# Patient Record
Sex: FEMALE | Race: WHITE | NOT HISPANIC OR LATINO | Employment: OTHER | URBAN - METROPOLITAN AREA
[De-identification: names, ages, dates, MRNs, and addresses within clinical notes are randomized per-mention and may not be internally consistent; named-entity substitution may affect disease eponyms.]

---

## 2017-02-16 ENCOUNTER — HOSPITAL ENCOUNTER (OUTPATIENT)
Dept: PAIN MANAGEMENT | Facility: REHABILITATION | Age: 57
End: 2017-02-16
Attending: PHYSICAL MEDICINE & REHABILITATION
Payer: MEDICAID

## 2017-02-16 ENCOUNTER — HOSPITAL ENCOUNTER (OUTPATIENT)
Dept: RADIOLOGY | Facility: REHABILITATION | Age: 57
End: 2017-02-16
Attending: PHYSICAL MEDICINE & REHABILITATION
Payer: MEDICAID

## 2017-02-16 VITALS
OXYGEN SATURATION: 94 % | SYSTOLIC BLOOD PRESSURE: 133 MMHG | HEART RATE: 73 BPM | TEMPERATURE: 98.8 F | WEIGHT: 156.53 LBS | BODY MASS INDEX: 31.56 KG/M2 | HEIGHT: 59 IN | DIASTOLIC BLOOD PRESSURE: 79 MMHG | RESPIRATION RATE: 18 BRPM

## 2017-02-16 PROCEDURE — 700111 HCHG RX REV CODE 636 W/ 250 OVERRIDE (IP)

## 2017-02-16 PROCEDURE — 64494 INJ PARAVERT F JNT L/S 2 LEV: CPT

## 2017-02-16 PROCEDURE — 64495 INJ PARAVERT F JNT L/S 3 LEV: CPT

## 2017-02-16 PROCEDURE — 700117 HCHG RX CONTRAST REV CODE 255

## 2017-02-16 PROCEDURE — 64493 INJ PARAVERT F JNT L/S 1 LEV: CPT

## 2017-02-16 RX ORDER — ROPIVACAINE HYDROCHLORIDE 2 MG/ML
INJECTION, SOLUTION EPIDURAL; INFILTRATION; PERINEURAL
Status: COMPLETED
Start: 2017-02-16 | End: 2017-02-16

## 2017-02-16 RX ORDER — BUPIVACAINE HYDROCHLORIDE 5 MG/ML
INJECTION, SOLUTION EPIDURAL; INTRACAUDAL
Status: COMPLETED
Start: 2017-02-16 | End: 2017-02-16

## 2017-02-16 RX ADMIN — IOHEXOL 3 ML: 240 INJECTION, SOLUTION INTRATHECAL; INTRAVASCULAR; INTRAVENOUS; ORAL at 08:39

## 2017-02-16 RX ADMIN — ROPIVACAINE HYDROCHLORIDE 6 MG: 2 INJECTION, SOLUTION EPIDURAL; INFILTRATION at 08:43

## 2017-02-16 ASSESSMENT — PAIN SCALES - GENERAL
PAINLEVEL_OUTOF10: 7

## 2017-02-16 NOTE — PROGRESS NOTES
Current meds. See medication reconciliation form. Reviewed with pt. Pt had excedrin at 1700 yesterday (takes daily).Pt denies taking other blood thinners or anti-inflammatories.Dr. Pozo made aware pre-procedure. Pt has a ride post-procedure (friend, Viola is ). Printed and verbal discharge instructions given to pt who verbalized understanding.

## 2017-04-20 ENCOUNTER — HOSPITAL ENCOUNTER (OUTPATIENT)
Dept: PAIN MANAGEMENT | Facility: REHABILITATION | Age: 57
End: 2017-04-20
Attending: PHYSICAL MEDICINE & REHABILITATION
Payer: MEDICAID

## 2017-04-20 RX ORDER — BETAMETHASONE SODIUM PHOSPHATE AND BETAMETHASONE ACETATE 3; 3 MG/ML; MG/ML
INJECTION, SUSPENSION INTRA-ARTICULAR; INTRALESIONAL; INTRAMUSCULAR; SOFT TISSUE
Status: COMPLETED
Start: 2017-04-20 | End: 2017-04-20

## 2017-04-20 RX ORDER — MIDAZOLAM HYDROCHLORIDE 1 MG/ML
INJECTION INTRAMUSCULAR; INTRAVENOUS
Status: COMPLETED
Start: 2017-04-20 | End: 2017-04-20

## 2017-05-04 ENCOUNTER — HOSPITAL ENCOUNTER (OUTPATIENT)
Dept: RADIOLOGY | Facility: REHABILITATION | Age: 57
End: 2017-05-04
Attending: PHYSICAL MEDICINE & REHABILITATION
Payer: MEDICAID

## 2017-05-04 ENCOUNTER — HOSPITAL ENCOUNTER (OUTPATIENT)
Dept: PAIN MANAGEMENT | Facility: REHABILITATION | Age: 57
End: 2017-05-04
Attending: PHYSICAL MEDICINE & REHABILITATION
Payer: MEDICAID

## 2017-05-04 VITALS
WEIGHT: 165.57 LBS | OXYGEN SATURATION: 92 % | DIASTOLIC BLOOD PRESSURE: 57 MMHG | HEIGHT: 58 IN | SYSTOLIC BLOOD PRESSURE: 91 MMHG | TEMPERATURE: 97.4 F | HEART RATE: 61 BPM | BODY MASS INDEX: 34.75 KG/M2 | RESPIRATION RATE: 16 BRPM

## 2017-05-04 PROCEDURE — 700117 HCHG RX CONTRAST REV CODE 255

## 2017-05-04 PROCEDURE — 700111 HCHG RX REV CODE 636 W/ 250 OVERRIDE (IP)

## 2017-05-04 PROCEDURE — 64483 NJX AA&/STRD TFRM EPI L/S 1: CPT

## 2017-05-04 RX ORDER — MIDAZOLAM HYDROCHLORIDE 1 MG/ML
INJECTION INTRAMUSCULAR; INTRAVENOUS
Status: COMPLETED
Start: 2017-05-04 | End: 2017-05-04

## 2017-05-04 RX ORDER — BUPIVACAINE HYDROCHLORIDE 2.5 MG/ML
INJECTION, SOLUTION EPIDURAL; INFILTRATION; INTRACAUDAL
Status: COMPLETED
Start: 2017-05-04 | End: 2017-05-04

## 2017-05-04 RX ORDER — BETAMETHASONE SODIUM PHOSPHATE AND BETAMETHASONE ACETATE 3; 3 MG/ML; MG/ML
INJECTION, SUSPENSION INTRA-ARTICULAR; INTRALESIONAL; INTRAMUSCULAR; SOFT TISSUE
Status: COMPLETED
Start: 2017-05-04 | End: 2017-05-04

## 2017-05-04 RX ADMIN — MIDAZOLAM HYDROCHLORIDE 1 MG: 1 INJECTION, SOLUTION INTRAMUSCULAR; INTRAVENOUS at 10:10

## 2017-05-04 RX ADMIN — BUPIVACAINE HYDROCHLORIDE 5 ML: 2.5 INJECTION, SOLUTION EPIDURAL; INFILTRATION; INTRACAUDAL; PERINEURAL at 10:00

## 2017-05-04 RX ADMIN — FENTANYL CITRATE 50 MCG: 50 INJECTION, SOLUTION INTRAMUSCULAR; INTRAVENOUS at 10:10

## 2017-05-04 RX ADMIN — BETAMETHASONE SODIUM PHOSPHATE AND BETAMETHASONE ACETATE 6 MG: 3; 3 INJECTION, SUSPENSION INTRA-ARTICULAR; INTRALESIONAL; INTRAMUSCULAR at 10:00

## 2017-05-04 RX ADMIN — IOHEXOL 3 ML: 240 INJECTION, SOLUTION INTRATHECAL; INTRAVASCULAR; INTRAVENOUS; ORAL at 10:00

## 2017-05-04 ASSESSMENT — PAIN SCALES - GENERAL
PAINLEVEL_OUTOF10: 6
PAINLEVEL_OUTOF10: 0
PAINLEVEL_OUTOF10: 8

## 2017-05-04 NOTE — PROGRESS NOTES
Current medications reviewed with pt, see medications reconciliation form. Pt wilmer taking ASA or other blood thinners or anti-inflammatories.  Pt has a ride post-procedure(Ed to drive).  Printed and verbal discharge instructions given to pt who verbalized understanding.

## 2017-07-26 ENCOUNTER — HOSPITAL ENCOUNTER (OUTPATIENT)
Dept: PAIN MANAGEMENT | Facility: REHABILITATION | Age: 57
End: 2017-07-26
Attending: PHYSICAL MEDICINE & REHABILITATION
Payer: MEDICAID

## 2017-07-26 ENCOUNTER — HOSPITAL ENCOUNTER (OUTPATIENT)
Dept: RADIOLOGY | Facility: REHABILITATION | Age: 57
End: 2017-07-26
Attending: PHYSICAL MEDICINE & REHABILITATION
Payer: MEDICAID

## 2017-07-26 VITALS
OXYGEN SATURATION: 91 % | RESPIRATION RATE: 16 BRPM | TEMPERATURE: 97.8 F | SYSTOLIC BLOOD PRESSURE: 125 MMHG | HEART RATE: 58 BPM | HEIGHT: 58 IN | WEIGHT: 169.75 LBS | DIASTOLIC BLOOD PRESSURE: 74 MMHG | BODY MASS INDEX: 35.63 KG/M2

## 2017-07-26 PROCEDURE — 700117 HCHG RX CONTRAST REV CODE 255

## 2017-07-26 PROCEDURE — 64483 NJX AA&/STRD TFRM EPI L/S 1: CPT

## 2017-07-26 PROCEDURE — 62323 NJX INTERLAMINAR LMBR/SAC: CPT

## 2017-07-26 PROCEDURE — 700111 HCHG RX REV CODE 636 W/ 250 OVERRIDE (IP)

## 2017-07-26 RX ORDER — OMEPRAZOLE 20 MG/1
20 CAPSULE, DELAYED RELEASE ORAL DAILY
COMMUNITY
End: 2018-05-30 | Stop reason: SDUPTHER

## 2017-07-26 RX ORDER — BETAMETHASONE SODIUM PHOSPHATE AND BETAMETHASONE ACETATE 3; 3 MG/ML; MG/ML
INJECTION, SUSPENSION INTRA-ARTICULAR; INTRALESIONAL; INTRAMUSCULAR; SOFT TISSUE
Status: COMPLETED
Start: 2017-07-26 | End: 2017-07-26

## 2017-07-26 RX ORDER — BUPIVACAINE HYDROCHLORIDE 2.5 MG/ML
INJECTION, SOLUTION EPIDURAL; INFILTRATION; INTRACAUDAL
Status: COMPLETED
Start: 2017-07-26 | End: 2017-07-26

## 2017-07-26 RX ORDER — LIDOCAINE HYDROCHLORIDE 10 MG/ML
INJECTION, SOLUTION EPIDURAL; INFILTRATION; INTRACAUDAL; PERINEURAL
Status: COMPLETED
Start: 2017-07-26 | End: 2017-07-26

## 2017-07-26 RX ORDER — MIDAZOLAM HYDROCHLORIDE 1 MG/ML
INJECTION INTRAMUSCULAR; INTRAVENOUS
Status: COMPLETED
Start: 2017-07-26 | End: 2017-07-26

## 2017-07-26 RX ADMIN — BUPIVACAINE HYDROCHLORIDE 3 ML: 2.5 INJECTION, SOLUTION EPIDURAL; INFILTRATION; INTRACAUDAL; PERINEURAL at 15:30

## 2017-07-26 RX ADMIN — MIDAZOLAM HYDROCHLORIDE 1.5 MG: 1 INJECTION, SOLUTION INTRAMUSCULAR; INTRAVENOUS at 15:17

## 2017-07-26 RX ADMIN — BETAMETHASONE SODIUM PHOSPHATE AND BETAMETHASONE ACETATE 6 MG: 3; 3 INJECTION, SUSPENSION INTRA-ARTICULAR; INTRALESIONAL; INTRAMUSCULAR at 15:15

## 2017-07-26 RX ADMIN — IOHEXOL 3 ML: 240 INJECTION, SOLUTION INTRATHECAL; INTRAVASCULAR; INTRAVENOUS; ORAL at 15:15

## 2017-07-26 RX ADMIN — FENTANYL CITRATE 50 MCG: 50 INJECTION, SOLUTION INTRAMUSCULAR; INTRAVENOUS at 15:17

## 2017-07-26 ASSESSMENT — PAIN SCALES - GENERAL
PAINLEVEL_OUTOF10: 8
PAINLEVEL_OUTOF10: 7
PAINLEVEL_OUTOF10: 9

## 2017-09-07 ENCOUNTER — HOSPITAL ENCOUNTER (OUTPATIENT)
Dept: PAIN MANAGEMENT | Facility: REHABILITATION | Age: 57
End: 2017-09-07
Attending: PHYSICAL MEDICINE & REHABILITATION
Payer: MEDICAID

## 2017-09-07 ENCOUNTER — HOSPITAL ENCOUNTER (OUTPATIENT)
Dept: RADIOLOGY | Facility: REHABILITATION | Age: 57
End: 2017-09-07
Attending: PHYSICAL MEDICINE & REHABILITATION
Payer: MEDICAID

## 2017-09-07 VITALS
OXYGEN SATURATION: 96 % | HEIGHT: 58 IN | DIASTOLIC BLOOD PRESSURE: 62 MMHG | TEMPERATURE: 97.4 F | HEART RATE: 98 BPM | WEIGHT: 167.11 LBS | BODY MASS INDEX: 35.08 KG/M2 | RESPIRATION RATE: 16 BRPM | SYSTOLIC BLOOD PRESSURE: 119 MMHG

## 2017-09-07 PROCEDURE — 700101 HCHG RX REV CODE 250

## 2017-09-07 PROCEDURE — 64490 INJ PARAVERT F JNT C/T 1 LEV: CPT

## 2017-09-07 PROCEDURE — 700117 HCHG RX CONTRAST REV CODE 255

## 2017-09-07 PROCEDURE — 64491 INJ PARAVERT F JNT C/T 2 LEV: CPT

## 2017-09-07 PROCEDURE — 700111 HCHG RX REV CODE 636 W/ 250 OVERRIDE (IP)

## 2017-09-07 PROCEDURE — 64492 INJ PARAVERT F JNT C/T 3 LEV: CPT

## 2017-09-07 PROCEDURE — 99152 MOD SED SAME PHYS/QHP 5/>YRS: CPT

## 2017-09-07 RX ORDER — MIDAZOLAM HYDROCHLORIDE 1 MG/ML
INJECTION INTRAMUSCULAR; INTRAVENOUS
Status: COMPLETED
Start: 2017-09-07 | End: 2017-09-07

## 2017-09-07 RX ORDER — BUPIVACAINE HYDROCHLORIDE 5 MG/ML
INJECTION, SOLUTION EPIDURAL; INTRACAUDAL
Status: COMPLETED
Start: 2017-09-07 | End: 2017-09-07

## 2017-09-07 RX ORDER — HYDROCODONE BITARTRATE AND ACETAMINOPHEN 10; 325 MG/1; MG/1
1 TABLET ORAL EVERY 4 HOURS
COMMUNITY
End: 2020-08-20

## 2017-09-07 RX ADMIN — MIDAZOLAM HYDROCHLORIDE 1.5 MG: 1 INJECTION, SOLUTION INTRAMUSCULAR; INTRAVENOUS at 10:43

## 2017-09-07 RX ADMIN — MIDAZOLAM HYDROCHLORIDE 0.5 MG: 1 INJECTION, SOLUTION INTRAMUSCULAR; INTRAVENOUS at 10:53

## 2017-09-07 RX ADMIN — BUPIVACAINE HYDROCHLORIDE 3 ML: 5 INJECTION, SOLUTION EPIDURAL; INTRACAUDAL; PERINEURAL at 10:30

## 2017-09-07 RX ADMIN — IOHEXOL 3 ML: 240 INJECTION, SOLUTION INTRATHECAL; INTRAVASCULAR; INTRAVENOUS; ORAL at 10:30

## 2017-09-07 ASSESSMENT — PAIN SCALES - GENERAL
PAINLEVEL_OUTOF10: 7
PAINLEVEL_OUTOF10: 8
PAINLEVEL_OUTOF10: 7
PAINLEVEL_OUTOF10: 8

## 2017-09-07 NOTE — PROGRESS NOTES
Reviewed Plan of Care with patient. Confirmed that he/she has stopped all blood thinning medications for last 5 days  Confirmed that she has a . Reviewed home care instructions with patient prior to procedure. All questions and concerns addressed. Reviewed Pain diary.    Dr Pozo in to evaluate patient.
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
## 2017-09-07 NOTE — PROCEDURES
Date of Service: 9/7/2017    Physician/s: Celso Pozo MD    Pre-operative Diagnosis: Cervical spondylosis, facet arthropathy    Post-operative Diagnosis: Cervical spondylosis, facet arthropathy    Procedure: LEFT TON-C3-C4 Medial Branch Block #1    Description of procedure:    The risks, benefits, and alternatives of the procedure were reviewed and discussed with the patient.  Written informed consent was freely obtained. A pre-procedural time-out was conducted by the physician verifying patient’s identity, procedure to be performed, procedure site and side, and allergy verification. Appropriate equipment was determined to be in place for the procedure.     An IV was placed peripherally, and the patient received a low dose of IV Versed for anxiolysis. The patient's vital signs were carefully monitored before, throughout, and after the procedure.     In the fluoroscopy suite the patient was placed in a prone position, a pillow placed underneath their chest, and the head was turned to the opposite side of injection. The skin was prepped and draped in the usual sterile fashion. The fluoroscope was placed over the cervical neck at the appropriate injection angles, and the targets for injection were marked. A 25g needle was placed into each of the markings at the three levels, and approx 1cc of 1% Lidocaine was injected subcutaneously into the epidermal and dermal layers. The needle was removed. A 25g needle was then placed at the lateral aspect of the articular pillars, whereby the medial branch runs, at the C2, C3, C4 levels on the LEFT side. The needle tips were then verified by AP, contralateral oblique, and lateral views. In the AP view, less than 1 cc of contrast dye was used to highlight the medial branch while the fluoroscope was running live. Following negative aspiration, approx 0.5cc of 0.5% Marcaine was then injected at the above levels, and the needles were removed intact after restyleted. The  patient's back was covered with a 4x4 gauze, the area was cleansed with sterile normal saline, and a dressing was applied.     There were no complications noted, and patient was hemodynamically stable before and after the procedure.     Celso Pozo MD  PM&R/Pain Mgmt

## 2017-09-07 NOTE — PROGRESS NOTES
Reviewed instructions as well as pain diary. Pt states pain level improved. Emphasized need to hold off on analgesic for 8 hours.

## 2017-10-08 ENCOUNTER — APPOINTMENT (OUTPATIENT)
Dept: RADIOLOGY | Facility: MEDICAL CENTER | Age: 57
End: 2017-10-08
Attending: EMERGENCY MEDICINE
Payer: MEDICAID

## 2017-10-08 ENCOUNTER — HOSPITAL ENCOUNTER (OUTPATIENT)
Facility: MEDICAL CENTER | Age: 57
End: 2017-10-09
Attending: EMERGENCY MEDICINE | Admitting: INTERNAL MEDICINE
Payer: MEDICAID

## 2017-10-08 ENCOUNTER — APPOINTMENT (OUTPATIENT)
Dept: RADIOLOGY | Facility: MEDICAL CENTER | Age: 57
End: 2017-10-08
Payer: MEDICAID

## 2017-10-08 ENCOUNTER — RESOLUTE PROFESSIONAL BILLING HOSPITAL PROF FEE (OUTPATIENT)
Dept: HOSPITALIST | Facility: MEDICAL CENTER | Age: 57
End: 2017-10-08
Payer: MEDICAID

## 2017-10-08 DIAGNOSIS — R07.9 CHEST PAIN, UNSPECIFIED TYPE: ICD-10-CM

## 2017-10-08 DIAGNOSIS — G43.809 OTHER MIGRAINE WITHOUT STATUS MIGRAINOSUS, NOT INTRACTABLE: ICD-10-CM

## 2017-10-08 PROBLEM — G43.909 MIGRAINE HEADACHE: Status: ACTIVE | Noted: 2017-10-08

## 2017-10-08 PROBLEM — Z72.0 NICOTINE ABUSE: Status: ACTIVE | Noted: 2017-10-08

## 2017-10-08 LAB
ALBUMIN SERPL BCP-MCNC: 3.8 G/DL (ref 3.2–4.9)
ALBUMIN/GLOB SERPL: 1.2 G/DL
ALP SERPL-CCNC: 58 U/L (ref 30–99)
ALT SERPL-CCNC: 13 U/L (ref 2–50)
ANION GAP SERPL CALC-SCNC: 6 MMOL/L (ref 0–11.9)
APTT PPP: 28.5 SEC (ref 24.7–36)
AST SERPL-CCNC: 39 U/L (ref 12–45)
BASOPHILS # BLD AUTO: 0.7 % (ref 0–1.8)
BASOPHILS # BLD: 0.03 K/UL (ref 0–0.12)
BILIRUB SERPL-MCNC: 0.4 MG/DL (ref 0.1–1.5)
BNP SERPL-MCNC: 66 PG/ML (ref 0–100)
BUN SERPL-MCNC: 11 MG/DL (ref 8–22)
CALCIUM SERPL-MCNC: 9.6 MG/DL (ref 8.5–10.5)
CHLORIDE SERPL-SCNC: 104 MMOL/L (ref 96–112)
CO2 SERPL-SCNC: 27 MMOL/L (ref 20–33)
CREAT SERPL-MCNC: 0.86 MG/DL (ref 0.5–1.4)
DEPRECATED D DIMER PPP IA-ACNC: 217 NG/ML(D-DU)
EKG IMPRESSION: NORMAL
EKG IMPRESSION: NORMAL
EOSINOPHIL # BLD AUTO: 0.11 K/UL (ref 0–0.51)
EOSINOPHIL NFR BLD: 2.7 % (ref 0–6.9)
ERYTHROCYTE [DISTWIDTH] IN BLOOD BY AUTOMATED COUNT: 44 FL (ref 35.9–50)
GFR SERPL CREATININE-BSD FRML MDRD: >60 ML/MIN/1.73 M 2
GLOBULIN SER CALC-MCNC: 3.1 G/DL (ref 1.9–3.5)
GLUCOSE SERPL-MCNC: 105 MG/DL (ref 65–99)
HCT VFR BLD AUTO: 43.3 % (ref 37–47)
HGB BLD-MCNC: 14.1 G/DL (ref 12–16)
IMM GRANULOCYTES # BLD AUTO: 0.03 K/UL (ref 0–0.11)
IMM GRANULOCYTES NFR BLD AUTO: 0.7 % (ref 0–0.9)
INR PPP: 0.93 (ref 0.87–1.13)
LIPASE SERPL-CCNC: 22 U/L (ref 11–82)
LYMPHOCYTES # BLD AUTO: 1.47 K/UL (ref 1–4.8)
LYMPHOCYTES NFR BLD: 36.1 % (ref 22–41)
MCH RBC QN AUTO: 29.7 PG (ref 27–33)
MCHC RBC AUTO-ENTMCNC: 32.6 G/DL (ref 33.6–35)
MCV RBC AUTO: 91.4 FL (ref 81.4–97.8)
MONOCYTES # BLD AUTO: 0.24 K/UL (ref 0–0.85)
MONOCYTES NFR BLD AUTO: 5.9 % (ref 0–13.4)
NEUTROPHILS # BLD AUTO: 2.19 K/UL (ref 2–7.15)
NEUTROPHILS NFR BLD: 53.9 % (ref 44–72)
NRBC # BLD AUTO: 0 K/UL
NRBC BLD AUTO-RTO: 0 /100 WBC
PLATELET # BLD AUTO: 170 K/UL (ref 164–446)
PMV BLD AUTO: 10.3 FL (ref 9–12.9)
POTASSIUM SERPL-SCNC: 4.2 MMOL/L (ref 3.6–5.5)
PROT SERPL-MCNC: 6.9 G/DL (ref 6–8.2)
PROTHROMBIN TIME: 12.8 SEC (ref 12–14.6)
RBC # BLD AUTO: 4.74 M/UL (ref 4.2–5.4)
SODIUM SERPL-SCNC: 137 MMOL/L (ref 135–145)
TROPONIN I SERPL-MCNC: <0.01 NG/ML (ref 0–0.04)
TROPONIN I SERPL-MCNC: <0.01 NG/ML (ref 0–0.04)
WBC # BLD AUTO: 4.1 K/UL (ref 4.8–10.8)

## 2017-10-08 PROCEDURE — 700102 HCHG RX REV CODE 250 W/ 637 OVERRIDE(OP): Performed by: INTERNAL MEDICINE

## 2017-10-08 PROCEDURE — 93005 ELECTROCARDIOGRAM TRACING: CPT | Performed by: EMERGENCY MEDICINE

## 2017-10-08 PROCEDURE — 96374 THER/PROPH/DIAG INJ IV PUSH: CPT

## 2017-10-08 PROCEDURE — A9270 NON-COVERED ITEM OR SERVICE: HCPCS | Performed by: INTERNAL MEDICINE

## 2017-10-08 PROCEDURE — G0378 HOSPITAL OBSERVATION PER HR: HCPCS

## 2017-10-08 PROCEDURE — 99285 EMERGENCY DEPT VISIT HI MDM: CPT

## 2017-10-08 PROCEDURE — 80053 COMPREHEN METABOLIC PANEL: CPT

## 2017-10-08 PROCEDURE — 93005 ELECTROCARDIOGRAM TRACING: CPT | Performed by: INTERNAL MEDICINE

## 2017-10-08 PROCEDURE — 85610 PROTHROMBIN TIME: CPT

## 2017-10-08 PROCEDURE — 84484 ASSAY OF TROPONIN QUANT: CPT

## 2017-10-08 PROCEDURE — 83690 ASSAY OF LIPASE: CPT

## 2017-10-08 PROCEDURE — 36415 COLL VENOUS BLD VENIPUNCTURE: CPT

## 2017-10-08 PROCEDURE — 93010 ELECTROCARDIOGRAM REPORT: CPT | Performed by: INTERNAL MEDICINE

## 2017-10-08 PROCEDURE — 85730 THROMBOPLASTIN TIME PARTIAL: CPT

## 2017-10-08 PROCEDURE — 96375 TX/PRO/DX INJ NEW DRUG ADDON: CPT

## 2017-10-08 PROCEDURE — 700105 HCHG RX REV CODE 258: Performed by: INTERNAL MEDICINE

## 2017-10-08 PROCEDURE — 85025 COMPLETE CBC W/AUTO DIFF WBC: CPT

## 2017-10-08 PROCEDURE — 71010 DX-CHEST-LIMITED (1 VIEW): CPT

## 2017-10-08 PROCEDURE — 99220 PR INITIAL OBSERVATION CARE,LEVL III: CPT | Performed by: INTERNAL MEDICINE

## 2017-10-08 PROCEDURE — 700111 HCHG RX REV CODE 636 W/ 250 OVERRIDE (IP): Performed by: INTERNAL MEDICINE

## 2017-10-08 PROCEDURE — 85379 FIBRIN DEGRADATION QUANT: CPT

## 2017-10-08 PROCEDURE — 83880 ASSAY OF NATRIURETIC PEPTIDE: CPT

## 2017-10-08 PROCEDURE — 700111 HCHG RX REV CODE 636 W/ 250 OVERRIDE (IP): Performed by: EMERGENCY MEDICINE

## 2017-10-08 PROCEDURE — 93005 ELECTROCARDIOGRAM TRACING: CPT

## 2017-10-08 PROCEDURE — 96372 THER/PROPH/DIAG INJ SC/IM: CPT | Mod: XU

## 2017-10-08 RX ORDER — ONDANSETRON 2 MG/ML
4 INJECTION INTRAMUSCULAR; INTRAVENOUS ONCE
Status: COMPLETED | OUTPATIENT
Start: 2017-10-08 | End: 2017-10-08

## 2017-10-08 RX ORDER — MAGNESIUM GLUCONATE 27 MG(500)
500 TABLET ORAL DAILY
Status: SHIPPED | COMMUNITY
End: 2021-10-20

## 2017-10-08 RX ORDER — ASPIRIN 300 MG/1
300 SUPPOSITORY RECTAL DAILY
Status: DISCONTINUED | OUTPATIENT
Start: 2017-10-08 | End: 2017-10-09 | Stop reason: HOSPADM

## 2017-10-08 RX ORDER — ASPIRIN 325 MG
325 TABLET ORAL DAILY
Status: DISCONTINUED | OUTPATIENT
Start: 2017-10-08 | End: 2017-10-09 | Stop reason: HOSPADM

## 2017-10-08 RX ORDER — DULOXETIN HYDROCHLORIDE 30 MG/1
60 CAPSULE, DELAYED RELEASE ORAL DAILY
COMMUNITY
End: 2017-12-26

## 2017-10-08 RX ORDER — CYCLOBENZAPRINE HCL 10 MG
10 TABLET ORAL
Status: DISCONTINUED | OUTPATIENT
Start: 2017-10-08 | End: 2017-10-09 | Stop reason: HOSPADM

## 2017-10-08 RX ORDER — SUMATRIPTAN 50 MG/1
50 TABLET, FILM COATED ORAL
COMMUNITY
End: 2018-01-29 | Stop reason: SDUPTHER

## 2017-10-08 RX ORDER — SODIUM CHLORIDE 9 MG/ML
INJECTION, SOLUTION INTRAVENOUS CONTINUOUS
Status: DISCONTINUED | OUTPATIENT
Start: 2017-10-08 | End: 2017-10-09 | Stop reason: HOSPADM

## 2017-10-08 RX ORDER — OMEPRAZOLE 20 MG/1
20 CAPSULE, DELAYED RELEASE ORAL DAILY
Status: DISCONTINUED | OUTPATIENT
Start: 2017-10-09 | End: 2017-10-09 | Stop reason: HOSPADM

## 2017-10-08 RX ORDER — AMOXICILLIN 250 MG
2 CAPSULE ORAL 2 TIMES DAILY
Status: DISCONTINUED | OUTPATIENT
Start: 2017-10-08 | End: 2017-10-09 | Stop reason: HOSPADM

## 2017-10-08 RX ORDER — TRAZODONE HYDROCHLORIDE 100 MG/1
300 TABLET ORAL
COMMUNITY
End: 2018-08-01 | Stop reason: SDUPTHER

## 2017-10-08 RX ORDER — SUMATRIPTAN 50 MG/1
50 TABLET, FILM COATED ORAL
Status: DISCONTINUED | OUTPATIENT
Start: 2017-10-08 | End: 2017-10-09 | Stop reason: HOSPADM

## 2017-10-08 RX ORDER — PROMETHAZINE HYDROCHLORIDE 25 MG/1
12.5 TABLET ORAL
COMMUNITY
End: 2018-05-12

## 2017-10-08 RX ORDER — POLYETHYLENE GLYCOL 3350 17 G/17G
1 POWDER, FOR SOLUTION ORAL
Status: DISCONTINUED | OUTPATIENT
Start: 2017-10-08 | End: 2017-10-09 | Stop reason: HOSPADM

## 2017-10-08 RX ORDER — PROMETHAZINE HYDROCHLORIDE 25 MG/1
12.5-25 SUPPOSITORY RECTAL EVERY 4 HOURS PRN
Status: DISCONTINUED | OUTPATIENT
Start: 2017-10-08 | End: 2017-10-09 | Stop reason: HOSPADM

## 2017-10-08 RX ORDER — DIPHENHYDRAMINE HCL 25 MG
25 TABLET ORAL 3 TIMES DAILY PRN
COMMUNITY
End: 2017-12-26

## 2017-10-08 RX ORDER — HEPARIN SODIUM 5000 [USP'U]/ML
5000 INJECTION, SOLUTION INTRAVENOUS; SUBCUTANEOUS EVERY 8 HOURS
Status: DISCONTINUED | OUTPATIENT
Start: 2017-10-08 | End: 2017-10-09 | Stop reason: HOSPADM

## 2017-10-08 RX ORDER — ASPIRIN 81 MG/1
324 TABLET, CHEWABLE ORAL DAILY
Status: DISCONTINUED | OUTPATIENT
Start: 2017-10-08 | End: 2017-10-09 | Stop reason: HOSPADM

## 2017-10-08 RX ORDER — MORPHINE SULFATE 4 MG/ML
2 INJECTION, SOLUTION INTRAMUSCULAR; INTRAVENOUS EVERY 4 HOURS PRN
Status: DISCONTINUED | OUTPATIENT
Start: 2017-10-08 | End: 2017-10-09 | Stop reason: HOSPADM

## 2017-10-08 RX ORDER — ONDANSETRON 2 MG/ML
4 INJECTION INTRAMUSCULAR; INTRAVENOUS EVERY 4 HOURS PRN
Status: DISCONTINUED | OUTPATIENT
Start: 2017-10-08 | End: 2017-10-09 | Stop reason: HOSPADM

## 2017-10-08 RX ORDER — ONDANSETRON 4 MG/1
4 TABLET, ORALLY DISINTEGRATING ORAL EVERY 4 HOURS PRN
Status: DISCONTINUED | OUTPATIENT
Start: 2017-10-08 | End: 2017-10-09 | Stop reason: HOSPADM

## 2017-10-08 RX ORDER — TRAZODONE HYDROCHLORIDE 50 MG/1
200 TABLET ORAL
Status: DISCONTINUED | OUTPATIENT
Start: 2017-10-08 | End: 2017-10-09 | Stop reason: HOSPADM

## 2017-10-08 RX ORDER — PROMETHAZINE HYDROCHLORIDE 25 MG/1
12.5-25 TABLET ORAL EVERY 4 HOURS PRN
Status: DISCONTINUED | OUTPATIENT
Start: 2017-10-08 | End: 2017-10-09 | Stop reason: HOSPADM

## 2017-10-08 RX ORDER — GABAPENTIN 300 MG/1
300 CAPSULE ORAL 3 TIMES DAILY
Status: ON HOLD | COMMUNITY
End: 2018-05-17

## 2017-10-08 RX ORDER — HYDROCODONE BITARTRATE AND ACETAMINOPHEN 10; 325 MG/1; MG/1
1 TABLET ORAL EVERY 6 HOURS
Status: DISCONTINUED | OUTPATIENT
Start: 2017-10-08 | End: 2017-10-09 | Stop reason: HOSPADM

## 2017-10-08 RX ORDER — BISACODYL 10 MG
10 SUPPOSITORY, RECTAL RECTAL
Status: DISCONTINUED | OUTPATIENT
Start: 2017-10-08 | End: 2017-10-09 | Stop reason: HOSPADM

## 2017-10-08 RX ORDER — ACETAMINOPHEN 325 MG/1
650 TABLET ORAL EVERY 6 HOURS PRN
Status: DISCONTINUED | OUTPATIENT
Start: 2017-10-08 | End: 2017-10-09 | Stop reason: HOSPADM

## 2017-10-08 RX ORDER — BUSPIRONE HYDROCHLORIDE 10 MG/1
30 TABLET ORAL 2 TIMES DAILY
COMMUNITY
End: 2017-12-26

## 2017-10-08 RX ADMIN — SODIUM CHLORIDE: 9 INJECTION, SOLUTION INTRAVENOUS at 22:31

## 2017-10-08 RX ADMIN — ONDANSETRON 4 MG: 2 INJECTION INTRAMUSCULAR; INTRAVENOUS at 18:12

## 2017-10-08 RX ADMIN — SUMATRIPTAN SUCCINATE 50 MG: 50 TABLET ORAL at 21:53

## 2017-10-08 RX ADMIN — STANDARDIZED SENNA CONCENTRATE AND DOCUSATE SODIUM 2 TABLET: 8.6; 5 TABLET, FILM COATED ORAL at 21:54

## 2017-10-08 RX ADMIN — HYDROMORPHONE HYDROCHLORIDE 0.5 MG: 1 INJECTION, SOLUTION INTRAMUSCULAR; INTRAVENOUS; SUBCUTANEOUS at 18:14

## 2017-10-08 RX ADMIN — CYCLOBENZAPRINE 10 MG: 10 TABLET, FILM COATED ORAL at 21:55

## 2017-10-08 RX ADMIN — HEPARIN SODIUM 5000 UNITS: 5000 INJECTION, SOLUTION INTRAVENOUS; SUBCUTANEOUS at 21:57

## 2017-10-08 RX ADMIN — HYDROCODONE BITARTRATE AND ACETAMINOPHEN 1 TABLET: 10; 325 TABLET ORAL at 20:47

## 2017-10-08 RX ADMIN — TRAZODONE HYDROCHLORIDE 200 MG: 50 TABLET ORAL at 21:54

## 2017-10-08 RX ADMIN — ASPIRIN 324 MG: 81 TABLET, CHEWABLE ORAL at 21:53

## 2017-10-08 ASSESSMENT — ENCOUNTER SYMPTOMS
MYALGIAS: 0
FOCAL WEAKNESS: 0
SHORTNESS OF BREATH: 1
FALLS: 0
INSOMNIA: 0
NERVOUS/ANXIOUS: 0
SHORTNESS OF BREATH: 0
DEPRESSION: 0
PHOTOPHOBIA: 1
EYE PAIN: 0
DIARRHEA: 0
NAUSEA: 1
CHILLS: 0
PALPITATIONS: 0
TINGLING: 1
NECK PAIN: 0
ORTHOPNEA: 0
COUGH: 0
HEADACHES: 1
DIZZINESS: 0
ABDOMINAL PAIN: 0
STRIDOR: 0
FEVER: 0
HEARTBURN: 0
COUGH: 1
SENSORY CHANGE: 1
WEIGHT LOSS: 0
BLURRED VISION: 0
SPUTUM PRODUCTION: 0
BACK PAIN: 0
VOMITING: 0
NAUSEA: 0
EYE REDNESS: 0
SPEECH CHANGE: 0
EYE DISCHARGE: 0
NECK PAIN: 1
SEIZURES: 0

## 2017-10-08 ASSESSMENT — LIFESTYLE VARIABLES
TOTAL SCORE: 0
EVER FELT BAD OR GUILTY ABOUT YOUR DRINKING: NO
EVER HAD A DRINK FIRST THING IN THE MORNING TO STEADY YOUR NERVES TO GET RID OF A HANGOVER: NO
CONSUMPTION TOTAL: NEGATIVE
ON A TYPICAL DAY WHEN YOU DRINK ALCOHOL HOW MANY DRINKS DO YOU HAVE: 2
EVER_SMOKED: YES
HOW MANY TIMES IN THE PAST YEAR HAVE YOU HAD 5 OR MORE DRINKS IN A DAY: 0
AVERAGE NUMBER OF DAYS PER WEEK YOU HAVE A DRINK CONTAINING ALCOHOL: 1
ALCOHOL_USE: YES
HAVE PEOPLE ANNOYED YOU BY CRITICIZING YOUR DRINKING: NO
TOTAL SCORE: 0
TOTAL SCORE: 0
HAVE YOU EVER FELT YOU SHOULD CUT DOWN ON YOUR DRINKING: NO

## 2017-10-08 ASSESSMENT — PATIENT HEALTH QUESTIONNAIRE - PHQ9
2. FEELING DOWN, DEPRESSED, IRRITABLE, OR HOPELESS: NOT AT ALL
SUM OF ALL RESPONSES TO PHQ QUESTIONS 1-9: 0
1. LITTLE INTEREST OR PLEASURE IN DOING THINGS: NOT AT ALL
SUM OF ALL RESPONSES TO PHQ9 QUESTIONS 1 AND 2: 0

## 2017-10-08 ASSESSMENT — PAIN SCALES - GENERAL
PAINLEVEL_OUTOF10: 9
PAINLEVEL_OUTOF10: 10
PAINLEVEL_OUTOF10: 9
PAINLEVEL_OUTOF10: 4
PAINLEVEL_OUTOF10: 9

## 2017-10-08 NOTE — ED PROVIDER NOTES
ED Provider Note    Scribed for Grady Lucas M.D. by Rolando Basurto. 10/8/2017, 4:59 PM.    Primary care provider: [facility]  Means of arrival: Walk in  History obtained from: Patient  History limited by: None    CHIEF COMPLAINT  Chief Complaint   Patient presents with   • Chest Pain   • Migraine     HPI  Martha Mosher is a 57 y.o. female who presents to the Emergency Department complaining of left sided chest pain, onset three weeks. The patient notes that her pain radiates to her left breast and is described as a pressure. Per patient her pain is intermittent and severe. This pain reportedly lasts for hours. She reports a history of DVT when she was a child. Associated symptoms include shortness of breath and nausea. She denies any history of cardiac issues. Patient denies a history of diabetes or hypertension. Her pain has been intermittently treated with Norco that she takes for chronic pain.     She reports an associated persistent ocular and occipital migraine at this time. Her headache radiates into her upper neck Per patient her migraine is severe in nature. The headache started a couple days ago and has waxed and waned.  It crescendoed over about 2 hours.  It is fairly typical in nature, location, and she has no trauma, or focal neurologic symptoms or fevers or chills.  She has been taking Imitrex for her chronic migraines with only intermittent relief of her pain. Associated symptoms include myalgias, and cough. Patient reports chronic numbness and tingling to her legs which has been unchanged. She notes that the location of her headache is consistent with her history of migraine. The patient states the only change to her migraine is in the severity.     REVIEW OF SYSTEMS  Review of Systems   Eyes: Positive for photophobia.   Respiratory: Positive for cough and shortness of breath.    Cardiovascular: Positive for chest pain.   Gastrointestinal: Positive for nausea. Negative for vomiting.    Musculoskeletal: Positive for neck pain. Negative for falls.   Neurological: Positive for tingling (chronic and unchanged), sensory change (chronic and unchanged) and headaches. Negative for speech change and focal weakness.   All other systems reviewed and are negative.    C.      PAST MEDICAL HISTORY   has a past medical history of Allergy, unspecified not elsewhere classified; Anxiety; Arthritis; Cancer; Chronic airway obstruction, not elsewhere classified; Depression; Fibromyalgia; GERD (gastroesophageal reflux disease); Hypertension; Indigestion; Migraine; Osteoporosis, unspecified; Other emphysema; Other specified symptom associated with female genital organs; Renal disorder; Type II or unspecified type diabetes mellitus without mention of complication, not stated as uncontrolled; Ulcer; Unspecified asthma(493.90); Unspecified cataract; and Urolithiasis.    SURGICAL HISTORY   has a past surgical history that includes gastroscopy with biopsy (3/1/2009); colonoscopy with biopsy (8/3/2009); other abdominal surgery; gyn surgery; other; appendectomy; primary c section; hernia repair; and abdominal hysterectomy total.    SOCIAL HISTORY  Social History   Substance Use Topics   • Smoking status: Current Every Day Smoker     Packs/day: 0.50     Types: Cigarettes      Comment: 1ppd - quit 7-8 months ago   • Alcohol use No      History   Drug Use   • Types: Inhaled     Comment: Meth     FAMILY HISTORY  Family History   Problem Relation Age of Onset   • Lung Disease Mother    • Diabetes Mother    • Stroke Mother    • Arthritis Father    • Cancer Father    • Diabetes Father    • Hypertension Father    • Stroke Father    • Alcohol/Drug Father    • Hypertension Brother    • Stroke Brother    • Alcohol/Drug Brother    • Arthritis Maternal Aunt    • Arthritis Maternal Uncle    • Cancer Paternal Grandmother    • Cancer Paternal Grandfather      CURRENT MEDICATIONS  No current facility-administered medications on file prior to  "encounter.      Current Outpatient Prescriptions on File Prior to Encounter   Medication Sig Dispense Refill   • hydrocodone/acetaminophen (NORCO)  MG Tab Take 1-2 Tabs by mouth every 6 hours as needed.     • omeprazole (PRILOSEC) 20 MG delayed-release capsule Take 20 mg by mouth every day.     • TRAZODONE HCL PO Take  by mouth.     • Gabapentin (NEURONTIN PO) Take  by mouth.     • SUMAtriptan Succinate (IMITREX PO) Take  by mouth.     • Misc. Devices (FOLDING WALKER/ADULT) Misc 1 Device by Does not apply route 1 time daily as needed. 1 Each 0   • cyclobenzaprine (FLEXERIL) 10 MG Tab Take 10 mg by mouth 3 times a day.     • promethazine (PHENERGAN) 25 MG Tab Take 25 mg by mouth every 6 hours as needed for Nausea/Vomiting.     • asa/apap/caffeine (EXCEDRIN) 250-250-65 MG Tab Take 1 Tab by mouth every 6 hours as needed for Headache.       ALLERGIES  Allergies   Allergen Reactions   • Lyrica Unspecified     Hallucinations   • Sulfa Drugs Hives and Unspecified     Pt states that she hallucinates on this as well as getting hives       PHYSICAL EXAM  VITAL SIGNS: /80   Pulse 71   Temp 36.4 °C (97.6 °F) (Temporal)   Resp 18   Ht 1.473 m (4' 10\")   Wt 72.6 kg (160 lb)   SpO2 93%   BMI 33.44 kg/m²   Vitals reviewed.  Constitutional:Regular appears comfortable in no acute cardiopulmonary distress.  HENT: Normocephalic, Atraumatic, Bilateral external ears normal, Oropharynx moist, No oral exudates, Nose normal.   Eyes: PERRL, EOMI, Conjunctiva normal, No discharge.   Neck: Normal range of motion, No tenderness, Supple, No stridor.   Cardiovascular: Normal heart rate, Normal rhythm, No murmurs, No rubs, No gallops.   Thorax & Lungs: Normal breath sounds, No respiratory distress, No wheezing, No chest tenderness.   Abdomen: Bowel sounds normal, Soft, No tenderness  Skin: Warm, Dry, No erythema, No rash.   Back: No tenderness, No CVA tenderness.   Musculoskeletal: Good range of motion in all major joints. No " edema. No tenderness to palpation or major deformities noted. Good pulses in all extremities.   Neurologic: Alert, Normal motor function, Normal sensory function, No focal deficits noted.   Psychiatric: Affect normal    LABS  Results for orders placed or performed during the hospital encounter of 10/08/17   Troponin   Result Value Ref Range    Troponin I <0.01 0.00 - 0.04 ng/mL   Btype Natriuretic Peptide   Result Value Ref Range    B Natriuretic Peptide 66 0 - 100 pg/mL   CBC with Differential   Result Value Ref Range    WBC 4.1 (L) 4.8 - 10.8 K/uL    RBC 4.74 4.20 - 5.40 M/uL    Hemoglobin 14.1 12.0 - 16.0 g/dL    Hematocrit 43.3 37.0 - 47.0 %    MCV 91.4 81.4 - 97.8 fL    MCH 29.7 27.0 - 33.0 pg    MCHC 32.6 (L) 33.6 - 35.0 g/dL    RDW 44.0 35.9 - 50.0 fL    Platelet Count 170 164 - 446 K/uL    MPV 10.3 9.0 - 12.9 fL    Neutrophils-Polys 53.90 44.00 - 72.00 %    Lymphocytes 36.10 22.00 - 41.00 %    Monocytes 5.90 0.00 - 13.40 %    Eosinophils 2.70 0.00 - 6.90 %    Basophils 0.70 0.00 - 1.80 %    Immature Granulocytes 0.70 0.00 - 0.90 %    Nucleated RBC 0.00 /100 WBC    Neutrophils (Absolute) 2.19 2.00 - 7.15 K/uL    Lymphs (Absolute) 1.47 1.00 - 4.80 K/uL    Monos (Absolute) 0.24 0.00 - 0.85 K/uL    Eos (Absolute) 0.11 0.00 - 0.51 K/uL    Baso (Absolute) 0.03 0.00 - 0.12 K/uL    Immature Granulocytes (abs) 0.03 0.00 - 0.11 K/uL    NRBC (Absolute) 0.00 K/uL   Complete Metabolic Panel (CMP)   Result Value Ref Range    Sodium 137 135 - 145 mmol/L    Potassium 4.2 3.6 - 5.5 mmol/L    Chloride 104 96 - 112 mmol/L    Co2 27 20 - 33 mmol/L    Anion Gap 6.0 0.0 - 11.9    Glucose 105 (H) 65 - 99 mg/dL    Bun 11 8 - 22 mg/dL    Creatinine 0.86 0.50 - 1.40 mg/dL    Calcium 9.6 8.5 - 10.5 mg/dL    AST(SGOT) 39 12 - 45 U/L    ALT(SGPT) 13 2 - 50 U/L    Alkaline Phosphatase 58 30 - 99 U/L    Total Bilirubin 0.4 0.1 - 1.5 mg/dL    Albumin 3.8 3.2 - 4.9 g/dL    Total Protein 6.9 6.0 - 8.2 g/dL    Globulin 3.1 1.9 - 3.5 g/dL     A-G Ratio 1.2 g/dL   Prothrombin Time   Result Value Ref Range    PT 12.8 12.0 - 14.6 sec    INR 0.93 0.87 - 1.13   APTT   Result Value Ref Range    APTT 28.5 24.7 - 36.0 sec   Lipase   Result Value Ref Range    Lipase 22 11 - 82 U/L   ESTIMATED GFR   Result Value Ref Range    GFR If African American >60 >60 mL/min/1.73 m 2    GFR If Non African American >60 >60 mL/min/1.73 m 2   EKG (NOW)   Result Value Ref Range    Report       [facility] Emergency Dept.    Test Date:  2017-10-08  Pt Name:    EDENILSON FLETCHER             Department: ER  MRN:        [id number]                      Room:  Gender:     F                            [facility]  :        [id number]                   Requested By:ER TRIAGE PROTOCOL  Order #:    [id number]                    Reading MD:    Measurements  Intervals                                Axis  Rate:       68                           P:          66  CT:         132                          QRS:        68  QRSD:       132                          T:          52  QT:         428  QTc:        456    Interpretive Statements  SINUS RHYTHM  RIGHT BUNDLE BRANCH BLOCK  Compared to ECG 06/10/2010 12:57:14  Sinus bradycardia no longer present  Myocardial infarct finding no longer present       All labs reviewed by me.    EKG Interpretation  Interpreted by me    Rhythm:  Normal sinus rhythm   Rate: 68  Axis: normal  Ectopy: none  Conduction:Right bundle branch morphology with asymmetric anterior ST depressions.  ST Segments: no acute change  T Waves: no acute change  Q Waves: none  Clinical Impression: Normal sinus rhythm with) block and no old EKG for comparison    RADIOLOGY  DX-CHEST-LIMITED (1 VIEW)   Final Result         Bibasal atelectasis.      NM-CARDIAC STRESS TEST    (Results Pending)     The radiologist's interpretation of all radiological studies have been reviewed by me.    COURSE & MEDICAL DECISION MAKING  Pertinent Labs & Imaging studies reviewed. (See  chart for details)    4:59 PM Patient seen and examined at bedside. The patient presents with A headache and chest pain.  And the differential diagnosis includes but is not limited to her headache appears to be acute exacerbation of her chronic headache.  Chest pain is concerning for ACS, PE, dissection, and I. Ordered for chest x ray, troponin, estimated GFR, BNP, CBC, CMP, prothrombin time, APTT, lipase, and EKG to evaluate. Patient will be treated with Dilaudid and Zofran for her symptoms.      5:55 PM - I discussed the case with Dr. Alonso Dobbs. He is aware of the patient and agrees to admit the patient into his care.    The patient has a headache but this appears to be an acute exacerbation of chronic headache.  She has a normal neurologic exam.  She does not have a history that suggests a subarachnoid hemorrhage is not a thunderclap headache.  There is no fever, nuchal rigidity.  There is no trauma.  I doubt this is a hemorrhage.  I don't think she would benefit from a head CT at this time feel that her for pain control and observation.    The patient has chest pain is concerning for ACS as well as an abnormal EKG.  Her troponin is negative and she's been having intermittent crescendoing chest pain.  Certainly I don't think we have excluded this.  In the ER with a troponin or 2.  Her EKG is abnormal.  Her chest x-ray does not show any evidence of atelectasis.  There is no evidence of pneumonia, pneumothorax.  Her clinical history is not use of a dissection or PE.  Should be admitted for QT workup and treatment.    Old chart was reviewed.  She did have a stress test in 2012.  She is multiple cardiac risk factors.  Because of her stuttering and crescendoing chest pain.  She'll be admitted to the hospital for continued workup and treat him.    DISPOSITION:  Patient will be admitted to Dr. Alonso Dobbs in guarded condition.    FINAL IMPRESSION  1. Chest pain, unspecified type    2. Other migraine without  status migrainosus, not intractable          IRolando (Scribe), am scribing for, and in the presence of, Grady Lucas M.D..    Electronically signed by: Rolando Basurto (Scribe), 10/8/2017    Grady DALAL M.D. personally performed the services described in this documentation, as scribed by Rolando Basurto in my presence, and it is both accurate and complete.    The note accurately reflects work and decisions made by me.  Grady Lucas  10/8/2017  11:16 PM

## 2017-10-08 NOTE — ED NOTES
Pt to triage c/o chest pain radiating under breast for weeks. (+) Nausea. Pt c/o chronic migraine x 5 days. Pt used home meds with no relief. HX of migraines.   Pt advised to return to triage nurse for any changes or concerns.

## 2017-10-09 ENCOUNTER — APPOINTMENT (OUTPATIENT)
Dept: RADIOLOGY | Facility: MEDICAL CENTER | Age: 57
End: 2017-10-09
Attending: INTERNAL MEDICINE
Payer: MEDICAID

## 2017-10-09 VITALS
WEIGHT: 177.03 LBS | BODY MASS INDEX: 37.16 KG/M2 | OXYGEN SATURATION: 96 % | DIASTOLIC BLOOD PRESSURE: 57 MMHG | RESPIRATION RATE: 18 BRPM | TEMPERATURE: 97.5 F | SYSTOLIC BLOOD PRESSURE: 110 MMHG | HEART RATE: 63 BPM | HEIGHT: 58 IN

## 2017-10-09 PROBLEM — G43.909 MIGRAINE HEADACHE: Status: RESOLVED | Noted: 2017-10-08 | Resolved: 2017-10-09

## 2017-10-09 PROBLEM — R07.9 CHEST PAIN: Status: RESOLVED | Noted: 2017-10-08 | Resolved: 2017-10-09

## 2017-10-09 LAB
ALBUMIN SERPL BCP-MCNC: 3.4 G/DL (ref 3.2–4.9)
ALBUMIN/GLOB SERPL: 1.3 G/DL
ALP SERPL-CCNC: 50 U/L (ref 30–99)
ALT SERPL-CCNC: 11 U/L (ref 2–50)
ANION GAP SERPL CALC-SCNC: 4 MMOL/L (ref 0–11.9)
AST SERPL-CCNC: 34 U/L (ref 12–45)
BILIRUB SERPL-MCNC: 0.2 MG/DL (ref 0.1–1.5)
BUN SERPL-MCNC: 13 MG/DL (ref 8–22)
CALCIUM SERPL-MCNC: 9.1 MG/DL (ref 8.5–10.5)
CHLORIDE SERPL-SCNC: 106 MMOL/L (ref 96–112)
CO2 SERPL-SCNC: 27 MMOL/L (ref 20–33)
CREAT SERPL-MCNC: 0.84 MG/DL (ref 0.5–1.4)
EKG IMPRESSION: NORMAL
ERYTHROCYTE [DISTWIDTH] IN BLOOD BY AUTOMATED COUNT: 44.4 FL (ref 35.9–50)
GFR SERPL CREATININE-BSD FRML MDRD: >60 ML/MIN/1.73 M 2
GLOBULIN SER CALC-MCNC: 2.7 G/DL (ref 1.9–3.5)
GLUCOSE SERPL-MCNC: 120 MG/DL (ref 65–99)
HCT VFR BLD AUTO: 39.4 % (ref 37–47)
HGB BLD-MCNC: 12.6 G/DL (ref 12–16)
MCH RBC QN AUTO: 29.4 PG (ref 27–33)
MCHC RBC AUTO-ENTMCNC: 32 G/DL (ref 33.6–35)
MCV RBC AUTO: 91.8 FL (ref 81.4–97.8)
PLATELET # BLD AUTO: 157 K/UL (ref 164–446)
PMV BLD AUTO: 10 FL (ref 9–12.9)
POTASSIUM SERPL-SCNC: 4.2 MMOL/L (ref 3.6–5.5)
PROT SERPL-MCNC: 6.1 G/DL (ref 6–8.2)
RBC # BLD AUTO: 4.29 M/UL (ref 4.2–5.4)
SODIUM SERPL-SCNC: 137 MMOL/L (ref 135–145)
TROPONIN I SERPL-MCNC: <0.01 NG/ML (ref 0–0.04)
WBC # BLD AUTO: 3.3 K/UL (ref 4.8–10.8)

## 2017-10-09 PROCEDURE — 84484 ASSAY OF TROPONIN QUANT: CPT

## 2017-10-09 PROCEDURE — 90686 IIV4 VACC NO PRSV 0.5 ML IM: CPT | Performed by: INTERNAL MEDICINE

## 2017-10-09 PROCEDURE — 99217 PR OBSERVATION CARE DISCHARGE: CPT | Performed by: HOSPITALIST

## 2017-10-09 PROCEDURE — 90471 IMMUNIZATION ADMIN: CPT

## 2017-10-09 PROCEDURE — A9502 TC99M TETROFOSMIN: HCPCS

## 2017-10-09 PROCEDURE — G0378 HOSPITAL OBSERVATION PER HR: HCPCS

## 2017-10-09 PROCEDURE — 96375 TX/PRO/DX INJ NEW DRUG ADDON: CPT | Mod: XU

## 2017-10-09 PROCEDURE — 700111 HCHG RX REV CODE 636 W/ 250 OVERRIDE (IP): Performed by: INTERNAL MEDICINE

## 2017-10-09 PROCEDURE — 96374 THER/PROPH/DIAG INJ IV PUSH: CPT

## 2017-10-09 PROCEDURE — 700102 HCHG RX REV CODE 250 W/ 637 OVERRIDE(OP): Performed by: INTERNAL MEDICINE

## 2017-10-09 PROCEDURE — 85027 COMPLETE CBC AUTOMATED: CPT

## 2017-10-09 PROCEDURE — 80053 COMPREHEN METABOLIC PANEL: CPT

## 2017-10-09 PROCEDURE — A9270 NON-COVERED ITEM OR SERVICE: HCPCS | Performed by: INTERNAL MEDICINE

## 2017-10-09 PROCEDURE — 700111 HCHG RX REV CODE 636 W/ 250 OVERRIDE (IP)

## 2017-10-09 PROCEDURE — 36415 COLL VENOUS BLD VENIPUNCTURE: CPT

## 2017-10-09 PROCEDURE — 96372 THER/PROPH/DIAG INJ SC/IM: CPT | Mod: XU

## 2017-10-09 RX ORDER — REGADENOSON 0.08 MG/ML
INJECTION, SOLUTION INTRAVENOUS
Status: COMPLETED
Start: 2017-10-09 | End: 2017-10-09

## 2017-10-09 RX ADMIN — HEPARIN SODIUM 5000 UNITS: 5000 INJECTION, SOLUTION INTRAVENOUS; SUBCUTANEOUS at 13:55

## 2017-10-09 RX ADMIN — HEPARIN SODIUM 5000 UNITS: 5000 INJECTION, SOLUTION INTRAVENOUS; SUBCUTANEOUS at 06:00

## 2017-10-09 RX ADMIN — MORPHINE SULFATE 2 MG: 4 INJECTION INTRAVENOUS at 06:00

## 2017-10-09 RX ADMIN — STANDARDIZED SENNA CONCENTRATE AND DOCUSATE SODIUM 2 TABLET: 8.6; 5 TABLET, FILM COATED ORAL at 08:26

## 2017-10-09 RX ADMIN — OMEPRAZOLE 20 MG: 20 CAPSULE, DELAYED RELEASE ORAL at 08:25

## 2017-10-09 RX ADMIN — ACETAMINOPHEN, ASPIRIN AND CAFFEINE 2 TABLET: 250; 250; 65 TABLET, FILM COATED ORAL at 00:36

## 2017-10-09 RX ADMIN — ASPIRIN 324 MG: 81 TABLET, CHEWABLE ORAL at 08:25

## 2017-10-09 RX ADMIN — HYDROCODONE BITARTRATE AND ACETAMINOPHEN 1 TABLET: 10; 325 TABLET ORAL at 08:26

## 2017-10-09 RX ADMIN — REGADENOSON 0.4 MG: 0.08 INJECTION, SOLUTION INTRAVENOUS at 11:33

## 2017-10-09 RX ADMIN — HYDROCODONE BITARTRATE AND ACETAMINOPHEN 1 TABLET: 10; 325 TABLET ORAL at 03:41

## 2017-10-09 RX ADMIN — INFLUENZA A VIRUS A/MICHIGAN/45/2015 X-275 (H1N1) ANTIGEN (FORMALDEHYDE INACTIVATED), INFLUENZA A VIRUS A/HONG KONG/4801/2014 X-263B (H3N2) ANTIGEN (FORMALDEHYDE INACTIVATED), INFLUENZA B VIRUS B/PHUKET/3073/2013 ANTIGEN (FORMALDEHYDE INACTIVATED), AND INFLUENZA B VIRUS B/BRISBANE/60/2008 ANTIGEN (FORMALDEHYDE INACTIVATED) 0.5 ML: 15; 15; 15; 15 INJECTION, SUSPENSION INTRAMUSCULAR at 08:27

## 2017-10-09 RX ADMIN — ACETAMINOPHEN, ASPIRIN AND CAFFEINE 2 TABLET: 250; 250; 65 TABLET, FILM COATED ORAL at 12:52

## 2017-10-09 RX ADMIN — HYDROCODONE BITARTRATE AND ACETAMINOPHEN 1 TABLET: 10; 325 TABLET ORAL at 13:55

## 2017-10-09 ASSESSMENT — PAIN SCALES - GENERAL
PAINLEVEL_OUTOF10: 8
PAINLEVEL_OUTOF10: 6
PAINLEVEL_OUTOF10: 4
PAINLEVEL_OUTOF10: 8
PAINLEVEL_OUTOF10: 8
PAINLEVEL_OUTOF10: 4

## 2017-10-09 ASSESSMENT — LIFESTYLE VARIABLES: EVER_SMOKED: YES

## 2017-10-09 NOTE — ASSESSMENT & PLAN NOTE
Likely noncardiac chest pain  HILARIA score low  Initial troponin and EKG were negative  Continue to trend troponin and EKG  D-dimer pending  Nothing by mouth at midnight  Stress test in the morning

## 2017-10-09 NOTE — PROGRESS NOTES
Pt admitted from [facility] by francesca Ramos, with PIV on her left hand patent SL. Walked to her room with no problem. Oriented to her room, call light given. Placed on cardiac monitoring. Blood pressure 122/63, pulse 67, temperature 36.5 °C (97.7 °F), resp. rate 14, SpO2 93 % on 1L/NC. Admit profile completed. C/o pain on her head, Imitrex given. CARCAMO, denies any N/T. Needs attended. Call light within reach. Hourly rounding practiced.

## 2017-10-09 NOTE — H&P
Hospital Medicine History and Physical      Date of Service  10/8/2017    Chief Complaint  Chief Complaint   Patient presents with   • Chest Pain   • Migraine       History of Presenting Illness  Nomi is a 57 y.o. female PMH of anxiety, fibromyalgia, who presents with 3 weeks history of intermittent chest pain. She described the pain as sharp pain and occasionally pressure-like pain, 6/ 10 intensity, no radiation. Also she has been having a migraine headache for the past few days. She take Fioricet and sumatriptan without relief. So she is decided to come do you have a checkup. Currently she is chest pain-free. Denies shortness of breath or recent upper respiratory tract infection. She will be admitted to the floor for observation.    Primary Care Physician  Luisana Parada N.P.      Code Status  Full code    Review of Systems  Review of Systems   Constitutional: Negative for chills, fever and weight loss.   HENT: Negative for congestion and nosebleeds.    Eyes: Negative for blurred vision, pain, discharge and redness.   Respiratory: Negative for cough, sputum production, shortness of breath and stridor.    Cardiovascular: Positive for chest pain. Negative for palpitations and orthopnea.   Gastrointestinal: Negative for abdominal pain, diarrhea, heartburn, nausea and vomiting.   Genitourinary: Negative for dysuria, frequency and urgency.   Musculoskeletal: Negative for back pain, myalgias and neck pain.   Skin: Negative for itching and rash.   Neurological: Positive for headaches. Negative for dizziness, focal weakness and seizures.   Psychiatric/Behavioral: Negative for depression. The patient is not nervous/anxious and does not have insomnia.      Please see HPI, all other systems were reviewed and are negative (AMA/CMS criteria)     Past Medical History  Past Medical History:   Diagnosis Date   • Allergy, unspecified not elsewhere classified    • Anxiety    • Arthritis    • Cancer     cervical ca -  hysterectomy   • Chronic airway obstruction, not elsewhere classified    • Depression    • Fibromyalgia    • GERD (gastroesophageal reflux disease)    • Hypertension     takes lisinopril   • Indigestion     hx colitis   • Migraine    • Osteoporosis, unspecified    • Other emphysema    • Other specified symptom associated with female genital organs     total hyster   • Renal disorder     kidney stone   • Type II or unspecified type diabetes mellitus without mention of complication, not stated as uncontrolled    • Ulcer    • Unspecified asthma(493.90)    • Unspecified cataract    • Urolithiasis     has lithotripsy       Surgical History  Past Surgical History:   Procedure Laterality Date   • COLONOSCOPY WITH BIOPSY  8/3/2009    Performed by GRAEME LOCK JR at ENDOSCOPY [facility] ORS   • GASTROSCOPY WITH BIOPSY  3/1/2009    Performed by LUIS ARMANDO SIERRA at ENDOSCOPY [facility] ORS   • ABDOMINAL HYSTERECTOMY TOTAL     • APPENDECTOMY     • GYN SURGERY      hysterectomy   • HERNIA REPAIR     • OTHER      lithotripsy   • OTHER ABDOMINAL SURGERY      hysterectomy, hernia,    • PRIMARY C SECTION         Medications  No current facility-administered medications on file prior to encounter.      Current Outpatient Prescriptions on File Prior to Encounter   Medication Sig Dispense Refill   • hydrocodone/acetaminophen (NORCO)  MG Tab Take 1 Tab by mouth every 6 hours.     • omeprazole (PRILOSEC) 20 MG delayed-release capsule Take 20 mg by mouth every day.     • cyclobenzaprine (FLEXERIL) 10 MG Tab Take 10 mg by mouth every bedtime.     • asa/apap/caffeine (EXCEDRIN) 250-250-65 MG Tab Take 2 Tabs by mouth every 6 hours as needed for Headache.       Family History  Family History   Problem Relation Age of Onset   • Lung Disease Mother    • Diabetes Mother    • Stroke Mother    • Arthritis Father    • Cancer Father    • Diabetes Father    • Hypertension Father    • Stroke Father    • Alcohol/Drug Father    •  Hypertension Brother    • Stroke Brother    • Alcohol/Drug Brother    • Arthritis Maternal Aunt    • Arthritis Maternal Uncle    • Cancer Paternal Grandmother    • Cancer Paternal Grandfather          Social History  Social History   Substance Use Topics   • Smoking status: Current Every Day Smoker     Packs/day: 0.50     Types: Cigarettes   • Smokeless tobacco: Not on file      Comment: 1ppd - quit 7-8 months ago   • Alcohol use No       Allergies  Allergies   Allergen Reactions   • Lyrica Unspecified     Hallucinations   • Sulfa Drugs Hives and Unspecified     Pt states that she hallucinates on this as well as getting hives        Physical Exam  Laboratory   Hemodynamics  Temp (24hrs), Av.4 °C (97.6 °F), Min:36.4 °C (97.6 °F), Max:36.4 °C (97.6 °F)   Temperature: 36.4 °C (97.6 °F)  Pulse  Av.5  Min: 68  Max: 86 Heart Rate (Monitored): 67  Blood Pressure: 111/65, NIBP: (!) 99/67      Respiratory      Respiration: (!) 29, Pulse Oximetry: 92 %             Physical Exam   Constitutional: She is oriented to person, place, and time. No distress.   HENT:   Head: Normocephalic and atraumatic.   Mouth/Throat: Oropharynx is clear and moist.   Eyes: Conjunctivae and EOM are normal. Pupils are equal, round, and reactive to light.   Neck: Normal range of motion. Neck supple. No tracheal deviation present. No thyromegaly present.   Cardiovascular: Normal rate and regular rhythm.    No murmur heard.  Pulmonary/Chest: Effort normal and breath sounds normal. No respiratory distress. She has no wheezes.   Abdominal: Soft. Bowel sounds are normal. She exhibits no distension. There is no tenderness.   Musculoskeletal: She exhibits no edema or tenderness.   Neurological: She is alert and oriented to person, place, and time. No cranial nerve deficit.   Skin: Skin is warm and dry. She is not diaphoretic. No erythema.   Psychiatric: She has a normal mood and affect. Her behavior is normal. Thought content normal.       Recent  Labs      10/08/17   1541   WBC  4.1*   RBC  4.74   HEMOGLOBIN  14.1   HEMATOCRIT  43.3   MCV  91.4   MCH  29.7   MCHC  32.6*   RDW  44.0   PLATELETCT  170   MPV  10.3     Recent Labs      10/08/17   1541   SODIUM  137   POTASSIUM  4.2   CHLORIDE  104   CO2  27   GLUCOSE  105*   BUN  11   CREATININE  0.86   CALCIUM  9.6     Recent Labs      10/08/17   1541   ALTSGPT  13   ASTSGOT  39   ALKPHOSPHAT  58   TBILIRUBIN  0.4   LIPASE  22   GLUCOSE  105*     Recent Labs      10/08/17   1541   APTT  28.5   INR  0.93     Recent Labs      10/08/17   1541   BNPBTYPENAT  66         Lab Results   Component Value Date    TROPONINI <0.01 10/08/2017       Imaging  DX-CHEST-LIMITED (1 VIEW)   Final Result         Bibasal atelectasis.        EKG  per my independant read:  QTc: 456, HR: 68, Normal Sinus Rhythm, no ST/T changes     Assessment/Plan     I anticipate this patient is appropriate for observation status at this time.    Chest pain- (present on admission)   Assessment & Plan    Likely noncardiac chest pain  HILARIA score low  Initial troponin and EKG were negative  Continue to trend troponin and EKG  D-dimer pending  Nothing by mouth at midnight  Stress test in the morning        Nicotine abuse- (present on admission)   Assessment & Plan    Smoking cessation education was given        Migraine headache- (present on admission)   Assessment & Plan    Continue Fioricet, sumatriptan            Prophylaxis:  sc heparin

## 2017-10-09 NOTE — ED NOTES
Med rec updated and complete.  Allergies reviewed.  Pt denies antibiotic use in last 30 days.  Dimmed lights for pt comfort.

## 2017-10-09 NOTE — DISCHARGE INSTRUCTIONS
Discharge Instructions    Discharged to home by car with relative. Discharged via wheelchair, hospital escort: Yes.  Special equipment needed: Not Applicable    Be sure to schedule a follow-up appointment with your primary care doctor or any specialists as instructed.     Discharge Plan:   Diet Plan: Discussed  Activity Level: Discussed  Smoking Cessation Offered: Patient Refused  Confirmed Follow up Appointment: Patient to Call and Schedule Appointment  Confirmed Symptoms Management: Discussed  Medication Reconciliation Updated: Yes  Influenza Vaccine Indication: Indicated: 9 to 64 years of age  Influenza Vaccine Given - only chart on this line when given: Influenza Vaccine Given (See MAR)    I understand that a diet low in cholesterol, fat, and sodium is recommended for good health. Unless I have been given specific instructions below for another diet, I accept this instruction as my diet prescription.   Other diet: Regular    Special Instructions: None    · Is patient discharged on Warfarin / Coumadin?   No     · Is patient Post Blood Transfusion?  No    Depression / Suicide Risk    As you are discharged from this [facility] facility, it is important to learn how to keep safe from harming yourself.    Recognize the warning signs:  · Abrupt changes in personality, positive or negative- including increase in energy   · Giving away possessions  · Change in eating patterns- significant weight changes-  positive or negative  · Change in sleeping patterns- unable to sleep or sleeping all the time   · Unwillingness or inability to communicate  · Depression  · Unusual sadness, discouragement and loneliness  · Talk of wanting to die  · Neglect of personal appearance   · Rebelliousness- reckless behavior  · Withdrawal from people/activities they love  · Confusion- inability to concentrate     If you or a loved one observes any of these behaviors or has concerns about self-harm, here's what you can do:  · Talk about it-  your feelings and reasons for harming yourself  · Remove any means that you might use to hurt yourself (examples: pills, rope, extension cords, firearm)  · Get professional help from the community (Mental Health, Substance Abuse, psychological counseling)  · Do not be alone:Call your Safe Contact- someone whom you trust who will be there for you.  · Call your local CRISIS HOTLINE 338-1200 or 170-840-3498  · Call your local Children's Mobile Crisis Response Team [place] (983) 449-6169 or www.immoture.be  · Call the toll free National Suicide Prevention Hotlines   · National Suicide Prevention Lifeline 573-008-TSGW (2490)  · National Hope Line Network 800-SUICIDE (318-9059)

## 2017-10-09 NOTE — DISCHARGE SUMMARY
CHIEF COMPLAINT ON ADMISSION  Chief Complaint   Patient presents with   • Chest Pain   • Migraine       CODE STATUS  Full Code    HPI & HOSPITAL COURSE  This is a 57 y.o. female here with chest pain. On admission patient received an EKG and cardiac serial troponin's were followed which was grossly normal. In addition we ordereda nuclear stress test for further risk stratification which was grossly normal. Patient's chest discomfort has resolved. Patient denies fevers/chills, chest pain, shortness of breath or nausea/vommiting.     Of note patient says that she has chest discomfort only during sleep and feels like she is suffocating. She says she was diagnosed with sleep apnea  But lost her CPAP machine. I recommended to follow up with a pulmonologist for another study and to obtain a machine.        Therefore, she is discharged in good and stable condition with close outpatient follow-up.    SPECIFIC OUTPATIENT FOLLOW-UP  [facility]      DISCHARGE PROBLEM LIST  Active Problems:    Nicotine abuse POA: Yes  Resolved Problems:    Chest pain POA: Yes    Migraine headache POA: Yes      FOLLOW UP  No future appointments.  No follow-up provider specified.    MEDICATIONS ON DISCHARGE   Martha Mosher   Home Medication Instructions TONY:[id number]    Printed on:10/09/17 1323   Medication Information                      asa/apap/caffeine (EXCEDRIN) 250-250-65 MG Tab  Take 2 Tabs by mouth every 6 hours as needed for Headache.             busPIRone (BUSPAR) 10 MG Tab  Take 30 mg by mouth 2 times a day.             cyclobenzaprine (FLEXERIL) 10 MG Tab  Take 10 mg by mouth every bedtime.             diphenhydrAMINE (BENADRYL) 25 MG Tab  Take 25 mg by mouth 3 times a day as needed.             duloxetine (CYMBALTA) 30 MG Cap DR Particles  Take 60 mg by mouth every day.             gabapentin (NEURONTIN) 300 MG Cap  Take 300 mg by mouth 3 times a day.             hydrocodone/acetaminophen (NORCO)  MG Tab  Take 1  Tab by mouth every 6 hours.             magnesium gluconate (MAG-G) 500 MG tablet  Take 500 mg by mouth every day.             omeprazole (PRILOSEC) 20 MG delayed-release capsule  Take 20 mg by mouth every day.             promethazine (PHENERGAN) 25 MG Tab  Take 12.5 mg by mouth 1 time daily as needed for Nausea/Vomiting.             sumatriptan (IMITREX) 50 MG Tab  Take 50 mg by mouth Once PRN for Migraine.             trazodone (DESYREL) 100 MG Tab  Take 200 mg by mouth every bedtime.                 DIET  Orders Placed This Encounter   Procedures   • Protocol 1313 Diet Order: Reg, No Decaf, No Caffeine - Cardiac Stress Test Pharmacological     Standing Status:   Standing     Number of Occurrences:   1     Order Specific Question:   Diet:     Answer:   Regular [1]     Order Specific Question:   Miscellaneous modifications:     Answer:   No Decaf, No Caffeine(for test) [11]     Comments:   Protocol 1313 Patient to have no caffeine for 12 hours prior to exam (decaf, coffee, cola, tea, chocolate)       ACTIVITY  As tolerated.  Weight bearing as tolerated      CONSULTATIONS  None    PROCEDURES  None    LABORATORY  Lab Results   Component Value Date/Time    SODIUM 137 10/09/2017 12:48 AM    POTASSIUM 4.2 10/09/2017 12:48 AM    CHLORIDE 106 10/09/2017 12:48 AM    CO2 27 10/09/2017 12:48 AM    GLUCOSE 120 (H) 10/09/2017 12:48 AM    BUN 13 10/09/2017 12:48 AM    CREATININE 0.84 10/09/2017 12:48 AM    CREATININE 0.9 02/27/2009 07:40 PM        Lab Results   Component Value Date/Time    WBC 3.3 (L) 10/09/2017 12:48 AM    HEMOGLOBIN 12.6 10/09/2017 12:48 AM    HEMATOCRIT 39.4 10/09/2017 12:48 AM    PLATELETCT 157 (L) 10/09/2017 12:48 AM        Total time of the discharge process exceeds 38 minutes

## 2017-10-19 ENCOUNTER — HOSPITAL ENCOUNTER (OUTPATIENT)
Dept: RADIOLOGY | Facility: REHABILITATION | Age: 57
End: 2017-10-19
Attending: PHYSICAL MEDICINE & REHABILITATION
Payer: MEDICAID

## 2017-10-19 ENCOUNTER — HOSPITAL ENCOUNTER (OUTPATIENT)
Dept: PAIN MANAGEMENT | Facility: REHABILITATION | Age: 57
End: 2017-10-19
Attending: PHYSICAL MEDICINE & REHABILITATION
Payer: MEDICAID

## 2017-10-19 VITALS
HEIGHT: 58 IN | DIASTOLIC BLOOD PRESSURE: 77 MMHG | RESPIRATION RATE: 18 BRPM | TEMPERATURE: 98.1 F | OXYGEN SATURATION: 92 % | HEART RATE: 72 BPM | WEIGHT: 172.18 LBS | BODY MASS INDEX: 36.14 KG/M2 | SYSTOLIC BLOOD PRESSURE: 114 MMHG

## 2017-10-19 PROCEDURE — 62321 NJX INTERLAMINAR CRV/THRC: CPT

## 2017-10-19 PROCEDURE — 700111 HCHG RX REV CODE 636 W/ 250 OVERRIDE (IP)

## 2017-10-19 PROCEDURE — 700117 HCHG RX CONTRAST REV CODE 255

## 2017-10-19 RX ORDER — LIDOCAINE HYDROCHLORIDE 10 MG/ML
INJECTION, SOLUTION EPIDURAL; INFILTRATION; INTRACAUDAL; PERINEURAL
Status: COMPLETED
Start: 2017-10-19 | End: 2017-10-19

## 2017-10-19 RX ORDER — MIDAZOLAM HYDROCHLORIDE 1 MG/ML
INJECTION INTRAMUSCULAR; INTRAVENOUS
Status: DISPENSED
Start: 2017-10-19 | End: 2017-10-19

## 2017-10-19 RX ORDER — DEXAMETHASONE SODIUM PHOSPHATE 10 MG/ML
INJECTION, SOLUTION INTRAMUSCULAR; INTRAVENOUS
Status: COMPLETED
Start: 2017-10-19 | End: 2017-10-19

## 2017-10-19 RX ORDER — DEXAMETHASONE SODIUM PHOSPHATE 10 MG/ML
INJECTION, SOLUTION INTRAMUSCULAR; INTRAVENOUS
Status: DISPENSED
Start: 2017-10-19 | End: 2017-10-19

## 2017-10-19 RX ADMIN — DEXAMETHASONE SODIUM PHOSPHATE 20 MG: 10 INJECTION, SOLUTION INTRAMUSCULAR; INTRAVENOUS at 10:15

## 2017-10-19 RX ADMIN — LIDOCAINE HYDROCHLORIDE 4 ML: 10 INJECTION, SOLUTION EPIDURAL; INFILTRATION; INTRACAUDAL; PERINEURAL at 09:45

## 2017-10-19 RX ADMIN — FENTANYL CITRATE 25 MCG: 50 INJECTION, SOLUTION INTRAMUSCULAR; INTRAVENOUS at 10:09

## 2017-10-19 RX ADMIN — IOHEXOL 3 ML: 240 INJECTION, SOLUTION INTRATHECAL; INTRAVASCULAR; INTRAVENOUS; ORAL at 09:45

## 2017-10-19 ASSESSMENT — PAIN SCALES - GENERAL
PAINLEVEL_OUTOF10: 4
PAINLEVEL_OUTOF10: 8
PAINLEVEL_OUTOF10: 4

## 2017-10-19 NOTE — PROCEDURES
Date of Procedure: 10/19/17    Post-Procedural Note:  Physician/s: Celso Pozo MD    Pre-operative Diagnosis: Cervical radiculopathy    Post-operative Diagnosis: Cervical radiculopathy    Procedure: Cervical 6-7 ILEI #1     Description of Procedure:    The benefits, risks, and alternatives of the procedure were reviewed and discussed with the patient.  Written informed consent was freely obtained.  A pre-procedural time-out was conducted by the physician verifying patient’s identity, procedure to be performed, procedure site and side, and allergy verification.  Appropriate equipment was determined to be in place for the procedure.     A peripheral IV was placed, and the patient received a low dosage of Versed for anxiolysis with Fentanyl for analgesia.  The patient’s vital signs were carefully monitored before and after the procedure.    In the fluoroscopy suite the patient was placed in a prone position, a pillow placed underneath their chest. The skin was prepped and draped in the usual sterile fashion. The fluoroscope was placed over the cervical neck at the appropriate injection AP angle view, and the target for injection was marked. A 25g needle was placed into the marked site, and approx 2cc of 1% Lidocaine was injected subcutaneously into the epidermal and dermal layers. The needle was removed. A 20g Tuohy needle was then placed and advanced under fluoroscopic guidance into the C6-7 interlaminar space at both the initial position AP view and at a lateral view to ensure proper location of the needle tip at all times. A hang drop technique was used to guide the needle into the epidural space in a lateral fluoroscopic view. In the AP and lateral views, contrast dye was used to highlight the epidural space spread while the fluoroscope was running live. Following negative aspiration, approx 4cc of 1% Lidocaine preservative free with 1.5 cc of 10mg/mL Dexamethasone was then injected, and the needle was  removed intact after restyleted.    The procedure site was covered with a 4x4 gauze, the area was cleansed with sterile normal saline, and a dressing was applied.    There were no complications noted, the patient was hemodynamically stable, and tolerated the procedure well.
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
## 2017-11-09 ENCOUNTER — HOSPITAL ENCOUNTER (EMERGENCY)
Facility: MEDICAL CENTER | Age: 57
End: 2017-11-09
Attending: EMERGENCY MEDICINE
Payer: MEDICAID

## 2017-11-09 ENCOUNTER — APPOINTMENT (OUTPATIENT)
Dept: RADIOLOGY | Facility: MEDICAL CENTER | Age: 57
End: 2017-11-09
Attending: EMERGENCY MEDICINE
Payer: MEDICAID

## 2017-11-09 VITALS
SYSTOLIC BLOOD PRESSURE: 119 MMHG | BODY MASS INDEX: 35.91 KG/M2 | WEIGHT: 171.08 LBS | HEART RATE: 88 BPM | DIASTOLIC BLOOD PRESSURE: 62 MMHG | TEMPERATURE: 98.5 F | OXYGEN SATURATION: 96 % | RESPIRATION RATE: 16 BRPM | HEIGHT: 58 IN

## 2017-11-09 DIAGNOSIS — K59.09 OTHER CONSTIPATION: ICD-10-CM

## 2017-11-09 LAB — EKG IMPRESSION: NORMAL

## 2017-11-09 PROCEDURE — 99283 EMERGENCY DEPT VISIT LOW MDM: CPT

## 2017-11-09 PROCEDURE — 74022 RADEX COMPL AQT ABD SERIES: CPT

## 2017-11-09 PROCEDURE — 93005 ELECTROCARDIOGRAM TRACING: CPT | Performed by: EMERGENCY MEDICINE

## 2017-11-09 PROCEDURE — 93005 ELECTROCARDIOGRAM TRACING: CPT

## 2017-11-09 ASSESSMENT — PAIN SCALES - GENERAL: PAINLEVEL_OUTOF10: 7

## 2017-11-09 NOTE — ED NOTES
"Pt ambulates to triage  Chief Complaint   Patient presents with   • Chest Pain     x 1.5 month - seen for same 2 wks ago   • Nausea   • Shortness of Breath   • Abdominal Swelling     \"I went 14 days with out going, I think I have a blockage\"   Pt asked to wait in lobby, pt updated on triage process and pt asked to inform RN of any changes.     "

## 2017-11-09 NOTE — DISCHARGE INSTRUCTIONS
Constipation, Adult  Constipation is when a person has fewer than three bowel movements a week, has difficulty having a bowel movement, or has stools that are dry, hard, or larger than normal. As people grow older, constipation is more common. A low-fiber diet, not taking in enough fluids, and taking certain medicines may make constipation worse.   CAUSES   · Certain medicines, such as antidepressants, pain medicine, iron supplements, antacids, and water pills.    · Certain diseases, such as diabetes, irritable bowel syndrome (IBS), thyroid disease, or depression.    · Not drinking enough water.    · Not eating enough fiber-rich foods.    · Stress or travel.    · Lack of physical activity or exercise.    · Ignoring the urge to have a bowel movement.    · Using laxatives too much.    SIGNS AND SYMPTOMS   · Having fewer than three bowel movements a week.    · Straining to have a bowel movement.    · Having stools that are hard, dry, or larger than normal.    · Feeling full or bloated.    · Pain in the lower abdomen.    · Not feeling relief after having a bowel movement.    DIAGNOSIS   Your health care provider will take a medical history and perform a physical exam. Further testing may be done for severe constipation. Some tests may include:  · A barium enema X-ray to examine your rectum, colon, and, sometimes, your small intestine.    · A sigmoidoscopy to examine your lower colon.    · A colonoscopy to examine your entire colon.  TREATMENT   Treatment will depend on the severity of your constipation and what is causing it. Some dietary treatments include drinking more fluids and eating more fiber-rich foods. Lifestyle treatments may include regular exercise. If these diet and lifestyle recommendations do not help, your health care provider may recommend taking over-the-counter laxative medicines to help you have bowel movements. Prescription medicines may be prescribed if over-the-counter medicines do not work.    HOME CARE INSTRUCTIONS   · Eat foods that have a lot of fiber, such as fruits, vegetables, whole grains, and beans.  · Limit foods high in fat and processed sugars, such as french fries, hamburgers, cookies, candies, and soda.    · A fiber supplement may be added to your diet if you cannot get enough fiber from foods.    · Drink enough fluids to keep your urine clear or pale yellow.    · Exercise regularly or as directed by your health care provider.    · Go to the restroom when you have the urge to go. Do not hold it.    · Only take over-the-counter or prescription medicines as directed by your health care provider. Do not take other medicines for constipation without talking to your health care provider first.    SEEK IMMEDIATE MEDICAL CARE IF:   · You have bright red blood in your stool.    · Your constipation lasts for more than 4 days or gets worse.    · You have abdominal or rectal pain.    · You have thin, pencil-like stools.    · You have unexplained weight loss.  MAKE SURE YOU:   · Understand these instructions.  · Will watch your condition.  · Will get help right away if you are not doing well or get worse.     This information is not intended to replace advice given to you by your health care provider. Make sure you discuss any questions you have with your health care provider.     Document Released: 09/15/2005 Document Revised: 01/08/2016 Document Reviewed: 09/29/2014  [employer] Interactive Patient Education ©2016 [employer]  Please follow up with a primary physician for blood pressure management, diabetic screening, and all other preventive health concerns.

## 2017-11-09 NOTE — ED PROVIDER NOTES
ED Provider Note    Scribed for Sridhar Youssef M.D. by Beverley Fernandes. 11/9/2017, 1:56 PM.    Primary care provider: Luisana Parada N.P.  Means of arrival: Walk in  History obtained from: Patient  History limited by: None    CHIEF COMPLAINT  Chief Complaint   Patient presents with   • Constipation        HPI  Martha Mosher is a 57 y.o. female who presents to the Emergency Department for constipation with an onset of 14 days.  Patient reports daily narcotic use for chronic hip pain.  She has experienced constipation for the past two weeks.  Symptoms were not resolved with Miralax and enemas. Her last bowel movement was this morning and was hard. Associated symptoms include decrease in appetite, abdominal distention and nausea.  Negative for fever, vomiting, diarrhea or abdominal pain. Although the nurse's note says chest pain, the patient denies chest pain, she reports upper abdominal pressure.      REVIEW OF SYSTEMS  Pertinent positives include chronic hip pain, constipation, decrease in appetite, abdominal distention and nausea. Pertinent negatives include no fever, vomiting, diarrhea or abdominal pain. As above, all other systems reviewed and are negative. See HPI for further details. C.      PAST MEDICAL HISTORY  Patient has a past medical history of Allergy, unspecified not elsewhere classified; Anxiety; Arthritis; Cancer; Chronic airway obstruction, not elsewhere classified; Depression; Fibromyalgia; GERD (gastroesophageal reflux disease); Hypertension; Indigestion; Migraine; Osteoporosis, unspecified; Other emphysema; Other specified symptom associated with female genital organs; Renal disorder; Type II or unspecified type diabetes mellitus without mention of complication, not stated as uncontrolled; Ulcer; Unspecified asthma(493.90); Unspecified cataract; and Urolithiasis.      SURGICAL HISTORY  Patient has a past surgical history that includes gastroscopy with biopsy (3/1/2009); colonoscopy with  biopsy (8/3/2009); other abdominal surgery; gyn surgery; other; appendectomy; primary c section; hernia repair; and abdominal hysterectomy total.      SOCIAL HISTORY  Social History   Substance Use Topics   • Smoking status: Current Every Day Smoker     Packs/day: 0.50     Types: Cigarettes      Comment: 1ppd - quit 7-8 months ago   • Alcohol use No      History   Drug Use   • Types: Inhaled     Comment: Meth       FAMILY HISTORY  Family History   Problem Relation Age of Onset   • Lung Disease Mother    • Diabetes Mother    • Stroke Mother    • Arthritis Father    • Cancer Father    • Diabetes Father    • Hypertension Father    • Stroke Father    • Alcohol/Drug Father    • Hypertension Brother    • Stroke Brother    • Alcohol/Drug Brother    • Arthritis Maternal Aunt    • Arthritis Maternal Uncle    • Cancer Paternal Grandmother    • Cancer Paternal Grandfather        CURRENT MEDICATIONS   Start End   sumatriptan (IMITREX) 50 MG Tab     Sig: Take 50 mg by mouth Once PRN for Migraine.   Class: Historical Med   Route: Oral   Number of times this order has been changed since signin     Order Audit [place]     trazodone (DESYREL) 100 MG Tab     Sig: Take 200 mg by mouth every bedtime.   Class: Historical Med   Route: Oral   Number of times this order has been changed since signin     Order Audit [place]     gabapentin (NEURONTIN) 300 MG Cap     Sig: Take 300 mg by mouth 3 times a day.   Class: Historical Med   Route: Oral   duloxetine (CYMBALTA) 30 MG Cap DR Particles     Sig: Take 60 mg by mouth every day.   Class: Historical Med   Route: Oral   busPIRone (BUSPAR) 10 MG Tab     Sig: Take 30 mg by mouth 2 times a day.   Class: Historical Med   Route: Oral   diphenhydrAMINE (BENADRYL) 25 MG Tab     Sig: Take 25 mg by mouth 3 times a day as needed.   Class: Historical Med   Route: Oral   magnesium gluconate (MAG-G) 500 MG tablet     Sig: Take 500 mg by mouth every day.   Class: Historical Med   Route: Oral  "  promethazine (PHENERGAN) 25 MG Tab     Sig: Take 12.5 mg by mouth 1 time daily as needed for Nausea/Vomiting.   Class: Historical Med   Route: Oral   hydrocodone/acetaminophen (NORCO)  MG Tab     Sig: Take 1 Tab by mouth every 6 hours.   Class: Historical Med   Route: Oral   Number of times this order has been changed since signing: 3     Order Audit [place]     omeprazole (PRILOSEC) 20 MG delayed-release capsule     Sig: Take 20 mg by mouth every day.   Class: Historical Med   Route: Oral   Number of times this order has been changed since signin     Order Audit [place]     cyclobenzaprine (FLEXERIL) 10 MG Tab     Sig: Take 10 mg by mouth every bedtime.   Class: Historical Med   Route: Oral   Number of times this order has been changed since signin     Order Audit [place]     asa/apap/caffeine (EXCEDRIN) 250-250-65 MG Tab     Sig: Take 2 Tabs by mouth every 6 hours as needed for Headache.   Class: Historical Med   Route: Oral   Number of times this order has been changed since signin     Order Audit [place]           ALLERGIES  Allergies   Allergen Reactions   • Lyrica Unspecified     Hallucinations   • Sulfa Drugs Hives and Unspecified     Pt states that she hallucinates on this as well as getting hives       PHYSICAL EXAM  VITAL SIGNS: /68   Pulse 91   Temp 36.8 °C (98.3 °F) (Temporal)   Resp 18   Ht 1.473 m (4' 10\")   Wt 77.6 kg (171 lb 1.2 oz)   SpO2 96%   BMI 35.76 kg/m²     Vitals reviewed.    Constitutional: Alert in no apparent distress.  HENT: No signs of trauma, Bilateral external ears normal, Nose normal.   Eyes: Pupils are equal and reactive, Conjunctiva normal, Non-icteric.   Neck: Normal range of motion, No tenderness, Supple, No stridor.   Lymphatic: No lymphadenopathy noted.   Cardiovascular: Regular rate and rhythm, no murmurs.   Thorax & Lungs: Normal breath sounds, No respiratory distress, No wheezing, No chest tenderness.   Abdomen: Bowel sounds normal, Soft, No " tenderness, No peritoneal signs, No masses, No pulsatile masses. Distended.  Skin: Warm, Dry, No erythema, No rash.   Back: No bony tenderness, No CVA tenderness.   Extremities: Intact distal pulses, No edema, No tenderness, No cyanosis  Musculoskeletal: Good range of motion in all major joints. No tenderness to palpation or major deformities noted.   Neurologic: Alert , Normal motor function, Normal sensory function, No focal deficits noted.   Psychiatric: Affect normal, Judgment normal, Mood normal.       DIAGNOSTIC STUDIES / PROCEDURES    EKG Interpretation:  Interpreted by me    12 Lead EKG interpreted by me to show:  Normal sinus rhythm  Rate 76  Axis: Normal  Intervals: Prolonged  consistent with RBBB  Normal T waves  Normal ST segments  Compared to EKG dated 10/08/17 indicating no significant changes.  My impression of this EKG: Does not indicate ischemia or arrhythmia at this time. Previous RBBB.      RADIOLOGY  DX-ABDOMEN COMPLETE WITH AP OR PA CXR   Final Result         1.  There is a nonobstructive nonspecific bowel gas pattern.  There is no evidence of free intraperitoneal air. There is moderate stool in the colon.        The radiologist's interpretation of all radiological studies have been reviewed by me.      COURSE & MEDICAL DECISION MAKING  Pertinent Labs & Imaging studies reviewed. (See chart for details)    Differential Diagnosis include but are not limited to: constipation or gastritis.    1:58 PM Reviewed patient's electronic medical record which indicates a normal cardiac stress test 10/09/2017.    2:02 PM Patient seen and examined at bedside. Patient presents for constipation.  Exam indicates a soft, non tender abdomen with bowel sounds present.      Initial orders in the Emergency Department included EKG and XR abdomen.    3:25 PM On repeat evaluation, results were discussed.  XR indicates a moderate amount of stool in the colon. There is no evidence of obstruction or free air.  "Encouraged her to increase her Miralax use and increase her fluid intake.    Discharge plan was discussed with the patient and includes following up with her primary care doctor.      Reviewed the patient's prescription history on [place] Prescription Monitoring Program which showed 180 Hydrocodone 10 mg tablets per month.      The patient will return for new or persisting symptoms including abdominal pain, fever, vomiting or any additional concerns.  The patient verbalizes understanding and will comply.  Patient is stable at the time of discharge.  Vital signs were reviewed: Blood pressure 126/68, pulse 91, temperature 36.8 °C (98.3 °F), temperature source Temporal, resp. rate 18, height 1.473 m (4' 10\"), weight 77.6 kg (171 lb 1.2 oz), SpO2 96 %.         DISPOSITION  Patient will be discharged home in stable condition.      FOLLOW UP  [facility], Emergency Dept  [facility] [facility] [id number] [phone]    If symptoms worsen          see your doctor for follow up      The patient is referred to a primary physician for blood pressure management, diabetic screening, and for all other preventative health concerns.      OUTPATIENT MEDICATIONS  New Prescriptions    No medications on file       DIAGNOSIS  1. Other constipation           The note accurately reflects work and decisions made by me.  Sridhar Youssef  11/9/2017  3:30 PM     IBeverley (Isabel), am scribing for, and in the presence of, Sridhar Youssef M.D.    Electronically signed by: Beverley Fernandes (Isabel), 11/9/2017    Sridhar DALAL M.D. personally performed the services described in this documentation, as scribed by Beverley Fernandes in my presence, and it is both accurate and complete.      "

## 2017-11-10 NOTE — ED NOTES
Discharged home to [facility] pt to continue bowel program. Drink plenty of fluids follow up with pcp.

## 2017-12-07 ENCOUNTER — HOSPITAL ENCOUNTER (OUTPATIENT)
Dept: PAIN MANAGEMENT | Facility: REHABILITATION | Age: 57
End: 2017-12-07
Attending: PHYSICAL MEDICINE & REHABILITATION
Payer: MEDICAID

## 2017-12-26 ENCOUNTER — OFFICE VISIT (OUTPATIENT)
Dept: MEDICAL GROUP | Facility: MEDICAL CENTER | Age: 57
End: 2017-12-26
Attending: NURSE PRACTITIONER
Payer: MEDICAID

## 2017-12-26 VITALS
OXYGEN SATURATION: 95 % | HEART RATE: 90 BPM | DIASTOLIC BLOOD PRESSURE: 80 MMHG | WEIGHT: 160 LBS | SYSTOLIC BLOOD PRESSURE: 122 MMHG | RESPIRATION RATE: 16 BRPM | TEMPERATURE: 97.1 F | BODY MASS INDEX: 33.44 KG/M2

## 2017-12-26 DIAGNOSIS — R10.32 BILATERAL GROIN PAIN: ICD-10-CM

## 2017-12-26 DIAGNOSIS — Z13.1 SCREENING FOR DIABETES MELLITUS: ICD-10-CM

## 2017-12-26 DIAGNOSIS — J43.9 PULMONARY EMPHYSEMA, UNSPECIFIED EMPHYSEMA TYPE (HCC): ICD-10-CM

## 2017-12-26 DIAGNOSIS — M79.89 LEG SWELLING: ICD-10-CM

## 2017-12-26 DIAGNOSIS — R10.31 BILATERAL GROIN PAIN: ICD-10-CM

## 2017-12-26 DIAGNOSIS — E66.3 OVER WEIGHT: ICD-10-CM

## 2017-12-26 DIAGNOSIS — Z13.29 SCREENING FOR THYROID DISORDER: ICD-10-CM

## 2017-12-26 DIAGNOSIS — Z12.11 SCREEN FOR COLON CANCER: ICD-10-CM

## 2017-12-26 DIAGNOSIS — M54.16 LUMBAR RADICULOPATHY: ICD-10-CM

## 2017-12-26 DIAGNOSIS — R10.9 ABDOMINAL DISCOMFORT: ICD-10-CM

## 2017-12-26 DIAGNOSIS — Z13.21 ENCOUNTER FOR VITAMIN DEFICIENCY SCREENING: ICD-10-CM

## 2017-12-26 DIAGNOSIS — G89.4 CHRONIC PAIN SYNDROME: ICD-10-CM

## 2017-12-26 PROCEDURE — 99204 OFFICE O/P NEW MOD 45 MIN: CPT | Performed by: NURSE PRACTITIONER

## 2017-12-26 RX ORDER — DULOXETIN HYDROCHLORIDE 60 MG/1
60 CAPSULE, DELAYED RELEASE ORAL DAILY
COMMUNITY
End: 2018-08-01 | Stop reason: SDUPTHER

## 2017-12-26 RX ORDER — ALBUTEROL SULFATE 90 UG/1
2 AEROSOL, METERED RESPIRATORY (INHALATION) EVERY 6 HOURS PRN
Qty: 8.5 G | Refills: 2 | Status: SHIPPED | OUTPATIENT
Start: 2017-12-26 | End: 2018-03-06 | Stop reason: SDUPTHER

## 2017-12-26 RX ORDER — TOPIRAMATE 25 MG/1
25 TABLET ORAL EVERY EVENING
COMMUNITY
End: 2022-09-27 | Stop reason: SDUPTHER

## 2017-12-26 RX ORDER — TIOTROPIUM BROMIDE 18 UG/1
18 CAPSULE ORAL; RESPIRATORY (INHALATION) DAILY
COMMUNITY
End: 2018-05-12

## 2017-12-26 ASSESSMENT — PAIN SCALES - GENERAL: PAINLEVEL: 10=SEVERE PAIN

## 2017-12-26 NOTE — PROGRESS NOTES
"Martha presents to the clinic to establish as New Patient.,  She is here w her room mate who is also a patient of mine.    Her PMH is extensive and includes:  Anxiety and Depression  Asthma Hx  Allergies  Cataract  Cervical Ca w Hysterectomy  Appendectomy  Kidney Stones w lithotripsy  DM-2  Hypertension  Fibromyalgia  Chronic Pain  Cervical Radiculopathy  Chronic Low Back Pain  DDD lumbar spine  Lumbar Radiculopathy  Osteoporosis  DM-2  Ulcer, Hx  Tobacco Use- smoking  Methamphetamine Abuse    Established w   Pain Management- DR Nohelia Lewis  Report shows  12/7/17 [place] 10/325 # 60 DR Pozo  11/7/17 [place] 10/325 # 120 Dr Pozo  15 RX and 1 Prescriber in 12 month record.    Chief Complaint: New Patient, multiple concerns    HPI:      Chronic pain syndrome  Pt reports has history of  Fibromyalgia, Cervical Radiculopathy, Lumbar Radiculopathy and chronic pain.  Is being tx by Pain Management- DR Pozo ( See DEE DEE ).  States he changed from [place] to Percocet. Pt reports Percocet is helping.    Pt reports has had multiple injections, neck, low back, left shoulder, hips and have not helped. Pain down both legs and right leg is swollen x 6 months to a year with pain.    Lumbar radiculopathy  Chronic low back w radiation into both legs.  Previous MRI lumbar spine in 2015 showed some moderate central canal narrowing and mild neural foraminal narrowing.   Pt reports legs are 'weaker\" and has to uses wheeled walker w seat.  Reports bowel control is off and on and sometimes can not make it to BR.  No saddle paresthesia.  Pt would like to see Neurosurgery.   I recommended MRI lumbar spine MRi.    Abdominal discomfort  Pt reports abdominal Hernia repair with \"mesh\" in 2010.  States has mid abd pain off and on above umbilicus  And wants u/s.  Also has bilat groin discomfort and wants U/S to check for inguinal hernias.  Denies any outpouching.      Patient Active Problem List    Diagnosis Date Noted   • Chronic pain " syndrome 12/26/2017   • Abdominal discomfort 12/26/2017   • Nicotine abuse 10/08/2017   • Cervical spondylosis 03/09/2016   • DDD (degenerative disc disease), cervical 03/09/2016   • Cervical radiculopathy 03/09/2016   • Chronic neck pain 03/09/2016   • Cervicogenic headache 03/09/2016   • Lumbosacral spondylosis 03/09/2016   • DDD (degenerative disc disease), lumbar 03/09/2016   • Lumbar radiculopathy 03/09/2016   • Chronic low back pain 03/09/2016   • Controlled substance agreement signed 03/09/2016   • Low back pain associated with a spinal disorder other than radiculopathy or spinal stenosis 05/08/2014       Allergies:Lyrica and Sulfa drugs    Current Outpatient Prescriptions   Medication Sig Dispense Refill   • tiotropium (SPIRIVA HANDIHALER) 18 MCG Cap Inhale 18 mcg by mouth every day.     • albuterol 108 (90 Base) MCG/ACT Aero Soln inhalation aerosol Inhale 2 Puffs by mouth every 6 hours as needed for Shortness of Breath. 8.5 g 2   • topiramate (TOPAMAX) 25 MG Tab Take 25 mg by mouth.     • duloxetine (CYMBALTA) 60 MG Cap DR Particles delayed-release capsule Take 60 mg by mouth every day.     • sumatriptan (IMITREX) 50 MG Tab Take 50 mg by mouth Once PRN for Migraine.     • trazodone (DESYREL) 100 MG Tab Take 200 mg by mouth every bedtime.     • gabapentin (NEURONTIN) 300 MG Cap Take 300 mg by mouth 3 times a day.     • magnesium gluconate (MAG-G) 500 MG tablet Take 500 mg by mouth every day.     • promethazine (PHENERGAN) 25 MG Tab Take 12.5 mg by mouth 1 time daily as needed for Nausea/Vomiting.     • hydrocodone/acetaminophen (NORCO)  MG Tab Take 1 Tab by mouth every 6 hours.     • omeprazole (PRILOSEC) 20 MG delayed-release capsule Take 20 mg by mouth every day.     • cyclobenzaprine (FLEXERIL) 10 MG Tab Take 10 mg by mouth every bedtime.     • asa/apap/caffeine (EXCEDRIN) 250-250-65 MG Tab Take 2 Tabs by mouth every 6 hours as needed for Headache.       No current facility-administered medications  for this visit.        Social History   Substance Use Topics   • Smoking status: Current Every Day Smoker     Packs/day: 0.50     Years: 30.00     Types: Cigarettes   • Smokeless tobacco: Current User      Comment: 1ppd - quit 7-8 months ago   • Alcohol use No       Family History   Problem Relation Age of Onset   • Lung Disease Mother    • Diabetes Mother    • Stroke Mother    • Arthritis Father    • Cancer Father    • Diabetes Father    • Hypertension Father    • Stroke Father    • Alcohol/Drug Father    • Hypertension Brother    • Stroke Brother    • Alcohol/Drug Brother    • Arthritis Maternal Aunt    • Arthritis Maternal Uncle    • Cancer Paternal Grandmother    • Cancer Paternal Grandfather        ROS:  Review of Systems   See HPI Above    Exam:  Blood pressure 122/80, pulse 90, temperature 36.2 °C (97.1 °F), resp. rate 16, weight 72.6 kg (160 lb), SpO2 95 %, not currently breastfeeding.  General:  Well nourished, over-weight,  well developed female in moderate distress.  HENT:Head is grossly normal. PERRL.  Neck: Supple. Trachea is midline.  Pulmonary: Clear to ausculation .  Normal effort. No rales, ronchi, or wheezing.   Cardiovascular: Regular rate and rhythm.  Abdomen-Abdomen is soft, No tenderness except mild to bilat groin areas..  Upper extremities- WEAK = . Good ROM  Lower extremities- neg for edema, redness, tenderness. Bilat leg weakness to push/pull testing. Stands very slowly.  Neuro- A & O x 4. Speech clear and appropriate. Poor walking using wheeled walker w seat.     Current medications, allergies, and problem list reviewed with patient and updated in  [facility] today.    Assessment/Plan:  1. Chronic pain syndrome Pt to keep appt's w Dr Pozo and Narcotics per him.    2. Screening for diabetes mellitus  HEMOGLOBIN A1C    COMP METABOLIC PANEL   3. Screening for thyroid disorder  TSH   4. Encounter for vitamin deficiency screening  VITAMIN D,25 HYDROXY    VITAMIN B12   5. Screen for colon  cancer  OCCULT BLOOD FECES IMMUNOASSAY   6. Leg swelling  LE VENOUS DUPLEX   7. Bilateral groin pain  US-ABDOMEN LIMITED   8. Pulmonary emphysema, unspecified emphysema type (CMS-HCC)  albuterol 108 (90 Base) MCG/ACT Aero Soln inhalation aerosol  Continue Spiriva. Pt not ready to quit smoking despite counseling    9. Lumbar radiculopathy  MR-LUMBAR SPINE-W/O   10. Abdominal discomfort  R/o abd hernia mesh issue. US-ABDOMEN LIMITED   11. Over weight  Patient identified as having weight management issue.  Appropriate orders and counseling given.   Follow up in 4 weeks. Call or return if questions, concerns, or worsening condition.

## 2017-12-27 NOTE — ASSESSMENT & PLAN NOTE
"Pt reports abdominal Hernia repair with \"mesh\" in 2010.  States has mid abd pain off and on above umbilicus  And wants u/s  "

## 2017-12-27 NOTE — ASSESSMENT & PLAN NOTE
"Chronic low back w radiation into both legs.  Previous MRI lumbar spine in 2015 showed some moderate central canal narrowing and mild neural foraminal narrowing.   Pt reports legs are 'weaker\" and has to uses wheeled walker w seat.  Reports bowel control is off and on and sometimes can not make it to BR.  No saddle paresthesia.  Pt would like to see Neurosurgery.   I recommended MRI lumbar spine MRi.  "

## 2017-12-27 NOTE — ASSESSMENT & PLAN NOTE
Pt reports has history of  Fibromyalgia, Cervical Radiculopathy, Lumbar Radiculopathy and chronic pain.  Is being tx by Pain Management- DR Pozo ( See NEV ).  States he changed from [place] to Percocet. Pt reports Percocet is helping.    Pt reports has had multiple injections, neck, low back, left shoulder, hips and have not helped. Pain down both legs and right leg is swollen x 6 months to a year with pain.

## 2018-01-10 RX ORDER — PROMETHAZINE HYDROCHLORIDE 12.5 MG/1
TABLET ORAL
Qty: 60 TAB | Refills: 0 | Status: SHIPPED | OUTPATIENT
Start: 2018-01-10 | End: 2018-02-16 | Stop reason: SDUPTHER

## 2018-01-11 ENCOUNTER — HOSPITAL ENCOUNTER (OUTPATIENT)
Dept: RADIOLOGY | Facility: MEDICAL CENTER | Age: 58
End: 2018-01-11
Attending: NURSE PRACTITIONER
Payer: MEDICAID

## 2018-01-11 DIAGNOSIS — G89.4 CHRONIC PAIN SYNDROME: ICD-10-CM

## 2018-01-11 DIAGNOSIS — R10.32 BILATERAL GROIN PAIN: ICD-10-CM

## 2018-01-11 DIAGNOSIS — M79.89 LEG SWELLING: ICD-10-CM

## 2018-01-11 DIAGNOSIS — R10.31 BILATERAL GROIN PAIN: ICD-10-CM

## 2018-01-11 DIAGNOSIS — R10.9 ABDOMINAL DISCOMFORT: ICD-10-CM

## 2018-01-11 PROCEDURE — 76705 ECHO EXAM OF ABDOMEN: CPT

## 2018-01-11 PROCEDURE — 76857 US EXAM PELVIC LIMITED: CPT

## 2018-01-11 PROCEDURE — 93970 EXTREMITY STUDY: CPT

## 2018-01-12 ENCOUNTER — HOSPITAL ENCOUNTER (OUTPATIENT)
Facility: MEDICAL CENTER | Age: 58
End: 2018-01-12
Attending: NURSE PRACTITIONER
Payer: MEDICAID

## 2018-01-12 PROCEDURE — 82274 ASSAY TEST FOR BLOOD FECAL: CPT

## 2018-01-16 ENCOUNTER — HOSPITAL ENCOUNTER (OUTPATIENT)
Dept: RADIOLOGY | Facility: MEDICAL CENTER | Age: 58
End: 2018-01-16
Attending: NURSE PRACTITIONER
Payer: MEDICAID

## 2018-01-16 VITALS — HEIGHT: 58 IN | BODY MASS INDEX: 32.12 KG/M2 | WEIGHT: 153 LBS

## 2018-01-16 DIAGNOSIS — M54.16 LUMBAR RADICULOPATHY: ICD-10-CM

## 2018-01-16 PROCEDURE — 72148 MRI LUMBAR SPINE W/O DYE: CPT

## 2018-01-16 PROCEDURE — A9270 NON-COVERED ITEM OR SERVICE: HCPCS | Performed by: RADIOLOGY

## 2018-01-16 PROCEDURE — 700102 HCHG RX REV CODE 250 W/ 637 OVERRIDE(OP): Performed by: RADIOLOGY

## 2018-01-16 RX ORDER — DIAZEPAM 5 MG/1
10 TABLET ORAL
Status: COMPLETED | OUTPATIENT
Start: 2018-01-16 | End: 2018-01-16

## 2018-01-16 RX ADMIN — DIAZEPAM 10 MG: 5 TABLET ORAL at 12:10

## 2018-01-16 ASSESSMENT — PAIN SCALES - GENERAL
PAINLEVEL_OUTOF10: 0
PAINLEVEL_OUTOF10: 0

## 2018-01-21 DIAGNOSIS — Z12.11 SCREEN FOR COLON CANCER: ICD-10-CM

## 2018-01-21 LAB — HEMOCCULT STL QL IA: NEGATIVE

## 2018-01-29 ENCOUNTER — OFFICE VISIT (OUTPATIENT)
Dept: MEDICAL GROUP | Facility: MEDICAL CENTER | Age: 58
End: 2018-01-29
Attending: NURSE PRACTITIONER
Payer: MEDICAID

## 2018-01-29 VITALS
BODY MASS INDEX: 33.37 KG/M2 | HEART RATE: 98 BPM | WEIGHT: 159 LBS | DIASTOLIC BLOOD PRESSURE: 60 MMHG | TEMPERATURE: 96.2 F | HEIGHT: 58 IN | OXYGEN SATURATION: 96 % | SYSTOLIC BLOOD PRESSURE: 108 MMHG | RESPIRATION RATE: 16 BRPM

## 2018-01-29 DIAGNOSIS — R10.9 ABDOMINAL DISCOMFORT: ICD-10-CM

## 2018-01-29 DIAGNOSIS — G89.4 CHRONIC PAIN SYNDROME: ICD-10-CM

## 2018-01-29 DIAGNOSIS — J20.9 ACUTE BRONCHITIS, UNSPECIFIED ORGANISM: ICD-10-CM

## 2018-01-29 DIAGNOSIS — M54.16 LUMBAR RADICULOPATHY: ICD-10-CM

## 2018-01-29 DIAGNOSIS — R68.89 FLU-LIKE SYMPTOMS: ICD-10-CM

## 2018-01-29 DIAGNOSIS — F99 PSYCHIATRIC DISORDER: ICD-10-CM

## 2018-01-29 DIAGNOSIS — R05.9 COUGH: ICD-10-CM

## 2018-01-29 LAB
FLUAV+FLUBV AG SPEC QL IA: NEGATIVE
INT CON NEG: NEGATIVE
INT CON POS: POSITIVE

## 2018-01-29 PROCEDURE — 99212 OFFICE O/P EST SF 10 MIN: CPT | Performed by: NURSE PRACTITIONER

## 2018-01-29 PROCEDURE — 87804 INFLUENZA ASSAY W/OPTIC: CPT | Performed by: NURSE PRACTITIONER

## 2018-01-29 PROCEDURE — 99214 OFFICE O/P EST MOD 30 MIN: CPT | Performed by: NURSE PRACTITIONER

## 2018-01-29 RX ORDER — OXYCODONE AND ACETAMINOPHEN 10; 325 MG/1; MG/1
TABLET ORAL
COMMUNITY
Start: 2018-01-21 | End: 2018-05-12

## 2018-01-29 RX ORDER — POLYETHYLENE GLYCOL 3350 17 G/17G
POWDER, FOR SOLUTION ORAL
COMMUNITY
Start: 2017-11-24 | End: 2018-05-09 | Stop reason: SDUPTHER

## 2018-01-29 RX ORDER — AZITHROMYCIN 250 MG/1
TABLET, FILM COATED ORAL
Qty: 6 TAB | Refills: 0 | Status: SHIPPED | OUTPATIENT
Start: 2018-01-29 | End: 2018-04-17

## 2018-01-29 RX ORDER — SUMATRIPTAN 50 MG/1
50 TABLET, FILM COATED ORAL
Qty: 15 TAB | Refills: 0 | Status: SHIPPED | OUTPATIENT
Start: 2018-01-29 | End: 2018-05-04 | Stop reason: SDUPTHER

## 2018-01-29 RX ORDER — BENZONATATE 100 MG/1
100 CAPSULE ORAL 3 TIMES DAILY PRN
Qty: 30 CAP | Refills: 0 | Status: SHIPPED | OUTPATIENT
Start: 2018-01-29 | End: 2018-04-17

## 2018-01-29 RX ORDER — CODEINE PHOSPHATE/GUAIFENESIN 10-100MG/5
5 LIQUID (ML) ORAL 2 TIMES DAILY PRN
Qty: 120 ML | Refills: 0 | Status: SHIPPED | OUTPATIENT
Start: 2018-01-29 | End: 2018-02-05

## 2018-01-29 NOTE — PROGRESS NOTES
Martha presents to the clinic reportedly for Results.    Her PMH includes:  Anxiety and Depression  Asthma Hx  Allergies  Cataract  Cervical Ca w Hysterectomy  Appendectomy  Kidney Stones w lithotripsy  DM-2  Hypertension  Fibromyalgia  Chronic Pain  Cervical Radiculopathy  Chronic Low Back Pain  DDD lumbar spine  Lumbar Radiculopathy  Osteoporosis  DM-2  Ulcer, Hx  Tobacco Use- smoking  Methamphetamine Abuse     Established w   Pain Management- DR Pozo     [place]  Report shows  1/22/17 Percocet 5/325 # 72 DR Burris  More entries in report.    REview of Records:  12/26/17 Clinic New Patient Visit w multiple concerns. Labs and FIT test ordered, LE Duplex ordered, U/S Abd ordered, MRI Lumbar Spine ordered.    Results Review:  1/11/18 U/S Abd  And U/S inguinal area Reports:  No ventral abdominal wall hernia and no inguinal hernia identified. If symptoms are persistent, CT would be consideration for further evaluation.  Negative bilateral inguinal ultrasound. If symptoms are persistent, CT would be consideration for further evaluation.    1/11/18 Bilat LE Duplex study Report  No evidence of bilateral lower extremity deep venous thrombosis.    1/11/18 MRI of Lumbar Spine Report  1.  Mild degenerative disease in the lumbar spine as described above. There has been no significant interval change.  2.  A gallstone is seen. There is new since the previous study.    1/12/18 Fit Stool Test= Negative    Blood Tests- no results    Chief Complaint:  Results, Flu-like Symptoms/cough    HPI:      Lumbar radiculopathy  We reviewed her MRI Lumbar spine that showed mild degenerative changes.  She reports ongoing low back pain and radiation to anterior thighs  She believes she has swelling to thighs, but exam today shows adipose tissue present there bilat.  I recommended she discuss the findings w Dr Pozo, her pain management MD    Abdominal discomfort  We reviewed her U/S abd/Hernia reports showing normal results.  Pt reports  "mild abdominal bloating, Denies pain per se.      Flu-like symptoms  Pt reports mild sore throat, post nasal drainage, achey body, nausea and diarrhea x 1 week. Cough is congested.   Will do flu  Test.   Cough and runny nose is \"yellow\"  Sinus pressure.  Smoking 1/2 ppd.    Chronic pain syndrome  WE reviewed her LE Duplex study which was normal.   We discussed her legs being thick in the thigh areas appear to be due to  Some adipose tissue on exam.     Reports knee pains, low back pain, chronic neck and low back pain.  Encouraged to f/u w her Pain Management MD    Psychiatric disorder  Pt reports has many emotional issues and PTSD  Is going to see counselor and Psychiatrist via  [facility].  Denies suicidal ideation.    I asked her to complete the past due labs including the TSH, Vit B12 and Vit d as it may be  A factor and having the baseline labs is important.        Patient Active Problem List    Diagnosis Date Noted   • Flu-like symptoms 01/29/2018   • Psychiatric disorder 01/29/2018   • Chronic pain syndrome 12/26/2017   • Abdominal discomfort 12/26/2017   • Nicotine abuse 10/08/2017   • Cervical spondylosis 03/09/2016   • DDD (degenerative disc disease), cervical 03/09/2016   • Cervical radiculopathy 03/09/2016   • Chronic neck pain 03/09/2016   • Cervicogenic headache 03/09/2016   • Lumbosacral spondylosis 03/09/2016   • DDD (degenerative disc disease), lumbar 03/09/2016   • Lumbar radiculopathy 03/09/2016   • Chronic low back pain 03/09/2016   • Controlled substance agreement signed 03/09/2016   • Low back pain associated with a spinal disorder other than radiculopathy or spinal stenosis 05/08/2014       Allergies:Lyrica and Sulfa drugs    Current Outpatient Prescriptions   Medication Sig Dispense Refill   • polyethylene glycol 3350 (MIRALAX) Powder      • sumatriptan (IMITREX) 50 MG Tab Take 1 Tab by mouth Once PRN for Migraine. 15 Tab 0   • guaifenesin-codeine (TUSSI-ORGANIDIN NR) 100-10 MG/5ML syrup " Take 5 mL by mouth 2 times a day as needed for up to 7 days. 120 mL 0   • azithromycin (ZITHROMAX) 250 MG Tab Z pack-Take 2 tabs day one and then 1 tab daily for a total of 5 days 6 Tab 0   • benzonatate (TESSALON) 100 MG Cap Take 1 Cap by mouth 3 times a day as needed. 30 Cap 0   • oxycodone-acetaminophen (PERCOCET-10)  MG Tab      • promethazine (PHENERGAN) 12.5 MG tablet TAKE 1 TO 2 TABLETS BY MOUTH BY MOUTH DAILY AS NEEDED FOR NAUSEA AND VOMITING 60 Tab 0   • tiotropium (SPIRIVA HANDIHALER) 18 MCG Cap Inhale 18 mcg by mouth every day.     • albuterol 108 (90 Base) MCG/ACT Aero Soln inhalation aerosol Inhale 2 Puffs by mouth every 6 hours as needed for Shortness of Breath. 8.5 g 2   • topiramate (TOPAMAX) 25 MG Tab Take 25 mg by mouth.     • duloxetine (CYMBALTA) 60 MG Cap DR Particles delayed-release capsule Take 60 mg by mouth every day.     • trazodone (DESYREL) 100 MG Tab Take 200 mg by mouth every bedtime.     • gabapentin (NEURONTIN) 300 MG Cap Take 300 mg by mouth 3 times a day.     • magnesium gluconate (MAG-G) 500 MG tablet Take 500 mg by mouth every day.     • promethazine (PHENERGAN) 25 MG Tab Take 12.5 mg by mouth 1 time daily as needed for Nausea/Vomiting.     • hydrocodone/acetaminophen (NORCO)  MG Tab Take 1 Tab by mouth every 6 hours.     • omeprazole (PRILOSEC) 20 MG delayed-release capsule Take 20 mg by mouth every day.     • cyclobenzaprine (FLEXERIL) 10 MG Tab Take 10 mg by mouth every bedtime.     • asa/apap/caffeine (EXCEDRIN) 250-250-65 MG Tab Take 2 Tabs by mouth every 6 hours as needed for Headache.       No current facility-administered medications for this visit.        Social History   Substance Use Topics   • Smoking status: Current Every Day Smoker     Packs/day: 0.50     Years: 30.00     Types: Cigarettes   • Smokeless tobacco: Current User      Comment: 1ppd - quit 7-8 months ago   • Alcohol use No       Family History   Problem Relation Age of Onset   • Lung Disease  "Mother    • Diabetes Mother    • Stroke Mother    • Arthritis Father    • Cancer Father    • Diabetes Father    • Hypertension Father    • Stroke Father    • Alcohol/Drug Father    • Hypertension Brother    • Stroke Brother    • Alcohol/Drug Brother    • Arthritis Maternal Aunt    • Arthritis Maternal Uncle    • Cancer Paternal Grandmother    • Cancer Paternal Grandfather        ROS:  Review of Systems   See HPI Above    Exam:  Blood pressure 108/60, pulse 98, temperature (!) 35.7 °C (96.2 °F), resp. rate 16, height 1.473 m (4' 9.99\"), weight 72.1 kg (159 lb), SpO2 96 %.  General:  Well nourished, over-weight, well developed female in mild discomfort.  HENT:Head is grossly normal. PERRL. Posterior Pharynx slightly red, No exudates.  Neck: Supple. Trachea is midline.  Pulmonary: mild scattered rhonchi to upper airways to ausculation. Normal effort. No rales or wheezing.   Cardiovascular: Regular rate and rhythm.  Abdomen-Abdomen is soft, No tenderness.  Upper extremities- Strong = . Good ROM  Lower extremities- neg for edema, redness. Moderate tenderness to bilat thigh muscles.  Neuro- A & O x 4. Speech clear and appropriate.     Current medications, allergies, and problem list reviewed with patient and updated in  [facility] today.    Assessment/Plan:  1. Lumbar radiculopathy  F/u DR Pozo as MRI shows mild degen. Changes. Pain medication per DR Pozo- Pain [facility]   2. Abdominal discomfort  Pt to monitor, U/s's normal, no hernias.   3. Flu-like symptoms  Influenza test in clinic today = Negative   4. Chronic pain syndrome  sumatriptan (IMITREX) 50 MG Tab   5. Psychiatric disorder  F/u Dr Pozo   6. Cough  guaifenesin-codeine (TUSSI-ORGANIDIN NR) 100-10 MG/5ML syrup    benzonatate (TESSALON) 100 MG Cap   7. Acute bronchitis, unspecified organism  azithromycin (ZITHROMAX) 250 MG Tab  Patient encouraged to stop smoking as reports down to 1/2 ppd, but not ready to quit.   Follow up in 3 weeks to review Past Due " Labs that she was instructed to complete. Call or return if questions, concerns, or worsening condition.

## 2018-01-30 NOTE — ASSESSMENT & PLAN NOTE
We reviewed her MRI Lumbar spine that showed mild degenerative changes.  She reports  I recommended she discuss the findings w Dr Pozo, her pain management MD

## 2018-01-30 NOTE — ASSESSMENT & PLAN NOTE
"Pt reports mild sore throat, post nasal drainage, achey body, nausea and diarrhea x 1 week. Cough is congested.   Will do flu  Test.   Cough and runny nose is \"yellow\"  Sinus pressure.  Smoking 1/2 ppd.  "

## 2018-01-30 NOTE — ASSESSMENT & PLAN NOTE
Pt reports has many emotional issues and PTSD  Is going to see counselor and Psychiatrist via [facility].  Denies suicidal ideation.    I asked her to complete the past due labs including the TSH, Vit B12 and Vit d as it may be  A factor and having the baseline labs is important.

## 2018-01-30 NOTE — ASSESSMENT & PLAN NOTE
WE reviewed her LE Duplex study which was normal.     Reports knee pains, low back pain, chronic neck and low back pain.  Encouraged to f/u w her Pain Management MD

## 2018-02-20 RX ORDER — PROMETHAZINE HYDROCHLORIDE 12.5 MG/1
TABLET ORAL
Qty: 60 TAB | Refills: 0 | Status: SHIPPED | OUTPATIENT
Start: 2018-02-20 | End: 2018-04-29 | Stop reason: SDUPTHER

## 2018-03-09 ENCOUNTER — HOSPITAL ENCOUNTER (OUTPATIENT)
Dept: LAB | Facility: MEDICAL CENTER | Age: 58
End: 2018-03-09
Attending: NURSE PRACTITIONER
Payer: MEDICAID

## 2018-03-09 DIAGNOSIS — Z13.29 SCREENING FOR THYROID DISORDER: ICD-10-CM

## 2018-03-09 DIAGNOSIS — Z13.1 SCREENING FOR DIABETES MELLITUS: ICD-10-CM

## 2018-03-09 DIAGNOSIS — Z13.21 ENCOUNTER FOR VITAMIN DEFICIENCY SCREENING: ICD-10-CM

## 2018-03-09 LAB
25(OH)D3 SERPL-MCNC: 16 NG/ML (ref 30–100)
ALBUMIN SERPL BCP-MCNC: 4.2 G/DL (ref 3.2–4.9)
ALBUMIN/GLOB SERPL: 1.4 G/DL
ALP SERPL-CCNC: 61 U/L (ref 30–99)
ALT SERPL-CCNC: 13 U/L (ref 2–50)
ANION GAP SERPL CALC-SCNC: 7 MMOL/L (ref 0–11.9)
AST SERPL-CCNC: 35 U/L (ref 12–45)
BILIRUB SERPL-MCNC: 0.5 MG/DL (ref 0.1–1.5)
BUN SERPL-MCNC: 15 MG/DL (ref 8–22)
CALCIUM SERPL-MCNC: 9.9 MG/DL (ref 8.5–10.5)
CHLORIDE SERPL-SCNC: 108 MMOL/L (ref 96–112)
CO2 SERPL-SCNC: 24 MMOL/L (ref 20–33)
CREAT SERPL-MCNC: 0.8 MG/DL (ref 0.5–1.4)
EST. AVERAGE GLUCOSE BLD GHB EST-MCNC: 108 MG/DL
GLOBULIN SER CALC-MCNC: 3 G/DL (ref 1.9–3.5)
GLUCOSE SERPL-MCNC: 93 MG/DL (ref 65–99)
HBA1C MFR BLD: 5.4 % (ref 0–5.6)
POTASSIUM SERPL-SCNC: 4.2 MMOL/L (ref 3.6–5.5)
PROT SERPL-MCNC: 7.2 G/DL (ref 6–8.2)
SODIUM SERPL-SCNC: 139 MMOL/L (ref 135–145)
TSH SERPL DL<=0.005 MIU/L-ACNC: 1.31 UIU/ML (ref 0.38–5.33)
VIT B12 SERPL-MCNC: 480 PG/ML (ref 211–911)

## 2018-03-09 PROCEDURE — 84443 ASSAY THYROID STIM HORMONE: CPT

## 2018-03-09 PROCEDURE — 82607 VITAMIN B-12: CPT

## 2018-03-09 PROCEDURE — 36415 COLL VENOUS BLD VENIPUNCTURE: CPT

## 2018-03-09 PROCEDURE — 82306 VITAMIN D 25 HYDROXY: CPT

## 2018-03-09 PROCEDURE — 83036 HEMOGLOBIN GLYCOSYLATED A1C: CPT

## 2018-03-09 PROCEDURE — 80053 COMPREHEN METABOLIC PANEL: CPT

## 2018-03-29 ENCOUNTER — TELEPHONE (OUTPATIENT)
Dept: PULMONOLOGY | Facility: HOSPICE | Age: 58
End: 2018-03-29

## 2018-03-29 NOTE — TELEPHONE ENCOUNTER
Records requested from referring provider's office for last office note, PFT results, and most recent imaging.

## 2018-03-30 ENCOUNTER — TELEPHONE (OUTPATIENT)
Dept: PULMONOLOGY | Facility: HOSPICE | Age: 58
End: 2018-03-30

## 2018-03-30 DIAGNOSIS — R06.00 DYSPNEA, UNSPECIFIED TYPE: ICD-10-CM

## 2018-04-02 ENCOUNTER — NON-PROVIDER VISIT (OUTPATIENT)
Dept: PULMONOLOGY | Facility: HOSPICE | Age: 58
End: 2018-04-02
Payer: MEDICAID

## 2018-04-02 ENCOUNTER — OFFICE VISIT (OUTPATIENT)
Dept: PULMONOLOGY | Facility: HOSPICE | Age: 58
End: 2018-04-02
Payer: MEDICAID

## 2018-04-02 VITALS
DIASTOLIC BLOOD PRESSURE: 78 MMHG | BODY MASS INDEX: 29.23 KG/M2 | TEMPERATURE: 97.8 F | SYSTOLIC BLOOD PRESSURE: 120 MMHG | HEIGHT: 59 IN | WEIGHT: 145 LBS | HEART RATE: 81 BPM | OXYGEN SATURATION: 95 % | RESPIRATION RATE: 16 BRPM

## 2018-04-02 DIAGNOSIS — R06.00 DYSPNEA, UNSPECIFIED TYPE: ICD-10-CM

## 2018-04-02 DIAGNOSIS — J44.9 CHRONIC OBSTRUCTIVE PULMONARY DISEASE, UNSPECIFIED COPD TYPE (HCC): ICD-10-CM

## 2018-04-02 PROCEDURE — 94726 PLETHYSMOGRAPHY LUNG VOLUMES: CPT | Performed by: INTERNAL MEDICINE

## 2018-04-02 PROCEDURE — 94060 EVALUATION OF WHEEZING: CPT | Performed by: INTERNAL MEDICINE

## 2018-04-02 PROCEDURE — 99204 OFFICE O/P NEW MOD 45 MIN: CPT | Mod: 25 | Performed by: INTERNAL MEDICINE

## 2018-04-02 PROCEDURE — 94729 DIFFUSING CAPACITY: CPT | Performed by: INTERNAL MEDICINE

## 2018-04-02 RX ORDER — NALOXONE HYDROCHLORIDE 4 MG/.1ML
SPRAY NASAL
COMMUNITY
Start: 2018-02-02 | End: 2018-05-12

## 2018-04-02 RX ORDER — DULOXETIN HYDROCHLORIDE 60 MG/1
CAPSULE, DELAYED RELEASE ORAL
COMMUNITY
Start: 2018-01-19 | End: 2018-04-17

## 2018-04-02 RX ORDER — QUETIAPINE FUMARATE 25 MG/1
TABLET, FILM COATED ORAL
COMMUNITY
Start: 2018-03-31 | End: 2018-05-12

## 2018-04-02 RX ORDER — VARENICLINE TARTRATE 1 MG/1
TABLET, FILM COATED ORAL
Qty: 60 TAB | Refills: 3 | Status: SHIPPED | OUTPATIENT
Start: 2018-04-02 | End: 2018-05-12

## 2018-04-02 RX ORDER — SUMATRIPTAN 25 MG/1
TABLET, FILM COATED ORAL
COMMUNITY
Start: 2018-02-16 | End: 2018-05-12

## 2018-04-02 ASSESSMENT — PULMONARY FUNCTION TESTS
FVC_PERCENT_PREDICTED: 82
FEV1/FVC_PERCENT_PREDICTED: 87
FEV1: 1.53
FEV1_PERCENT_CHANGE: 4
FEV1/FVC_PERCENT_LLN: 66
FEV1/FVC: 71
FVC: 2.25
FEV1/FVC_PERCENT_PREDICTED: 78
FEV1/FVC_PERCENT_PREDICTED: 91
FEV1/FVC: 71.19
FVC: 2.36
FEV1_LLN: 1.78
FEV1/FVC_PREDICTED: 79
FEV1: 1.68
FEV1/FVC_PERCENT_CHANGE: 4
FVC_PERCENT_PREDICTED: 86
FEV1_PREDICTED: 2.13
FEV1/FVC: 68
FEV1_PERCENT_CHANGE: 9
FEV1/FVC_PERCENT_PREDICTED: 85
FVC_PREDICTED: 2.74
FEV1/FVC_PERCENT_CHANGE: 225
FEV1_PERCENT_PREDICTED: 78
FEV1_PERCENT_PREDICTED: 71
FEV1/FVC_PERCENT_PREDICTED: 90
FVC_LLN: 2.29
FEV1/FVC: 68

## 2018-04-02 NOTE — PROCEDURES
Good patient effort & cooperation.  The results of this test meet the ATS/ERS standards for acceptability and repeatability.  Predicted equations for Spirometry are N-Angelita II, ITS for lung volumes, and [facility] for DLCO.  The DLCO was uncorrected for Hgb.  A bronchodilator of Ventolin HFA- 2puffs via spacer were administered.  DLCO was performed during dilation period.    SPIROMETRY:  1. FVC was 2.36 L, 86 % of predicted  2. FEV1 was 1.68 L, 78 % of predicted   3. FEV1/FVC ratio was 71 %  4. There was borderline response to bronchodilators   5. Flow volume loop consistent with obstruction    LUNG VOLUMES:  1. RV was  163 % of predicted   2. TLC was 116 % of predicted       DIFFUSION CAPACITY:  1.Diffusion capacity was  97 % of predicted       IMPRESSION:   The patient has mild decrease in FEV1 with borderline response to bronchodilators. FEV1 increased by 9% to 78% predicted after bronchodilators. The patient FEV1/FVC ratio was 68% prior to bronchodilators and 71% after bronchodilators. The patient also has moderate air trapping. Putting these findings together with the patient's history of smoking, probably these findings are secondary to COPD. Even though FEV1/FVC was 71% and did not meet the Gold criteria to diagnose the patient with COPD. Clinical correlation is required.

## 2018-04-03 NOTE — PROGRESS NOTES
CC:  Chief Complaint   Patient presents with   • Establish Care     Referred by Leticia Chan for COPD     Shortness of breath on exertion, evaluation for asthma.    HPI:   The patient is a 58 y.o. female with history of heavy smoking. She smokes one pack a day for more than 30 years was referred to our clinic today to be evaluated for shortness of breath, chronic cough and wheezing. The patient gets winded with minimal activities. She cannot climb 2 flights of stairs. However she does have major limitation of bilateral lower extremity weakness and pain. The patient had pulmonary function tests done prior to her visit. It showed moderate Obstructive ventilatory defect    The patient only uses Ventolin as needed.    She has chest x-ray done last year in the hospital. It was personally reviewed and did not show any acute process. It showed some atelectasis.  ROS:   Constitutional: Denies fevers, chills, night sweats  Eyes: Denies vision loss, pain, drainage, double vision  Ears, Nose, Throat: Denies earache, difficulty hearing, tinnitus, nasal congestion, hoarseness  Cardiovascular: Denies chest pain, tightness, palpitations, orthopnea or edema  Respiratory: Please see history of present illness  GI: Denies heartburn, dysphagia, nausea, abdominal pain, diarrhea or constipation  : Denies frequent urination, hematuria, discharge or painful urination  Musculoskeletal: Denies back pain, painful joints, sore muscles  Neurological: Denies weakness or headaches  Skin: No rashes  All other ROS were negative except what mentioned in the HPI     Past Medical History:  Past Medical History:   Diagnosis Date   • Allergy, unspecified not elsewhere classified    • Anxiety    • Arthritis    • Cancer (CMS-[facility])     cervical ca - hysterectomy   • Chronic airway obstruction, not elsewhere classified    • Depression    • Fibromyalgia    • GERD (gastroesophageal reflux disease)    • Hypertension     takes lisinopril   • Indigestion      hx colitis   • Migraine    • Osteoporosis, unspecified    • Other emphysema (CMS-HCC)    • Other specified symptom associated with female genital organs     total hyster   • Psychiatric disorder 1/29/2018   • Renal disorder     kidney stone   • Type II or unspecified type diabetes mellitus without mention of complication, not stated as uncontrolled    • Ulcer (CMS-HCC)    • Unspecified asthma(493.90)    • Unspecified cataract    • Urolithiasis     has lithotripsy               Social History:  Social History     Social History   • Marital status:      Spouse name: N/A   • Number of children: N/A   • Years of education: N/A     Occupational History   • Not on file.     Social History Main Topics   • Smoking status: Current Every Day Smoker     Packs/day: 0.50     Years: 30.00     Types: Cigarettes   • Smokeless tobacco: Current User      Comment: 1ppd - quit 7-8 months ago   • Alcohol use No   • Drug use: Yes     Types: Inhaled      Comment: Meth 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   • Sexual activity: Not on file     Other Topics Concern   • Not on file     Social History Narrative    ** Merged History Encounter **              Home Pets   The patient has dogs    Family History:  Family History   Problem Relation Age of Onset   • Lung Disease Mother    • Diabetes Mother    • Stroke Mother    • Arthritis Father    • Cancer Father    • Diabetes Father    • Hypertension Father    • Stroke Father    • Alcohol/Drug Father    • Hypertension Brother    • Stroke Brother    • Alcohol/Drug Brother    • Arthritis Maternal Aunt    • Arthritis Maternal Uncle    • Cancer Paternal Grandmother    • Cancer Paternal Grandfather        Current Outpatient Prescriptions on File Prior to Visit   Medication Sig Dispense Refill   • PROVENTIL  (90 Base) MCG/ACT Aero Soln inhalation aerosol INHALE 2 PUFFS BY MOUTH EVERY 6 HOURS AS NEEDED FOR SHORTNESS OF BREATH 6.7 g 2   • promethazine (PHENERGAN) 12.5 MG tablet TAKE 1 TO 2 TABLETS BY  "MOUTH DAILY AS NEEDED FOR NAUSEA OR VOMITING 60 Tab 0   • oxycodone-acetaminophen (PERCOCET-10)  MG Tab      • polyethylene glycol 3350 (MIRALAX) Powder      • sumatriptan (IMITREX) 50 MG Tab Take 1 Tab by mouth Once PRN for Migraine. 15 Tab 0   • tiotropium (SPIRIVA HANDIHALER) 18 MCG Cap Inhale 18 mcg by mouth every day.     • topiramate (TOPAMAX) 25 MG Tab Take 25 mg by mouth.     • duloxetine (CYMBALTA) 60 MG Cap DR Particles delayed-release capsule Take 60 mg by mouth every day.     • trazodone (DESYREL) 100 MG Tab Take 200 mg by mouth every bedtime.     • gabapentin (NEURONTIN) 300 MG Cap Take 300 mg by mouth 3 times a day.     • magnesium gluconate (MAG-G) 500 MG tablet Take 500 mg by mouth every day.     • promethazine (PHENERGAN) 25 MG Tab Take 12.5 mg by mouth 1 time daily as needed for Nausea/Vomiting.     • hydrocodone/acetaminophen (NORCO)  MG Tab Take 1 Tab by mouth every 6 hours.     • omeprazole (PRILOSEC) 20 MG delayed-release capsule Take 20 mg by mouth every day.     • cyclobenzaprine (FLEXERIL) 10 MG Tab Take 10 mg by mouth every bedtime.     • asa/apap/caffeine (EXCEDRIN) 250-250-65 MG Tab Take 2 Tabs by mouth every 6 hours as needed for Headache.     • azithromycin (ZITHROMAX) 250 MG Tab Z pack-Take 2 tabs day one and then 1 tab daily for a total of 5 days 6 Tab 0   • benzonatate (TESSALON) 100 MG Cap Take 1 Cap by mouth 3 times a day as needed. 30 Cap 0     No current facility-administered medications on file prior to visit.        Allergies:   Lyrica and Sulfa drugs        Vitals:    04/02/18 1534   Height: 1.486 m (4' 10.5\")   Weight: 65.8 kg (145 lb)   Weight % change since last entry.: 0 %   BP: 120/78   Pulse: 81   BMI (Calculated): 29.79   Resp: 16   Temp: 36.6 °C (97.8 °F)   O2 sat % room air: 95 %           Physical Exam:  Appearance:  in no acute distress  HEENT: Normocephalic, atraumatic, white sclera, PERRLA, oropharynx clear  Neck: No adenopathy or masses  Respiratory: " no intercostal retractions or accessory muscle use  Lungs auscultation: Clear to auscultation bilaterally  Cardiovascular: Regular rate rhythm. No murmurs, rubs or gallops.  No LE edema  Abdomen: soft, nondistended  Gait: Normal  Digits: No clubbing, cyanosis  Motor: No focal deficits  Orientation: Oriented to time, person and place      DATA:    Labs:  Lab Results   Component Value Date/Time    WBC 3.3 (L) 10/09/2017 12:48 AM    RBC 4.29 10/09/2017 12:48 AM    HEMOGLOBIN 12.6 10/09/2017 12:48 AM    HEMATOCRIT 39.4 10/09/2017 12:48 AM    MCV 91.8 10/09/2017 12:48 AM    MCH 29.4 10/09/2017 12:48 AM    MCHC 32.0 (L) 10/09/2017 12:48 AM    MPV 10.0 10/09/2017 12:48 AM    NEUTSPOLYS 53.90 10/08/2017 03:41 PM    LYMPHOCYTES 36.10 10/08/2017 03:41 PM    MONOCYTES 5.90 10/08/2017 03:41 PM    EOSINOPHILS 2.70 10/08/2017 03:41 PM    BASOPHILS 0.70 10/08/2017 03:41 PM    HYPOCHROMIA 1+ 05/11/2010 09:45 AM      Lab Results   Component Value Date/Time    SODIUM 139 03/09/2018 10:41 AM    POTASSIUM 4.2 03/09/2018 10:41 AM    CHLORIDE 108 03/09/2018 10:41 AM    CO2 24 03/09/2018 10:41 AM    GLUCOSE 93 03/09/2018 10:41 AM    BUN 15 03/09/2018 10:41 AM    CREATININE 0.80 03/09/2018 10:41 AM    CREATININE 0.9 02/27/2009 07:40 PM        Imaging:  Please see history of present illness      PULMONARY FUNCTION TEST:  Please see history of present illness        Diagnosis:  No diagnosis found.     Assessment and Plan     Problem List Items Addressed This Visit     COPD (chronic obstructive pulmonary disease) (CMS-[facility])    Relevant Medications    Tiotropium Bromide-Olodaterol (STIOLTO RESPIMAT) 2.5-2.5 MCG/ACT Aero Soln    varenicline (CHANTIX STARTING MONTH PAK) 0.5 MG X 11 & 1 MG X 42 tablet    varenicline (CHANTIX CONTINUING MONTH PAK) 1 MG tablet      The patient has moderate to severe COPD secondary to smoking. No recent hospitalization for COPD exacerbation.  Smoking cessation was strongly advised. Benja explained to her the  mechanism of the damage that smoking can cause her lung and make her lung disease progress.  We will start the patient on Stiolto   I offered the patient pulmonary have but because she does not have reliable ride she declined to do it at this time  Multi-oximetry in our clinic today was negative for hypoxemia.  Give the patient prescription for Chantix to help her quit smoking.                    Return in about 6 months (around 10/2/2018).        This note was created using voice recognition software. I apologize for any overlooked obvious grammar or  vocabulary mistake

## 2018-04-08 ENCOUNTER — APPOINTMENT (OUTPATIENT)
Dept: RADIOLOGY | Facility: MEDICAL CENTER | Age: 58
End: 2018-04-08
Attending: EMERGENCY MEDICINE
Payer: MEDICAID

## 2018-04-08 ENCOUNTER — HOSPITAL ENCOUNTER (EMERGENCY)
Facility: MEDICAL CENTER | Age: 58
End: 2018-04-08
Attending: EMERGENCY MEDICINE
Payer: MEDICAID

## 2018-04-08 VITALS
RESPIRATION RATE: 18 BRPM | BODY MASS INDEX: 30.44 KG/M2 | HEIGHT: 58 IN | WEIGHT: 145 LBS | TEMPERATURE: 98.2 F | HEART RATE: 82 BPM | OXYGEN SATURATION: 92 %

## 2018-04-08 DIAGNOSIS — S16.1XXA STRAIN OF NECK MUSCLE, INITIAL ENCOUNTER: ICD-10-CM

## 2018-04-08 DIAGNOSIS — S09.90XA CLOSED HEAD INJURY, INITIAL ENCOUNTER: ICD-10-CM

## 2018-04-08 DIAGNOSIS — S30.1XXA CONTUSION OF ABDOMINAL WALL, INITIAL ENCOUNTER: ICD-10-CM

## 2018-04-08 DIAGNOSIS — F10.920 ALCOHOLIC INTOXICATION WITHOUT COMPLICATION (HCC): ICD-10-CM

## 2018-04-08 LAB
ALBUMIN SERPL BCP-MCNC: 4 G/DL (ref 3.2–4.9)
ALBUMIN/GLOB SERPL: 1.3 G/DL
ALP SERPL-CCNC: 66 U/L (ref 30–99)
ALT SERPL-CCNC: 10 U/L (ref 2–50)
ANION GAP SERPL CALC-SCNC: 9 MMOL/L (ref 0–11.9)
APPEARANCE UR: CLEAR
APTT PPP: 30.3 SEC (ref 24.7–36)
AST SERPL-CCNC: 28 U/L (ref 12–45)
BACTERIA #/AREA URNS HPF: ABNORMAL /HPF
BASOPHILS # BLD AUTO: 0.6 % (ref 0–1.8)
BASOPHILS # BLD: 0.03 K/UL (ref 0–0.12)
BILIRUB SERPL-MCNC: 0.2 MG/DL (ref 0.1–1.5)
BILIRUB UR QL STRIP.AUTO: NEGATIVE
BUN SERPL-MCNC: 13 MG/DL (ref 8–22)
CALCIUM SERPL-MCNC: 9.3 MG/DL (ref 8.5–10.5)
CHLORIDE SERPL-SCNC: 110 MMOL/L (ref 96–112)
CO2 SERPL-SCNC: 25 MMOL/L (ref 20–33)
COLOR UR: YELLOW
CREAT SERPL-MCNC: 0.69 MG/DL (ref 0.5–1.4)
EOSINOPHIL # BLD AUTO: 0.07 K/UL (ref 0–0.51)
EOSINOPHIL NFR BLD: 1.3 % (ref 0–6.9)
EPI CELLS #/AREA URNS HPF: ABNORMAL /HPF
ERYTHROCYTE [DISTWIDTH] IN BLOOD BY AUTOMATED COUNT: 49.6 FL (ref 35.9–50)
ETHANOL BLD-MCNC: 0.14 G/DL
GLOBULIN SER CALC-MCNC: 3.1 G/DL (ref 1.9–3.5)
GLUCOSE SERPL-MCNC: 96 MG/DL (ref 65–99)
GLUCOSE UR STRIP.AUTO-MCNC: NEGATIVE MG/DL
HCT VFR BLD AUTO: 47.2 % (ref 37–47)
HGB BLD-MCNC: 15 G/DL (ref 12–16)
HYALINE CASTS #/AREA URNS LPF: ABNORMAL /LPF
IMM GRANULOCYTES # BLD AUTO: 0.04 K/UL (ref 0–0.11)
IMM GRANULOCYTES NFR BLD AUTO: 0.7 % (ref 0–0.9)
INR PPP: 0.97 (ref 0.87–1.13)
KETONES UR STRIP.AUTO-MCNC: NEGATIVE MG/DL
LEUKOCYTE ESTERASE UR QL STRIP.AUTO: ABNORMAL
LYMPHOCYTES # BLD AUTO: 1.29 K/UL (ref 1–4.8)
LYMPHOCYTES NFR BLD: 24.1 % (ref 22–41)
MCH RBC QN AUTO: 29.9 PG (ref 27–33)
MCHC RBC AUTO-ENTMCNC: 31.8 G/DL (ref 33.6–35)
MCV RBC AUTO: 94.2 FL (ref 81.4–97.8)
MICRO URNS: ABNORMAL
MONOCYTES # BLD AUTO: 0.38 K/UL (ref 0–0.85)
MONOCYTES NFR BLD AUTO: 7.1 % (ref 0–13.4)
NEUTROPHILS # BLD AUTO: 3.55 K/UL (ref 2–7.15)
NEUTROPHILS NFR BLD: 66.2 % (ref 44–72)
NITRITE UR QL STRIP.AUTO: NEGATIVE
NRBC # BLD AUTO: 0 K/UL
NRBC BLD-RTO: 0 /100 WBC
PH UR STRIP.AUTO: 5.5 [PH]
PLATELET # BLD AUTO: 169 K/UL (ref 164–446)
PMV BLD AUTO: 10.5 FL (ref 9–12.9)
POTASSIUM SERPL-SCNC: 3.8 MMOL/L (ref 3.6–5.5)
PROT SERPL-MCNC: 7.1 G/DL (ref 6–8.2)
PROT UR QL STRIP: NEGATIVE MG/DL
PROTHROMBIN TIME: 12.6 SEC (ref 12–14.6)
RBC # BLD AUTO: 5.01 M/UL (ref 4.2–5.4)
RBC # URNS HPF: ABNORMAL /HPF
RBC UR QL AUTO: ABNORMAL
SODIUM SERPL-SCNC: 144 MMOL/L (ref 135–145)
SP GR UR STRIP.AUTO: 1.01
UROBILINOGEN UR STRIP.AUTO-MCNC: 0.2 MG/DL
WBC # BLD AUTO: 5.4 K/UL (ref 4.8–10.8)
WBC #/AREA URNS HPF: ABNORMAL /HPF

## 2018-04-08 PROCEDURE — 85610 PROTHROMBIN TIME: CPT

## 2018-04-08 PROCEDURE — 36415 COLL VENOUS BLD VENIPUNCTURE: CPT

## 2018-04-08 PROCEDURE — 700102 HCHG RX REV CODE 250 W/ 637 OVERRIDE(OP): Performed by: EMERGENCY MEDICINE

## 2018-04-08 PROCEDURE — 85730 THROMBOPLASTIN TIME PARTIAL: CPT

## 2018-04-08 PROCEDURE — 80307 DRUG TEST PRSMV CHEM ANLYZR: CPT

## 2018-04-08 PROCEDURE — 85025 COMPLETE CBC W/AUTO DIFF WBC: CPT

## 2018-04-08 PROCEDURE — 94760 N-INVAS EAR/PLS OXIMETRY 1: CPT

## 2018-04-08 PROCEDURE — 700111 HCHG RX REV CODE 636 W/ 250 OVERRIDE (IP): Performed by: EMERGENCY MEDICINE

## 2018-04-08 PROCEDURE — 700117 HCHG RX CONTRAST REV CODE 255: Performed by: EMERGENCY MEDICINE

## 2018-04-08 PROCEDURE — 80053 COMPREHEN METABOLIC PANEL: CPT

## 2018-04-08 PROCEDURE — 81001 URINALYSIS AUTO W/SCOPE: CPT | Mod: XU

## 2018-04-08 PROCEDURE — 74177 CT ABD & PELVIS W/CONTRAST: CPT

## 2018-04-08 PROCEDURE — 99285 EMERGENCY DEPT VISIT HI MDM: CPT

## 2018-04-08 PROCEDURE — 96375 TX/PRO/DX INJ NEW DRUG ADDON: CPT

## 2018-04-08 PROCEDURE — A9270 NON-COVERED ITEM OR SERVICE: HCPCS | Performed by: EMERGENCY MEDICINE

## 2018-04-08 PROCEDURE — 304561 HCHG STAT O2

## 2018-04-08 PROCEDURE — 72125 CT NECK SPINE W/O DYE: CPT

## 2018-04-08 PROCEDURE — 96374 THER/PROPH/DIAG INJ IV PUSH: CPT | Mod: XU

## 2018-04-08 PROCEDURE — 70450 CT HEAD/BRAIN W/O DYE: CPT

## 2018-04-08 RX ORDER — MORPHINE SULFATE 4 MG/ML
4 INJECTION, SOLUTION INTRAMUSCULAR; INTRAVENOUS ONCE
Status: COMPLETED | OUTPATIENT
Start: 2018-04-08 | End: 2018-04-08

## 2018-04-08 RX ORDER — ONDANSETRON 2 MG/ML
4 INJECTION INTRAMUSCULAR; INTRAVENOUS ONCE
Status: COMPLETED | OUTPATIENT
Start: 2018-04-08 | End: 2018-04-08

## 2018-04-08 RX ORDER — SUMATRIPTAN 50 MG/1
50 TABLET, FILM COATED ORAL ONCE
Status: COMPLETED | OUTPATIENT
Start: 2018-04-08 | End: 2018-04-08

## 2018-04-08 RX ORDER — ONDANSETRON 2 MG/ML
4 INJECTION INTRAMUSCULAR; INTRAVENOUS EVERY 4 HOURS PRN
Status: DISCONTINUED | OUTPATIENT
Start: 2018-04-08 | End: 2018-04-08

## 2018-04-08 RX ADMIN — MORPHINE SULFATE 4 MG: 4 INJECTION INTRAVENOUS at 04:47

## 2018-04-08 RX ADMIN — ONDANSETRON 4 MG: 2 INJECTION, SOLUTION INTRAMUSCULAR; INTRAVENOUS at 04:47

## 2018-04-08 RX ADMIN — IOHEXOL 100 ML: 350 INJECTION, SOLUTION INTRAVENOUS at 06:09

## 2018-04-08 RX ADMIN — SUMATRIPTAN SUCCINATE 50 MG: 50 TABLET ORAL at 07:29

## 2018-04-08 ASSESSMENT — PAIN SCALES - GENERAL: PAINLEVEL_OUTOF10: 7

## 2018-04-08 ASSESSMENT — LIFESTYLE VARIABLES: DO YOU DRINK ALCOHOL: YES

## 2018-04-08 NOTE — ED NOTES
Pt reporting 7/10 head pain. ERP notified. Order received for 4mg Zofran and 4mg Morphine.   Medications administered.
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
## 2018-04-08 NOTE — DISCHARGE INSTRUCTIONS
Follow-up with primary care 1-2 days for reevaluation, medication management and close blood pressure monitoring.    Continuing all medications as previously indicated, including those for pain as prescribed.    Diet and activity as tolerated.    Return to the emergency department for headache, altered mental status, visual changes, focal weakness or new paresthesias, vomiting, chest pain, shortness breath, abdominal pain or other new concerns.    Head Injury, Adult  There are many types of head injuries. They can be as minor as a bump. Some head injuries can be worse. Worse injuries include:  · A strong hit to the head that hurts the brain (concussion).  · A bruise of the brain (contusion). This means there is bleeding in the brain that can cause swelling.  · A cracked skull (skull fracture).  · Bleeding in the brain that gathers, gets thick (makes a clot), and forms a bump (hematoma).  Most problems from a head injury come in the first 24 hours. However, you may still have side effects up to 7-10 days after your injury. It is important to watch your condition for any changes.  Follow these instructions at home:  Activity  · Rest as much as possible.  · Avoid activities that are hard or tiring.  · Make sure you get enough sleep.  · Limit activities that need a lot of thought or attention, such as:  ¨ Watching TV.  ¨ Playing memory games and puzzles.  ¨ Job-related work or homework.  ¨ Working on the computer, social media, and texting.  · Avoid activities that could cause another head injury until your doctor says it is okay. This includes playing sports.  · Ask your doctor when it is safe for you to go back to your normal activities, such as work or school. Ask your doctor for a step-by-step plan for slowly going back to your normal activities.  · Ask your doctor when you can drive, ride a bicycle, or use heavy machinery. Never do these activities if you are dizzy.  Lifestyle  · Do not drink alcohol until your  doctor says it is okay.  · Avoid drug use.  · If it is harder than usual to remember things, write them down.  · If you are easily distracted, try to do one thing at a time.  · Talk with family members or close friends when making important decisions.  · Tell your friends, family, a trusted coworker, and  about your injury, symptoms, and limits (restrictions). Have them watch for any problems that are new or getting worse.  General instructions  · Take over-the-counter and prescription medicines only as told by your doctor.  · Have someone stay with you for 24 hours after your head injury. This person should watch you for any changes in your symptoms and be ready to get help.  · Keep all follow-up visits as told by your doctor. This is important.  Prevention  · Work on your balance and strength. This can help you avoid falls.  · Wear a seatbelt when you are in a moving vehicle.  · Wear a helmet when:  ¨ Riding a bicycle.  ¨ Skiing.  ¨ Doing any other sport or activity that has a risk of injury.  · Drink alcohol only in moderation.  · Make your home safer by:  ¨ Getting rid of clutter from the floors and stairs, like things that can make you trip.  ¨ Using grab bars in bathrooms and handrails by stairs.  ¨ Placing non-slip mats on floors and in bathtubs.  ¨ Putting more light in dim areas.  Get help right away if:  · You have:  ¨ A very bad (severe) headache that is not helped by medicine.  ¨ Trouble walking or weakness in your arms and legs.  ¨ Clear or bloody fluid coming from your nose or ears.  ¨ Changes in your seeing (vision).  ¨ Jerky movements that you cannot control (seizure).  · You throw up (vomit).  · Your symptoms get worse.  · You lose balance.  · Your speech is slurred.  · You pass out.  · You are sleepier and have trouble staying awake.  · The black centers of your eyes (pupils) change in size.  These symptoms may be an emergency. Do not wait to see if the symptoms will go away. Get  medical help right away. Call your local emergency services (911 in the [place] Do not drive yourself to the hospital.   This information is not intended to replace advice given to you by your health care provider. Make sure you discuss any questions you have with your health care provider.  Document Released: 11/30/2009 Document Revised: 07/13/2017 Document Reviewed: 06/27/2017  [employer] Interactive Patient Education © 2017 [employer]    Contusion  Introduction  A contusion is a deep bruise. Contusions happen when an injury causes bleeding under the skin. Symptoms of bruising include pain, swelling, and discolored skin. The skin may turn blue, purple, or yellow.  Follow these instructions at home:  · Rest the injured area.  · If told, put ice on the injured area.  ¨ Put ice in a plastic bag.  ¨ Place a towel between your skin and the bag.  ¨ Leave the ice on for 20 minutes, 2-3 times per day.  · If told, put light pressure (compression) on the injured area using an elastic bandage. Make sure the bandage is not too tight. Remove it and put it back on as told by your doctor.  · If possible, raise (elevate) the injured area above the level of your heart while you are sitting or lying down.  · Take over-the-counter and prescription medicines only as told by your doctor.  Contact a doctor if:  · Your symptoms do not get better after several days of treatment.  · Your symptoms get worse.  · You have trouble moving the injured area.  Get help right away if:  · You have very bad pain.  · You have a loss of feeling (numbness) in a hand or foot.  · Your hand or foot turns pale or cold.  This information is not intended to replace advice given to you by your health care provider. Make sure you discuss any questions you have with your health care provider.  Document Released: 06/05/2009 Document Revised: 05/25/2017 Document Reviewed: 05/04/2016  © 2017 [employer]
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
## 2018-04-08 NOTE — ED NOTES
Per pt okay to update Karishma (family) via telephone, Karishma states she will head over to the hospital.

## 2018-04-09 ENCOUNTER — PATIENT OUTREACH (OUTPATIENT)
Dept: HEALTH INFORMATION MANAGEMENT | Facility: OTHER | Age: 58
End: 2018-04-09

## 2018-04-09 NOTE — PROGRESS NOTES
Placed discharge outreach phone call to patient s/p ER discharge 4/8/18.  Left voicemail providing my contact information and instructions to call with any questions or concerns.

## 2018-04-17 ENCOUNTER — HOSPITAL ENCOUNTER (OUTPATIENT)
Facility: MEDICAL CENTER | Age: 58
End: 2018-04-17
Attending: NURSE PRACTITIONER
Payer: MEDICAID

## 2018-04-17 ENCOUNTER — OFFICE VISIT (OUTPATIENT)
Dept: MEDICAL GROUP | Facility: MEDICAL CENTER | Age: 58
End: 2018-04-17
Attending: NURSE PRACTITIONER
Payer: MEDICAID

## 2018-04-17 VITALS
BODY MASS INDEX: 30.04 KG/M2 | OXYGEN SATURATION: 93 % | WEIGHT: 149 LBS | HEART RATE: 68 BPM | RESPIRATION RATE: 16 BRPM | SYSTOLIC BLOOD PRESSURE: 115 MMHG | TEMPERATURE: 98 F | HEIGHT: 59 IN | DIASTOLIC BLOOD PRESSURE: 70 MMHG

## 2018-04-17 DIAGNOSIS — N30.01 ACUTE CYSTITIS WITH HEMATURIA: ICD-10-CM

## 2018-04-17 DIAGNOSIS — E55.9 VITAMIN D DEFICIENCY: ICD-10-CM

## 2018-04-17 DIAGNOSIS — F99 PSYCHIATRIC DISORDER: ICD-10-CM

## 2018-04-17 DIAGNOSIS — J43.8 OTHER EMPHYSEMA (HCC): ICD-10-CM

## 2018-04-17 DIAGNOSIS — M51.36 DDD (DEGENERATIVE DISC DISEASE), LUMBAR: ICD-10-CM

## 2018-04-17 DIAGNOSIS — R30.0 DYSURIA: ICD-10-CM

## 2018-04-17 LAB
APPEARANCE UR: NORMAL
BILIRUB UR STRIP-MCNC: NEGATIVE MG/DL
COLOR UR AUTO: YELLOW
GLUCOSE UR STRIP.AUTO-MCNC: NEGATIVE MG/DL
KETONES UR STRIP.AUTO-MCNC: NEGATIVE MG/DL
LEUKOCYTE ESTERASE UR QL STRIP.AUTO: NORMAL
NITRITE UR QL STRIP.AUTO: NEGATIVE
PH UR STRIP.AUTO: 6.5 [PH] (ref 5–8)
PROT UR QL STRIP: NEGATIVE MG/DL
RBC UR QL AUTO: NORMAL
SP GR UR STRIP.AUTO: 1.01
UROBILINOGEN UR STRIP-MCNC: NEGATIVE MG/DL

## 2018-04-17 PROCEDURE — 87086 URINE CULTURE/COLONY COUNT: CPT

## 2018-04-17 PROCEDURE — 87591 N.GONORRHOEAE DNA AMP PROB: CPT

## 2018-04-17 PROCEDURE — 99214 OFFICE O/P EST MOD 30 MIN: CPT | Mod: 25 | Performed by: NURSE PRACTITIONER

## 2018-04-17 PROCEDURE — 81002 URINALYSIS NONAUTO W/O SCOPE: CPT | Performed by: NURSE PRACTITIONER

## 2018-04-17 PROCEDURE — 87491 CHLMYD TRACH DNA AMP PROBE: CPT

## 2018-04-17 PROCEDURE — 99213 OFFICE O/P EST LOW 20 MIN: CPT | Performed by: NURSE PRACTITIONER

## 2018-04-17 PROCEDURE — 81001 URINALYSIS AUTO W/SCOPE: CPT

## 2018-04-17 RX ORDER — NITROFURANTOIN 25; 75 MG/1; MG/1
100 CAPSULE ORAL 2 TIMES DAILY
Qty: 14 CAP | Refills: 0 | Status: SHIPPED | OUTPATIENT
Start: 2018-04-17 | End: 2018-05-12
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
## 2018-04-17 NOTE — ASSESSMENT & PLAN NOTE
Pt did see Pulmonary in early April and was instructed has moderate to severe COPD. Pulmonary MD added  Stiolto-( tiotropium) to inhalers. And rX for Chantix for pt to   Work on smoking cessation, but not ready to start Chantix when less stressed from boyfriend issues.

## 2018-04-17 NOTE — ASSESSMENT & PLAN NOTE
We reviewed her L-S Spine MRI from1/16/18 and is following w Pain  Management. Reports is walking w cane.

## 2018-04-17 NOTE — ASSESSMENT & PLAN NOTE
3/9/18 Labs showed Vit D= 16 (low)  Discussed what optimal level of vitamin D is appropriate for her good health. Recommended supplement.
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
## 2018-04-17 NOTE — ASSESSMENT & PLAN NOTE
Pt reports dysuria and believes she has a bladder infection  Of note during her recent ER visit on 4/8/18 her Urine dip was   TRACE leukocytes and TRACE occ Blood.    Reports for about 2 weeks has had burning on urination. Mild flank pain both side. R> L. No N/V. No fever or chills.

## 2018-04-18 LAB
APPEARANCE UR: CLEAR
BACTERIA #/AREA URNS HPF: NEGATIVE /HPF
BILIRUB UR QL STRIP.AUTO: NEGATIVE
COLOR UR: YELLOW
CULTURE IF INDICATED INDCX: YES UA CULTURE
EPI CELLS #/AREA URNS HPF: NEGATIVE /HPF
GLUCOSE UR STRIP.AUTO-MCNC: NEGATIVE MG/DL
KETONES UR STRIP.AUTO-MCNC: NEGATIVE MG/DL
LEUKOCYTE ESTERASE UR QL STRIP.AUTO: ABNORMAL
MICRO URNS: ABNORMAL
NITRITE UR QL STRIP.AUTO: NEGATIVE
PH UR STRIP.AUTO: 6.5 [PH]
PROT UR QL STRIP: NEGATIVE MG/DL
RBC # URNS HPF: NORMAL /HPF
RBC UR QL AUTO: ABNORMAL
SP GR UR STRIP.AUTO: 1.01
UROBILINOGEN UR STRIP.AUTO-MCNC: 0.2 MG/DL
WBC #/AREA URNS HPF: NORMAL /HPF

## 2018-04-20 LAB
BACTERIA UR CULT: NORMAL
C TRACH DNA SPEC QL NAA+PROBE: NEGATIVE
N GONORRHOEA DNA SPEC QL NAA+PROBE: NEGATIVE
SIGNIFICANT IND 70042: NORMAL
SITE SITE: NORMAL
SOURCE SOURCE: NORMAL
SPECIMEN SOURCE: NORMAL

## 2018-04-26 ENCOUNTER — TELEPHONE (OUTPATIENT)
Dept: MEDICAL GROUP | Facility: MEDICAL CENTER | Age: 58
End: 2018-04-26

## 2018-04-26 NOTE — TELEPHONE ENCOUNTER
1. Caller Name: Martha                                         Call Back Number: [phone] (home)         Patient approves a detailed voicemail message: yes    2. Patient is requesting lab results dated: 4/17/18    3. Confirmed results are in chart. Patient advised they will be contacted once interpreted by provider.

## 2018-04-26 NOTE — TELEPHONE ENCOUNTER
Please call Martha and let her know her urine tests showed  Moderate white blood cells and only a trace of hidden blood in her urine  They found normal mixed skin wang which means normal skin bacteria in the urine  No need to do further testing since this is fairly normal.  The GC(gonorrhea) and Chlamydia tests on the urine were all negative.    She should stay well hydrated and can take the Macrobid ( antibiotic) but may not  Necessary need it for the normal mixed skin bacteria.     If she does not improve, she should make a f/u appt.    Cash

## 2018-04-30 ENCOUNTER — OFFICE VISIT (OUTPATIENT)
Dept: MEDICAL GROUP | Facility: MEDICAL CENTER | Age: 58
End: 2018-04-30
Attending: NURSE PRACTITIONER
Payer: MEDICAID

## 2018-04-30 ENCOUNTER — HOSPITAL ENCOUNTER (OUTPATIENT)
Facility: MEDICAL CENTER | Age: 58
End: 2018-04-30
Attending: NURSE PRACTITIONER
Payer: MEDICAID

## 2018-04-30 VITALS
HEART RATE: 80 BPM | TEMPERATURE: 98.9 F | RESPIRATION RATE: 16 BRPM | SYSTOLIC BLOOD PRESSURE: 120 MMHG | DIASTOLIC BLOOD PRESSURE: 90 MMHG | WEIGHT: 148 LBS | HEIGHT: 59 IN | OXYGEN SATURATION: 96 % | BODY MASS INDEX: 29.84 KG/M2

## 2018-04-30 DIAGNOSIS — B37.31 YEAST VAGINITIS: ICD-10-CM

## 2018-04-30 DIAGNOSIS — R14.0 ABDOMINAL BLOATING WITH CRAMPS: ICD-10-CM

## 2018-04-30 DIAGNOSIS — R10.9 ABDOMINAL BLOATING WITH CRAMPS: ICD-10-CM

## 2018-04-30 DIAGNOSIS — N93.9 VAGINAL BLEEDING: ICD-10-CM

## 2018-04-30 PROCEDURE — 87480 CANDIDA DNA DIR PROBE: CPT

## 2018-04-30 PROCEDURE — 87510 GARDNER VAG DNA DIR PROBE: CPT

## 2018-04-30 PROCEDURE — 99213 OFFICE O/P EST LOW 20 MIN: CPT | Performed by: NURSE PRACTITIONER

## 2018-04-30 PROCEDURE — 99214 OFFICE O/P EST MOD 30 MIN: CPT | Performed by: NURSE PRACTITIONER

## 2018-04-30 PROCEDURE — 87660 TRICHOMONAS VAGIN DIR PROBE: CPT

## 2018-04-30 RX ORDER — FLUCONAZOLE 150 MG/1
TABLET ORAL
Qty: 2 TAB | Refills: 0 | Status: ON HOLD | OUTPATIENT
Start: 2018-04-30 | End: 2018-05-17

## 2018-04-30 RX ORDER — PROMETHAZINE HYDROCHLORIDE 12.5 MG/1
TABLET ORAL
Qty: 60 TAB | Refills: 0 | Status: SHIPPED | OUTPATIENT
Start: 2018-04-30 | End: 2018-05-30 | Stop reason: SDUPTHER
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
## 2018-05-01 DIAGNOSIS — B37.31 YEAST VAGINITIS: ICD-10-CM

## 2018-05-01 DIAGNOSIS — N93.9 VAGINAL BLEEDING: ICD-10-CM

## 2018-05-01 LAB
CANDIDA DNA VAG QL PROBE+SIG AMP: NEGATIVE
G VAGINALIS DNA VAG QL PROBE+SIG AMP: NEGATIVE
T VAGINALIS DNA VAG QL PROBE+SIG AMP: NEGATIVE

## 2018-05-01 NOTE — TELEPHONE ENCOUNTER
Phone Number Called: [phone] (home)     Message: Left vm notifying patient we have the results of her urine tests. Please call back to get the results and to let her know we sent the antibiotic in. And to let us know if she is feeling better if not to make follow up appointment.     Left Message for patient to call back: yes

## 2018-05-04 DIAGNOSIS — G89.4 CHRONIC PAIN SYNDROME: ICD-10-CM

## 2018-05-07 RX ORDER — SUMATRIPTAN 50 MG/1
50 TABLET, FILM COATED ORAL
Qty: 15 TAB | Refills: 0 | Status: SHIPPED | OUTPATIENT
Start: 2018-05-07 | End: 2018-05-21 | Stop reason: SDUPTHER

## 2018-05-09 ENCOUNTER — OFFICE VISIT (OUTPATIENT)
Dept: MEDICAL GROUP | Facility: MEDICAL CENTER | Age: 58
End: 2018-05-09
Attending: NURSE PRACTITIONER
Payer: MEDICAID

## 2018-05-09 VITALS
OXYGEN SATURATION: 98 % | RESPIRATION RATE: 16 BRPM | HEART RATE: 81 BPM | SYSTOLIC BLOOD PRESSURE: 124 MMHG | WEIGHT: 147.2 LBS | BODY MASS INDEX: 29.68 KG/M2 | DIASTOLIC BLOOD PRESSURE: 62 MMHG | HEIGHT: 59 IN | TEMPERATURE: 99.5 F

## 2018-05-09 DIAGNOSIS — N93.9 ABNORMAL VAGINAL BLEEDING: ICD-10-CM

## 2018-05-09 DIAGNOSIS — N30.00 ACUTE CYSTITIS WITHOUT HEMATURIA: ICD-10-CM

## 2018-05-09 LAB
APPEARANCE UR: NORMAL
BILIRUB UR STRIP-MCNC: NORMAL MG/DL
COLOR UR AUTO: YELLOW
GLUCOSE UR STRIP.AUTO-MCNC: NORMAL MG/DL
KETONES UR STRIP.AUTO-MCNC: NORMAL MG/DL
LEUKOCYTE ESTERASE UR QL STRIP.AUTO: NORMAL
NITRITE UR QL STRIP.AUTO: NORMAL
PH UR STRIP.AUTO: 7 [PH] (ref 5–8)
PROT UR QL STRIP: NORMAL MG/DL
RBC UR QL AUTO: NORMAL
SP GR UR STRIP.AUTO: 1.03
UROBILINOGEN UR STRIP-MCNC: NORMAL MG/DL

## 2018-05-09 PROCEDURE — 81002 URINALYSIS NONAUTO W/O SCOPE: CPT | Performed by: NURSE PRACTITIONER

## 2018-05-09 PROCEDURE — 99213 OFFICE O/P EST LOW 20 MIN: CPT | Performed by: NURSE PRACTITIONER

## 2018-05-09 PROCEDURE — 99214 OFFICE O/P EST MOD 30 MIN: CPT | Mod: 25 | Performed by: NURSE PRACTITIONER

## 2018-05-09 RX ORDER — NITROFURANTOIN 25; 75 MG/1; MG/1
100 CAPSULE ORAL 2 TIMES DAILY
Qty: 14 CAP | Refills: 0 | Status: ON HOLD | OUTPATIENT
Start: 2018-05-09 | End: 2018-05-17
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
## 2018-05-09 NOTE — LETTER
May 9, 2018       Patient: Martha Mosher   YOB: 1960   Date of Visit: 5/9/2018         To Martha,    This is a reminder to you. Please avoid any intercourse or any penetration into the vaginal area until cleared by Gynecologist (except please do complete Transvaginal Pelvic U/S on 5/15/18 as scheduled).    If vaginal bleeding returns and does not stop, please go to ER for evaluation      If you have any questions or concerns, please don't hesitate to call [phone]          Sincerely,          ALISHA Mejía.  Electronically Signed

## 2018-05-09 NOTE — ASSESSMENT & PLAN NOTE
6 days ago had intercourse and had vaginal bleeding and very painful intercourse. Bled for 3 days and had to wear pads.  No bleeding since then.  Hx of Cervical cancer and Hysterectomy 1991.    Has not made appt w GYN.

## 2018-05-10 RX ORDER — POLYETHYLENE GLYCOL 3350 17 G/17G
POWDER, FOR SOLUTION ORAL
Qty: 255 G | Refills: 0 | Status: SHIPPED | OUTPATIENT
Start: 2018-05-10 | End: 2018-09-20 | Stop reason: SDUPTHER

## 2018-05-12 ENCOUNTER — APPOINTMENT (OUTPATIENT)
Dept: RADIOLOGY | Facility: MEDICAL CENTER | Age: 58
DRG: 871 | End: 2018-05-12
Attending: EMERGENCY MEDICINE
Payer: MEDICAID

## 2018-05-12 ENCOUNTER — HOSPITAL ENCOUNTER (INPATIENT)
Facility: MEDICAL CENTER | Age: 58
LOS: 4 days | DRG: 871 | End: 2018-05-17
Attending: EMERGENCY MEDICINE | Admitting: INTERNAL MEDICINE
Payer: MEDICAID

## 2018-05-12 DIAGNOSIS — J44.1 ACUTE EXACERBATION OF CHRONIC OBSTRUCTIVE PULMONARY DISEASE (COPD) (HCC): ICD-10-CM

## 2018-05-12 DIAGNOSIS — B37.31 YEAST VAGINITIS: ICD-10-CM

## 2018-05-12 PROBLEM — R07.1 CHEST PAIN ON BREATHING: Status: ACTIVE | Noted: 2017-10-08

## 2018-05-12 LAB
ALBUMIN SERPL BCP-MCNC: 3.7 G/DL (ref 3.2–4.9)
ALBUMIN/GLOB SERPL: 1.2 G/DL
ALP SERPL-CCNC: 45 U/L (ref 30–99)
ALT SERPL-CCNC: 7 U/L (ref 2–50)
ANION GAP SERPL CALC-SCNC: 10 MMOL/L (ref 0–11.9)
AST SERPL-CCNC: 26 U/L (ref 12–45)
BASOPHILS # BLD AUTO: 0.2 % (ref 0–1.8)
BASOPHILS # BLD: 0.02 K/UL (ref 0–0.12)
BILIRUB SERPL-MCNC: 0.5 MG/DL (ref 0.1–1.5)
BUN SERPL-MCNC: 13 MG/DL (ref 8–22)
CALCIUM SERPL-MCNC: 9.7 MG/DL (ref 8.5–10.5)
CHLORIDE SERPL-SCNC: 108 MMOL/L (ref 96–112)
CO2 SERPL-SCNC: 20 MMOL/L (ref 20–33)
CREAT SERPL-MCNC: 0.7 MG/DL (ref 0.5–1.4)
DEPRECATED D DIMER PPP IA-ACNC: 223 NG/ML(D-DU)
EKG IMPRESSION: NORMAL
EOSINOPHIL # BLD AUTO: 0.03 K/UL (ref 0–0.51)
EOSINOPHIL NFR BLD: 0.2 % (ref 0–6.9)
ERYTHROCYTE [DISTWIDTH] IN BLOOD BY AUTOMATED COUNT: 45 FL (ref 35.9–50)
GLOBULIN SER CALC-MCNC: 3.2 G/DL (ref 1.9–3.5)
GLUCOSE SERPL-MCNC: 138 MG/DL (ref 65–99)
HCT VFR BLD AUTO: 45.2 % (ref 37–47)
HGB BLD-MCNC: 15.1 G/DL (ref 12–16)
IMM GRANULOCYTES # BLD AUTO: 0.08 K/UL (ref 0–0.11)
IMM GRANULOCYTES NFR BLD AUTO: 0.6 % (ref 0–0.9)
LACTATE BLD-SCNC: 1.7 MMOL/L (ref 0.5–2)
LYMPHOCYTES # BLD AUTO: 0.22 K/UL (ref 1–4.8)
LYMPHOCYTES NFR BLD: 1.7 % (ref 22–41)
MAGNESIUM SERPL-MCNC: 1.6 MG/DL (ref 1.5–2.5)
MCH RBC QN AUTO: 30.7 PG (ref 27–33)
MCHC RBC AUTO-ENTMCNC: 33.4 G/DL (ref 33.6–35)
MCV RBC AUTO: 91.9 FL (ref 81.4–97.8)
MONOCYTES # BLD AUTO: 0.3 K/UL (ref 0–0.85)
MONOCYTES NFR BLD AUTO: 2.3 % (ref 0–13.4)
NEUTROPHILS # BLD AUTO: 12.62 K/UL (ref 2–7.15)
NEUTROPHILS NFR BLD: 95 % (ref 44–72)
NRBC # BLD AUTO: 0 K/UL
NRBC BLD-RTO: 0 /100 WBC
PLATELET # BLD AUTO: 152 K/UL (ref 164–446)
PMV BLD AUTO: 10.5 FL (ref 9–12.9)
POTASSIUM SERPL-SCNC: 4.2 MMOL/L (ref 3.6–5.5)
PROT SERPL-MCNC: 6.9 G/DL (ref 6–8.2)
RBC # BLD AUTO: 4.92 M/UL (ref 4.2–5.4)
SODIUM SERPL-SCNC: 138 MMOL/L (ref 135–145)
TROPONIN I SERPL-MCNC: 0.02 NG/ML (ref 0–0.04)
WBC # BLD AUTO: 13.3 K/UL (ref 4.8–10.8)

## 2018-05-12 PROCEDURE — 93005 ELECTROCARDIOGRAM TRACING: CPT | Performed by: EMERGENCY MEDICINE

## 2018-05-12 PROCEDURE — A9270 NON-COVERED ITEM OR SERVICE: HCPCS | Performed by: INTERNAL MEDICINE

## 2018-05-12 PROCEDURE — 700102 HCHG RX REV CODE 250 W/ 637 OVERRIDE(OP): Performed by: EMERGENCY MEDICINE

## 2018-05-12 PROCEDURE — 83735 ASSAY OF MAGNESIUM: CPT

## 2018-05-12 PROCEDURE — 94640 AIRWAY INHALATION TREATMENT: CPT

## 2018-05-12 PROCEDURE — 71045 X-RAY EXAM CHEST 1 VIEW: CPT

## 2018-05-12 PROCEDURE — 96372 THER/PROPH/DIAG INJ SC/IM: CPT | Mod: XU

## 2018-05-12 PROCEDURE — 700111 HCHG RX REV CODE 636 W/ 250 OVERRIDE (IP): Performed by: INTERNAL MEDICINE

## 2018-05-12 PROCEDURE — 94760 N-INVAS EAR/PLS OXIMETRY 1: CPT

## 2018-05-12 PROCEDURE — 99407 BEHAV CHNG SMOKING > 10 MIN: CPT | Performed by: INTERNAL MEDICINE

## 2018-05-12 PROCEDURE — G0378 HOSPITAL OBSERVATION PER HR: HCPCS

## 2018-05-12 PROCEDURE — 84484 ASSAY OF TROPONIN QUANT: CPT

## 2018-05-12 PROCEDURE — 85379 FIBRIN DEGRADATION QUANT: CPT

## 2018-05-12 PROCEDURE — 93005 ELECTROCARDIOGRAM TRACING: CPT

## 2018-05-12 PROCEDURE — A9270 NON-COVERED ITEM OR SERVICE: HCPCS | Performed by: EMERGENCY MEDICINE

## 2018-05-12 PROCEDURE — 85025 COMPLETE CBC W/AUTO DIFF WBC: CPT

## 2018-05-12 PROCEDURE — 304561 HCHG STAT O2

## 2018-05-12 PROCEDURE — 87040 BLOOD CULTURE FOR BACTERIA: CPT

## 2018-05-12 PROCEDURE — 99220 PR INITIAL OBSERVATION CARE,LEVL III: CPT | Mod: 25 | Performed by: INTERNAL MEDICINE

## 2018-05-12 PROCEDURE — 83605 ASSAY OF LACTIC ACID: CPT

## 2018-05-12 PROCEDURE — 700102 HCHG RX REV CODE 250 W/ 637 OVERRIDE(OP): Performed by: INTERNAL MEDICINE

## 2018-05-12 PROCEDURE — 700111 HCHG RX REV CODE 636 W/ 250 OVERRIDE (IP): Performed by: EMERGENCY MEDICINE

## 2018-05-12 PROCEDURE — 80053 COMPREHEN METABOLIC PANEL: CPT

## 2018-05-12 PROCEDURE — 96374 THER/PROPH/DIAG INJ IV PUSH: CPT | Mod: XU

## 2018-05-12 PROCEDURE — 700101 HCHG RX REV CODE 250: Performed by: EMERGENCY MEDICINE

## 2018-05-12 PROCEDURE — 99285 EMERGENCY DEPT VISIT HI MDM: CPT

## 2018-05-12 RX ORDER — GABAPENTIN 300 MG/1
300 CAPSULE ORAL 3 TIMES DAILY
Status: DISCONTINUED | OUTPATIENT
Start: 2018-05-12 | End: 2018-05-17 | Stop reason: HOSPADM

## 2018-05-12 RX ORDER — NICOTINE 21 MG/24HR
14 PATCH, TRANSDERMAL 24 HOURS TRANSDERMAL
Status: DISCONTINUED | OUTPATIENT
Start: 2018-05-12 | End: 2018-05-17 | Stop reason: HOSPADM

## 2018-05-12 RX ORDER — OXYCODONE HYDROCHLORIDE AND ACETAMINOPHEN 5; 325 MG/1; MG/1
2 TABLET ORAL ONCE
Status: DISCONTINUED | OUTPATIENT
Start: 2018-05-12 | End: 2018-05-12

## 2018-05-12 RX ORDER — ALBUTEROL SULFATE 90 UG/1
2 AEROSOL, METERED RESPIRATORY (INHALATION) EVERY 6 HOURS PRN
Status: DISCONTINUED | OUTPATIENT
Start: 2018-05-12 | End: 2018-05-17 | Stop reason: HOSPADM

## 2018-05-12 RX ORDER — DULOXETIN HYDROCHLORIDE 60 MG/1
60 CAPSULE, DELAYED RELEASE ORAL DAILY
Status: DISCONTINUED | OUTPATIENT
Start: 2018-05-12 | End: 2018-05-17 | Stop reason: HOSPADM

## 2018-05-12 RX ORDER — QUETIAPINE FUMARATE 50 MG/1
50 TABLET, FILM COATED ORAL EVERY EVENING
COMMUNITY
End: 2018-08-01 | Stop reason: SDUPTHER

## 2018-05-12 RX ORDER — HYDROCODONE BITARTRATE AND ACETAMINOPHEN 10; 325 MG/1; MG/1
1 TABLET ORAL EVERY 6 HOURS
Status: DISCONTINUED | OUTPATIENT
Start: 2018-05-12 | End: 2018-05-12

## 2018-05-12 RX ORDER — DOXYCYCLINE 100 MG/1
100 TABLET ORAL EVERY 12 HOURS
Status: DISCONTINUED | OUTPATIENT
Start: 2018-05-12 | End: 2018-05-13

## 2018-05-12 RX ORDER — SUMATRIPTAN 50 MG/1
50 TABLET, FILM COATED ORAL
Status: DISCONTINUED | OUTPATIENT
Start: 2018-05-12 | End: 2018-05-17 | Stop reason: HOSPADM

## 2018-05-12 RX ORDER — ONDANSETRON 2 MG/ML
4 INJECTION INTRAMUSCULAR; INTRAVENOUS EVERY 4 HOURS PRN
Status: DISCONTINUED | OUTPATIENT
Start: 2018-05-12 | End: 2018-05-17 | Stop reason: HOSPADM

## 2018-05-12 RX ORDER — METHYLPREDNISOLONE SODIUM SUCCINATE 125 MG/2ML
125 INJECTION, POWDER, LYOPHILIZED, FOR SOLUTION INTRAMUSCULAR; INTRAVENOUS ONCE
Status: COMPLETED | OUTPATIENT
Start: 2018-05-12 | End: 2018-05-12

## 2018-05-12 RX ORDER — OMEPRAZOLE 20 MG/1
20 CAPSULE, DELAYED RELEASE ORAL DAILY
Status: DISCONTINUED | OUTPATIENT
Start: 2018-05-12 | End: 2018-05-17 | Stop reason: HOSPADM

## 2018-05-12 RX ORDER — POLYETHYLENE GLYCOL 3350 17 G/17G
1 POWDER, FOR SOLUTION ORAL DAILY
Status: DISCONTINUED | OUTPATIENT
Start: 2018-05-12 | End: 2018-05-12

## 2018-05-12 RX ORDER — POLYETHYLENE GLYCOL 3350 17 G/17G
1 POWDER, FOR SOLUTION ORAL
Status: DISCONTINUED | OUTPATIENT
Start: 2018-05-12 | End: 2018-05-17 | Stop reason: HOSPADM

## 2018-05-12 RX ORDER — PROMETHAZINE HYDROCHLORIDE 25 MG/1
12.5-25 SUPPOSITORY RECTAL EVERY 4 HOURS PRN
Status: DISCONTINUED | OUTPATIENT
Start: 2018-05-12 | End: 2018-05-17 | Stop reason: HOSPADM

## 2018-05-12 RX ORDER — AMOXICILLIN 250 MG
2 CAPSULE ORAL 2 TIMES DAILY
Status: DISCONTINUED | OUTPATIENT
Start: 2018-05-12 | End: 2018-05-17 | Stop reason: HOSPADM

## 2018-05-12 RX ORDER — BISACODYL 10 MG
10 SUPPOSITORY, RECTAL RECTAL
Status: DISCONTINUED | OUTPATIENT
Start: 2018-05-12 | End: 2018-05-17 | Stop reason: HOSPADM

## 2018-05-12 RX ORDER — OXYCODONE HYDROCHLORIDE AND ACETAMINOPHEN 5; 325 MG/1; MG/1
1-2 TABLET ORAL EVERY 4 HOURS PRN
Status: DISCONTINUED | OUTPATIENT
Start: 2018-05-12 | End: 2018-05-17 | Stop reason: HOSPADM

## 2018-05-12 RX ORDER — PROMETHAZINE HYDROCHLORIDE 25 MG/1
12.5-25 TABLET ORAL EVERY 4 HOURS PRN
Status: DISCONTINUED | OUTPATIENT
Start: 2018-05-12 | End: 2018-05-17 | Stop reason: HOSPADM

## 2018-05-12 RX ORDER — QUETIAPINE FUMARATE 25 MG/1
25 TABLET, FILM COATED ORAL EVERY EVENING
Status: DISCONTINUED | OUTPATIENT
Start: 2018-05-12 | End: 2018-05-13

## 2018-05-12 RX ORDER — IPRATROPIUM BROMIDE AND ALBUTEROL SULFATE 2.5; .5 MG/3ML; MG/3ML
3 SOLUTION RESPIRATORY (INHALATION)
Status: DISCONTINUED | OUTPATIENT
Start: 2018-05-12 | End: 2018-05-14

## 2018-05-12 RX ORDER — CYCLOBENZAPRINE HCL 10 MG
10 TABLET ORAL
Status: DISCONTINUED | OUTPATIENT
Start: 2018-05-12 | End: 2018-05-17 | Stop reason: HOSPADM

## 2018-05-12 RX ORDER — UREA 10 %
500 LOTION (ML) TOPICAL DAILY
Status: DISCONTINUED | OUTPATIENT
Start: 2018-05-12 | End: 2018-05-17 | Stop reason: HOSPADM

## 2018-05-12 RX ORDER — GUAIFENESIN/DEXTROMETHORPHAN 100-10MG/5
10 SYRUP ORAL EVERY 6 HOURS PRN
Status: DISCONTINUED | OUTPATIENT
Start: 2018-05-12 | End: 2018-05-17 | Stop reason: HOSPADM

## 2018-05-12 RX ORDER — GABAPENTIN 300 MG/1
300 CAPSULE ORAL 3 TIMES DAILY
Status: DISCONTINUED | OUTPATIENT
Start: 2018-05-12 | End: 2018-05-12

## 2018-05-12 RX ORDER — PREDNISONE 20 MG/1
40 TABLET ORAL DAILY
Status: DISCONTINUED | OUTPATIENT
Start: 2018-05-12 | End: 2018-05-13

## 2018-05-12 RX ORDER — TIOTROPIUM BROMIDE 18 UG/1
1 CAPSULE ORAL; RESPIRATORY (INHALATION) DAILY
Status: DISCONTINUED | OUTPATIENT
Start: 2018-05-12 | End: 2018-05-17 | Stop reason: HOSPADM

## 2018-05-12 RX ADMIN — NICOTINE 14 MG: 14 PATCH, EXTENDED RELEASE TRANSDERMAL at 18:41

## 2018-05-12 RX ADMIN — DULOXETINE HYDROCHLORIDE 60 MG: 60 CAPSULE, DELAYED RELEASE ORAL at 20:33

## 2018-05-12 RX ADMIN — GABAPENTIN 300 MG: 300 CAPSULE ORAL at 18:41

## 2018-05-12 RX ADMIN — CYCLOBENZAPRINE 10 MG: 10 TABLET, FILM COATED ORAL at 20:36

## 2018-05-12 RX ADMIN — TIOTROPIUM BROMIDE 1 CAPSULE: 18 CAPSULE ORAL; RESPIRATORY (INHALATION) at 20:34

## 2018-05-12 RX ADMIN — SUMATRIPTAN SUCCINATE 50 MG: 50 TABLET ORAL at 20:35

## 2018-05-12 RX ADMIN — METHYLPREDNISOLONE SODIUM SUCCINATE 125 MG: 125 INJECTION, POWDER, FOR SOLUTION INTRAMUSCULAR; INTRAVENOUS at 16:32

## 2018-05-12 RX ADMIN — STANDARDIZED SENNA CONCENTRATE AND DOCUSATE SODIUM 2 TABLET: 8.6; 5 TABLET, FILM COATED ORAL at 20:40

## 2018-05-12 RX ADMIN — DOXYCYCLINE 100 MG: 100 TABLET ORAL at 20:36

## 2018-05-12 RX ADMIN — OXYCODONE HYDROCHLORIDE AND ACETAMINOPHEN 2 TABLET: 5; 325 TABLET ORAL at 16:43

## 2018-05-12 RX ADMIN — Medication 500 MG: at 20:33

## 2018-05-12 RX ADMIN — IPRATROPIUM BROMIDE AND ALBUTEROL SULFATE 3 ML: .5; 3 SOLUTION RESPIRATORY (INHALATION) at 22:18

## 2018-05-12 RX ADMIN — QUETIAPINE FUMARATE 25 MG: 25 TABLET ORAL at 20:37

## 2018-05-12 RX ADMIN — HYDROCODONE BITARTRATE AND ACETAMINOPHEN 1 TABLET: 10; 325 TABLET ORAL at 18:41

## 2018-05-12 RX ADMIN — ALBUTEROL SULFATE 2.5 MG: 2.5 SOLUTION RESPIRATORY (INHALATION) at 16:38

## 2018-05-12 RX ADMIN — ENOXAPARIN SODIUM 40 MG: 100 INJECTION SUBCUTANEOUS at 18:42

## 2018-05-12 RX ADMIN — OMEPRAZOLE 20 MG: 20 CAPSULE, DELAYED RELEASE ORAL at 18:41

## 2018-05-12 RX ADMIN — IPRATROPIUM BROMIDE 0.5 MG: 0.5 SOLUTION RESPIRATORY (INHALATION) at 16:37

## 2018-05-12 RX ADMIN — PREDNISONE 40 MG: 20 TABLET ORAL at 18:41

## 2018-05-12 ASSESSMENT — ENCOUNTER SYMPTOMS
CHILLS: 1
BACK PAIN: 1
FALLS: 0
HEMOPTYSIS: 0
TREMORS: 0
NAUSEA: 0
SPUTUM PRODUCTION: 0
DIZZINESS: 0
HEARTBURN: 0
FEVER: 0
PALPITATIONS: 0
NECK PAIN: 1
MYALGIAS: 1
BRUISES/BLEEDS EASILY: 0
COUGH: 1
HEADACHES: 0
WHEEZING: 1
DEPRESSION: 0
SHORTNESS OF BREATH: 1
PHOTOPHOBIA: 0
BLURRED VISION: 0
DOUBLE VISION: 0

## 2018-05-12 ASSESSMENT — PATIENT HEALTH QUESTIONNAIRE - PHQ9
1. LITTLE INTEREST OR PLEASURE IN DOING THINGS: NOT AT ALL
SUM OF ALL RESPONSES TO PHQ9 QUESTIONS 1 AND 2: 0
2. FEELING DOWN, DEPRESSED, IRRITABLE, OR HOPELESS: NOT AT ALL

## 2018-05-12 ASSESSMENT — COPD QUESTIONNAIRES
HAVE YOU SMOKED AT LEAST 100 CIGARETTES IN YOUR ENTIRE LIFE: YES
DURING THE PAST 4 WEEKS HOW MUCH DID YOU FEEL SHORT OF BREATH: NONE/LITTLE OF THE TIME
COPD SCREENING SCORE: 3
DO YOU EVER COUGH UP ANY MUCUS OR PHLEGM?: NO/ONLY WITH OCCASIONAL COLDS OR INFECTIONS

## 2018-05-12 ASSESSMENT — PAIN SCALES - GENERAL
PAINLEVEL_OUTOF10: 5
PAINLEVEL_OUTOF10: 3
PAINLEVEL_OUTOF10: 8

## 2018-05-12 ASSESSMENT — LIFESTYLE VARIABLES
ALCOHOL_USE: NO
EVER_SMOKED: YES
EVER_SMOKED: YES

## 2018-05-12 NOTE — ED PROVIDER NOTES
"ED Provider Note    Scribed for Janusz Rg M.D. by Vikas Cook. 5/12/2018  2:43 PM    Primary care provider: FERMIN Mejía  Means of arrival: Wheel Chair  History obtained from: Patient  History limited by: None    CHIEF COMPLAINT  Chief Complaint   Patient presents with   • Shortness of Breath     started this am approx 8am.     • Cough     dry cough started this am.     • Flank Pain     (R), increasing over last few day,  worse today.  hx of \"kidney problems\"       HPI  Martha Mosher is a 58 y.o. female who presents to the Emergency Department complaining of cough and intermittent shortness of breath that began at 8:00 AM this morning. The patient reports associated chest pain which occurs contemporaneously with her chest pain, left flank pain, left rib pain, and left arm pain. Her left flank pain has been present over the last few days, but increased in severity today. She denies fever. Patient takes 4  mg Norco daily for management of her pain associated with her fibromyalgia and arthritis.    REVIEW OF SYSTEMS  Pertinent positives include cough, shortness of breath, chest pain, left flank pain, left rib pain, and left arm pain. Pertinent negatives include no fever.  All other systems reviewed and negative.  C    PAST MEDICAL HISTORY   has a past medical history of Allergy, unspecified not elsewhere classified; Anxiety; Arthritis; Cancer (HCC); Chronic airway obstruction, not elsewhere classified; Depression; Fibromyalgia; GERD (gastroesophageal reflux disease); Hypertension; Indigestion; Migraine; Osteoporosis, unspecified; Other emphysema (HCC); Other specified symptom associated with female genital organs; Psychiatric disorder (1/29/2018); Renal disorder; Type II or unspecified type diabetes mellitus without mention of complication, not stated as uncontrolled; Ulcer; Unspecified asthma(493.90); Unspecified cataract; and Urolithiasis.    SURGICAL HISTORY   has a past " surgical history that includes gastroscopy with biopsy (3/1/2009); colonoscopy with biopsy (8/3/2009); other abdominal surgery; gyn surgery; other; appendectomy; primary c section; hernia repair; and abdominal hysterectomy total.    SOCIAL HISTORY  Social History   Substance Use Topics   • Smoking status: Current Every Day Smoker     Packs/day: 0.50     Years: 30.00     Types: Cigarettes   • Smokeless tobacco: Current User      Comment: 1ppd - quit 7-8 months ago   • Alcohol use Yes      Comment: occ      History   Drug Use   • Types: Inhaled     Comment: Meth 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       FAMILY HISTORY  Family History   Problem Relation Age of Onset   • Lung Disease Mother    • Diabetes Mother    • Stroke Mother    • Arthritis Father    • Cancer Father    • Diabetes Father    • Hypertension Father    • Stroke Father    • Alcohol/Drug Father    • Hypertension Brother    • Stroke Brother    • Alcohol/Drug Brother    • Arthritis Maternal Aunt    • Arthritis Maternal Uncle    • Cancer Paternal Grandmother    • Cancer Paternal Grandfather        CURRENT MEDICATIONS  No current facility-administered medications on file prior to encounter.      Current Outpatient Prescriptions on File Prior to Encounter   Medication Sig Dispense Refill   • polyethylene glycol 3350 (MIRALAX) Powder MIX 17 GRAMS OF POWDER WITH AN 8 OZ GLASS OF WATER OR JUICE AND DRINK EVERY  g 0   • nitrofurantoin monohydr macro (MACROBID) 100 MG Cap Take 1 Cap by mouth 2 times a day. 14 Cap 0   • SUMAtriptan (IMITREX) 50 MG Tab Take 1 Tab by mouth Once PRN for Migraine. 15 Tab 0   • promethazine (PHENERGAN) 12.5 MG tablet TAKE 1 TO 2 TABLETS BY MOUTH EVERY DAY AS NEEDED FOR NAUSEA OR VOMITING 60 Tab 0   • fluconazole (DIFLUCAN) 150 MG tablet Take one tablet today, may repeat in 3 days if continued symptoms 2 Tab 0   • Cholecalciferol 4000 units Cap Take 1 Capsule by mouth every day. 30 Cap 5   • nitrofurantoin monohydr macro (MACROBID) 100 MG Cap Take  1 Cap by mouth 2 times a day. 14 Cap 0   • NARCAN 4 MG/0.1ML Liquid      • QUEtiapine (SEROQUEL) 25 MG Tab      • SUMAtriptan (IMITREX) 25 MG Tab tablet      • Tiotropium Bromide-Olodaterol (STIOLTO RESPIMAT) 2.5-2.5 MCG/ACT Aero Soln Take 2 Puffs by mouth every day. 1 Inhaler 5   • varenicline (CHANTIX STARTING MONTH LINWOOD) 0.5 MG X 11 & 1 MG X 42 tablet Take as directed per package instructions. (Patient not taking: Reported on 4/17/2018) 56 Tab 0   • varenicline (CHANTIX CONTINUING MONTH LINWOOD) 1 MG tablet Take as directed per package instructions. (Patient not taking: Reported on 4/17/2018) 60 Tab 3   • PROVENTIL  (90 Base) MCG/ACT Aero Soln inhalation aerosol INHALE 2 PUFFS BY MOUTH EVERY 6 HOURS AS NEEDED FOR SHORTNESS OF BREATH 6.7 g 2   • oxycodone-acetaminophen (PERCOCET-10)  MG Tab      • tiotropium (SPIRIVA HANDIHALER) 18 MCG Cap Inhale 18 mcg by mouth every day.     • topiramate (TOPAMAX) 25 MG Tab Take 25 mg by mouth.     • duloxetine (CYMBALTA) 60 MG Cap DR Particles delayed-release capsule Take 60 mg by mouth every day.     • trazodone (DESYREL) 100 MG Tab Take 200 mg by mouth every bedtime.     • gabapentin (NEURONTIN) 300 MG Cap Take 300 mg by mouth 3 times a day.     • magnesium gluconate (MAG-G) 500 MG tablet Take 500 mg by mouth every day.     • promethazine (PHENERGAN) 25 MG Tab Take 12.5 mg by mouth 1 time daily as needed for Nausea/Vomiting.     • hydrocodone/acetaminophen (NORCO)  MG Tab Take 1 Tab by mouth every 6 hours.     • omeprazole (PRILOSEC) 20 MG delayed-release capsule Take 20 mg by mouth every day.     • cyclobenzaprine (FLEXERIL) 10 MG Tab Take 10 mg by mouth every bedtime.     • asa/apap/caffeine (EXCEDRIN) 250-250-65 MG Tab Take 2 Tabs by mouth every 6 hours as needed for Headache.         ALLERGIES  Allergies   Allergen Reactions   • Lyrica Unspecified     Hallucinations   • Sulfa Drugs Hives and Unspecified     Pt states that she hallucinates on this as well as  getting hives       PHYSICAL EXAM  VITAL SIGNS: /66   Pulse (!) 123   Temp 36.4 °C (97.6 °F)   Resp (!) 22   Wt 65.8 kg (145 lb)   SpO2 92%   BMI 29.79 kg/m²     Vital signs reviewed.  Constitutional:  Ill-appearing  Head: atraumatic  Mouth/Throat: Moist mucous membranes  Neck: Soft, no JVD  Cardiovascular: Regular rate and rhythm  Pulmonary/Chest: Bibasilar rales  Abdominal: Soft, Normal bowel sounds  Musculoskeletal: Mild scoliosis  Lymphadenopathy: no lymphadenopathy  Neurological: Normal strength and sensation  Skin: Warm and dry    LABS  Results for orders placed or performed during the hospital encounter of 05/12/18   Lactic acid (lactate)   Result Value Ref Range    Lactic Acid 1.7 0.5 - 2.0 mmol/L   CBC WITH DIFFERENTIAL   Result Value Ref Range    WBC 13.3 (H) 4.8 - 10.8 K/uL    RBC 4.92 4.20 - 5.40 M/uL    Hemoglobin 15.1 12.0 - 16.0 g/dL    Hematocrit 45.2 37.0 - 47.0 %    MCV 91.9 81.4 - 97.8 fL    MCH 30.7 27.0 - 33.0 pg    MCHC 33.4 (L) 33.6 - 35.0 g/dL    RDW 45.0 35.9 - 50.0 fL    Platelet Count 152 (L) 164 - 446 K/uL    MPV 10.5 9.0 - 12.9 fL    Neutrophils-Polys 95.00 (H) 44.00 - 72.00 %    Lymphocytes 1.70 (L) 22.00 - 41.00 %    Monocytes 2.30 0.00 - 13.40 %    Eosinophils 0.20 0.00 - 6.90 %    Basophils 0.20 0.00 - 1.80 %    Immature Granulocytes 0.60 0.00 - 0.90 %    Nucleated RBC 0.00 /100 WBC    Neutrophils (Absolute) 12.62 (H) 2.00 - 7.15 K/uL    Lymphs (Absolute) 0.22 (L) 1.00 - 4.80 K/uL    Monos (Absolute) 0.30 0.00 - 0.85 K/uL    Eos (Absolute) 0.03 0.00 - 0.51 K/uL    Baso (Absolute) 0.02 0.00 - 0.12 K/uL    Immature Granulocytes (abs) 0.08 0.00 - 0.11 K/uL    NRBC (Absolute) 0.00 K/uL   COMP METABOLIC PANEL   Result Value Ref Range    Sodium 138 135 - 145 mmol/L    Potassium 4.2 3.6 - 5.5 mmol/L    Chloride 108 96 - 112 mmol/L    Co2 20 20 - 33 mmol/L    Anion Gap 10.0 0.0 - 11.9    Glucose 138 (H) 65 - 99 mg/dL    Bun 13 8 - 22 mg/dL    Creatinine 0.70 0.50 - 1.40 mg/dL     Calcium 9.7 8.5 - 10.5 mg/dL    AST(SGOT) 26 12 - 45 U/L    ALT(SGPT) 7 2 - 50 U/L    Alkaline Phosphatase 45 30 - 99 U/L    Total Bilirubin 0.5 0.1 - 1.5 mg/dL    Albumin 3.7 3.2 - 4.9 g/dL    Total Protein 6.9 6.0 - 8.2 g/dL    Globulin 3.2 1.9 - 3.5 g/dL    A-G Ratio 1.2 g/dL   D-DIMER   Result Value Ref Range    D-Dimer Screen 223 <250 ng/mL(D-DU)   ESTIMATED GFR   Result Value Ref Range    GFR If African American >60 >60 mL/min/1.73 m 2    GFR If Non African American >60 >60 mL/min/1.73 m 2   TROPONIN   Result Value Ref Range    Troponin I 0.02 0.00 - 0.04 ng/mL   EKG (ER)   Result Value Ref Range    Report       [facility] Emergency Dept.    Test Date:  2018  Pt Name:    EDENILSON FLETCHER             Department: ER  MRN:        [id number]                      Room:  Gender:     Female                       Technician: [id number]  :        1960                   Requested By:ER TRIAGE PROTOCOL  Order #:    [id number]                    Reading MD:    Measurements  Intervals                                Axis  Rate:       103                          P:          72  MA:         120                          QRS:        76  QRSD:       132                          T:          50  QT:         376  QTc:        492    Interpretive Statements  SINUS TACHYCARDIA  RIGHT BUNDLE BRANCH BLOCK  BASELINE WANDER IN LEAD(S) I,II,III,aVR,aVL,aVF,V2,V4  Compared to ECG 2017 12:08:45  Sinus rhythm no longer present       All labs reviewed by me.    EKG  12 Lead EKG interpreted by me to show sinus rhythm at 100.  There is a right bundle branch block.  Abnormal ST and T waves.  Abnormal EKG    RADIOLOGY  DX-CHEST-PORTABLE (1 VIEW)   Final Result      1.  There is no acute cardiopulmonary process.   2.  There are changes of bilateral shoulder calcific tendinitis.        The radiologist's interpretation of all radiological studies have been reviewed by me.    COURSE & MEDICAL DECISION MAKING  Pertinent  Labs & Imaging studies reviewed. (See chart for details) The patient's [facility] and past medical records were reviewed    2:43 PM - Patient seen and examined at bedside. Ordered DX chest portable 1 view, lactic acid, CBC with differential, CMP, U/A, urine culture, blood cultures x2, and EKG to evaluate her symptoms. The differential diagnoses include but are not limited to: pneumonia vs PE vs bronchitis.  Patient's chest x-ray was negative for pneumonia.  D-dimer is negative for signs of a pulmonary embolus.  Patient is persistently hypoxic and will need to be admitted for steroids and oxygen and breathing treatments    Patient will be [facility] by the [facility]    FINAL IMPRESSION  1. Acute exacerbation of chronic obstructive pulmonary disease (COPD) (HCC)          Vikas DALAL (Scribe), am scribing for, and in the presence of, Janusz Rg M.D..    Electronically signed by: Vikas Cook (Scribe), 5/12/2018    Janusz DALAL M.D. personally performed the services described in this documentation, as scribed by Vikas Cook in my presence, and it is both accurate and complete.    The note accurately reflects work and decisions made by me.  Janusz Rg  5/12/2018  4:33 PM

## 2018-05-12 NOTE — ED TRIAGE NOTES
"Martha Mosher  Chief Complaint   Patient presents with   • Shortness of Breath     started this am approx 8am.     • Cough     dry cough started this am.     • Flank Pain     (R), increasing over last few day,  worse today.  hx of \"kidney problems\"     Pt to triage in w/c with above complaint. VS as charted and EKG done.  Pt with grunting with respirations, but speaking in full sentences. Was placed on portable O2 by triage tech.   Sepsis score of 3 noted,  Protocol initiated, charge RN notified.       "

## 2018-05-13 ENCOUNTER — APPOINTMENT (OUTPATIENT)
Dept: RADIOLOGY | Facility: MEDICAL CENTER | Age: 58
DRG: 871 | End: 2018-05-13
Attending: NURSE PRACTITIONER
Payer: MEDICAID

## 2018-05-13 ENCOUNTER — APPOINTMENT (OUTPATIENT)
Dept: RADIOLOGY | Facility: MEDICAL CENTER | Age: 58
DRG: 871 | End: 2018-05-13
Attending: INTERNAL MEDICINE
Payer: MEDICAID
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
## 2018-05-13 PROBLEM — A41.9 SEVERE SEPSIS (HCC): Status: ACTIVE | Noted: 2018-05-13

## 2018-05-13 PROBLEM — R65.20 SEVERE SEPSIS (HCC): Status: ACTIVE | Noted: 2018-05-13

## 2018-05-13 PROBLEM — T74.11XA PHYSICAL ABUSE OF ADULT BY PARTNER: Status: ACTIVE | Noted: 2018-05-13

## 2018-05-13 PROBLEM — G89.11 ACUTE PAIN DUE TO TRAUMA: Status: ACTIVE | Noted: 2018-05-13

## 2018-05-13 LAB
ALBUMIN SERPL BCP-MCNC: 3.1 G/DL (ref 3.2–4.9)
ALBUMIN SERPL BCP-MCNC: 3.4 G/DL (ref 3.2–4.9)
ALBUMIN/GLOB SERPL: 1.2 G/DL
ALBUMIN/GLOB SERPL: 1.2 G/DL
ALP SERPL-CCNC: 35 U/L (ref 30–99)
ALP SERPL-CCNC: 39 U/L (ref 30–99)
ALT SERPL-CCNC: 10 U/L (ref 2–50)
ALT SERPL-CCNC: 8 U/L (ref 2–50)
ANION GAP SERPL CALC-SCNC: 7 MMOL/L (ref 0–11.9)
ANION GAP SERPL CALC-SCNC: 7 MMOL/L (ref 0–11.9)
APPEARANCE UR: CLEAR
APTT PPP: 31.2 SEC (ref 24.7–36)
APTT PPP: 32.3 SEC (ref 24.7–36)
AST SERPL-CCNC: 20 U/L (ref 12–45)
AST SERPL-CCNC: 22 U/L (ref 12–45)
BACTERIA #/AREA URNS HPF: ABNORMAL /HPF
BASOPHILS # BLD AUTO: 0 % (ref 0–1.8)
BASOPHILS # BLD AUTO: 0.3 % (ref 0–1.8)
BASOPHILS # BLD: 0 K/UL (ref 0–0.12)
BASOPHILS # BLD: 0.03 K/UL (ref 0–0.12)
BILIRUB SERPL-MCNC: 0.3 MG/DL (ref 0.1–1.5)
BILIRUB SERPL-MCNC: 0.4 MG/DL (ref 0.1–1.5)
BILIRUB UR QL STRIP.AUTO: NEGATIVE
BUN SERPL-MCNC: 16 MG/DL (ref 8–22)
BUN SERPL-MCNC: 16 MG/DL (ref 8–22)
CALCIUM SERPL-MCNC: 8.4 MG/DL (ref 8.5–10.5)
CALCIUM SERPL-MCNC: 9 MG/DL (ref 8.5–10.5)
CHLORIDE SERPL-SCNC: 104 MMOL/L (ref 96–112)
CHLORIDE SERPL-SCNC: 111 MMOL/L (ref 96–112)
CO2 SERPL-SCNC: 20 MMOL/L (ref 20–33)
CO2 SERPL-SCNC: 26 MMOL/L (ref 20–33)
COLOR UR: YELLOW
CORTIS SERPL-MCNC: 8.9 UG/DL (ref 0–23)
CORTIS SERPL-MCNC: 95.1 UG/DL (ref 0–23)
CREAT SERPL-MCNC: 0.64 MG/DL (ref 0.5–1.4)
CREAT SERPL-MCNC: 0.68 MG/DL (ref 0.5–1.4)
EOSINOPHIL # BLD AUTO: 0 K/UL (ref 0–0.51)
EOSINOPHIL # BLD AUTO: 0 K/UL (ref 0–0.51)
EOSINOPHIL NFR BLD: 0 % (ref 0–6.9)
EOSINOPHIL NFR BLD: 0 % (ref 0–6.9)
EPI CELLS #/AREA URNS HPF: ABNORMAL /HPF
ERYTHROCYTE [DISTWIDTH] IN BLOOD BY AUTOMATED COUNT: 46.5 FL (ref 35.9–50)
ERYTHROCYTE [DISTWIDTH] IN BLOOD BY AUTOMATED COUNT: 48.3 FL (ref 35.9–50)
FIBRINOGEN PPP-MCNC: 521 MG/DL (ref 215–460)
GLOBULIN SER CALC-MCNC: 2.5 G/DL (ref 1.9–3.5)
GLOBULIN SER CALC-MCNC: 2.9 G/DL (ref 1.9–3.5)
GLUCOSE SERPL-MCNC: 154 MG/DL (ref 65–99)
GLUCOSE SERPL-MCNC: 240 MG/DL (ref 65–99)
GLUCOSE UR STRIP.AUTO-MCNC: NEGATIVE MG/DL
HCT VFR BLD AUTO: 35.5 % (ref 37–47)
HCT VFR BLD AUTO: 36.6 % (ref 37–47)
HCT VFR BLD AUTO: 41 % (ref 37–47)
HGB BLD-MCNC: 11.3 G/DL (ref 12–16)
HGB BLD-MCNC: 11.6 G/DL (ref 12–16)
HGB BLD-MCNC: 13.2 G/DL (ref 12–16)
HIV 1+2 AB+HIV1 P24 AG SERPL QL IA: NON REACTIVE
HYALINE CASTS #/AREA URNS LPF: ABNORMAL /LPF
IMM GRANULOCYTES # BLD AUTO: 0.03 K/UL (ref 0–0.11)
IMM GRANULOCYTES NFR BLD AUTO: 0.3 % (ref 0–0.9)
INR PPP: 1.14 (ref 0.87–1.13)
KETONES UR STRIP.AUTO-MCNC: NEGATIVE MG/DL
LACTATE BLD-SCNC: 1.7 MMOL/L (ref 0.5–2)
LACTATE BLD-SCNC: 2.3 MMOL/L (ref 0.5–2)
LACTATE BLD-SCNC: 2.5 MMOL/L (ref 0.5–2)
LACTATE BLD-SCNC: 2.6 MMOL/L (ref 0.5–2)
LACTATE BLD-SCNC: 2.8 MMOL/L (ref 0.5–2)
LACTATE BLD-SCNC: 4.6 MMOL/L (ref 0.5–2)
LEUKOCYTE ESTERASE UR QL STRIP.AUTO: ABNORMAL
LV EJECT FRACT  99904: 80
LV EJECT FRACT MOD 2C 99903: 62.68
LV EJECT FRACT MOD 4C 99902: 74.97
LV EJECT FRACT MOD BP 99901: 72.01
LYMPHOCYTES # BLD AUTO: 0.1 K/UL (ref 1–4.8)
LYMPHOCYTES # BLD AUTO: 0.43 K/UL (ref 1–4.8)
LYMPHOCYTES NFR BLD: 0.9 % (ref 22–41)
LYMPHOCYTES NFR BLD: 4 % (ref 22–41)
MANUAL DIFF BLD: NORMAL
MCH RBC QN AUTO: 30 PG (ref 27–33)
MCH RBC QN AUTO: 30.3 PG (ref 27–33)
MCHC RBC AUTO-ENTMCNC: 31.8 G/DL (ref 33.6–35)
MCHC RBC AUTO-ENTMCNC: 32.2 G/DL (ref 33.6–35)
MCV RBC AUTO: 93.2 FL (ref 81.4–97.8)
MCV RBC AUTO: 95.2 FL (ref 81.4–97.8)
MICRO URNS: ABNORMAL
MONOCYTES # BLD AUTO: 0 K/UL (ref 0–0.85)
MONOCYTES # BLD AUTO: 0.26 K/UL (ref 0–0.85)
MONOCYTES NFR BLD AUTO: 0 % (ref 0–13.4)
MONOCYTES NFR BLD AUTO: 2.4 % (ref 0–13.4)
MORPHOLOGY BLD-IMP: NORMAL
NEUTROPHILS # BLD AUTO: 10.08 K/UL (ref 2–7.15)
NEUTROPHILS # BLD AUTO: 11.1 K/UL (ref 2–7.15)
NEUTROPHILS NFR BLD: 93 % (ref 44–72)
NEUTROPHILS NFR BLD: 95.6 % (ref 44–72)
NEUTS BAND NFR BLD MANUAL: 3.5 % (ref 0–10)
NITRITE UR QL STRIP.AUTO: NEGATIVE
NRBC # BLD AUTO: 0 K/UL
NRBC # BLD AUTO: 0 K/UL
NRBC BLD-RTO: 0 /100 WBC
NRBC BLD-RTO: 0 /100 WBC
PH UR STRIP.AUTO: 5 [PH]
PHOSPHATE SERPL-MCNC: 1.4 MG/DL (ref 2.5–4.5)
PLATELET # BLD AUTO: 117 K/UL (ref 164–446)
PLATELET # BLD AUTO: 153 K/UL (ref 164–446)
PLATELET BLD QL SMEAR: NORMAL
PMV BLD AUTO: 10.6 FL (ref 9–12.9)
PMV BLD AUTO: 11.7 FL (ref 9–12.9)
POTASSIUM SERPL-SCNC: 3.7 MMOL/L (ref 3.6–5.5)
POTASSIUM SERPL-SCNC: 4 MMOL/L (ref 3.6–5.5)
PROCALCITONIN SERPL-MCNC: 0.47 NG/ML
PROT SERPL-MCNC: 5.6 G/DL (ref 6–8.2)
PROT SERPL-MCNC: 6.3 G/DL (ref 6–8.2)
PROT UR QL STRIP: NEGATIVE MG/DL
PROTHROMBIN TIME: 14.3 SEC (ref 12–14.6)
RBC # BLD AUTO: 3.73 M/UL (ref 4.2–5.4)
RBC # BLD AUTO: 4.4 M/UL (ref 4.2–5.4)
RBC # URNS HPF: ABNORMAL /HPF
RBC BLD AUTO: NORMAL
RBC UR QL AUTO: NEGATIVE
SODIUM SERPL-SCNC: 137 MMOL/L (ref 135–145)
SODIUM SERPL-SCNC: 138 MMOL/L (ref 135–145)
SP GR UR STRIP.AUTO: 1.02
TROPONIN I SERPL-MCNC: 0.01 NG/ML (ref 0–0.04)
TROPONIN I SERPL-MCNC: 0.05 NG/ML (ref 0–0.04)
UROBILINOGEN UR STRIP.AUTO-MCNC: 0.2 MG/DL
WBC # BLD AUTO: 10.8 K/UL (ref 4.8–10.8)
WBC # BLD AUTO: 11.2 K/UL (ref 4.8–10.8)
WBC #/AREA URNS HPF: ABNORMAL /HPF

## 2018-05-13 PROCEDURE — 93306 TTE W/DOPPLER COMPLETE: CPT | Mod: 26 | Performed by: INTERNAL MEDICINE

## 2018-05-13 PROCEDURE — 99291 CRITICAL CARE FIRST HOUR: CPT | Performed by: HOSPITALIST

## 2018-05-13 PROCEDURE — 85730 THROMBOPLASTIN TIME PARTIAL: CPT

## 2018-05-13 PROCEDURE — A9270 NON-COVERED ITEM OR SERVICE: HCPCS | Performed by: EMERGENCY MEDICINE

## 2018-05-13 PROCEDURE — 87389 HIV-1 AG W/HIV-1&-2 AB AG IA: CPT

## 2018-05-13 PROCEDURE — 85018 HEMOGLOBIN: CPT

## 2018-05-13 PROCEDURE — 94640 AIRWAY INHALATION TREATMENT: CPT

## 2018-05-13 PROCEDURE — 93306 TTE W/DOPPLER COMPLETE: CPT

## 2018-05-13 PROCEDURE — 700105 HCHG RX REV CODE 258: Performed by: HOSPITALIST

## 2018-05-13 PROCEDURE — 700111 HCHG RX REV CODE 636 W/ 250 OVERRIDE (IP): Performed by: HOSPITALIST

## 2018-05-13 PROCEDURE — 700105 HCHG RX REV CODE 258: Performed by: STUDENT IN AN ORGANIZED HEALTH CARE EDUCATION/TRAINING PROGRAM

## 2018-05-13 PROCEDURE — A9270 NON-COVERED ITEM OR SERVICE: HCPCS | Performed by: INTERNAL MEDICINE

## 2018-05-13 PROCEDURE — 85027 COMPLETE CBC AUTOMATED: CPT

## 2018-05-13 PROCEDURE — 700102 HCHG RX REV CODE 250 W/ 637 OVERRIDE(OP): Performed by: NURSE PRACTITIONER

## 2018-05-13 PROCEDURE — 81001 URINALYSIS AUTO W/SCOPE: CPT

## 2018-05-13 PROCEDURE — 770022 HCHG ROOM/CARE - ICU (200)

## 2018-05-13 PROCEDURE — 84100 ASSAY OF PHOSPHORUS: CPT

## 2018-05-13 PROCEDURE — 87086 URINE CULTURE/COLONY COUNT: CPT

## 2018-05-13 PROCEDURE — 36415 COLL VENOUS BLD VENIPUNCTURE: CPT

## 2018-05-13 PROCEDURE — 700105 HCHG RX REV CODE 258: Performed by: NURSE PRACTITIONER

## 2018-05-13 PROCEDURE — 80053 COMPREHEN METABOLIC PANEL: CPT

## 2018-05-13 PROCEDURE — 700102 HCHG RX REV CODE 250 W/ 637 OVERRIDE(OP): Performed by: HOSPITALIST

## 2018-05-13 PROCEDURE — 700117 HCHG RX CONTRAST REV CODE 255: Performed by: NURSE PRACTITIONER

## 2018-05-13 PROCEDURE — 700111 HCHG RX REV CODE 636 W/ 250 OVERRIDE (IP): Performed by: NURSE PRACTITIONER

## 2018-05-13 PROCEDURE — 71046 X-RAY EXAM CHEST 2 VIEWS: CPT

## 2018-05-13 PROCEDURE — 85384 FIBRINOGEN ACTIVITY: CPT

## 2018-05-13 PROCEDURE — 83605 ASSAY OF LACTIC ACID: CPT | Mod: 91

## 2018-05-13 PROCEDURE — 87186 SC STD MICRODIL/AGAR DIL: CPT

## 2018-05-13 PROCEDURE — 700102 HCHG RX REV CODE 250 W/ 637 OVERRIDE(OP): Performed by: INTERNAL MEDICINE

## 2018-05-13 PROCEDURE — 700101 HCHG RX REV CODE 250: Performed by: HOSPITALIST

## 2018-05-13 PROCEDURE — 85014 HEMATOCRIT: CPT

## 2018-05-13 PROCEDURE — A9270 NON-COVERED ITEM OR SERVICE: HCPCS | Performed by: HOSPITALIST

## 2018-05-13 PROCEDURE — 85007 BL SMEAR W/DIFF WBC COUNT: CPT

## 2018-05-13 PROCEDURE — 84484 ASSAY OF TROPONIN QUANT: CPT

## 2018-05-13 PROCEDURE — 700111 HCHG RX REV CODE 636 W/ 250 OVERRIDE (IP): Performed by: INTERNAL MEDICINE

## 2018-05-13 PROCEDURE — 87077 CULTURE AEROBIC IDENTIFY: CPT

## 2018-05-13 PROCEDURE — 85610 PROTHROMBIN TIME: CPT

## 2018-05-13 PROCEDURE — A9270 NON-COVERED ITEM OR SERVICE: HCPCS | Performed by: NURSE PRACTITIONER

## 2018-05-13 PROCEDURE — 71260 CT THORAX DX C+: CPT

## 2018-05-13 PROCEDURE — 84145 PROCALCITONIN (PCT): CPT

## 2018-05-13 PROCEDURE — 82533 TOTAL CORTISOL: CPT

## 2018-05-13 PROCEDURE — 85025 COMPLETE CBC W/AUTO DIFF WBC: CPT

## 2018-05-13 PROCEDURE — 700102 HCHG RX REV CODE 250 W/ 637 OVERRIDE(OP): Performed by: EMERGENCY MEDICINE

## 2018-05-13 PROCEDURE — 700101 HCHG RX REV CODE 250: Performed by: EMERGENCY MEDICINE

## 2018-05-13 RX ORDER — SODIUM CHLORIDE 9 MG/ML
1000 INJECTION, SOLUTION INTRAVENOUS ONCE
Status: COMPLETED | OUTPATIENT
Start: 2018-05-13 | End: 2018-05-13

## 2018-05-13 RX ORDER — SODIUM CHLORIDE 9 MG/ML
500 INJECTION, SOLUTION INTRAVENOUS ONCE
Status: COMPLETED | OUTPATIENT
Start: 2018-05-13 | End: 2018-05-13

## 2018-05-13 RX ORDER — DOXYCYCLINE 100 MG/1
100 TABLET ORAL EVERY 12 HOURS
Status: DISCONTINUED | OUTPATIENT
Start: 2018-05-13 | End: 2018-05-13

## 2018-05-13 RX ORDER — TRAZODONE HYDROCHLORIDE 50 MG/1
300 TABLET ORAL
Status: DISCONTINUED | OUTPATIENT
Start: 2018-05-13 | End: 2018-05-17 | Stop reason: HOSPADM

## 2018-05-13 RX ORDER — SODIUM CHLORIDE 9 MG/ML
1000 INJECTION, SOLUTION INTRAVENOUS
Status: COMPLETED | OUTPATIENT
Start: 2018-05-13 | End: 2018-05-13

## 2018-05-13 RX ORDER — SODIUM CHLORIDE 9 MG/ML
INJECTION, SOLUTION INTRAVENOUS CONTINUOUS
Status: DISCONTINUED | OUTPATIENT
Start: 2018-05-13 | End: 2018-05-15

## 2018-05-13 RX ORDER — QUETIAPINE FUMARATE 25 MG/1
50 TABLET, FILM COATED ORAL EVERY EVENING
Status: DISCONTINUED | OUTPATIENT
Start: 2018-05-13 | End: 2018-05-14

## 2018-05-13 RX ORDER — SODIUM CHLORIDE 9 MG/ML
500 INJECTION, SOLUTION INTRAVENOUS
Status: DISCONTINUED | OUTPATIENT
Start: 2018-05-13 | End: 2018-05-13

## 2018-05-13 RX ADMIN — SODIUM CHLORIDE 1000 ML: 9 INJECTION, SOLUTION INTRAVENOUS at 04:49

## 2018-05-13 RX ADMIN — SODIUM CHLORIDE 1000 ML: 9 INJECTION, SOLUTION INTRAVENOUS at 16:26

## 2018-05-13 RX ADMIN — OMEPRAZOLE 20 MG: 20 CAPSULE, DELAYED RELEASE ORAL at 08:16

## 2018-05-13 RX ADMIN — CEFTRIAXONE SODIUM 1 G: 10 INJECTION, POWDER, FOR SOLUTION INTRAVENOUS at 09:46

## 2018-05-13 RX ADMIN — DULOXETINE HYDROCHLORIDE 60 MG: 60 CAPSULE, DELAYED RELEASE ORAL at 08:16

## 2018-05-13 RX ADMIN — HYDROCORTISONE SODIUM SUCCINATE 100 MG: 100 INJECTION, POWDER, FOR SOLUTION INTRAMUSCULAR; INTRAVENOUS at 16:53

## 2018-05-13 RX ADMIN — CYCLOBENZAPRINE 10 MG: 10 TABLET, FILM COATED ORAL at 20:12

## 2018-05-13 RX ADMIN — DIBASIC SODIUM PHOSPHATE, MONOBASIC POTASSIUM PHOSPHATE AND MONOBASIC SODIUM PHOSPHATE 1 TABLET: 852; 155; 130 TABLET ORAL at 17:00

## 2018-05-13 RX ADMIN — ACETAMINOPHEN, ASPIRIN AND CAFFEINE 2 TABLET: 250; 250; 65 TABLET, FILM COATED ORAL at 08:17

## 2018-05-13 RX ADMIN — STANDARDIZED SENNA CONCENTRATE AND DOCUSATE SODIUM 2 TABLET: 8.6; 5 TABLET, FILM COATED ORAL at 18:42

## 2018-05-13 RX ADMIN — HYDROCORTISONE SODIUM SUCCINATE 100 MG: 100 INJECTION, POWDER, FOR SOLUTION INTRAMUSCULAR; INTRAVENOUS at 20:12

## 2018-05-13 RX ADMIN — IOHEXOL 100 ML: 350 INJECTION, SOLUTION INTRAVENOUS at 17:58

## 2018-05-13 RX ADMIN — VANCOMYCIN HYDROCHLORIDE 1600 MG: 100 INJECTION, POWDER, LYOPHILIZED, FOR SOLUTION INTRAVENOUS at 17:31

## 2018-05-13 RX ADMIN — IPRATROPIUM BROMIDE AND ALBUTEROL SULFATE 3 ML: .5; 3 SOLUTION RESPIRATORY (INHALATION) at 23:23

## 2018-05-13 RX ADMIN — SODIUM CHLORIDE: 9 INJECTION, SOLUTION INTRAVENOUS at 23:03

## 2018-05-13 RX ADMIN — PREDNISONE 40 MG: 20 TABLET ORAL at 08:16

## 2018-05-13 RX ADMIN — GABAPENTIN 300 MG: 300 CAPSULE ORAL at 08:16

## 2018-05-13 RX ADMIN — SODIUM CHLORIDE 1000 ML: 9 INJECTION, SOLUTION INTRAVENOUS at 21:44

## 2018-05-13 RX ADMIN — SODIUM CHLORIDE: 9 INJECTION, SOLUTION INTRAVENOUS at 13:33

## 2018-05-13 RX ADMIN — PIPERACILLIN AND TAZOBACTAM 4.5 G: 4; .5 INJECTION, POWDER, LYOPHILIZED, FOR SOLUTION INTRAVENOUS; PARENTERAL at 16:53

## 2018-05-13 RX ADMIN — OXYCODONE HYDROCHLORIDE AND ACETAMINOPHEN 1 TABLET: 5; 325 TABLET ORAL at 17:00

## 2018-05-13 RX ADMIN — IPRATROPIUM BROMIDE AND ALBUTEROL SULFATE 3 ML: .5; 3 SOLUTION RESPIRATORY (INHALATION) at 09:35

## 2018-05-13 RX ADMIN — OXYCODONE HYDROCHLORIDE AND ACETAMINOPHEN 2 TABLET: 5; 325 TABLET ORAL at 21:02

## 2018-05-13 RX ADMIN — GABAPENTIN 300 MG: 300 CAPSULE ORAL at 12:30

## 2018-05-13 RX ADMIN — PIPERACILLIN AND TAZOBACTAM 4.5 G: 4; .5 INJECTION, POWDER, LYOPHILIZED, FOR SOLUTION INTRAVENOUS; PARENTERAL at 20:17

## 2018-05-13 RX ADMIN — SODIUM CHLORIDE: 9 INJECTION, SOLUTION INTRAVENOUS at 20:33

## 2018-05-13 RX ADMIN — Medication 500 MG: at 08:15

## 2018-05-13 RX ADMIN — SODIUM CHLORIDE 500 ML: 9 INJECTION, SOLUTION INTRAVENOUS at 01:17

## 2018-05-13 RX ADMIN — OXYCODONE HYDROCHLORIDE AND ACETAMINOPHEN 1 TABLET: 5; 325 TABLET ORAL at 13:32

## 2018-05-13 RX ADMIN — TRAZODONE HYDROCHLORIDE 300 MG: 50 TABLET ORAL at 20:35

## 2018-05-13 RX ADMIN — GABAPENTIN 300 MG: 300 CAPSULE ORAL at 00:39

## 2018-05-13 RX ADMIN — GABAPENTIN 300 MG: 300 CAPSULE ORAL at 18:42

## 2018-05-13 RX ADMIN — TIOTROPIUM BROMIDE 1 CAPSULE: 18 CAPSULE ORAL; RESPIRATORY (INHALATION) at 10:02

## 2018-05-13 RX ADMIN — SODIUM CHLORIDE 500 ML: 9 INJECTION, SOLUTION INTRAVENOUS at 03:08

## 2018-05-13 RX ADMIN — IPRATROPIUM BROMIDE AND ALBUTEROL SULFATE 3 ML: .5; 3 SOLUTION RESPIRATORY (INHALATION) at 06:58

## 2018-05-13 RX ADMIN — SODIUM CHLORIDE: 9 INJECTION, SOLUTION INTRAVENOUS at 09:27

## 2018-05-13 RX ADMIN — NICOTINE 14 MG: 14 PATCH, EXTENDED RELEASE TRANSDERMAL at 06:29

## 2018-05-13 RX ADMIN — STANDARDIZED SENNA CONCENTRATE AND DOCUSATE SODIUM 2 TABLET: 8.6; 5 TABLET, FILM COATED ORAL at 08:17

## 2018-05-13 RX ADMIN — IPRATROPIUM BROMIDE AND ALBUTEROL SULFATE 3 ML: .5; 3 SOLUTION RESPIRATORY (INHALATION) at 20:08

## 2018-05-13 RX ADMIN — SODIUM CHLORIDE 1000 ML: 9 INJECTION, SOLUTION INTRAVENOUS at 05:59

## 2018-05-13 RX ADMIN — DOXYCYCLINE 100 MG: 100 TABLET ORAL at 08:15

## 2018-05-13 RX ADMIN — SODIUM PHOSPHATE, MONOBASIC, MONOHYDRATE AND SODIUM PHOSPHATE, DIBASIC, ANHYDROUS 30 MMOL: 276; 142 INJECTION, SOLUTION INTRAVENOUS at 23:42

## 2018-05-13 RX ADMIN — OXYCODONE HYDROCHLORIDE AND ACETAMINOPHEN 2 TABLET: 5; 325 TABLET ORAL at 09:27

## 2018-05-13 RX ADMIN — QUETIAPINE FUMARATE 50 MG: 25 TABLET ORAL at 20:12

## 2018-05-13 RX ADMIN — IPRATROPIUM BROMIDE AND ALBUTEROL SULFATE 3 ML: .5; 3 SOLUTION RESPIRATORY (INHALATION) at 02:36

## 2018-05-13 ASSESSMENT — ENCOUNTER SYMPTOMS
VOMITING: 0
WHEEZING: 0
ABDOMINAL PAIN: 1
SHORTNESS OF BREATH: 1
DIAPHORESIS: 0
HEADACHES: 0
NAUSEA: 1
SPEECH CHANGE: 0
WEAKNESS: 0
HEMOPTYSIS: 0
CONSTIPATION: 0
FEVER: 1
SORE THROAT: 0
CHILLS: 0
NERVOUS/ANXIOUS: 0
TINGLING: 0
PALPITATIONS: 0
COUGH: 1
FLANK PAIN: 1
DEPRESSION: 0
PND: 0
SPUTUM PRODUCTION: 0
SENSORY CHANGE: 0
SINUS PAIN: 0
FOCAL WEAKNESS: 0
ORTHOPNEA: 0
DIARRHEA: 0
CLAUDICATION: 0
DIZZINESS: 0
TREMORS: 0
BLOOD IN STOOL: 0

## 2018-05-13 ASSESSMENT — PATIENT HEALTH QUESTIONNAIRE - PHQ9
5. POOR APPETITE OR OVEREATING: NOT AT ALL
1. LITTLE INTEREST OR PLEASURE IN DOING THINGS: SEVERAL DAYS
2. FEELING DOWN, DEPRESSED, IRRITABLE, OR HOPELESS: SEVERAL DAYS
8. MOVING OR SPEAKING SO SLOWLY THAT OTHER PEOPLE COULD HAVE NOTICED. OR THE OPPOSITE, BEING SO FIGETY OR RESTLESS THAT YOU HAVE BEEN MOVING AROUND A LOT MORE THAN USUAL: NOT AT ALL
3. TROUBLE FALLING OR STAYING ASLEEP OR SLEEPING TOO MUCH: NEARLY EVERY DAY
SUM OF ALL RESPONSES TO PHQ QUESTIONS 1-9: 8
4. FEELING TIRED OR HAVING LITTLE ENERGY: SEVERAL DAYS
SUM OF ALL RESPONSES TO PHQ9 QUESTIONS 1 AND 2: 2
9. THOUGHTS THAT YOU WOULD BE BETTER OFF DEAD, OR OF HURTING YOURSELF: NOT AT ALL
6. FEELING BAD ABOUT YOURSELF - OR THAT YOU ARE A FAILURE OR HAVE LET YOURSELF OR YOUR FAMILY DOWN: SEVERAL DAYS
7. TROUBLE CONCENTRATING ON THINGS, SUCH AS READING THE NEWSPAPER OR WATCHING TELEVISION: SEVERAL DAYS

## 2018-05-13 ASSESSMENT — PAIN SCALES - GENERAL
PAINLEVEL_OUTOF10: 5
PAINLEVEL_OUTOF10: 8
PAINLEVEL_OUTOF10: 8
PAINLEVEL_OUTOF10: 4
PAINLEVEL_OUTOF10: 7
PAINLEVEL_OUTOF10: 8
PAINLEVEL_OUTOF10: 5
PAINLEVEL_OUTOF10: 8
PAINLEVEL_OUTOF10: 3

## 2018-05-13 ASSESSMENT — LIFESTYLE VARIABLES: EVER_SMOKED: YES
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
## 2018-05-13 NOTE — PROGRESS NOTES
2 RN assessment completed with TEJAS Dorman.  Minor abrasions noted mid upper back, bruise on Left upper anterior thigh, mild bruising from venipuncture on bilateral forearms.
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
## 2018-05-13 NOTE — ASSESSMENT & PLAN NOTE
This is sepsis (without associated acute organ dysfunction).  Kidney & liver function WNL. No acute mental status changes. UO normal per report from nursing. Will continue to monitor closely. Strict I&O. Lactic acid continues to trend up, now 4.6. Procalcitonin elevated.  Fibrinogen elevated. Increased O2 demand.  Normal CBC. Lactic Acid corrected  UA with MRSA + on vanco IV.  CT chest w/o negative for pna.  Improved BP with IV fluids.    No acute finding on CT abd/pelvis  Continue antibiotics for now.

## 2018-05-13 NOTE — PROGRESS NOTES
Pt ambulated to BR with 1 assist, slightly lightheaded. Voided, UA/UC collected. Will continue to monitor.
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
## 2018-05-13 NOTE — CARE PLAN
Problem: Bronchoconstriction:  Goal: Improve in air movement and diminished wheezing  Outcome: PROGRESSING AS EXPECTED    Intervention: Implement inhaled treatments  Duoneb Q4H  Spiriva QD
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
## 2018-05-13 NOTE — PROGRESS NOTES
BP improving, on 3rd Liter of IVF. Hospitalist made aware of rising lactate. Order to redraw at 0830.

## 2018-05-13 NOTE — PROGRESS NOTES
SBP 79, hospitalist aware. Ordered for additional IVF boluses. Also c/o L abdominal/flank pain 8/10, unable to medication with prn Percocet 2/2 low BP. Repeat lactate sent (previously flat).

## 2018-05-13 NOTE — PROGRESS NOTES
Pt sleeping upon rounding. Stick for labs, rainbow drawn and sent. BP remains soft in BUE, 80s/50s. Hospitalist made aware, new orders for IVF bolus placed.

## 2018-05-13 NOTE — H&P
" Hospital Medicine History and Physical    Date of Service  5/12/2018    Chief Complaint  Chief Complaint   Patient presents with   • Shortness of Breath     started this am approx 8am.     • Cough     dry cough started this am.     • Flank Pain     (R), increasing over last few day,  worse today.  hx of \"kidney problems\"       History of Presenting Illness  58 y.o. Female with history of COPD, depression, fibromyalgia, GERD, chronic pain, migraine, anxiety and psychiatric disorder, diabetes, hypertension, who presented 5/12/2018 with chest pain and shortness of breath, as well as cough, left flank pain, left shoulder pain, chronic back pain.   She states that shortness of breath started this morning approximately 8 AM, associated with GI cough, wheezes, chills.   She does have mid chest pain, mostly on deep inspiration, which is sharp, in the last 1-2 days , non radiating , up to 7 out of 10.  She does have chronic left arm and left flank pain and lower back pain which somewhat getting worse lately last a few days. She is not on oxygen and she smokes half pack a day.      Primary Care Physician  JAS Mejía.P.JOHNATHON.    Consultants  None    Code Status  Full code    Review of Systems  Review of Systems   Constitutional: Positive for chills. Negative for fever.   HENT: Negative for hearing loss.    Eyes: Negative for blurred vision, double vision and photophobia.   Respiratory: Positive for cough, shortness of breath and wheezing. Negative for hemoptysis and sputum production.    Cardiovascular: Negative for chest pain and palpitations.   Gastrointestinal: Negative for heartburn and nausea.   Genitourinary: Negative for dysuria and urgency.   Musculoskeletal: Positive for back pain, joint pain, myalgias and neck pain. Negative for falls.   Skin: Negative for rash.   Neurological: Negative for dizziness, tremors and headaches.   Endo/Heme/Allergies: Negative for environmental allergies. Does not bruise/bleed " easily.   Psychiatric/Behavioral: Negative for depression and suicidal ideas.     Please see HPI, all other systems were reviewed and are negative (AMA/CMS criteria)       Past Medical History  Past Medical History:   Diagnosis Date   • Psychiatric disorder 2018   • Allergy, unspecified not elsewhere classified    • Anxiety    • Arthritis    • Cancer (HCC)     cervical ca - hysterectomy   • Chronic airway obstruction, not elsewhere classified    • Depression    • Fibromyalgia    • GERD (gastroesophageal reflux disease)    • Hypertension     takes lisinopril   • Indigestion     hx colitis   • Migraine    • Osteoporosis, unspecified    • Other emphysema (HCC)    • Other specified symptom associated with female genital organs     total hyster   • Renal disorder     kidney stone   • Type II or unspecified type diabetes mellitus without mention of complication, not stated as uncontrolled    • Ulcer    • Unspecified asthma(493.90)    • Unspecified cataract    • Urolithiasis     has lithotripsy       Surgical History  Past Surgical History:   Procedure Laterality Date   • COLONOSCOPY WITH BIOPSY  8/3/2009    Performed by GRAEME LOCK JR at ENDOSCOPY [facility] ORS   • GASTROSCOPY WITH BIOPSY  3/1/2009    Performed by LUIS ARMANDO SIERRA at ENDOSCOPY [facility] ORS   • ABDOMINAL HYSTERECTOMY TOTAL     • APPENDECTOMY     • GYN SURGERY      hysterectomy   • HERNIA REPAIR     • OTHER      lithotripsy   • OTHER ABDOMINAL SURGERY      hysterectomy, hernia,    • PRIMARY C SECTION         Medications  No current facility-administered medications on file prior to encounter.      Current Outpatient Prescriptions on File Prior to Encounter   Medication Sig Dispense Refill   • polyethylene glycol 3350 (MIRALAX) Powder MIX 17 GRAMS OF POWDER WITH AN 8 OZ GLASS OF WATER OR JUICE AND DRINK EVERY  g 0   • nitrofurantoin monohydr macro (MACROBID) 100 MG Cap Take 1 Cap by mouth 2 times a day. 14 Cap 0   • SUMAtriptan  (IMITREX) 50 MG Tab Take 1 Tab by mouth Once PRN for Migraine. 15 Tab 0   • promethazine (PHENERGAN) 12.5 MG tablet TAKE 1 TO 2 TABLETS BY MOUTH EVERY DAY AS NEEDED FOR NAUSEA OR VOMITING 60 Tab 0   • fluconazole (DIFLUCAN) 150 MG tablet Take one tablet today, may repeat in 3 days if continued symptoms 2 Tab 0   • Cholecalciferol 4000 units Cap Take 1 Capsule by mouth every day. 30 Cap 5   • Tiotropium Bromide-Olodaterol (STIOLTO RESPIMAT) 2.5-2.5 MCG/ACT Aero Soln Take 2 Puffs by mouth every day. 1 Inhaler 5   • PROVENTIL  (90 Base) MCG/ACT Aero Soln inhalation aerosol INHALE 2 PUFFS BY MOUTH EVERY 6 HOURS AS NEEDED FOR SHORTNESS OF BREATH 6.7 g 2   • topiramate (TOPAMAX) 25 MG Tab Take 25 mg by mouth every evening.     • duloxetine (CYMBALTA) 60 MG Cap DR Particles delayed-release capsule Take 60 mg by mouth every day.     • trazodone (DESYREL) 100 MG Tab Take 300 mg by mouth every bedtime.     • gabapentin (NEURONTIN) 300 MG Cap Take 300 mg by mouth 3 times a day.     • magnesium gluconate (MAG-G) 500 MG tablet Take 500 mg by mouth every day.     • hydrocodone/acetaminophen (NORCO)  MG Tab Take 1 Tab by mouth every 4 hours.     • omeprazole (PRILOSEC) 20 MG delayed-release capsule Take 20 mg by mouth every day.     • cyclobenzaprine (FLEXERIL) 10 MG Tab Take 10 mg by mouth every bedtime.     • asa/apap/caffeine (EXCEDRIN) 250-250-65 MG Tab Take 1 Tab by mouth every 6 hours as needed for Headache.         Family History  Mother had lung disease, diabetes, stroke. Father had arthritis, cancer, diabetes, hypertension and stroke.    .  Social History  Social History   Substance Use Topics   • Smoking status: Current Every Day Smoker     Packs/day: 0.50     Years: 30.00     Types: Cigarettes   • Smokeless tobacco: Current User      Comment: 1ppd - quit 7-8 months ago   • Alcohol use Yes      Comment: occ       Allergies  Allergies   Allergen Reactions   • Lyrica Unspecified     Hallucinations   • Sulfa  Drugs Hives and Unspecified     Pt states that she hallucinates on this as well as getting hives        Physical Exam  Laboratory   Hemodynamics  Temp (24hrs), Av.7 °C (98.1 °F), Min:36.1 °C (96.9 °F), Max:37.7 °C (99.8 °F)   Temperature: 36.1 °C (96.9 °F)  Pulse  Av.1  Min: 63  Max: 123 Heart Rate (Monitored): 86  Blood Pressure: (!) 94/52, NIBP: (!) 99/50      Respiratory      Respiration: 16, Pulse Oximetry: 95 %, O2 Daily Delivery Respiratory : Silicone Nasal Cannula     Given By:: Mouthpiece, Work Of Breathing / Effort: Mild  RUL Breath Sounds: Diminished, RML Breath Sounds: Diminished, RLL Breath Sounds: Diminished, ARASH Breath Sounds: Diminished, LLL Breath Sounds: Diminished    Physical Exam  Vitals/ General Appearance:   Weight/BMI: Body mass index is 29.35 kg/m².  Blood pressure (!) 94/52, pulse 70, temperature 36.1 °C (96.9 °F), resp. rate 16, weight 64.8 kg (142 lb 13.7 oz), SpO2 95 %, not currently breastfeeding. Vitals:    18 1944 18 2000 18 2030 18 2220   BP: (!) 89/54 (!) 82/45 (!) 94/52    Pulse: 72   70   Resp: 16   16   Temp: 36.1 °C (96.9 °F)      SpO2: 96%   95%   Weight:        Oxygen Therapy:  Pulse Oximetry: 95 %, O2 (LPM): 4, O2 Delivery: Nasal Cannula    Constitutional:  well developed, well nourished, non-toxic, no acute distress  HENMT: Normocephalic, atraumatic, b/l ears normal, nose normal  Eyes:  EOMI, conjunctiva normal, no discharge  Neck: no tracheal deviation, supple  Cardiovascular: normal heart rate,  Rhythm regular, no murmurs, no rubs or gallops; no cyanosis, clubbing or edema  Lungs: Decreased breath sound bilaterally. Mild bilateral wheezes.   Abdomen: soft, non-tender, no guarding or rebound  Skin: warm, dry, no erythema, no rash  Neurologic: Alert and oriented, no focal deficits, CN II-XII normal  Psychiatric: Some anxiety or depression  MSK : Tenderness to palpation and bilateral paraspinal lumbar area. Postsurgical scar midline.   Recent  Labs      05/12/18   1437   WBC  13.3*   RBC  4.92   HEMOGLOBIN  15.1   HEMATOCRIT  45.2   MCV  91.9   MCH  30.7   MCHC  33.4*   RDW  45.0   PLATELETCT  152*   MPV  10.5     Recent Labs      05/12/18   1437   SODIUM  138   POTASSIUM  4.2   CHLORIDE  108   CO2  20   GLUCOSE  138*   BUN  13   CREATININE  0.70   CALCIUM  9.7     Recent Labs      05/12/18   1437   ALTSGPT  7   ASTSGOT  26   ALKPHOSPHAT  45   TBILIRUBIN  0.5   GLUCOSE  138*                 Lab Results   Component Value Date    TROPONINI 0.02 05/12/2018     Urinalysis:    Lab Results  Component Value Date/Time   SPECGRAVITY 1.013 04/17/2018 2328   GLUCOSEUR Negative 04/17/2018 2328   KETONES Negative 04/17/2018 2328   NITRITE Negative 04/17/2018 2328   WBCURINE Packed WBC 04/17/2018 2328   RBCURINE 0-2 04/17/2018 2328   BACTERIA Negative 04/17/2018 2328   EPITHELCELL Negative 04/17/2018 2328        Imaging  DX-CHEST-PORTABLE (1 VIEW)   Final Result      1.  There is no acute cardiopulmonary process.   2.  There are changes of bilateral shoulder calcific tendinitis.      DX-CHEST-2 VIEWS    (Results Pending)      Assessment/Plan     I anticipate this patient is appropriate for observation status at this time.    Chest pain on breathing- (present on admission)   Assessment & Plan    This is noncardiogenic, likely muscular skeletal pain.  Continue pain medications from home as needed        COPD exacerbation (HCC)   Assessment & Plan    - Oral prednisone 40 mg  - RT protocol  -No pneumonia on imaging  -RT protocol, bronchodilators          Nicotine abuse- (present on admission)   Assessment & Plan    I spent 12 minutes, discussing tobacco dependence and cardiac as well as pulmonary risk. Nicotine replacement and smoking cessation instructions. Code 28695          DDD (degenerative disc disease), cervical- (present on admission)   Assessment & Plan    Continue Flexeril, norco as needed            Prophylaxis: lovenox       I spent 80 minutes evaluating  Martha Mosher, reviewing the chart, vitals, labs and imaging, discussing the case with ED physician, medication reconciliation, placing orders and enacting the plan above.    Sections of this note have been dictated using Dragon Speech recognition software. While care and attention has been placed to ensure the accuracy of this note, there can be occasional typographical errors that may be missed and I apologize if this situation occurs. Please take these errors into context. If questions occur please do not hesitate to call or notify the author of this note for clarification.

## 2018-05-13 NOTE — PROGRESS NOTES
Full assessment completed. Medicated for migraine, room darkened for comfort. Admit profile completed, SW consult in place for tomorrow. Pt aware of plan of care for overnight. Encouraged PO fluid intake 2/2 soft BP and headaches. Pt endorsed understanding, will continue to monitor.

## 2018-05-13 NOTE — PROGRESS NOTES
Pt transferred to T825 via  on tele monitor with medical chart, medications and personal belongings.

## 2018-05-13 NOTE — ED NOTES
Med rec completed per pt at bedside  Allergies reviewed  Pt started a 7 day course of Macrobid yesterday

## 2018-05-13 NOTE — PROGRESS NOTES
Assumed care of Pt at 0700 after bedside report from TEJAS Carpenter, assessment complete.  Pt sleeping but easily awakened for assessment; A & O X 4, but drowsy, VSS but hypotensive with BP 87/50; Pt c/o HA, L flank pain, SOB that is > with activity.  Pt currently on 4L o2 n/c.  Pt updated on and understands POC; new orders placed.  Pt medicated per MAR, given hot meal tray.  Bed in low position, call light within reach - will continue to monitor.    0955  Report to TEJAS Duke.    1005  Pt hand held assist to RR and back with slow gait and increased L flank pain.    1027  US at bedside for Echo.    1036  Transport here to transfer Pt to T8; will delay for 30 minutes for completion of Echo.

## 2018-05-13 NOTE — PROGRESS NOTES
Will give another bolus of NS 500cc. MD also aware of trop trending up. No chest pain from patient. Continue to monitor.
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
## 2018-05-13 NOTE — PROGRESS NOTES
Current PIV dislodged, removed. New 22g placed.     MD paged regarding SBP <90 despite NS bolus, awaiting call back.

## 2018-05-13 NOTE — PROGRESS NOTES
Pt sitting in bed, having same L flank discomfort, medicated by previous RN. Awaiting other home meds from pharmacy. Encouraged to call for assistance with any needs.
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
## 2018-05-13 NOTE — ASSESSMENT & PLAN NOTE
Continue oral prednisone, spiriva, & bronchodilators  RT protocol in place  No pneumonia on pCXR, repeating PA/Lat cxr  Continue O2 supplementation

## 2018-05-13 NOTE — PROGRESS NOTES
Pharmacy Kinetics 58 y.o. female on vancomycin day # 1   2018    Currently on Vancomycin 1600 mg IV x1 followed by 1100 mg IV q12h    Indication for Treatment: empiric    Pertinent history per medical record: Admitted on 2018 for flank pain, SOB, and pain with coughing. Patient does have a complex PMH which includes COPD (not on O2 at home), GERD, fibromyalgia, DM, and psych disorder. Her chest pain is mostly with inspiration and is sharp. When she was initially admitted she was thought to have aeCOPD V. PNA and her LA was normal. Her LA is now rising with hypotension and patient is transferred to [facility].    Other antibiotics: Zosyn 4.5 g IV q8h    Allergies: Lyrica and Sulfa drugs     List concerns for renal function: hypotension with lactic acidosis    Pertinent cultures to date:   18: Respiratory = ordered, yet to be collected  18: Blood, peripheral x2 = NGTD    Recent Labs      18   1437  18   0040   WBC  13.3*  11.2*   NEUTSPOLYS  95.00*  95.60*   BANDSSTABS   --   3.50     Recent Labs      18   1437  18   0040   BUN  13  16   CREATININE  0.70  0.64   ALBUMIN  3.7  3.4     Intake/Output Summary (Last 24 hours) at 18 1647  Last data filed at 18 2030   Gross per 24 hour   Intake              500 ml   Output                0 ml   Net              500 ml      Blood pressure 104/60, pulse 97, temperature 36.9 °C (98.5 °F), resp. rate 18, weight 64.8 kg (142 lb 13.7 oz), SpO2 96 %, not currently breastfeeding. Temp (24hrs), Av.8 °C (98.2 °F), Min:36.1 °C (96.9 °F), Max:37.7 °C (99.8 °F)    A/P   1. Vancomycin dose change: new start  2. Next vancomycin level: 2 days (not ordered)  3. Goal trough: 16-20 mcg/mL  4. Comments: Patient has been borderline hypotensive since admit, was started on appropriate abx and responded to IVF. She is now hypotensive again, remains on 4L NC, and is transferred to [facility] for increasing lactic acid. Her WBC are improving. No source is  yet identified, MD would like broad spectrum for now. CT-Chest, abd, pelvis ordered. PCT only 0.47 this afternoon. No real c.o accumulation. Trough in a couple of days if remains on vanco.    James Ordaz, PharmD, BCPS

## 2018-05-14 PROBLEM — R65.21 SEPTIC SHOCK (HCC): Status: ACTIVE | Noted: 2018-05-13

## 2018-05-14 LAB
ALBUMIN SERPL BCP-MCNC: 2.9 G/DL (ref 3.2–4.9)
ALBUMIN/GLOB SERPL: 1.2 G/DL
ALP SERPL-CCNC: 33 U/L (ref 30–99)
ALT SERPL-CCNC: 13 U/L (ref 2–50)
ANION GAP SERPL CALC-SCNC: 6 MMOL/L (ref 0–11.9)
AST SERPL-CCNC: 22 U/L (ref 12–45)
BASOPHILS # BLD AUTO: 0.2 % (ref 0–1.8)
BASOPHILS # BLD: 0.02 K/UL (ref 0–0.12)
BILIRUB SERPL-MCNC: 0.2 MG/DL (ref 0.1–1.5)
BUN SERPL-MCNC: 13 MG/DL (ref 8–22)
CALCIUM SERPL-MCNC: 7.5 MG/DL (ref 8.5–10.5)
CHLORIDE SERPL-SCNC: 112 MMOL/L (ref 96–112)
CO2 SERPL-SCNC: 21 MMOL/L (ref 20–33)
CREAT SERPL-MCNC: 0.65 MG/DL (ref 0.5–1.4)
EOSINOPHIL # BLD AUTO: 0 K/UL (ref 0–0.51)
EOSINOPHIL NFR BLD: 0 % (ref 0–6.9)
ERYTHROCYTE [DISTWIDTH] IN BLOOD BY AUTOMATED COUNT: 49.9 FL (ref 35.9–50)
GLOBULIN SER CALC-MCNC: 2.4 G/DL (ref 1.9–3.5)
GLUCOSE SERPL-MCNC: 301 MG/DL (ref 65–99)
HCT VFR BLD AUTO: 35.5 % (ref 37–47)
HGB BLD-MCNC: 10.9 G/DL (ref 12–16)
IMM GRANULOCYTES # BLD AUTO: 0.05 K/UL (ref 0–0.11)
IMM GRANULOCYTES NFR BLD AUTO: 0.6 % (ref 0–0.9)
LACTATE BLD-SCNC: 1.7 MMOL/L (ref 0.5–2)
LYMPHOCYTES # BLD AUTO: 0.42 K/UL (ref 1–4.8)
LYMPHOCYTES NFR BLD: 4.7 % (ref 22–41)
MAGNESIUM SERPL-MCNC: 1.7 MG/DL (ref 1.5–2.5)
MCH RBC QN AUTO: 29.7 PG (ref 27–33)
MCHC RBC AUTO-ENTMCNC: 30.7 G/DL (ref 33.6–35)
MCV RBC AUTO: 96.7 FL (ref 81.4–97.8)
MONOCYTES # BLD AUTO: 0.25 K/UL (ref 0–0.85)
MONOCYTES NFR BLD AUTO: 2.8 % (ref 0–13.4)
NEUTROPHILS # BLD AUTO: 8.23 K/UL (ref 2–7.15)
NEUTROPHILS NFR BLD: 91.7 % (ref 44–72)
NRBC # BLD AUTO: 0 K/UL
NRBC BLD-RTO: 0 /100 WBC
PLATELET # BLD AUTO: 127 K/UL (ref 164–446)
PMV BLD AUTO: 10.5 FL (ref 9–12.9)
POTASSIUM SERPL-SCNC: 3.7 MMOL/L (ref 3.6–5.5)
PROT SERPL-MCNC: 5.3 G/DL (ref 6–8.2)
RBC # BLD AUTO: 3.67 M/UL (ref 4.2–5.4)
SODIUM SERPL-SCNC: 139 MMOL/L (ref 135–145)
WBC # BLD AUTO: 9 K/UL (ref 4.8–10.8)

## 2018-05-14 PROCEDURE — 83735 ASSAY OF MAGNESIUM: CPT

## 2018-05-14 PROCEDURE — 700101 HCHG RX REV CODE 250: Performed by: INTERNAL MEDICINE

## 2018-05-14 PROCEDURE — 700105 HCHG RX REV CODE 258: Performed by: HOSPITALIST

## 2018-05-14 PROCEDURE — A9270 NON-COVERED ITEM OR SERVICE: HCPCS | Performed by: HOSPITALIST

## 2018-05-14 PROCEDURE — 99232 SBSQ HOSP IP/OBS MODERATE 35: CPT | Performed by: HOSPITALIST

## 2018-05-14 PROCEDURE — 700111 HCHG RX REV CODE 636 W/ 250 OVERRIDE (IP): Performed by: HOSPITALIST

## 2018-05-14 PROCEDURE — 700102 HCHG RX REV CODE 250 W/ 637 OVERRIDE(OP): Performed by: INTERNAL MEDICINE

## 2018-05-14 PROCEDURE — 83605 ASSAY OF LACTIC ACID: CPT

## 2018-05-14 PROCEDURE — 700102 HCHG RX REV CODE 250 W/ 637 OVERRIDE(OP): Performed by: HOSPITALIST

## 2018-05-14 PROCEDURE — 700102 HCHG RX REV CODE 250 W/ 637 OVERRIDE(OP): Performed by: EMERGENCY MEDICINE

## 2018-05-14 PROCEDURE — 770020 HCHG ROOM/CARE - TELE (206)

## 2018-05-14 PROCEDURE — A9270 NON-COVERED ITEM OR SERVICE: HCPCS | Performed by: INTERNAL MEDICINE

## 2018-05-14 PROCEDURE — 700105 HCHG RX REV CODE 258: Performed by: NURSE PRACTITIONER

## 2018-05-14 PROCEDURE — A9270 NON-COVERED ITEM OR SERVICE: HCPCS | Performed by: EMERGENCY MEDICINE

## 2018-05-14 PROCEDURE — 94640 AIRWAY INHALATION TREATMENT: CPT

## 2018-05-14 PROCEDURE — 80053 COMPREHEN METABOLIC PANEL: CPT

## 2018-05-14 PROCEDURE — 85025 COMPLETE CBC W/AUTO DIFF WBC: CPT

## 2018-05-14 PROCEDURE — 700101 HCHG RX REV CODE 250: Performed by: EMERGENCY MEDICINE

## 2018-05-14 RX ORDER — QUETIAPINE FUMARATE 25 MG/1
50 TABLET, FILM COATED ORAL 2 TIMES DAILY
Status: DISCONTINUED | OUTPATIENT
Start: 2018-05-14 | End: 2018-05-17 | Stop reason: HOSPADM

## 2018-05-14 RX ORDER — MAGNESIUM SULFATE HEPTAHYDRATE 40 MG/ML
2 INJECTION, SOLUTION INTRAVENOUS ONCE
Status: COMPLETED | OUTPATIENT
Start: 2018-05-14 | End: 2018-05-14

## 2018-05-14 RX ORDER — IPRATROPIUM BROMIDE AND ALBUTEROL SULFATE 2.5; .5 MG/3ML; MG/3ML
3 SOLUTION RESPIRATORY (INHALATION)
Status: DISCONTINUED | OUTPATIENT
Start: 2018-05-15 | End: 2018-05-16

## 2018-05-14 RX ORDER — QUETIAPINE FUMARATE 25 MG/1
50 TABLET, FILM COATED ORAL ONCE
Status: COMPLETED | OUTPATIENT
Start: 2018-05-14 | End: 2018-05-14

## 2018-05-14 RX ADMIN — IPRATROPIUM BROMIDE AND ALBUTEROL SULFATE 3 ML: .5; 3 SOLUTION RESPIRATORY (INHALATION) at 19:03

## 2018-05-14 RX ADMIN — GABAPENTIN 300 MG: 300 CAPSULE ORAL at 20:50

## 2018-05-14 RX ADMIN — PIPERACILLIN AND TAZOBACTAM 4.5 G: 4; .5 INJECTION, POWDER, LYOPHILIZED, FOR SOLUTION INTRAVENOUS; PARENTERAL at 23:51

## 2018-05-14 RX ADMIN — OMEPRAZOLE 20 MG: 20 CAPSULE, DELAYED RELEASE ORAL at 08:13

## 2018-05-14 RX ADMIN — IPRATROPIUM BROMIDE AND ALBUTEROL SULFATE 3 ML: .5; 3 SOLUTION RESPIRATORY (INHALATION) at 16:38

## 2018-05-14 RX ADMIN — VANCOMYCIN HYDROCHLORIDE 1100 MG: 100 INJECTION, POWDER, LYOPHILIZED, FOR SOLUTION INTRAVENOUS at 20:48

## 2018-05-14 RX ADMIN — STANDARDIZED SENNA CONCENTRATE AND DOCUSATE SODIUM 2 TABLET: 8.6; 5 TABLET, FILM COATED ORAL at 08:14

## 2018-05-14 RX ADMIN — DULOXETINE HYDROCHLORIDE 60 MG: 60 CAPSULE, DELAYED RELEASE ORAL at 08:13

## 2018-05-14 RX ADMIN — GABAPENTIN 300 MG: 300 CAPSULE ORAL at 08:13

## 2018-05-14 RX ADMIN — IPRATROPIUM BROMIDE AND ALBUTEROL SULFATE 3 ML: .5; 3 SOLUTION RESPIRATORY (INHALATION) at 03:10

## 2018-05-14 RX ADMIN — VANCOMYCIN HYDROCHLORIDE 1100 MG: 100 INJECTION, POWDER, LYOPHILIZED, FOR SOLUTION INTRAVENOUS at 10:44

## 2018-05-14 RX ADMIN — NICOTINE 14 MG: 14 PATCH, EXTENDED RELEASE TRANSDERMAL at 06:03

## 2018-05-14 RX ADMIN — CYCLOBENZAPRINE 10 MG: 10 TABLET, FILM COATED ORAL at 20:49

## 2018-05-14 RX ADMIN — TIOTROPIUM BROMIDE 1 CAPSULE: 18 CAPSULE ORAL; RESPIRATORY (INHALATION) at 10:18

## 2018-05-14 RX ADMIN — PIPERACILLIN AND TAZOBACTAM 4.5 G: 4; .5 INJECTION, POWDER, LYOPHILIZED, FOR SOLUTION INTRAVENOUS; PARENTERAL at 13:24

## 2018-05-14 RX ADMIN — OXYCODONE HYDROCHLORIDE AND ACETAMINOPHEN 1 TABLET: 5; 325 TABLET ORAL at 10:29

## 2018-05-14 RX ADMIN — HYDROCORTISONE SODIUM SUCCINATE 100 MG: 100 INJECTION, POWDER, FOR SOLUTION INTRAMUSCULAR; INTRAVENOUS at 06:03

## 2018-05-14 RX ADMIN — ACETAMINOPHEN, ASPIRIN AND CAFFEINE 2 TABLET: 250; 250; 65 TABLET, FILM COATED ORAL at 17:16

## 2018-05-14 RX ADMIN — SODIUM CHLORIDE: 9 INJECTION, SOLUTION INTRAVENOUS at 23:53

## 2018-05-14 RX ADMIN — QUETIAPINE FUMARATE 50 MG: 25 TABLET ORAL at 13:38

## 2018-05-14 RX ADMIN — SODIUM CHLORIDE: 9 INJECTION, SOLUTION INTRAVENOUS at 06:03

## 2018-05-14 RX ADMIN — QUETIAPINE FUMARATE 50 MG: 25 TABLET ORAL at 20:49

## 2018-05-14 RX ADMIN — OXYCODONE HYDROCHLORIDE AND ACETAMINOPHEN 1 TABLET: 5; 325 TABLET ORAL at 10:31

## 2018-05-14 RX ADMIN — OXYCODONE HYDROCHLORIDE AND ACETAMINOPHEN 2 TABLET: 5; 325 TABLET ORAL at 14:35

## 2018-05-14 RX ADMIN — TRAZODONE HYDROCHLORIDE 300 MG: 50 TABLET ORAL at 20:49

## 2018-05-14 RX ADMIN — Medication 500 MG: at 08:13

## 2018-05-14 RX ADMIN — MAGNESIUM SULFATE IN WATER 2 G: 40 INJECTION, SOLUTION INTRAVENOUS at 06:03

## 2018-05-14 RX ADMIN — IPRATROPIUM BROMIDE AND ALBUTEROL SULFATE 3 ML: .5; 3 SOLUTION RESPIRATORY (INHALATION) at 12:06

## 2018-05-14 RX ADMIN — STANDARDIZED SENNA CONCENTRATE AND DOCUSATE SODIUM 2 TABLET: 8.6; 5 TABLET, FILM COATED ORAL at 18:00

## 2018-05-14 RX ADMIN — PIPERACILLIN AND TAZOBACTAM 4.5 G: 4; .5 INJECTION, POWDER, LYOPHILIZED, FOR SOLUTION INTRAVENOUS; PARENTERAL at 06:03

## 2018-05-14 RX ADMIN — OXYCODONE HYDROCHLORIDE AND ACETAMINOPHEN 2 TABLET: 5; 325 TABLET ORAL at 19:43

## 2018-05-14 RX ADMIN — OXYCODONE HYDROCHLORIDE AND ACETAMINOPHEN 2 TABLET: 5; 325 TABLET ORAL at 06:12

## 2018-05-14 RX ADMIN — IPRATROPIUM BROMIDE AND ALBUTEROL SULFATE 3 ML: .5; 3 SOLUTION RESPIRATORY (INHALATION) at 08:19

## 2018-05-14 RX ADMIN — GABAPENTIN 300 MG: 300 CAPSULE ORAL at 17:16

## 2018-05-14 ASSESSMENT — ENCOUNTER SYMPTOMS
NERVOUS/ANXIOUS: 0
FLANK PAIN: 1
EYE DISCHARGE: 0
SHORTNESS OF BREATH: 0
COUGH: 0
HEADACHES: 0
ABDOMINAL PAIN: 1
FEVER: 0
BACK PAIN: 1
DIZZINESS: 0
PALPITATIONS: 0
NAUSEA: 0
SORE THROAT: 0

## 2018-05-14 ASSESSMENT — PAIN SCALES - GENERAL
PAINLEVEL_OUTOF10: 6
PAINLEVEL_OUTOF10: 8
PAINLEVEL_OUTOF10: 7
PAINLEVEL_OUTOF10: 6
PAINLEVEL_OUTOF10: 6
PAINLEVEL_OUTOF10: 7
PAINLEVEL_OUTOF10: 0
PAINLEVEL_OUTOF10: 7
PAINLEVEL_OUTOF10: 7

## 2018-05-14 NOTE — CARE PLAN
Problem: Venous Thromboembolism (VTW)/Deep Vein Thrombosis (DVT) Prevention:  Goal: Patient will participate in Venous Thrombosis (VTE)/Deep Vein Thrombosis (DVT)Prevention Measures  Outcome: PROGRESSING AS EXPECTED  SCDs placed on pt and education provided

## 2018-05-14 NOTE — PROGRESS NOTES
Pt became hypotensive with systolic pressures in the 80's. PRN bolus initiated. Pt asymptomatic at this time.
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
## 2018-05-14 NOTE — CARE PLAN
Problem: Respiratory:  Goal: Respiratory status will improve  Outcome: PROGRESSING AS EXPECTED    Intervention: Assess and monitor pulmonary status   05/14/18 0000 05/14/18 0015   Vitals   Pulse Oximetry --  93 %   OTHER   O2 (LPM) 2 --    Work Of Breathing / Effort Mild --    RUL Breath Sounds Diminished --    RML Breath Sounds Diminished --    RLL Breath Sounds Diminished --    ARASH Breath Sounds Diminished --    LLL Breath Sounds Diminished --       Respiratory status assessed and monitored. RT involved in care to provide breathing treatments and additional assessment.       Problem: Fluid Volume:  Goal: Will maintain balanced intake and output  Outcome: PROGRESSING SLOWER THAN EXPECTED  Volume status continuously assessed and monitored. Pt hypotensive on and off through shift. Intake via IV increased with boluses ordered. Output remained adequate during shift.

## 2018-05-14 NOTE — PROGRESS NOTES
Pt arrived to the floor via [facility]. VSS. Pt A&O x4. Dopamine infusing. Pt [facility] to bed, [facility], and introduced to unit routine. Dr Bishop notified of pt arrival.

## 2018-05-14 NOTE — PROGRESS NOTES
Report called to TEJAS VALDEZ. Patient informed of transfer to [facility] bed 1. Patient verbalized understanding of transfer. Patient in no acute distress. All personal items packed and sent with patient. Chart and medications packed and sent with patient. Patient transported via hospital bed at 1400.

## 2018-05-14 NOTE — CARE PLAN
Problem: Safety  Goal: Will remain free from falls  Outcome: PROGRESSING AS EXPECTED  Assessed mobility status and patients gait. Set pt and edge of bed and assessed for light headedness/ dizziness before standing/ ambulation  Ensured pt is connected to a tele box/ 02 while using the bathroom.  Educated pt about fall risk preventions. Personal belongings and call light within reach at all times. Anti-slip socks in place. Room near nursing station. Pt calls for help when needing assistance/ getting out of bed. Pt remained free from injury/ falls.

## 2018-05-14 NOTE — DISCHARGE PLANNING
Medical SW    Sw attended AM IDT Rounds.    Per face sheet, pt is resident of [facility], , 57yo female, admitted 5/12 for COPD,     RN reports, pt will mobilize today, on room air,     Plan: Sw to assist w/ d/c planning as needed.

## 2018-05-14 NOTE — PROGRESS NOTES
Pt beginning to have frequent PAC. AM labs drawn and MD paged. Orders received in the event of electrolyte abnormalities. EKG ordered. VSS
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
## 2018-05-14 NOTE — PROGRESS NOTES
"Pharmacy Kinetics 58 y.o. female on vancomycin day # 2 2018    Currently on Vancomycin 1100 mg iv q12hr    Indication for Treatment: sepsis of unknown source    Pertinent history per medical record: Admitted on 2018 for flank pain, SOB, and pain with coughing. Patient does have a complex PMH which includes COPD (not on O2 at home), GERD, fibromyalgia, DM, and psych disorder. Her chest pain is mostly with inspiration and is sharp. When she was initially admitted she was thought to have aeCOPD V. PNA and her LA was normal. Her LA is now rising with hypotension and patient is transferred to [facility].    Other antibiotics: Zosyn 4.5 gm iv Q8H    Allergies: Lyrica and Sulfa drugs     List concerns for renal function: obesity (BMI ~ 32), low albumin, contrast on     Pertinent cultures to date:   18 blood, peripheral x 2: NGTD    Recent Labs      18   1437  18   0040  18   1645  18   0150   WBC  13.3*  11.2*  10.8  9.0   NEUTSPOLYS  95.00*  95.60*  93.00*  91.70*   BANDSSTABS   --   3.50   --    --      Recent Labs      18   1437  18   0040  18   1832  18   0150   BUN  13  16  16  13   CREATININE  0.70  0.64  0.68  0.65   ALBUMIN  3.7  3.4  3.1*  2.9*     No results for input(s): VANCOTROUGH, VANCOPEAK, VANCORANDOM in the last 72 hours.  Intake/Output Summary (Last 24 hours) at 18 1151  Last data filed at 18 1100   Gross per 24 hour   Intake             4945 ml   Output             1485 ml   Net             3460 ml      Blood pressure 114/81, pulse 73, temperature 36 °C (96.8 °F), resp. rate 14, height 1.47 m (4' 9.87\"), weight 69.4 kg (153 lb), SpO2 94 %, not currently breastfeeding. Temp (24hrs), Av.7 °C (98 °F), Min:36 °C (96.8 °F), Max:37.4 °C (99.3 °F)      A/P   1. Vancomycin dose change: Not indicated at this time  2. Next vancomycin level: 0530 tomorrow  3. Goal trough: 16-20 mcg/mL  4. Comments: This patients blood pressure has been " stable overnight. She now has orders to transfer back to the telemetry floor.     Nely Yun, ElizabethD.

## 2018-05-15 PROBLEM — K59.03 DRUG-INDUCED CONSTIPATION: Status: ACTIVE | Noted: 2018-05-15

## 2018-05-15 PROBLEM — N12 PYELONEPHRITIS: Status: ACTIVE | Noted: 2018-05-15

## 2018-05-15 LAB
ANION GAP SERPL CALC-SCNC: 8 MMOL/L (ref 0–11.9)
BACTERIA UR CULT: ABNORMAL
BACTERIA UR CULT: ABNORMAL
BUN SERPL-MCNC: 10 MG/DL (ref 8–22)
CALCIUM SERPL-MCNC: 8 MG/DL (ref 8.5–10.5)
CHLORIDE SERPL-SCNC: 114 MMOL/L (ref 96–112)
CO2 SERPL-SCNC: 20 MMOL/L (ref 20–33)
CREAT SERPL-MCNC: 0.77 MG/DL (ref 0.5–1.4)
ERYTHROCYTE [DISTWIDTH] IN BLOOD BY AUTOMATED COUNT: 49.3 FL (ref 35.9–50)
GLUCOSE SERPL-MCNC: 89 MG/DL (ref 65–99)
HCT VFR BLD AUTO: 34.1 % (ref 37–47)
HGB BLD-MCNC: 11.1 G/DL (ref 12–16)
MCH RBC QN AUTO: 30.5 PG (ref 27–33)
MCHC RBC AUTO-ENTMCNC: 32.6 G/DL (ref 33.6–35)
MCV RBC AUTO: 93.7 FL (ref 81.4–97.8)
PLATELET # BLD AUTO: 134 K/UL (ref 164–446)
PMV BLD AUTO: 10.5 FL (ref 9–12.9)
POTASSIUM SERPL-SCNC: 3.3 MMOL/L (ref 3.6–5.5)
RBC # BLD AUTO: 3.64 M/UL (ref 4.2–5.4)
SIGNIFICANT IND 70042: ABNORMAL
SITE SITE: ABNORMAL
SODIUM SERPL-SCNC: 142 MMOL/L (ref 135–145)
SOURCE SOURCE: ABNORMAL
VANCOMYCIN TROUGH SERPL-MCNC: 19.7 UG/ML (ref 10–20)
WBC # BLD AUTO: 7.3 K/UL (ref 4.8–10.8)

## 2018-05-15 PROCEDURE — A9270 NON-COVERED ITEM OR SERVICE: HCPCS | Performed by: INTERNAL MEDICINE

## 2018-05-15 PROCEDURE — 85027 COMPLETE CBC AUTOMATED: CPT

## 2018-05-15 PROCEDURE — 700105 HCHG RX REV CODE 258: Performed by: HOSPITALIST

## 2018-05-15 PROCEDURE — 700102 HCHG RX REV CODE 250 W/ 637 OVERRIDE(OP): Performed by: HOSPITALIST

## 2018-05-15 PROCEDURE — 36415 COLL VENOUS BLD VENIPUNCTURE: CPT

## 2018-05-15 PROCEDURE — 700102 HCHG RX REV CODE 250 W/ 637 OVERRIDE(OP): Performed by: EMERGENCY MEDICINE

## 2018-05-15 PROCEDURE — 770020 HCHG ROOM/CARE - TELE (206)

## 2018-05-15 PROCEDURE — 94760 N-INVAS EAR/PLS OXIMETRY 1: CPT

## 2018-05-15 PROCEDURE — 99232 SBSQ HOSP IP/OBS MODERATE 35: CPT | Performed by: HOSPITALIST

## 2018-05-15 PROCEDURE — 80202 ASSAY OF VANCOMYCIN: CPT

## 2018-05-15 PROCEDURE — A9270 NON-COVERED ITEM OR SERVICE: HCPCS | Performed by: EMERGENCY MEDICINE

## 2018-05-15 PROCEDURE — A9270 NON-COVERED ITEM OR SERVICE: HCPCS | Performed by: HOSPITALIST

## 2018-05-15 PROCEDURE — 94640 AIRWAY INHALATION TREATMENT: CPT

## 2018-05-15 PROCEDURE — 700102 HCHG RX REV CODE 250 W/ 637 OVERRIDE(OP): Performed by: INTERNAL MEDICINE

## 2018-05-15 PROCEDURE — 700101 HCHG RX REV CODE 250: Performed by: HOSPITALIST

## 2018-05-15 PROCEDURE — 700111 HCHG RX REV CODE 636 W/ 250 OVERRIDE (IP): Performed by: HOSPITALIST

## 2018-05-15 PROCEDURE — 80048 BASIC METABOLIC PNL TOTAL CA: CPT

## 2018-05-15 RX ORDER — POTASSIUM CHLORIDE 20 MEQ/1
40 TABLET, EXTENDED RELEASE ORAL DAILY
Status: DISCONTINUED | OUTPATIENT
Start: 2018-05-15 | End: 2018-05-17 | Stop reason: HOSPADM

## 2018-05-15 RX ORDER — POLYETHYLENE GLYCOL 3350 17 G/17G
1 POWDER, FOR SOLUTION ORAL DAILY
Status: DISCONTINUED | OUTPATIENT
Start: 2018-05-16 | End: 2018-05-17 | Stop reason: HOSPADM

## 2018-05-15 RX ADMIN — OXYCODONE HYDROCHLORIDE AND ACETAMINOPHEN 2 TABLET: 5; 325 TABLET ORAL at 09:00

## 2018-05-15 RX ADMIN — NICOTINE 14 MG: 14 PATCH, EXTENDED RELEASE TRANSDERMAL at 06:34

## 2018-05-15 RX ADMIN — TRAZODONE HYDROCHLORIDE 300 MG: 50 TABLET ORAL at 20:59

## 2018-05-15 RX ADMIN — STANDARDIZED SENNA CONCENTRATE AND DOCUSATE SODIUM 2 TABLET: 8.6; 5 TABLET, FILM COATED ORAL at 17:08

## 2018-05-15 RX ADMIN — Medication 500 MG: at 06:33

## 2018-05-15 RX ADMIN — IPRATROPIUM BROMIDE AND ALBUTEROL SULFATE 3 ML: .5; 3 SOLUTION RESPIRATORY (INHALATION) at 22:20

## 2018-05-15 RX ADMIN — IPRATROPIUM BROMIDE AND ALBUTEROL SULFATE 3 ML: .5; 3 SOLUTION RESPIRATORY (INHALATION) at 07:24

## 2018-05-15 RX ADMIN — OXYCODONE HYDROCHLORIDE AND ACETAMINOPHEN 2 TABLET: 5; 325 TABLET ORAL at 18:20

## 2018-05-15 RX ADMIN — QUETIAPINE FUMARATE 50 MG: 25 TABLET ORAL at 06:34

## 2018-05-15 RX ADMIN — STANDARDIZED SENNA CONCENTRATE AND DOCUSATE SODIUM 2 TABLET: 8.6; 5 TABLET, FILM COATED ORAL at 06:34

## 2018-05-15 RX ADMIN — POLYETHYLENE GLYCOL 3350 1 PACKET: 17 POWDER, FOR SOLUTION ORAL at 06:33

## 2018-05-15 RX ADMIN — POTASSIUM CHLORIDE 40 MEQ: 1500 TABLET, EXTENDED RELEASE ORAL at 11:51

## 2018-05-15 RX ADMIN — GABAPENTIN 300 MG: 300 CAPSULE ORAL at 11:51

## 2018-05-15 RX ADMIN — OXYCODONE HYDROCHLORIDE AND ACETAMINOPHEN 2 TABLET: 5; 325 TABLET ORAL at 03:43

## 2018-05-15 RX ADMIN — DULOXETINE HYDROCHLORIDE 60 MG: 60 CAPSULE, DELAYED RELEASE ORAL at 06:33

## 2018-05-15 RX ADMIN — IPRATROPIUM BROMIDE AND ALBUTEROL SULFATE 3 ML: .5; 3 SOLUTION RESPIRATORY (INHALATION) at 12:12

## 2018-05-15 RX ADMIN — OXYCODONE HYDROCHLORIDE AND ACETAMINOPHEN 2 TABLET: 5; 325 TABLET ORAL at 14:18

## 2018-05-15 RX ADMIN — CYCLOBENZAPRINE 10 MG: 10 TABLET, FILM COATED ORAL at 20:14

## 2018-05-15 RX ADMIN — SODIUM CHLORIDE: 9 INJECTION, SOLUTION INTRAVENOUS at 11:55

## 2018-05-15 RX ADMIN — GABAPENTIN 300 MG: 300 CAPSULE ORAL at 06:33

## 2018-05-15 RX ADMIN — TIOTROPIUM BROMIDE 1 CAPSULE: 18 CAPSULE ORAL; RESPIRATORY (INHALATION) at 06:34

## 2018-05-15 RX ADMIN — VANCOMYCIN HYDROCHLORIDE 1100 MG: 100 INJECTION, POWDER, LYOPHILIZED, FOR SOLUTION INTRAVENOUS at 17:10

## 2018-05-15 RX ADMIN — GABAPENTIN 300 MG: 300 CAPSULE ORAL at 17:08

## 2018-05-15 RX ADMIN — QUETIAPINE FUMARATE 50 MG: 25 TABLET ORAL at 17:08

## 2018-05-15 RX ADMIN — OMEPRAZOLE 20 MG: 20 CAPSULE, DELAYED RELEASE ORAL at 06:35

## 2018-05-15 RX ADMIN — POLYETHYLENE GLYCOL 3350 1 PACKET: 17 POWDER, FOR SOLUTION ORAL at 20:59

## 2018-05-15 RX ADMIN — VANCOMYCIN HYDROCHLORIDE 1100 MG: 100 INJECTION, POWDER, LYOPHILIZED, FOR SOLUTION INTRAVENOUS at 06:32

## 2018-05-15 ASSESSMENT — ENCOUNTER SYMPTOMS
NERVOUS/ANXIOUS: 0
EYE DISCHARGE: 0
HEADACHES: 0
SORE THROAT: 0
SHORTNESS OF BREATH: 0
ABDOMINAL PAIN: 1
DIZZINESS: 0
FEVER: 0
FLANK PAIN: 1
BACK PAIN: 1
NAUSEA: 0
COUGH: 0
PALPITATIONS: 0

## 2018-05-15 ASSESSMENT — PAIN SCALES - GENERAL
PAINLEVEL_OUTOF10: 6
PAINLEVEL_OUTOF10: 5
PAINLEVEL_OUTOF10: 8
PAINLEVEL_OUTOF10: 7
PAINLEVEL_OUTOF10: 8
PAINLEVEL_OUTOF10: 6
PAINLEVEL_OUTOF10: 8
PAINLEVEL_OUTOF10: 6
PAINLEVEL_OUTOF10: 8
PAINLEVEL_OUTOF10: 6
PAINLEVEL_OUTOF10: 5

## 2018-05-15 NOTE — RESPIRATORY CARE
COPD EDUCATION by COPD CLINICAL EDUCATOR  5/15/2018 at 6:13 AM by Cecelia Santos     Patient reviewed by COPD education team. Patient does not qualify for COPD program.

## 2018-05-15 NOTE — PROGRESS NOTES
Bedside report completed with JENNIFER RN. Reviewed POC and safety precautions with pt. Recently received prn pain meds. Will reassess.

## 2018-05-15 NOTE — CARE PLAN
Problem: Safety  Goal: Will remain free from injury  Outcome: PROGRESSING AS EXPECTED  Fall precaution in place, call light w/in reach.    Problem: Bowel/Gastric:  Goal: Normal bowel function is maintained or improved  Outcome: PROGRESSING SLOWER THAN EXPECTED  Given stool softener per MAR.  Educated on ambulation and use of pain meds.  Will continue to monitor.

## 2018-05-15 NOTE — PROGRESS NOTES
Pt at bedside, VSS.  Denies any pain at this time.  Fall precautions in place, call light w/in reach.  Discussed POC, pt verbalized agreement. Will continue to monitor.

## 2018-05-15 NOTE — PROGRESS NOTES
(Kateryna LAZARO did not get name)  from Lab called with critical result of MRSA in urin at 10:35. Critical lab result read back to (Kateryna LAZARO did not get name) .   Dr. Jain notified of critical lab result at 10:45.  Critical lab result read back by Dr. Jani.

## 2018-05-15 NOTE — CARE PLAN
Problem: Communication  Goal: The ability to communicate needs accurately and effectively will improve  Outcome: PROGRESSING AS EXPECTED  Pt using call light appropriately and able to make needs known.    Problem: Mobility  Goal: Risk for activity intolerance will decrease  Outcome: PROGRESSING AS EXPECTED  Pt up to bathroom with SBA and cane. Gait steady.

## 2018-05-15 NOTE — CARE PLAN
Problem: Safety  Goal: Will remain free from falls  Call light in reach, hourly rounding in practice.     Problem: Mobility  Goal: Risk for activity intolerance will decrease    Intervention: Encourage patient to increase activity level in collaboration with Interdisciplinary Team  Pt will get OOB for all meals.
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
## 2018-05-15 NOTE — PROGRESS NOTES
"Pharmacy Kinetics 58 y.o. female on vancomycin day # 3 5/15/2018    Currently on Vancomycin 1,100 mg iv q12hr (0600, 1800)     Indication for Treatment: Pyelonephritis      Pertinent history per medical record: Admitted on 2018 for flank pain, SOB, and pain with coughing. Patient does have a complex PMH which includes COPD (not on O2 at home), GERD, fibromyalgia, DM, and psych disorder.      Other antibiotics: Zosyn 4.5 gm iv Q8H     Allergies: Lyrica and Sulfa drugs      List concerns for renal function: obesity (BMI ~ 32), low albumin, contrast on      Pertinent cultures to date:   18: Urine Cx = MRSA (Vanco STEPHEN = 2)  18 blood, peripheral x 2: NGTD    Recent Labs      18   1437  18   0040  18   1645  18   0150  05/15/18   0527   WBC  13.3*  11.2*  10.8  9.0  7.3   NEUTSPOLYS  95.00*  95.60*  93.00*  91.70*   --    BANDSSTABS   --   3.50   --    --    --      Recent Labs      18   1437  18   0040  18   1832  18   0150  05/15/18   0527   BUN  13  16  16  13  10   CREATININE  0.70  0.64  0.68  0.65  0.77   ALBUMIN  3.7  3.4  3.1*  2.9*   --      Recent Labs      05/15/18   0527   VANCOTROUGH  19.7     Intake/Output Summary (Last 24 hours) at 05/15/18 1315  Last data filed at 05/15/18 0700   Gross per 24 hour   Intake             1850 ml   Output                0 ml   Net             1850 ml      Blood pressure 111/74, pulse 80, temperature 36.6 °C (97.8 °F), resp. rate 16, height 1.47 m (4' 9.87\"), weight 69.4 kg (153 lb), SpO2 99 %, not currently breastfeeding. Temp (24hrs), Av.6 °C (97.9 °F), Min:36.4 °C (97.5 °F), Max:36.9 °C (98.4 °F)      A/P   1. Vancomycin dose change: Not indicated, continue.   2. Next vancomycin level: Tomorrow,  05   3. Goal trough: 12 - 16 mcg/mL   4. Comments: Vancomycin trough level drawn this morning is not a true trough level. Vancomycin doses were not given on time. Vancomycin dose prior to trough level was " given at ~ 2100 versus 1800. Will not adjust vancomycin dose based on this level. Repeat trough level tomorrow morning to ensure patient is within goal range - SCr with a 15% increase overnight - continue to monitor. Urine culture now positive with MRSA. Blood cultures remain negative to date. Leukocytosis resolved.     Bella Shaw, ElizabethD

## 2018-05-16 ENCOUNTER — APPOINTMENT (OUTPATIENT)
Dept: RADIOLOGY | Facility: MEDICAL CENTER | Age: 58
DRG: 871 | End: 2018-05-16
Attending: HOSPITALIST
Payer: MEDICAID

## 2018-05-16 LAB
ANION GAP SERPL CALC-SCNC: 6 MMOL/L (ref 0–11.9)
BASOPHILS # BLD AUTO: 0.3 % (ref 0–1.8)
BASOPHILS # BLD: 0.02 K/UL (ref 0–0.12)
BUN SERPL-MCNC: 9 MG/DL (ref 8–22)
CALCIUM SERPL-MCNC: 8.7 MG/DL (ref 8.5–10.5)
CHLORIDE SERPL-SCNC: 113 MMOL/L (ref 96–112)
CO2 SERPL-SCNC: 19 MMOL/L (ref 20–33)
CREAT SERPL-MCNC: 0.6 MG/DL (ref 0.5–1.4)
EOSINOPHIL # BLD AUTO: 0.41 K/UL (ref 0–0.51)
EOSINOPHIL NFR BLD: 6.3 % (ref 0–6.9)
ERYTHROCYTE [DISTWIDTH] IN BLOOD BY AUTOMATED COUNT: 49.4 FL (ref 35.9–50)
GLUCOSE SERPL-MCNC: 81 MG/DL (ref 65–99)
HCT VFR BLD AUTO: 38.7 % (ref 37–47)
HGB BLD-MCNC: 12.2 G/DL (ref 12–16)
IMM GRANULOCYTES # BLD AUTO: 0.04 K/UL (ref 0–0.11)
IMM GRANULOCYTES NFR BLD AUTO: 0.6 % (ref 0–0.9)
IRON SATN MFR SERPL: 11 % (ref 15–55)
IRON SERPL-MCNC: 33 UG/DL (ref 40–170)
LYMPHOCYTES # BLD AUTO: 2 K/UL (ref 1–4.8)
LYMPHOCYTES NFR BLD: 30.5 % (ref 22–41)
MCH RBC QN AUTO: 29.7 PG (ref 27–33)
MCHC RBC AUTO-ENTMCNC: 31.5 G/DL (ref 33.6–35)
MCV RBC AUTO: 94.2 FL (ref 81.4–97.8)
MONOCYTES # BLD AUTO: 0.43 K/UL (ref 0–0.85)
MONOCYTES NFR BLD AUTO: 6.6 % (ref 0–13.4)
NEUTROPHILS # BLD AUTO: 3.65 K/UL (ref 2–7.15)
NEUTROPHILS NFR BLD: 55.7 % (ref 44–72)
NRBC # BLD AUTO: 0 K/UL
NRBC BLD-RTO: 0 /100 WBC
PLATELET # BLD AUTO: 163 K/UL (ref 164–446)
PMV BLD AUTO: 10.6 FL (ref 9–12.9)
POTASSIUM SERPL-SCNC: 4.1 MMOL/L (ref 3.6–5.5)
RBC # BLD AUTO: 4.11 M/UL (ref 4.2–5.4)
SODIUM SERPL-SCNC: 138 MMOL/L (ref 135–145)
TIBC SERPL-MCNC: 307 UG/DL (ref 250–450)
VANCOMYCIN TROUGH SERPL-MCNC: 21 UG/ML (ref 10–20)
WBC # BLD AUTO: 6.6 K/UL (ref 4.8–10.8)

## 2018-05-16 PROCEDURE — A9270 NON-COVERED ITEM OR SERVICE: HCPCS | Performed by: HOSPITALIST

## 2018-05-16 PROCEDURE — A9270 NON-COVERED ITEM OR SERVICE: HCPCS | Performed by: EMERGENCY MEDICINE

## 2018-05-16 PROCEDURE — 94760 N-INVAS EAR/PLS OXIMETRY 1: CPT

## 2018-05-16 PROCEDURE — 700102 HCHG RX REV CODE 250 W/ 637 OVERRIDE(OP): Performed by: EMERGENCY MEDICINE

## 2018-05-16 PROCEDURE — 700101 HCHG RX REV CODE 250: Performed by: HOSPITALIST

## 2018-05-16 PROCEDURE — 76830 TRANSVAGINAL US NON-OB: CPT

## 2018-05-16 PROCEDURE — 99232 SBSQ HOSP IP/OBS MODERATE 35: CPT | Performed by: HOSPITALIST

## 2018-05-16 PROCEDURE — 700105 HCHG RX REV CODE 258: Performed by: HOSPITALIST

## 2018-05-16 PROCEDURE — 83540 ASSAY OF IRON: CPT

## 2018-05-16 PROCEDURE — 700111 HCHG RX REV CODE 636 W/ 250 OVERRIDE (IP): Performed by: INTERNAL MEDICINE

## 2018-05-16 PROCEDURE — 85025 COMPLETE CBC W/AUTO DIFF WBC: CPT

## 2018-05-16 PROCEDURE — 700105 HCHG RX REV CODE 258: Performed by: INTERNAL MEDICINE

## 2018-05-16 PROCEDURE — 700111 HCHG RX REV CODE 636 W/ 250 OVERRIDE (IP): Performed by: HOSPITALIST

## 2018-05-16 PROCEDURE — 700102 HCHG RX REV CODE 250 W/ 637 OVERRIDE(OP): Performed by: HOSPITALIST

## 2018-05-16 PROCEDURE — 770020 HCHG ROOM/CARE - TELE (206)

## 2018-05-16 PROCEDURE — 80202 ASSAY OF VANCOMYCIN: CPT

## 2018-05-16 PROCEDURE — 83550 IRON BINDING TEST: CPT

## 2018-05-16 PROCEDURE — 700102 HCHG RX REV CODE 250 W/ 637 OVERRIDE(OP): Performed by: INTERNAL MEDICINE

## 2018-05-16 PROCEDURE — A9270 NON-COVERED ITEM OR SERVICE: HCPCS | Performed by: INTERNAL MEDICINE

## 2018-05-16 PROCEDURE — 36415 COLL VENOUS BLD VENIPUNCTURE: CPT

## 2018-05-16 PROCEDURE — 80048 BASIC METABOLIC PNL TOTAL CA: CPT

## 2018-05-16 PROCEDURE — 94640 AIRWAY INHALATION TREATMENT: CPT

## 2018-05-16 RX ORDER — IPRATROPIUM BROMIDE AND ALBUTEROL SULFATE 2.5; .5 MG/3ML; MG/3ML
3 SOLUTION RESPIRATORY (INHALATION)
Status: DISCONTINUED | OUTPATIENT
Start: 2018-05-16 | End: 2018-05-17 | Stop reason: HOSPADM

## 2018-05-16 RX ADMIN — OMEPRAZOLE 20 MG: 20 CAPSULE, DELAYED RELEASE ORAL at 05:57

## 2018-05-16 RX ADMIN — STANDARDIZED SENNA CONCENTRATE AND DOCUSATE SODIUM 2 TABLET: 8.6; 5 TABLET, FILM COATED ORAL at 05:58

## 2018-05-16 RX ADMIN — OXYCODONE HYDROCHLORIDE AND ACETAMINOPHEN 2 TABLET: 5; 325 TABLET ORAL at 13:58

## 2018-05-16 RX ADMIN — GABAPENTIN 300 MG: 300 CAPSULE ORAL at 17:19

## 2018-05-16 RX ADMIN — GABAPENTIN 300 MG: 300 CAPSULE ORAL at 05:58

## 2018-05-16 RX ADMIN — DULOXETINE HYDROCHLORIDE 60 MG: 60 CAPSULE, DELAYED RELEASE ORAL at 05:58

## 2018-05-16 RX ADMIN — QUETIAPINE FUMARATE 50 MG: 25 TABLET ORAL at 17:19

## 2018-05-16 RX ADMIN — QUETIAPINE FUMARATE 50 MG: 25 TABLET ORAL at 05:58

## 2018-05-16 RX ADMIN — ENOXAPARIN SODIUM 40 MG: 100 INJECTION SUBCUTANEOUS at 17:18

## 2018-05-16 RX ADMIN — VANCOMYCIN HYDROCHLORIDE 1100 MG: 100 INJECTION, POWDER, LYOPHILIZED, FOR SOLUTION INTRAVENOUS at 05:59

## 2018-05-16 RX ADMIN — VANCOMYCIN HYDROCHLORIDE 800 MG: 100 INJECTION, POWDER, LYOPHILIZED, FOR SOLUTION INTRAVENOUS at 21:31

## 2018-05-16 RX ADMIN — CYCLOBENZAPRINE 10 MG: 10 TABLET, FILM COATED ORAL at 17:19

## 2018-05-16 RX ADMIN — OXYCODONE HYDROCHLORIDE AND ACETAMINOPHEN 2 TABLET: 5; 325 TABLET ORAL at 18:34

## 2018-05-16 RX ADMIN — NICOTINE 14 MG: 14 PATCH, EXTENDED RELEASE TRANSDERMAL at 05:57

## 2018-05-16 RX ADMIN — OXYCODONE HYDROCHLORIDE AND ACETAMINOPHEN 2 TABLET: 5; 325 TABLET ORAL at 08:59

## 2018-05-16 RX ADMIN — MAGNESIUM HYDROXIDE 30 ML: 400 SUSPENSION ORAL at 12:20

## 2018-05-16 RX ADMIN — STANDARDIZED SENNA CONCENTRATE AND DOCUSATE SODIUM 2 TABLET: 8.6; 5 TABLET, FILM COATED ORAL at 17:19

## 2018-05-16 RX ADMIN — METHYLNALTREXONE BROMIDE 12 MG: 12 INJECTION, SOLUTION SUBCUTANEOUS at 18:25

## 2018-05-16 RX ADMIN — IPRATROPIUM BROMIDE AND ALBUTEROL SULFATE 3 ML: .5; 3 SOLUTION RESPIRATORY (INHALATION) at 11:54

## 2018-05-16 RX ADMIN — POTASSIUM CHLORIDE 40 MEQ: 1500 TABLET, EXTENDED RELEASE ORAL at 05:57

## 2018-05-16 RX ADMIN — IPRATROPIUM BROMIDE AND ALBUTEROL SULFATE 3 ML: .5; 3 SOLUTION RESPIRATORY (INHALATION) at 18:51

## 2018-05-16 RX ADMIN — IPRATROPIUM BROMIDE AND ALBUTEROL SULFATE 3 ML: .5; 3 SOLUTION RESPIRATORY (INHALATION) at 15:22

## 2018-05-16 RX ADMIN — GABAPENTIN 300 MG: 300 CAPSULE ORAL at 12:20

## 2018-05-16 RX ADMIN — TRAZODONE HYDROCHLORIDE 300 MG: 50 TABLET ORAL at 21:31

## 2018-05-16 RX ADMIN — POLYETHYLENE GLYCOL 3350 1 PACKET: 17 POWDER, FOR SOLUTION ORAL at 05:57

## 2018-05-16 RX ADMIN — Medication 500 MG: at 08:07

## 2018-05-16 RX ADMIN — OXYCODONE HYDROCHLORIDE AND ACETAMINOPHEN 2 TABLET: 5; 325 TABLET ORAL at 04:36

## 2018-05-16 RX ADMIN — OXYCODONE HYDROCHLORIDE AND ACETAMINOPHEN 2 TABLET: 5; 325 TABLET ORAL at 22:34

## 2018-05-16 RX ADMIN — TIOTROPIUM BROMIDE 1 CAPSULE: 18 CAPSULE ORAL; RESPIRATORY (INHALATION) at 07:52

## 2018-05-16 RX ADMIN — IPRATROPIUM BROMIDE AND ALBUTEROL SULFATE 3 ML: .5; 3 SOLUTION RESPIRATORY (INHALATION) at 07:52

## 2018-05-16 ASSESSMENT — ENCOUNTER SYMPTOMS
FLANK PAIN: 1
NERVOUS/ANXIOUS: 0
PALPITATIONS: 0
HEADACHES: 0
SHORTNESS OF BREATH: 0
BACK PAIN: 1
ABDOMINAL PAIN: 1
COUGH: 0
EYE DISCHARGE: 0
FEVER: 0
SORE THROAT: 0
NAUSEA: 0
DIZZINESS: 0

## 2018-05-16 ASSESSMENT — PAIN SCALES - GENERAL
PAINLEVEL_OUTOF10: 8
PAINLEVEL_OUTOF10: 6
PAINLEVEL_OUTOF10: 8
PAINLEVEL_OUTOF10: 6
PAINLEVEL_OUTOF10: 6
PAINLEVEL_OUTOF10: 8
PAINLEVEL_OUTOF10: 7
PAINLEVEL_OUTOF10: 6

## 2018-05-16 ASSESSMENT — COPD QUESTIONNAIRES
DO YOU EVER COUGH UP ANY MUCUS OR PHLEGM?: NO/ONLY WITH OCCASIONAL COLDS OR INFECTIONS
HAVE YOU SMOKED AT LEAST 100 CIGARETTES IN YOUR ENTIRE LIFE: YES
DURING THE PAST 4 WEEKS HOW MUCH DID YOU FEEL SHORT OF BREATH: NONE/LITTLE OF THE TIME
COPD SCREENING SCORE: 3

## 2018-05-16 ASSESSMENT — LIFESTYLE VARIABLES: EVER_SMOKED: YES

## 2018-05-16 NOTE — PROGRESS NOTES
Patient's Significant other in  Room with e vap. Educated patient and family on not to use while in room . There is  Constant oxygen in room.

## 2018-05-16 NOTE — ASSESSMENT & PLAN NOTE
Left flank pain on exam 2/2 pyelo  CT chest/abd/pelvis:  No stones seen.  MRSA on urine culture  Continue IV vanco  BCs negative.

## 2018-05-16 NOTE — DISCHARGE PLANNING
Anticipated Discharge Disposition: Home    Action: PABLITO faxed Zyvox Rx to [facility] to check for insurance coverage    Barriers to Discharge: None    Plan: Awaiting response from [facility]

## 2018-05-16 NOTE — PROGRESS NOTES
Received bedside report from RN, pt care assumed. Pt AAOx4, pt  c/o neck and back pain at this time. No signs of acute distress noted at this time. POC discussed with pt and verbalizes no questions. Pt denies any additional needs at this time. Bed in lowest position, bed alarm on, pt educated on fall risk and verbalized understanding, call light within reach, hourly rounding initiated.

## 2018-05-16 NOTE — ASSESSMENT & PLAN NOTE
Take miralax daily at home.  On narcotics.  Restarted miralax daily since no BM since admission.  5/16:  No BM >72 hours, ordered relistor since no BM despite several days of laxatives.

## 2018-05-16 NOTE — CARE PLAN
Problem: Safety  Goal: Will remain free from injury  Outcome: PROGRESSING AS EXPECTED  Rn educated patient and family  on safety and fall precautions. Patient and family verbalized understanding of education    Problem: Infection  Goal: Will remain free from infection  Outcome: PROGRESSING AS EXPECTED  Rn educated patient and family  on infection control and precautions. Patient and family verbalized understanding of education

## 2018-05-16 NOTE — CARE PLAN
Problem: Bronchoconstriction:  Goal: Improve in air movement and diminished wheezing  Outcome: PROGRESSING AS EXPECTED    Intervention: Implement inhaled treatments  DUO QID  Spiriva

## 2018-05-16 NOTE — PROGRESS NOTES
Bedside report received. No s/s of distress. Patient ambulated to the restroom. Patient had a small bowel movement. Patient denies any other needs at this time.patient instructed to call for assistance. Patient's bed in lowest position, tread socks on, bed alarm active and call light within reach

## 2018-05-16 NOTE — PROGRESS NOTES
Called repsiratory  To check on 1900 respiratory treatment. RT is on tele 8 then will come see richardson

## 2018-05-16 NOTE — PROGRESS NOTES
"Pharmacy Kinetics 58 y.o. female on vancomycin day # 4 2018    Currently on Vancomycin 800mg iv q12hr (800, )    Indication for Treatment: Pyelonephritis      Pertinent history per medical record: Admitted on 2018 for flank pain, SOB, and pain with coughing. Patient does have a complex PMH which includes COPD (not on O2 at home), GERD, fibromyalgia, DM, and psych disorder.       Other antibiotics: Zosyn 4.5 gm iv Q8H     Allergies: Lyrica and Sulfa drugs      List concerns for renal function: obesity (BMI ~ 32), low albumin, contrast on      Pertinent cultures to date:   18: Urine Cx = MRSA (Vanco STEPHEN = 2)  18 blood, peripheral x 2: NGTD    Recent Labs      18   1645  18   0150  05/15/18   0527  18   0603   WBC  10.8  9.0  7.3  6.6   NEUTSPOLYS  93.00*  91.70*   --   55.70     Recent Labs      18   1832  18   0150  05/15/18   0527  18   0603   BUN  16  13  10  9   CREATININE  0.68  0.65  0.77  0.60   ALBUMIN  3.1*  2.9*   --    --      Recent Labs      05/15/18   0527  18   0603   VANCOTROUGH  19.7  21.0*     Intake/Output Summary (Last 24 hours) at 18 1450  Last data filed at 18 1300   Gross per 24 hour   Intake             1880 ml   Output                0 ml   Net             1880 ml      Blood pressure 120/67, pulse 82, temperature 37.2 °C (98.9 °F), resp. rate 16, height 1.47 m (4' 9.87\"), weight 75.9 kg (167 lb 5.3 oz), SpO2 90 %, not currently breastfeeding. Temp (24hrs), Av.8 °C (98.2 °F), Min:36.2 °C (97.2 °F), Max:37.2 °C (98.9 °F)      A/P   1. Vancomycin dose change: Dose reduced today.   2. Next vancomycin level: 18 (not yet ordered)   3. Goal trough: 12 - 16 mcg/mL   4. Comments: Vancomycin trough level resulted at 21 mcg/mL which is above desired range as outlined above. Reduced dose from 1,100mg IV q12h to 800mg iV q12h based on linear kinetics of vancomycin. Repeat trough level 18 if therapy continues. " Likely move to Linezolid upon discharge.     Bella Shaw, PharmD
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
## 2018-05-17 VITALS
WEIGHT: 157.85 LBS | HEIGHT: 58 IN | BODY MASS INDEX: 33.13 KG/M2 | TEMPERATURE: 98 F | DIASTOLIC BLOOD PRESSURE: 84 MMHG | HEART RATE: 57 BPM | OXYGEN SATURATION: 93 % | RESPIRATION RATE: 18 BRPM | SYSTOLIC BLOOD PRESSURE: 150 MMHG

## 2018-05-17 PROBLEM — J44.1 COPD EXACERBATION (HCC): Status: RESOLVED | Noted: 2018-05-12 | Resolved: 2018-05-17

## 2018-05-17 PROBLEM — G89.11 ACUTE PAIN DUE TO TRAUMA: Status: RESOLVED | Noted: 2018-05-13 | Resolved: 2018-05-17

## 2018-05-17 PROBLEM — R65.21 SEPTIC SHOCK (HCC): Status: RESOLVED | Noted: 2018-05-13 | Resolved: 2018-05-17

## 2018-05-17 PROBLEM — R07.1 CHEST PAIN ON BREATHING: Status: RESOLVED | Noted: 2017-10-08 | Resolved: 2018-05-17

## 2018-05-17 PROBLEM — A41.9 SEPTIC SHOCK (HCC): Status: RESOLVED | Noted: 2018-05-13 | Resolved: 2018-05-17

## 2018-05-17 PROBLEM — R30.0 DYSURIA: Status: RESOLVED | Noted: 2018-04-17 | Resolved: 2018-05-17

## 2018-05-17 LAB
ANION GAP SERPL CALC-SCNC: 4 MMOL/L (ref 0–11.9)
BACTERIA BLD CULT: NORMAL
BACTERIA BLD CULT: NORMAL
BASOPHILS # BLD AUTO: 0.6 % (ref 0–1.8)
BASOPHILS # BLD: 0.03 K/UL (ref 0–0.12)
BUN SERPL-MCNC: 10 MG/DL (ref 8–22)
CALCIUM SERPL-MCNC: 9 MG/DL (ref 8.5–10.5)
CHLORIDE SERPL-SCNC: 107 MMOL/L (ref 96–112)
CO2 SERPL-SCNC: 28 MMOL/L (ref 20–33)
CREAT SERPL-MCNC: 0.77 MG/DL (ref 0.5–1.4)
EOSINOPHIL # BLD AUTO: 0.29 K/UL (ref 0–0.51)
EOSINOPHIL NFR BLD: 5.8 % (ref 0–6.9)
ERYTHROCYTE [DISTWIDTH] IN BLOOD BY AUTOMATED COUNT: 48 FL (ref 35.9–50)
GLUCOSE SERPL-MCNC: 101 MG/DL (ref 65–99)
HCT VFR BLD AUTO: 36.2 % (ref 37–47)
HGB BLD-MCNC: 11.7 G/DL (ref 12–16)
IMM GRANULOCYTES # BLD AUTO: 0.05 K/UL (ref 0–0.11)
IMM GRANULOCYTES NFR BLD AUTO: 1 % (ref 0–0.9)
LYMPHOCYTES # BLD AUTO: 2.08 K/UL (ref 1–4.8)
LYMPHOCYTES NFR BLD: 41.4 % (ref 22–41)
MCH RBC QN AUTO: 29.8 PG (ref 27–33)
MCHC RBC AUTO-ENTMCNC: 32.3 G/DL (ref 33.6–35)
MCV RBC AUTO: 92.3 FL (ref 81.4–97.8)
MONOCYTES # BLD AUTO: 0.33 K/UL (ref 0–0.85)
MONOCYTES NFR BLD AUTO: 6.6 % (ref 0–13.4)
NEUTROPHILS # BLD AUTO: 2.24 K/UL (ref 2–7.15)
NEUTROPHILS NFR BLD: 44.6 % (ref 44–72)
NRBC # BLD AUTO: 0 K/UL
NRBC BLD-RTO: 0 /100 WBC
PLATELET # BLD AUTO: 162 K/UL (ref 164–446)
PMV BLD AUTO: 9.8 FL (ref 9–12.9)
POTASSIUM SERPL-SCNC: 4.1 MMOL/L (ref 3.6–5.5)
RBC # BLD AUTO: 3.92 M/UL (ref 4.2–5.4)
SIGNIFICANT IND 70042: NORMAL
SIGNIFICANT IND 70042: NORMAL
SITE SITE: NORMAL
SITE SITE: NORMAL
SODIUM SERPL-SCNC: 139 MMOL/L (ref 135–145)
SOURCE SOURCE: NORMAL
SOURCE SOURCE: NORMAL
WBC # BLD AUTO: 5 K/UL (ref 4.8–10.8)

## 2018-05-17 PROCEDURE — 85025 COMPLETE CBC W/AUTO DIFF WBC: CPT

## 2018-05-17 PROCEDURE — 700102 HCHG RX REV CODE 250 W/ 637 OVERRIDE(OP): Performed by: INTERNAL MEDICINE

## 2018-05-17 PROCEDURE — A9270 NON-COVERED ITEM OR SERVICE: HCPCS | Performed by: EMERGENCY MEDICINE

## 2018-05-17 PROCEDURE — 700102 HCHG RX REV CODE 250 W/ 637 OVERRIDE(OP): Performed by: HOSPITALIST

## 2018-05-17 PROCEDURE — A9270 NON-COVERED ITEM OR SERVICE: HCPCS | Performed by: HOSPITALIST

## 2018-05-17 PROCEDURE — 700111 HCHG RX REV CODE 636 W/ 250 OVERRIDE (IP): Performed by: HOSPITALIST

## 2018-05-17 PROCEDURE — 700105 HCHG RX REV CODE 258: Performed by: INTERNAL MEDICINE

## 2018-05-17 PROCEDURE — 36415 COLL VENOUS BLD VENIPUNCTURE: CPT

## 2018-05-17 PROCEDURE — 80048 BASIC METABOLIC PNL TOTAL CA: CPT

## 2018-05-17 PROCEDURE — A9270 NON-COVERED ITEM OR SERVICE: HCPCS | Performed by: INTERNAL MEDICINE

## 2018-05-17 PROCEDURE — 700102 HCHG RX REV CODE 250 W/ 637 OVERRIDE(OP): Performed by: EMERGENCY MEDICINE

## 2018-05-17 PROCEDURE — 99239 HOSP IP/OBS DSCHRG MGMT >30: CPT | Performed by: HOSPITALIST

## 2018-05-17 PROCEDURE — 700111 HCHG RX REV CODE 636 W/ 250 OVERRIDE (IP): Performed by: INTERNAL MEDICINE

## 2018-05-17 RX ORDER — FLUCONAZOLE 150 MG/1
150 TABLET ORAL DAILY
Qty: 3 TAB | Refills: 1 | Status: SHIPPED | OUTPATIENT
Start: 2018-05-17 | End: 2018-05-20

## 2018-05-17 RX ORDER — NICOTINE 21 MG/24HR
1 PATCH, TRANSDERMAL 24 HOURS TRANSDERMAL EVERY 24 HOURS
Qty: 30 PATCH | Refills: 3 | Status: SHIPPED | OUTPATIENT
Start: 2018-05-17 | End: 2020-02-18

## 2018-05-17 RX ORDER — LINEZOLID 600 MG/1
600 TABLET, FILM COATED ORAL 2 TIMES DAILY
Qty: 20 TAB | Refills: 0 | Status: SHIPPED | OUTPATIENT
Start: 2018-05-17 | End: 2018-05-27

## 2018-05-17 RX ORDER — GABAPENTIN 300 MG/1
600 CAPSULE ORAL 3 TIMES DAILY
Qty: 180 CAP | Refills: 3 | Status: SHIPPED | OUTPATIENT
Start: 2018-05-17 | End: 2024-01-05

## 2018-05-17 RX ORDER — TIOTROPIUM BROMIDE 18 UG/1
18 CAPSULE ORAL; RESPIRATORY (INHALATION) DAILY
Qty: 30 CAP | Refills: 3 | Status: SHIPPED | OUTPATIENT
Start: 2018-05-18 | End: 2022-03-01 | Stop reason: SDUPTHER

## 2018-05-17 RX ADMIN — ENOXAPARIN SODIUM 40 MG: 100 INJECTION SUBCUTANEOUS at 06:13

## 2018-05-17 RX ADMIN — POTASSIUM CHLORIDE 40 MEQ: 1500 TABLET, EXTENDED RELEASE ORAL at 06:13

## 2018-05-17 RX ADMIN — GABAPENTIN 300 MG: 300 CAPSULE ORAL at 06:13

## 2018-05-17 RX ADMIN — POLYETHYLENE GLYCOL 3350 1 PACKET: 17 POWDER, FOR SOLUTION ORAL at 06:12

## 2018-05-17 RX ADMIN — OMEPRAZOLE 20 MG: 20 CAPSULE, DELAYED RELEASE ORAL at 06:14

## 2018-05-17 RX ADMIN — VANCOMYCIN HYDROCHLORIDE 800 MG: 100 INJECTION, POWDER, LYOPHILIZED, FOR SOLUTION INTRAVENOUS at 08:14

## 2018-05-17 RX ADMIN — TIOTROPIUM BROMIDE 1 CAPSULE: 18 CAPSULE ORAL; RESPIRATORY (INHALATION) at 06:14

## 2018-05-17 RX ADMIN — GABAPENTIN 300 MG: 300 CAPSULE ORAL at 11:26

## 2018-05-17 RX ADMIN — DULOXETINE HYDROCHLORIDE 60 MG: 60 CAPSULE, DELAYED RELEASE ORAL at 06:13

## 2018-05-17 RX ADMIN — Medication 500 MG: at 06:13

## 2018-05-17 RX ADMIN — NICOTINE 14 MG: 14 PATCH, EXTENDED RELEASE TRANSDERMAL at 06:14

## 2018-05-17 RX ADMIN — OXYCODONE HYDROCHLORIDE AND ACETAMINOPHEN 2 TABLET: 5; 325 TABLET ORAL at 06:24

## 2018-05-17 RX ADMIN — ACETAMINOPHEN, ASPIRIN AND CAFFEINE 2 TABLET: 250; 250; 65 TABLET, FILM COATED ORAL at 00:22

## 2018-05-17 RX ADMIN — STANDARDIZED SENNA CONCENTRATE AND DOCUSATE SODIUM 2 TABLET: 8.6; 5 TABLET, FILM COATED ORAL at 06:14

## 2018-05-17 RX ADMIN — QUETIAPINE FUMARATE 50 MG: 25 TABLET ORAL at 06:14

## 2018-05-17 ASSESSMENT — PAIN SCALES - GENERAL
PAINLEVEL_OUTOF10: 8
PAINLEVEL_OUTOF10: 2
PAINLEVEL_OUTOF10: 8

## 2018-05-17 NOTE — DOCUMENTATION QUERY
DOCUMENTATION QUERY    PROVIDERS: Please select “Cosign w/ note”to reply to query.    Dr. Jain,    To better represent the severity of illness of your patient, please review the following information and exercise your independent professional judgment in responding to this query.     Magnesium 1.6 - 1.7 on 5/14/18 is noted in the Lab Results . Based upon the clinical findings, risk factors, and treatment, can a diagnosis be provided to support this finding?     • Hypomagnesemia  • Findings of no clinical significance   • Other explanation of clinical findings  • Unable to determine (no explanation for clinical findings)    The medical record reflects the following:   Clinical Findings  Magnesium 1.6 -1.7 noted in lab results on 5/14/18   Treatment  Magnesium Sulfate 2 g given once on 5/14/18   Risk Factors  Sepsis w/ Septic Shock, UTI, COPD exacerbation   Location within medical record  Lab Results      Thank you,   Gaye Whitten RN  Clinical   [phone]

## 2018-05-17 NOTE — PROGRESS NOTES
Care of patient assumed after report. Patient awake, alert and oriented. Patient ambulated around room, steady on feet. Call light in reach, fall precautions in place. Discussed plan of care with patient. Will continue to monitor.

## 2018-05-17 NOTE — DISCHARGE INSTRUCTIONS
Discharge Instructions    Discharged to home by car with relative. Discharged via wheelchair, hospital escort: Yes.  Special equipment needed: Not Applicable    Be sure to schedule a follow-up appointment with your primary care doctor or any specialists as instructed.     Discharge Plan:   Diet Plan: Discussed  Activity Level: Discussed  Smoking Cessation Offered: Patient Counseled  Confirmed Follow up Appointment: No (Comments)  Confirmed Symptoms Management: Discussed  Medication Reconciliation Updated: Yes  Influenza Vaccine Indication: Not indicated: Previously immunized this influenza season and > 8 years of age    I understand that a diet low in cholesterol, fat, and sodium is recommended for good health. Unless I have been given specific instructions below for another diet, I accept this instruction as my diet prescription.     Special Instructions: None    · Is patient discharged on Warfarin / Coumadin?   No     Depression / Suicide Risk    As you are discharged from this [facility] facility, it is important to learn how to keep safe from harming yourself.    Recognize the warning signs:  · Abrupt changes in personality, positive or negative- including increase in energy   · Giving away possessions  · Change in eating patterns- significant weight changes-  positive or negative  · Change in sleeping patterns- unable to sleep or sleeping all the time   · Unwillingness or inability to communicate  · Depression  · Unusual sadness, discouragement and loneliness  · Talk of wanting to die  · Neglect of personal appearance   · Rebelliousness- reckless behavior  · Withdrawal from people/activities they love  · Confusion- inability to concentrate     If you or a loved one observes any of these behaviors or has concerns about self-harm, here's what you can do:  · Talk about it- your feelings and reasons for harming yourself  · Remove any means that you might use to hurt yourself (examples: pills, rope, extension  cords, firearm)  · Get professional help from the community (Mental Health, Substance Abuse, psychological counseling)  · Do not be alone:Call your Safe Contact- someone whom you trust who will be there for you.  · Call your local CRISIS HOTLINE 243-7266 or 228-967-5262  · Call your local Children's Mobile Crisis Response Team [place] (915) 202-8815 or www.BIO-NEMS  · Call the toll free National Suicide Prevention Hotlines   · National Suicide Prevention Lifeline 057-312-YZMX (8918)  · National Hope Line Network 800-SUICIDE (227-9932)
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
## 2018-05-17 NOTE — DOCUMENTATION QUERY
DOCUMENTATION QUERY    PROVIDERS: Please select “Cosign w/ note”to reply to query.    Dr. Jain,    To better represent the severity of illness of your patient, please review the following information and exercise your independent professional judgment in responding to this query.     Potassium 3.3 on 5/15/18 is noted in the Lab Results . Based upon the clinical findings, risk factors, and treatment, can a diagnosis be provided to support this finding?     • Hypokalemia  • Findings of no clinical significance   • Other explanation of clinical findings  • Unable to determine (no explanation for clinical findings)    The medical record reflects the following:   Clinical Findings  Potassium 3.3 on 5/15/18   Treatment  KDur 40 mEq given once on 5/15/18   Risk Factors  Sepsis w/ Septic Shock, UTI, COPD exacerbation.    Location within medical record  Lab Results      Thank you,   Gaye Whitten RN  Clinical   [phone]

## 2018-05-18 ENCOUNTER — PATIENT OUTREACH (OUTPATIENT)
Dept: HEALTH INFORMATION MANAGEMENT | Facility: OTHER | Age: 58
End: 2018-05-18

## 2018-05-21 DIAGNOSIS — G89.4 CHRONIC PAIN SYNDROME: ICD-10-CM

## 2018-05-21 RX ORDER — SUMATRIPTAN 50 MG/1
50 TABLET, FILM COATED ORAL
Qty: 15 TAB | Refills: 0 | Status: SHIPPED | OUTPATIENT
Start: 2018-05-21 | End: 2018-09-22 | Stop reason: SDUPTHER

## 2018-05-21 NOTE — TELEPHONE ENCOUNTER
Was the patient seen in the last year in this department? Yes     Does patient have an active prescription for medications requested? No     Received Request Via: Pharmacy   Future Appointments       Provider [facility]    5/23/2018 3:50 PM FERMIN Mejía [facility]    10/15/2018 2:30 PM A Rotation [facility]

## 2018-05-24 DIAGNOSIS — J43.9 PULMONARY EMPHYSEMA, UNSPECIFIED EMPHYSEMA TYPE (HCC): ICD-10-CM

## 2018-05-24 RX ORDER — ALBUTEROL SULFATE 90 MCG
2 HFA AEROSOL WITH ADAPTER (GRAM) INHALATION EVERY 6 HOURS PRN
Qty: 6.7 G | Refills: 0 | Status: SHIPPED | OUTPATIENT
Start: 2018-05-24 | End: 2018-06-21 | Stop reason: SDUPTHER

## 2018-05-30 RX ORDER — OMEPRAZOLE 20 MG/1
CAPSULE, DELAYED RELEASE ORAL
Qty: 90 CAP | Refills: 0 | Status: SHIPPED | OUTPATIENT
Start: 2018-05-30 | End: 2018-05-31

## 2018-05-31 ENCOUNTER — OFFICE VISIT (OUTPATIENT)
Dept: MEDICAL GROUP | Facility: MEDICAL CENTER | Age: 58
End: 2018-05-31
Attending: NURSE PRACTITIONER
Payer: MEDICAID

## 2018-05-31 VITALS
HEIGHT: 58 IN | DIASTOLIC BLOOD PRESSURE: 60 MMHG | OXYGEN SATURATION: 98 % | WEIGHT: 138 LBS | BODY MASS INDEX: 28.97 KG/M2 | RESPIRATION RATE: 14 BRPM | TEMPERATURE: 96.5 F | HEART RATE: 76 BPM | SYSTOLIC BLOOD PRESSURE: 90 MMHG

## 2018-05-31 DIAGNOSIS — N30.00 ACUTE CYSTITIS WITHOUT HEMATURIA: ICD-10-CM

## 2018-05-31 DIAGNOSIS — R59.9 SWOLLEN LYMPH NODES: ICD-10-CM

## 2018-05-31 DIAGNOSIS — K30 ACID INDIGESTION: ICD-10-CM

## 2018-05-31 DIAGNOSIS — B37.2 SKIN YEAST INFECTION: ICD-10-CM

## 2018-05-31 PROBLEM — J02.9 PHARYNGITIS: Status: ACTIVE | Noted: 2018-05-31

## 2018-05-31 PROCEDURE — 99214 OFFICE O/P EST MOD 30 MIN: CPT | Performed by: NURSE PRACTITIONER

## 2018-05-31 PROCEDURE — 99213 OFFICE O/P EST LOW 20 MIN: CPT | Performed by: NURSE PRACTITIONER

## 2018-05-31 RX ORDER — PROMETHAZINE HYDROCHLORIDE 12.5 MG/1
TABLET ORAL
Qty: 60 TAB | Refills: 0 | Status: SHIPPED | OUTPATIENT
Start: 2018-05-31 | End: 2018-07-09 | Stop reason: SDUPTHER

## 2018-05-31 RX ORDER — NYSTATIN 100000 [USP'U]/G
POWDER TOPICAL
Qty: 15 G | Refills: 1 | Status: SHIPPED | OUTPATIENT
Start: 2018-05-31 | End: 2020-02-18

## 2018-05-31 RX ORDER — DOXYCYCLINE 100 MG/1
100 CAPSULE ORAL 2 TIMES DAILY
Qty: 14 CAP | Refills: 0 | Status: SHIPPED | OUTPATIENT
Start: 2018-05-31 | End: 2020-02-18

## 2018-05-31 RX ORDER — PREDNISONE 10 MG/1
TABLET ORAL
Qty: 15 TAB | Refills: 0 | Status: SHIPPED | OUTPATIENT
Start: 2018-05-31 | End: 2020-02-18

## 2018-05-31 RX ORDER — FAMOTIDINE 20 MG/1
20 TABLET, FILM COATED ORAL 2 TIMES DAILY
Qty: 60 TAB | Refills: 2 | Status: SHIPPED | OUTPATIENT
Start: 2018-05-31 | End: 2018-08-20 | Stop reason: SDUPTHER
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
## 2018-05-31 NOTE — ASSESSMENT & PLAN NOTE
Pt reports is here concern about swollen lymph nodes in neck on right neck near right ear fr 24 hours.  Pt unsure

## 2018-05-31 NOTE — TELEPHONE ENCOUNTER
Was the patient seen in the last year in this department? Yes     Does patient have an active prescription for medications requested? No     Received Request Via: Pharmacy   Future Appointments       Provider [facility]    5/31/2018 2:50 PM FERMIN Mejía [facility]    10/15/2018 2:30 PM A Rotation [facility]

## 2018-06-21 DIAGNOSIS — J43.9 PULMONARY EMPHYSEMA, UNSPECIFIED EMPHYSEMA TYPE (HCC): ICD-10-CM

## 2018-06-21 RX ORDER — ALBUTEROL SULFATE 90 MCG
2 HFA AEROSOL WITH ADAPTER (GRAM) INHALATION EVERY 6 HOURS PRN
Qty: 6.7 G | Refills: 0 | Status: SHIPPED | OUTPATIENT
Start: 2018-06-21 | End: 2018-07-19 | Stop reason: SDUPTHER

## 2018-07-09 RX ORDER — PROMETHAZINE HYDROCHLORIDE 12.5 MG/1
TABLET ORAL
Qty: 60 TAB | Refills: 0 | Status: SHIPPED | OUTPATIENT
Start: 2018-07-09 | End: 2018-07-12 | Stop reason: SDUPTHER

## 2018-07-12 ENCOUNTER — OFFICE VISIT (OUTPATIENT)
Dept: MEDICAL GROUP | Facility: MEDICAL CENTER | Age: 58
End: 2018-07-12
Attending: FAMILY MEDICINE
Payer: MEDICAID

## 2018-07-12 ENCOUNTER — HOSPITAL ENCOUNTER (OUTPATIENT)
Facility: MEDICAL CENTER | Age: 58
End: 2018-07-12
Attending: FAMILY MEDICINE
Payer: MEDICAID

## 2018-07-12 VITALS
OXYGEN SATURATION: 94 % | WEIGHT: 130 LBS | HEART RATE: 88 BPM | HEIGHT: 58 IN | RESPIRATION RATE: 16 BRPM | DIASTOLIC BLOOD PRESSURE: 90 MMHG | BODY MASS INDEX: 27.29 KG/M2 | SYSTOLIC BLOOD PRESSURE: 130 MMHG | TEMPERATURE: 96.6 F

## 2018-07-12 DIAGNOSIS — R10.9 LEFT FLANK PAIN: ICD-10-CM

## 2018-07-12 DIAGNOSIS — N89.8 VAGINAL DISCHARGE: ICD-10-CM

## 2018-07-12 DIAGNOSIS — R30.0 DYSURIA: ICD-10-CM

## 2018-07-12 LAB
APPEARANCE UR: CLEAR
BILIRUB UR STRIP-MCNC: NEGATIVE MG/DL
COLOR UR AUTO: YELLOW
GLUCOSE UR STRIP.AUTO-MCNC: NEGATIVE MG/DL
KETONES UR STRIP.AUTO-MCNC: NEGATIVE MG/DL
LEUKOCYTE ESTERASE UR QL STRIP.AUTO: NEGATIVE
NITRITE UR QL STRIP.AUTO: NEGATIVE
PH UR STRIP.AUTO: 6 [PH] (ref 5–8)
PROT UR QL STRIP: NEGATIVE MG/DL
RBC UR QL AUTO: NORMAL
SP GR UR STRIP.AUTO: 1.01
UROBILINOGEN UR STRIP-MCNC: NEGATIVE MG/DL

## 2018-07-12 PROCEDURE — 81002 URINALYSIS NONAUTO W/O SCOPE: CPT | Performed by: FAMILY MEDICINE

## 2018-07-12 PROCEDURE — 87086 URINE CULTURE/COLONY COUNT: CPT

## 2018-07-12 PROCEDURE — 99213 OFFICE O/P EST LOW 20 MIN: CPT | Performed by: FAMILY MEDICINE

## 2018-07-12 PROCEDURE — 99214 OFFICE O/P EST MOD 30 MIN: CPT | Mod: 25 | Performed by: FAMILY MEDICINE

## 2018-07-12 PROCEDURE — 87591 N.GONORRHOEAE DNA AMP PROB: CPT

## 2018-07-12 PROCEDURE — 87491 CHLMYD TRACH DNA AMP PROBE: CPT

## 2018-07-12 RX ORDER — PROMETHAZINE HYDROCHLORIDE 12.5 MG/1
TABLET ORAL
Qty: 60 TAB | Refills: 0 | Status: SHIPPED | OUTPATIENT
Start: 2018-07-12 | End: 2018-09-21 | Stop reason: SDUPTHER

## 2018-07-12 RX ORDER — CIPROFLOXACIN 500 MG/1
500 TABLET, FILM COATED ORAL 2 TIMES DAILY
Qty: 10 TAB | Refills: 1 | Status: SHIPPED | OUTPATIENT
Start: 2018-07-12 | End: 2020-02-18

## 2018-07-12 ASSESSMENT — ENCOUNTER SYMPTOMS
PALPITATIONS: 0
SWEATS: 0
COUGH: 0
SHORTNESS OF BREATH: 0
SPUTUM PRODUCTION: 0
CHILLS: 0
NAUSEA: 0
EMPTYING BLADDER: 1
FLANK PAIN: 1
FEVER: 0
VOMITING: 0

## 2018-07-12 NOTE — PROGRESS NOTES
"Subjective:      Martha Mosher is a 58 y.o. female who presents with Cystitis            Cystitis    This is a recurrent problem. The current episode started in the past 7 days. The problem has been unchanged. The quality of the pain is described as burning. There has been no fever. She is not sexually active. There is a history of pyelonephritis. Associated symptoms include flank pain, frequency, hematuria, hesitancy and urgency. Pertinent negatives include no chills, discharge, nausea, possible pregnancy, sweats or vomiting. Associated symptoms comments: Vaginal discharge. Treatments tried: will order UCx, urine GC/chlam, will start cipro and get an ultrasound. Her past medical history is significant for kidney stones and recurrent UTIs.       Review of Systems   Constitutional: Negative for chills and fever.   HENT: Negative for hearing loss and tinnitus.    Respiratory: Negative for cough, sputum production and shortness of breath.    Cardiovascular: Negative for chest pain and palpitations.   Gastrointestinal: Negative for nausea and vomiting.   Genitourinary: Positive for dysuria, flank pain, frequency, hematuria, hesitancy and urgency.   Skin: Negative for rash.          Objective:     /90   Pulse 88   Temp 35.9 °C (96.6 °F)   Resp 16   Ht 1.473 m (4' 10\")   Wt 59 kg (130 lb)   SpO2 94%   BMI 27.17 kg/m²      Physical Exam   Constitutional: She is oriented to person, place, and time. She appears well-developed and well-nourished.   HENT:   Head: Normocephalic and atraumatic.   Cardiovascular: Normal rate, regular rhythm and normal heart sounds.  Exam reveals no friction rub.    No murmur heard.  Pulmonary/Chest: Effort normal and breath sounds normal. No respiratory distress. She has no wheezes. She has no rales.   Abdominal: Soft. Bowel sounds are normal. She exhibits no distension. There is tenderness.   L CVAT   Neurological: She is alert and oriented to person, place, and time. "   Skin: Skin is warm and dry.   Psychiatric: She has a normal mood and affect. Her behavior is normal.   Nursing note and vitals reviewed.              Assessment/Plan:     1. Dysuria  Will order a urine culture as well as have patient hold onto Cipro if her symptoms worsen. Since she has also been having left flank pain will order an ultrasound of her kidneys to further assess for kidney stones for which she has a history of. She also was in a relationship recently and is concerned about potential STD due to her initial vaginal discharge so a urine GC chlamydia will also be ordered. We'll continue to follow.  - URINE CULTURE(NEW); Future  - US-RENAL; Future  - CHLAMYDIA/GC PCR URINE OR SWAB; Future    2. Left flank pain  See above plan.  - URINE CULTURE(NEW); Future  - US-RENAL; Future    3. Vaginal discharge  See above plan.  - CHLAMYDIA/GC PCR URINE OR SWAB; Future

## 2018-07-13 LAB
C TRACH DNA SPEC QL NAA+PROBE: NEGATIVE
N GONORRHOEA DNA SPEC QL NAA+PROBE: NEGATIVE
SPECIMEN SOURCE: NORMAL

## 2018-07-15 LAB
BACTERIA UR CULT: NORMAL
SIGNIFICANT IND 70042: NORMAL
SITE SITE: NORMAL
SOURCE SOURCE: NORMAL

## 2018-07-24 ENCOUNTER — HOSPITAL ENCOUNTER (EMERGENCY)
Facility: MEDICAL CENTER | Age: 58
End: 2018-07-24
Attending: EMERGENCY MEDICINE
Payer: MEDICAID

## 2018-07-24 ENCOUNTER — APPOINTMENT (OUTPATIENT)
Dept: RADIOLOGY | Facility: MEDICAL CENTER | Age: 58
End: 2018-07-24
Attending: EMERGENCY MEDICINE
Payer: MEDICAID

## 2018-07-24 VITALS
DIASTOLIC BLOOD PRESSURE: 58 MMHG | BODY MASS INDEX: 27.65 KG/M2 | TEMPERATURE: 98.9 F | WEIGHT: 132.28 LBS | RESPIRATION RATE: 16 BRPM | OXYGEN SATURATION: 91 % | SYSTOLIC BLOOD PRESSURE: 106 MMHG | HEART RATE: 75 BPM

## 2018-07-24 DIAGNOSIS — S52.602A CLOSED FRACTURE OF DISTAL ENDS OF LEFT RADIUS AND ULNA, INITIAL ENCOUNTER: Primary | ICD-10-CM

## 2018-07-24 DIAGNOSIS — S52.502A CLOSED FRACTURE OF DISTAL ENDS OF LEFT RADIUS AND ULNA, INITIAL ENCOUNTER: Primary | ICD-10-CM

## 2018-07-24 PROCEDURE — 302874 HCHG BANDAGE ACE 2 OR 3""

## 2018-07-24 PROCEDURE — 99284 EMERGENCY DEPT VISIT MOD MDM: CPT

## 2018-07-24 PROCEDURE — 29125 APPL SHORT ARM SPLINT STATIC: CPT

## 2018-07-24 PROCEDURE — 73110 X-RAY EXAM OF WRIST: CPT | Mod: LT

## 2018-07-24 PROCEDURE — A9270 NON-COVERED ITEM OR SERVICE: HCPCS | Performed by: EMERGENCY MEDICINE

## 2018-07-24 PROCEDURE — 73090 X-RAY EXAM OF FOREARM: CPT | Mod: LT

## 2018-07-24 PROCEDURE — 700102 HCHG RX REV CODE 250 W/ 637 OVERRIDE(OP): Performed by: EMERGENCY MEDICINE

## 2018-07-24 PROCEDURE — 73060 X-RAY EXAM OF HUMERUS: CPT | Mod: LT

## 2018-07-24 RX ORDER — HYDROCODONE BITARTRATE AND ACETAMINOPHEN 5; 325 MG/1; MG/1
1 TABLET ORAL ONCE
Status: COMPLETED | OUTPATIENT
Start: 2018-07-24 | End: 2018-07-24

## 2018-07-24 RX ADMIN — HYDROCODONE BITARTRATE AND ACETAMINOPHEN 1 TABLET: 5; 325 TABLET ORAL at 05:43

## 2018-07-24 ASSESSMENT — PAIN SCALES - GENERAL
PAINLEVEL_OUTOF10: 9
PAINLEVEL_OUTOF10: 10
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
## 2018-07-24 NOTE — ED NOTES
Discharge instructions given to pt. Prescriptions unchanged. Pt educated, verbalizes understanding. All belongings accounted for. Pt wheeled out of ED with significant other at side. Splint and sling applied.
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
## 2018-07-24 NOTE — ED NOTES
Swelling is noted around the wrist, with bruising and pain up the left arm and shoulder.  Protocol initiated

## 2018-07-24 NOTE — DISCHARGE INSTRUCTIONS
Follow-up with orthopedics this week for reevaluation.  Call Dr. Hermosillo's office tomorrow, references emergency department visit schedule appointment for follow-up.    Continue home medications as previously indicated, including those for pain.    Keep splint clean, dry and intact.  Nonweightbearing left upper extremity.  Sling for comfort.    Return to the emergency department for increased pain, swelling, paresthesias, discoloration or other new concerns.    Wrist Fracture Treated With Immobilization  Introduction  A wrist fracture is a break or crack in one of the bones of your wrist. Your wrist is made of eight small bones at the palm of your hand (carpal bones) and two long bones that make your forearm (radius and ulna).  A broken wrist is often treated by wearing a cast, splint, or sling (immobilization). This holds the broken pieces in place so they can heal.  Follow these instructions at home:  If you have a splint:  · Wear the splint as told by your doctor. Remove it only as told by your doctor.  · Loosen the splint if your fingers tingle, get numb, or turn cold and blue.  · Do not let your splint get wet if it is not waterproof.  · Keep the splint clean.  If you have a sling:  · Wear it as told by your doctor. Remove it only as told by your doctor.  If you have a cast:  · Do not stick anything inside the cast to scratch your skin.  · Check the skin around the cast every day. Tell your doctor about any concerns. You may put lotion on dry skin around the edges of the cast. Do not put lotion on the skin underneath the cast.  · Do not let your cast get wet if it is not waterproof.  · Keep the cast clean.  Bathing  · Do not take baths, swim, or use a hot tub until your doctor says that you can. Ask your doctor if you can take showers. You may only be allowed to take sponge baths.  · If your cast or splint is not waterproof, cover it with a watertight plastic bag while you take a bath or a shower. Do not let the  cast or splint get wet.  · If you have a sling, remove it for bathing only if your doctor says this is okay.  Managing pain, stiffness, and swelling  · If directed, put ice on the injured area.  ¨ Put ice in a plastic bag.  ¨ Place a towel between your skin and the bag.  ¨ Leave the ice on for 20 minutes, 2-3 times a day.  · Move your fingers often to avoid stiffness and to lessen swelling.  · Raise (elevate) the injured area above the level of your heart while you are sitting or lying down.  Driving  · Do not drive or use heavy machinery while taking prescription pain medicine.  · Ask your doctor when it is safe to drive if you have a cast, splint, or sling on your wrist.  Activity  · Return to your normal activities as told by your doctor. Ask your doctor what activities are safe for you.  · Do range-of-motion exercises only as told by your doctor.  General instructions  · Do not put pressure on any part of the cast or splint until it is fully hardened. This may take many hours.  · Do not use any tobacco products, such as cigarettes, chewing tobacco, and e-cigarettes. Tobacco can delay bone healing. If you need help quitting, ask your doctor.  · Take over-the-counter and prescription medicines only as told by your doctor.  · Keep all follow-up visits as told by your doctor. This is important.  Contact a doctor if:  · Your cast, splint, or sling is damaged or loose.  · You have any new pain, swelling, or bruising.  · Your pain, swelling, and bruising do not get better.  · You have a fever.  · You have chills.  Get help right away if:  · Your skin or fingers on your injured arm turn blue or gray.  · Your arm feels cold or gets numb.  · You have very bad pain in your injured wrist.  This information is not intended to replace advice given to you by your health care provider. Make sure you discuss any questions you have with your health care provider.  Document Released: 06/05/2009 Document Revised: 05/25/2017  Document Reviewed: 08/31/2016  © 2017 [employer]vier

## 2018-07-25 ENCOUNTER — PATIENT OUTREACH (OUTPATIENT)
Dept: HEALTH INFORMATION MANAGEMENT | Facility: OTHER | Age: 58
End: 2018-07-25

## 2018-07-25 NOTE — PROGRESS NOTES
Placed discharge outreach phone call to pt s/p ER discharge 7/24/18.  Left voicemail providing my contact information and instructions to call with any questions or concerns.

## 2018-08-01 ENCOUNTER — OFFICE VISIT (OUTPATIENT)
Dept: MEDICAL GROUP | Facility: MEDICAL CENTER | Age: 58
End: 2018-08-01
Attending: NURSE PRACTITIONER
Payer: MEDICAID

## 2018-08-01 VITALS
DIASTOLIC BLOOD PRESSURE: 80 MMHG | HEART RATE: 86 BPM | TEMPERATURE: 98.8 F | HEIGHT: 58 IN | RESPIRATION RATE: 16 BRPM | OXYGEN SATURATION: 97 % | SYSTOLIC BLOOD PRESSURE: 115 MMHG | BODY MASS INDEX: 26.66 KG/M2 | WEIGHT: 127 LBS

## 2018-08-01 DIAGNOSIS — N20.0 KIDNEY STONES: ICD-10-CM

## 2018-08-01 DIAGNOSIS — N30.90 RECURRENT CYSTITIS WITH NEGATIVE CULTURE: ICD-10-CM

## 2018-08-01 DIAGNOSIS — S62.102A: ICD-10-CM

## 2018-08-01 DIAGNOSIS — F99 PSYCHIATRIC DISORDER: ICD-10-CM

## 2018-08-01 DIAGNOSIS — N12 PYELONEPHRITIS: ICD-10-CM

## 2018-08-01 DIAGNOSIS — N30.90 CYSTITIS: ICD-10-CM

## 2018-08-01 DIAGNOSIS — R31.29 MICROSCOPIC HEMATURIA: ICD-10-CM

## 2018-08-01 PROCEDURE — 99213 OFFICE O/P EST LOW 20 MIN: CPT | Performed by: NURSE PRACTITIONER

## 2018-08-01 PROCEDURE — 99214 OFFICE O/P EST MOD 30 MIN: CPT | Performed by: NURSE PRACTITIONER

## 2018-08-01 RX ORDER — DULOXETIN HYDROCHLORIDE 60 MG/1
60 CAPSULE, DELAYED RELEASE ORAL DAILY
Qty: 30 CAP | Refills: 1 | Status: SHIPPED | OUTPATIENT
Start: 2018-08-01 | End: 2018-09-24 | Stop reason: SDUPTHER

## 2018-08-01 RX ORDER — TRAZODONE HYDROCHLORIDE 100 MG/1
300 TABLET ORAL
Qty: 90 TAB | Refills: 0 | Status: SHIPPED | OUTPATIENT
Start: 2018-08-01 | End: 2018-08-29 | Stop reason: SDUPTHER

## 2018-08-01 RX ORDER — QUETIAPINE FUMARATE 50 MG/1
150 TABLET, FILM COATED ORAL EVERY EVENING
Qty: 90 TAB | Refills: 0 | Status: SHIPPED | OUTPATIENT
Start: 2018-08-01 | End: 2018-08-28 | Stop reason: SDUPTHER

## 2018-08-01 NOTE — ASSESSMENT & PLAN NOTE
"We reviewed her urine tests which were normal.  Reports ongoing bilat \"kidney pain\" mild  R> L. Has not completed U/s Renal  And have asked to do so.  Is voiding okay.  Denies dysuria  Hx of kidney stones  Will refer to Urology.  "
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
## 2018-08-01 NOTE — ASSESSMENT & PLAN NOTE
"Hx of Anxiety, PTSD, Alcohol Abuse  States was getting Psych Meds from \"old [facility]\" but not able to be seen there. Needs refill.   "

## 2018-08-28 DIAGNOSIS — F99 PSYCHIATRIC DISORDER: ICD-10-CM

## 2018-08-28 RX ORDER — QUETIAPINE FUMARATE 50 MG/1
TABLET, FILM COATED ORAL
Qty: 90 TAB | Refills: 0 | Status: SHIPPED | OUTPATIENT
Start: 2018-08-28 | End: 2018-09-24 | Stop reason: SDUPTHER

## 2018-08-29 DIAGNOSIS — F99 PSYCHIATRIC DISORDER: ICD-10-CM

## 2018-08-29 RX ORDER — TRAZODONE HYDROCHLORIDE 100 MG/1
TABLET ORAL
Qty: 90 TAB | Refills: 0 | Status: SHIPPED | OUTPATIENT
Start: 2018-08-29 | End: 2018-10-19 | Stop reason: SDUPTHER

## 2018-09-22 DIAGNOSIS — G89.4 CHRONIC PAIN SYNDROME: ICD-10-CM

## 2018-09-23 RX ORDER — PROMETHAZINE HYDROCHLORIDE 12.5 MG/1
TABLET ORAL
Qty: 60 TAB | Refills: 0 | Status: SHIPPED | OUTPATIENT
Start: 2018-09-23 | End: 2018-10-29 | Stop reason: SDUPTHER

## 2018-09-23 RX ORDER — POLYETHYLENE GLYCOL 3350 17 G/17G
POWDER, FOR SOLUTION ORAL
Qty: 255 G | Refills: 0 | Status: SHIPPED | OUTPATIENT
Start: 2018-09-23 | End: 2018-10-21 | Stop reason: SDUPTHER

## 2018-09-24 ENCOUNTER — OFFICE VISIT (OUTPATIENT)
Dept: MEDICAL GROUP | Facility: MEDICAL CENTER | Age: 58
End: 2018-09-24
Attending: NURSE PRACTITIONER
Payer: MEDICAID

## 2018-09-24 VITALS
SYSTOLIC BLOOD PRESSURE: 120 MMHG | TEMPERATURE: 99.3 F | OXYGEN SATURATION: 92 % | HEART RATE: 74 BPM | BODY MASS INDEX: 25.61 KG/M2 | HEIGHT: 58 IN | RESPIRATION RATE: 16 BRPM | DIASTOLIC BLOOD PRESSURE: 62 MMHG | WEIGHT: 122 LBS

## 2018-09-24 DIAGNOSIS — F99 PSYCHIATRIC DISORDER: ICD-10-CM

## 2018-09-24 DIAGNOSIS — Z23 NEED FOR INFLUENZA VACCINATION: ICD-10-CM

## 2018-09-24 PROCEDURE — 99212 OFFICE O/P EST SF 10 MIN: CPT | Mod: 25 | Performed by: NURSE PRACTITIONER

## 2018-09-24 PROCEDURE — 90686 IIV4 VACC NO PRSV 0.5 ML IM: CPT

## 2018-09-24 PROCEDURE — 99213 OFFICE O/P EST LOW 20 MIN: CPT | Performed by: NURSE PRACTITIONER

## 2018-09-24 PROCEDURE — 90686 IIV4 VACC NO PRSV 0.5 ML IM: CPT | Performed by: NURSE PRACTITIONER

## 2018-09-24 PROCEDURE — 90471 IMMUNIZATION ADMIN: CPT | Performed by: NURSE PRACTITIONER

## 2018-09-24 RX ORDER — SUMATRIPTAN 50 MG/1
TABLET, FILM COATED ORAL
Qty: 15 TAB | Refills: 0 | Status: SHIPPED | OUTPATIENT
Start: 2018-09-24 | End: 2020-08-20

## 2018-09-24 RX ORDER — DULOXETIN HYDROCHLORIDE 60 MG/1
60 CAPSULE, DELAYED RELEASE ORAL DAILY
Qty: 30 CAP | Refills: 1 | Status: SHIPPED | OUTPATIENT
Start: 2018-09-24 | End: 2018-12-24 | Stop reason: SDUPTHER

## 2018-09-24 RX ORDER — QUETIAPINE FUMARATE 50 MG/1
50 TABLET, FILM COATED ORAL 3 TIMES DAILY
Qty: 90 TAB | Refills: 1 | Status: SHIPPED | OUTPATIENT
Start: 2018-09-24 | End: 2023-03-14

## 2018-09-24 ASSESSMENT — PAIN SCALES - GENERAL: PAINLEVEL: 8=MODERATE-SEVERE PAIN
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
## 2018-10-19 DIAGNOSIS — F99 PSYCHIATRIC DISORDER: ICD-10-CM

## 2018-10-20 DIAGNOSIS — F99 PSYCHIATRIC DISORDER: ICD-10-CM

## 2018-10-22 RX ORDER — DULOXETIN HYDROCHLORIDE 60 MG/1
CAPSULE, DELAYED RELEASE ORAL
Qty: 30 CAP | Refills: 0 | Status: SHIPPED | OUTPATIENT
Start: 2018-10-22 | End: 2018-12-24

## 2018-10-22 RX ORDER — TRAZODONE HYDROCHLORIDE 100 MG/1
TABLET ORAL
Qty: 90 TAB | Refills: 0 | Status: ON HOLD | OUTPATIENT
Start: 2018-10-22 | End: 2023-01-03

## 2018-12-24 DIAGNOSIS — F99 PSYCHIATRIC DISORDER: ICD-10-CM

## 2018-12-24 RX ORDER — DULOXETIN HYDROCHLORIDE 60 MG/1
CAPSULE, DELAYED RELEASE ORAL
Qty: 30 CAP | Refills: 0 | Status: SHIPPED | OUTPATIENT
Start: 2018-12-24 | End: 2019-11-08 | Stop reason: SDUPTHER

## 2019-03-27 ENCOUNTER — HOSPITAL ENCOUNTER (OUTPATIENT)
Dept: LAB | Facility: MEDICAL CENTER | Age: 59
End: 2019-03-27
Attending: STUDENT IN AN ORGANIZED HEALTH CARE EDUCATION/TRAINING PROGRAM
Payer: MEDICAID

## 2019-03-27 LAB
HCV AB SER QL: NEGATIVE
HIV 1+2 AB+HIV1 P24 AG SERPL QL IA: NON REACTIVE
TREPONEMA PALLIDUM IGG+IGM AB [PRESENCE] IN SERUM OR PLASMA BY IMMUNOASSAY: NON REACTIVE

## 2019-03-27 PROCEDURE — 87491 CHLMYD TRACH DNA AMP PROBE: CPT

## 2019-03-27 PROCEDURE — 86803 HEPATITIS C AB TEST: CPT

## 2019-03-27 PROCEDURE — 36415 COLL VENOUS BLD VENIPUNCTURE: CPT

## 2019-03-27 PROCEDURE — 87591 N.GONORRHOEAE DNA AMP PROB: CPT

## 2019-03-27 PROCEDURE — 87389 HIV-1 AG W/HIV-1&-2 AB AG IA: CPT

## 2019-03-27 PROCEDURE — 86780 TREPONEMA PALLIDUM: CPT

## 2019-06-27 ENCOUNTER — OFFICE VISIT (OUTPATIENT)
Dept: URGENT CARE | Facility: CLINIC | Age: 59
End: 2019-06-27
Payer: MEDICAID

## 2019-06-27 ENCOUNTER — HOSPITAL ENCOUNTER (OUTPATIENT)
Facility: MEDICAL CENTER | Age: 59
End: 2019-06-27
Attending: PHYSICIAN ASSISTANT
Payer: MEDICAID

## 2019-06-27 VITALS
TEMPERATURE: 98.9 F | DIASTOLIC BLOOD PRESSURE: 74 MMHG | WEIGHT: 142 LBS | HEART RATE: 79 BPM | SYSTOLIC BLOOD PRESSURE: 100 MMHG | RESPIRATION RATE: 14 BRPM | HEIGHT: 57 IN | OXYGEN SATURATION: 96 % | BODY MASS INDEX: 30.63 KG/M2

## 2019-06-27 DIAGNOSIS — N89.8 VAGINAL DISCHARGE: ICD-10-CM

## 2019-06-27 DIAGNOSIS — N39.0 URINARY TRACT INFECTION WITHOUT HEMATURIA, SITE UNSPECIFIED: ICD-10-CM

## 2019-06-27 LAB
APPEARANCE UR: NORMAL
BILIRUB UR STRIP-MCNC: NORMAL MG/DL
COLOR UR AUTO: YELLOW
GLUCOSE UR STRIP.AUTO-MCNC: NORMAL MG/DL
KETONES UR STRIP.AUTO-MCNC: NORMAL MG/DL
LEUKOCYTE ESTERASE UR QL STRIP.AUTO: NORMAL
NITRITE UR QL STRIP.AUTO: NORMAL
PH UR STRIP.AUTO: 5 [PH] (ref 5–8)
PROT UR QL STRIP: NORMAL MG/DL
RBC UR QL AUTO: NORMAL
SP GR UR STRIP.AUTO: 1.02
UROBILINOGEN UR STRIP-MCNC: 0.2 MG/DL

## 2019-06-27 PROCEDURE — 87510 GARDNER VAG DNA DIR PROBE: CPT

## 2019-06-27 PROCEDURE — 87086 URINE CULTURE/COLONY COUNT: CPT

## 2019-06-27 PROCEDURE — 87660 TRICHOMONAS VAGIN DIR PROBE: CPT

## 2019-06-27 PROCEDURE — 87491 CHLMYD TRACH DNA AMP PROBE: CPT

## 2019-06-27 PROCEDURE — 87480 CANDIDA DNA DIR PROBE: CPT

## 2019-06-27 PROCEDURE — 87591 N.GONORRHOEAE DNA AMP PROB: CPT

## 2019-06-27 PROCEDURE — 81002 URINALYSIS NONAUTO W/O SCOPE: CPT | Performed by: PHYSICIAN ASSISTANT

## 2019-06-27 PROCEDURE — 99204 OFFICE O/P NEW MOD 45 MIN: CPT | Mod: 25 | Performed by: PHYSICIAN ASSISTANT

## 2019-06-27 RX ORDER — METRONIDAZOLE 500 MG/1
500 TABLET ORAL
Qty: 14 TAB | Refills: 0 | Status: SHIPPED | OUTPATIENT
Start: 2019-06-27 | End: 2019-07-04

## 2019-06-27 RX ORDER — CIPROFLOXACIN 500 MG/1
500 TABLET, FILM COATED ORAL EVERY 12 HOURS
Qty: 14 TAB | Refills: 0 | Status: SHIPPED | OUTPATIENT
Start: 2019-06-27 | End: 2019-07-04

## 2019-06-27 ASSESSMENT — ENCOUNTER SYMPTOMS
FEVER: 0
SHORTNESS OF BREATH: 0
CHILLS: 0
COUGH: 0
FLANK PAIN: 1
PALPITATIONS: 0
BACK PAIN: 0

## 2019-06-27 NOTE — PATIENT INSTRUCTIONS
Vaginitis  Vaginitis is an inflammation of the vagina. It is most often caused by a change in the normal balance of the bacteria and yeast that live in the vagina. This change in balance causes an overgrowth of certain bacteria or yeast, which causes the inflammation. There are different types of vaginitis, but the most common types are:  · Bacterial vaginosis.  · Yeast infection (candidiasis).  · Trichomoniasis vaginitis. This is a sexually transmitted infection (STI).  · Viral vaginitis.  · Atrophic vaginitis.  · Allergic vaginitis.  CAUSES   The cause depends on the type of vaginitis. Vaginitis can be caused by:  · Bacteria (bacterial vaginosis).  · Yeast (yeast infection).  · A parasite (trichomoniasis vaginitis)  · A virus (viral vaginitis).  · Low hormone levels (atrophic vaginitis). Low hormone levels can occur during pregnancy, breastfeeding, or after menopause.  · Irritants, such as bubble baths, scented tampons, and feminine sprays (allergic vaginitis).  Other factors can change the normal balance of the yeast and bacteria that live in the vagina. These include:  · Antibiotic medicines.  · Poor hygiene.  · Diaphragms, vaginal sponges, spermicides, birth control pills, and intrauterine devices (IUD).  · Sexual intercourse.  · Infection.  · Uncontrolled diabetes.  · A weakened immune system.  SYMPTOMS   Symptoms can vary depending on the cause of the vaginitis. Common symptoms include:  · Abnormal vaginal discharge.  ¨ The discharge is white, gray, or yellow with bacterial vaginosis.  ¨ The discharge is thick, white, and cheesy with a yeast infection.  ¨ The discharge is frothy and yellow or greenish with trichomoniasis.  · A bad vaginal odor.  ¨ The odor is fishy with bacterial vaginosis.  · Vaginal itching, pain, or swelling.  · Painful intercourse.  · Pain or burning when urinating.  Sometimes, there are no symptoms.  TREATMENT   Treatment will vary depending on the type of infection.   · Bacterial  vaginosis and trichomoniasis are often treated with antibiotic creams or pills.  · Yeast infections are often treated with antifungal medicines, such as vaginal creams or suppositories.  · Viral vaginitis has no cure, but symptoms can be treated with medicines that relieve discomfort. Your sexual partner should be treated as well.  · Atrophic vaginitis may be treated with an estrogen cream, pill, suppository, or vaginal ring. If vaginal dryness occurs, lubricants and moisturizing creams may help. You may be told to avoid scented soaps, sprays, or douches.  · Allergic vaginitis treatment involves quitting the use of the product that is causing the problem. Vaginal creams can be used to treat the symptoms.  HOME CARE INSTRUCTIONS   · Take all medicines as directed by your caregiver.  · Keep your genital area clean and dry. Avoid soap and only rinse the area with water.  · Avoid douching. It can remove the healthy bacteria in the vagina.  · Do not use tampons or have sexual intercourse until your vaginitis has been treated. Use sanitary pads while you have vaginitis.  · Wipe from front to back. This avoids the spread of bacteria from the rectum to the vagina.  · Let air reach your genital area.  ¨ Wear cotton underwear to decrease moisture buildup.  ¨ Avoid wearing underwear while you sleep until your vaginitis is gone.  ¨ Avoid tight pants and underwear or nylons without a cotton panel.  ¨ Take off wet clothing (especially bathing suits) as soon as possible.  · Use mild, non-scented products. Avoid using irritants, such as:  ¨ Scented feminine sprays.  ¨ Fabric softeners.  ¨ Scented detergents.  ¨ Scented tampons.  ¨ Scented soaps or bubble baths.  · Practice safe sex and use condoms. Condoms may prevent the spread of trichomoniasis and viral vaginitis.  SEEK MEDICAL CARE IF:   · You have abdominal pain.  · You have a fever or persistent symptoms for more than 2-3 days.  · You have a fever and your symptoms suddenly  get worse.  This information is not intended to replace advice given to you by your health care provider. Make sure you discuss any questions you have with your health care provider.  Document Released: 10/14/2008 Document Revised: 05/03/2016 Document Reviewed: 05/30/2013  [employer] Interactive Patient Education © 2017 [employer]

## 2019-06-28 ENCOUNTER — TELEPHONE (OUTPATIENT)
Dept: URGENT CARE | Facility: PHYSICIAN GROUP | Age: 59
End: 2019-06-28

## 2019-06-28 LAB
CANDIDA DNA VAG QL PROBE+SIG AMP: NEGATIVE
G VAGINALIS DNA VAG QL PROBE+SIG AMP: NEGATIVE
T VAGINALIS DNA VAG QL PROBE+SIG AMP: POSITIVE

## 2019-06-28 NOTE — PROGRESS NOTES
Subjective:      Martha Mosher is a 59 y.o. female who presents with Vaginal Discharge (red, green, brown, and clear Lower abdominal pain x7days )            Dysuria    This is a new problem. The current episode started in the past 7 days. The problem occurs every urination. The problem has been waxing and waning. She is sexually active. There is a history of pyelonephritis. Associated symptoms include a discharge, flank pain, frequency, hesitancy and urgency. Pertinent negatives include no chills or hematuria.       Review of Systems   Constitutional: Negative for chills and fever.   Respiratory: Negative for cough and shortness of breath.    Cardiovascular: Negative for chest pain and palpitations.   Genitourinary: Positive for dysuria, flank pain, frequency, hesitancy and urgency. Negative for hematuria.   Musculoskeletal: Negative for back pain.   All other systems reviewed and are negative.    PMH:  has a past medical history of Allergy, unspecified not elsewhere classified; Anxiety; Arthritis; Cancer ([facility]); Chronic airway obstruction, not elsewhere classified; Depression; Fibromyalgia; GERD (gastroesophageal reflux disease); Hypertension; Indigestion; Migraine; Osteoporosis, unspecified; Other emphysema ([facility]); Other specified symptom associated with female genital organs; Psychiatric disorder (1/29/2018); Renal disorder; Severe sepsis ([facility]) (5/13/2018); Type II or unspecified type diabetes mellitus without mention of complication, not stated as uncontrolled; Ulcer; Unspecified asthma(493.90); Unspecified cataract; and Urolithiasis. She also has no past medical history of Angina; Arrhythmia; ASTHMA; Backpain; Bronchitis; CATARACT; Congestive heart failure ([facility]); COPD; Diabetes; Dialysis; Glaucoma; Heart murmur; Heart valve disease; Jaundice; Myocardial infarct ([facility]); Pacemaker; Personal history of venous thrombosis and embolism; Pneumonia; Renal disorder; Rheumatic fever; Seizure ([facility]); Stroke ([facility]);  Unspecified disorder of thyroid; Unspecified hemorrhagic conditions; or Unspecified urinary incontinence.  MEDS:   Current Outpatient Prescriptions:   •  metroNIDAZOLE (FLAGYL) 500 MG Tab, Take 1 Tab by mouth 2 Times a Day for 7 days., Disp: 14 Tab, Rfl: 0  •  ciprofloxacin (CIPRO) 500 MG Tab, Take 1 Tab by mouth every 12 hours for 7 days., Disp: 14 Tab, Rfl: 0  •  quetiapine (SEROQUEL) 50 MG tablet, TAKE 3 TABLETS BY MOUTH EVERY EVENING, Disp: 90 Tab, Rfl: 0  •  DULoxetine (CYMBALTA) 60 MG Cap DR Particles delayed-release capsule, TAKE ONE CAPSULE BY MOUTH EVERY DAY, Disp: 30 Cap, Rfl: 0  •  promethazine (PHENERGAN) 12.5 MG tablet, TAKE 1 TO 2 TABLETS BY MOUTH EVERY DAY AS NEEDED FOR NAUSEA/VOMITING, Disp: 60 Tab, Rfl: 0  •  traZODone (DESYREL) 100 MG Tab, TAKE 3 TABLETS BY MOUTH EVERY NIGHT AT BEDTIME, Disp: 90 Tab, Rfl: 0  •  polyethylene glycol 3350 (MIRALAX) Powder, MIX 1 CAPFUL WITH LIQUID AND DRINK EVERY DAY, Disp: 255 g, Rfl: 2  •  PROVENTIL  (90 Base) MCG/ACT Aero Soln inhalation aerosol, INHALE 2 PUFFS BY MOUTH EVERY 6 HOURS AS NEEDED FOR SHORTNESS OF BREATH, Disp: 6.7 g, Rfl: 2  •  SUMAtriptan (IMITREX) 50 MG Tab, TAKE 1 TABLET BY MOUTH 1 TIME AS NEEDED FOR MIGRAINE, Disp: 15 Tab, Rfl: 0  •  quetiapine (SEROQUEL) 50 MG tablet, Take 1 Tab by mouth 3 times a day., Disp: 90 Tab, Rfl: 1  •  famotidine (PEPCID) 20 MG Tab, TAKE 1 TABLET BY MOUTH TWICE DAILY, Disp: 60 Tab, Rfl: 2  •  ciprofloxacin (CIPRO) 500 MG Tab, Take 1 Tab by mouth 2 times a day., Disp: 10 Tab, Rfl: 1  •  doxycycline (MONODOX) 100 MG capsule, Take 1 Cap by mouth 2 times a day., Disp: 14 Cap, Rfl: 0  •  predniSONE (DELTASONE) 10 MG Tab, Take 10 mg twice a day for 5 days, then 10 mg once a day., Disp: 15 Tab, Rfl: 0  •  nystatin (MYCOSTATIN) powder, Apply to yeast skin areas twice daily as needed, Disp: 15 g, Rfl: 1  •  tiotropium (SPIRIVA HANDIHALER) 18 MCG Cap, Inhale 1 Cap by mouth every day., Disp: 30 Cap, Rfl: 3  •  gabapentin  (NEURONTIN) 300 MG Cap, Take 2 Caps by mouth 3 times a day., Disp: 180 Cap, Rfl: 3  •  nicotine (NICODERM) 14 MG/24HR PATCH 24 HR, Apply 1 Patch to skin as directed every 24 hours., Disp: 30 Patch, Rfl: 3  •  magnesium citrate Solution, Take 300 mL by mouth Once PRN (constipation) for up to 1 dose., Disp: 1 Bottle, Rfl: 0  •  conjugated estrogen (PREMARIN) 0.625 MG/GM Cream, Apply intravaginally once weekly., Disp: 1 Tube, Rfl: 2  •  Cholecalciferol 4000 units Cap, Take 1 Capsule by mouth every day., Disp: 30 Cap, Rfl: 5  •  Tiotropium Bromide-Olodaterol (STIOLTO RESPIMAT) 2.5-2.5 MCG/ACT Aero Soln, Take 2 Puffs by mouth every day., Disp: 1 Inhaler, Rfl: 5  •  topiramate (TOPAMAX) 25 MG Tab, Take 25 mg by mouth every evening., Disp: , Rfl:   •  magnesium gluconate (MAG-G) 500 MG tablet, Take 500 mg by mouth every day., Disp: , Rfl:   •  hydrocodone/acetaminophen (NORCO)  MG Tab, Take 1 Tab by mouth every 4 hours., Disp: , Rfl:   •  cyclobenzaprine (FLEXERIL) 10 MG Tab, Take 10 mg by mouth every bedtime., Disp: , Rfl:   •  asa/apap/caffeine (EXCEDRIN) 250-250-65 MG Tab, Take 1 Tab by mouth every 6 hours as needed for Headache., Disp: , Rfl:   ALLERGIES:   Allergies   Allergen Reactions   • Lyrica Unspecified     Hallucinations   • Sulfa Drugs Hives and Unspecified     Pt states that she hallucinates on this as well as getting hives     SURGHX:   Past Surgical History:   Procedure Laterality Date   • COLONOSCOPY WITH BIOPSY  8/3/2009    Performed by GRAEME LOCK JR at ENDOSCOPY [facility] ORS   • GASTROSCOPY WITH BIOPSY  3/1/2009    Performed by LUIS ARMANDO SIERRA at ENDOSCOPY [facility] ORS   • ABDOMINAL HYSTERECTOMY TOTAL     • APPENDECTOMY     • GYN SURGERY      hysterectomy   • HERNIA REPAIR     • OTHER      lithotripsy   • OTHER ABDOMINAL SURGERY      hysterectomy, hernia,    • PRIMARY C SECTION       SOCHX:  reports that she has been smoking Cigarettes.  She has a 15.00 pack-year smoking  "history. She uses smokeless tobacco. She reports that she drinks alcohol. She reports that she uses drugs, including Inhaled.  FH: Family history was reviewed, no pertinent findings to report  Medications, Allergies, and current problem list reviewed today in Epic       Objective:     /74   Pulse 79   Temp 37.2 °C (98.9 °F)   Resp 14   Ht 1.448 m (4' 9\")   Wt 64.4 kg (142 lb)   LMP  (LMP Unknown)   SpO2 96%   BMI 30.73 kg/m²      Physical Exam   Constitutional: She is oriented to person, place, and time. She appears well-developed and well-nourished.   HENT:   Head: Normocephalic and atraumatic.   Right Ear: External ear normal.   Left Ear: External ear normal.   Nose: Nose normal.   Mouth/Throat: Oropharynx is clear and moist.   Neck: Normal range of motion. Neck supple.   Cardiovascular: Normal rate, regular rhythm and normal heart sounds.    Pulmonary/Chest: Effort normal and breath sounds normal.   Abdominal: Soft.   Musculoskeletal: She exhibits no tenderness.   + CVA tenderness present on right side.   Neurological: She is alert and oriented to person, place, and time.   Skin: Skin is warm and dry.   Psychiatric: She has a normal mood and affect. Her behavior is normal. Judgment and thought content normal.   Vitals reviewed.              Results for orders placed or performed in visit on 06/27/19   POCT Urinalysis   Result Value Ref Range    POC Color YELLOW Negative    POC Appearance CLOUDY Negative    POC Leukocyte Esterase LARGE Negative    POC Nitrites NEG Negative    POC Urobiligen 0.2 Negative (0.2) mg/dL    POC Protein NEG Negative mg/dL    POC Urine PH 5.0 5.0 - 8.0    POC Blood TRACE-INTACT Negative    POC Specific Gravity 1.020 <1.005 - >1.030    POC Ketones NEG Negative mg/dL    POC Bilirubin NEG Negative mg/dL    POC Glucose NEG Negative mg/dL       Assessment/Plan:     1. Vaginal discharge  - self swab, suspect BV  - POCT Urinalysis  - Urine Culture; Future  - Chlamydia/GC PCR Urine " Or Swab; Future  - VAGINAL PATHOGENS DNA PANEL; Future  - metroNIDAZOLE (FLAGYL) 500 MG Tab; Take 1 Tab by mouth 2 Times a Day for 7 days.  Dispense: 14 Tab; Refill: 0    2. Urinary tract infection without hematuria, site unspecified    - ciprofloxacin (CIPRO) 500 MG Tab; Take 1 Tab by mouth every 12 hours for 7 days.  Dispense: 14 Tab; Refill: 0    Differential diagnosis, natural history, supportive care discussed. Follow-up with primary care provider within 7-10 days, emergency room precautions discussed.  Patient and/or family appears understanding of information.  Handout and review of patients diagnosis and treatment was discussed extensively.

## 2019-06-30 LAB
BACTERIA UR CULT: NORMAL
SIGNIFICANT IND 70042: NORMAL
SITE SITE: NORMAL
SOURCE SOURCE: NORMAL

## 2019-11-08 DIAGNOSIS — F99 PSYCHIATRIC DISORDER: ICD-10-CM

## 2019-11-08 RX ORDER — DULOXETIN HYDROCHLORIDE 60 MG/1
CAPSULE, DELAYED RELEASE ORAL
Qty: 30 CAP | Refills: 0 | Status: SHIPPED | OUTPATIENT
Start: 2019-11-08 | End: 2022-03-01

## 2019-12-10 NOTE — CARE PLAN
Problem: Pain Management  Goal: Pain level will decrease to patient's comfort goal    Intervention: Follow pain managment plan developed in collaboration with patient and Interdisciplinary Team  Assess and medicate pt pain as per MAR and orders.       Problem: Respiratory:  Goal: Respiratory status will improve    Intervention: Assess and monitor pulmonary status   05/16/18 1852 05/16/18 2000 05/16/18 2132   Vitals   Respiration --  --  --    Pulse Oximetry --  --  --    OTHER   O2 (LPM) --  --  0   Work Of Breathing / Effort --  Mild --    Pre/Post Intervention Pre Intervention Assessment --  --    RUL Breath Sounds --  Clear --    RML Breath Sounds --  Diminished --    RLL Breath Sounds --  Diminished --    ARASH Breath Sounds --  Clear --    LLL Breath Sounds --  Diminished --    Respiratory   Cough --  Non Productive --     05/17/18 [facility]   Vitals   Respiration 18   Pulse Oximetry 91 %   OTHER   O2 (LPM) --    Work Of Breathing / Effort --    Pre/Post Intervention --    RUL Breath Sounds --    RML Breath Sounds --    RLL Breath Sounds --    ARASH Breath Sounds --    LLL Breath Sounds --    Respiratory   Cough --             Primary Defect Length In Cm (Final Defect Size - Required For Flaps/Grafts): 2

## 2019-12-30 ENCOUNTER — HOSPITAL ENCOUNTER (OUTPATIENT)
Dept: RADIOLOGY | Facility: MEDICAL CENTER | Age: 59
End: 2019-12-30
Attending: PHYSICAL MEDICINE & REHABILITATION
Payer: MEDICAID

## 2019-12-30 DIAGNOSIS — M25.552 LEFT HIP PAIN: ICD-10-CM

## 2019-12-30 PROCEDURE — 73502 X-RAY EXAM HIP UNI 2-3 VIEWS: CPT | Mod: LT

## 2020-02-18 ENCOUNTER — OFFICE VISIT (OUTPATIENT)
Dept: URGENT CARE | Facility: CLINIC | Age: 60
End: 2020-02-18
Payer: MEDICAID

## 2020-02-18 VITALS
HEART RATE: 88 BPM | OXYGEN SATURATION: 95 % | RESPIRATION RATE: 16 BRPM | DIASTOLIC BLOOD PRESSURE: 72 MMHG | BODY MASS INDEX: 32.58 KG/M2 | WEIGHT: 151 LBS | HEIGHT: 57 IN | TEMPERATURE: 97.8 F | SYSTOLIC BLOOD PRESSURE: 108 MMHG

## 2020-02-18 DIAGNOSIS — R11.0 NAUSEA: ICD-10-CM

## 2020-02-18 DIAGNOSIS — R19.7 DIARRHEA, UNSPECIFIED TYPE: ICD-10-CM

## 2020-02-18 DIAGNOSIS — R10.84 GENERALIZED ABDOMINAL PAIN: ICD-10-CM

## 2020-02-18 LAB
APPEARANCE UR: CLEAR
BILIRUB UR STRIP-MCNC: NEGATIVE MG/DL
COLOR UR AUTO: YELLOW
GLUCOSE UR STRIP.AUTO-MCNC: NEGATIVE MG/DL
KETONES UR STRIP.AUTO-MCNC: NEGATIVE MG/DL
LEUKOCYTE ESTERASE UR QL STRIP.AUTO: NEGATIVE
NITRITE UR QL STRIP.AUTO: NEGATIVE
PH UR STRIP.AUTO: 5.5 [PH] (ref 5–8)
PROT UR QL STRIP: NEGATIVE MG/DL
RBC UR QL AUTO: NEGATIVE
SP GR UR STRIP.AUTO: 1.02
UROBILINOGEN UR STRIP-MCNC: 0.2 MG/DL

## 2020-02-18 PROCEDURE — 99213 OFFICE O/P EST LOW 20 MIN: CPT | Mod: 25 | Performed by: NURSE PRACTITIONER

## 2020-02-18 PROCEDURE — 81002 URINALYSIS NONAUTO W/O SCOPE: CPT | Performed by: NURSE PRACTITIONER

## 2020-02-18 NOTE — PROGRESS NOTES
Chief Complaint   Patient presents with   • Diarrhea     x1 week, diarrhea, nausea, heartburn, lack of appetite, dizziness, chills, low grade fever   •             Diarrhea   This is a new problem. The current episode started in the past 7 days. The problem occurs 2 times per day. The problem has been unchanged. The stool consistency is described as loose. The patient states that diarrhea does not awaken from sleep. Associated symptoms include nausea and abdominal pain. Pertinent negatives include no chills, coughing, fever, headaches, vomiting or weight loss. Patient does believe that food aggravates the symptoms. There are no known risk factors. Patient has tried nothing for the symptoms. There is no history of inflammatory bowel disease.       Social History     Socioeconomic History   • Marital status:      Spouse name: Not on file   • Number of children: Not on file   • Years of education: Not on file   • Highest education level: Not on file   Occupational History   • Not on file   Social Needs   • Financial resource strain: Not on file   • Food insecurity     Worry: Not on file     Inability: Not on file   • Transportation needs     Medical: Not on file     Non-medical: Not on file   Tobacco Use   • Smoking status: Current Every Day Smoker     Packs/day: 0.50     Years: 30.00     Pack years: 15.00     Types: Cigarettes   • Smokeless tobacco: Current User   • Tobacco comment: 1ppd - quit 7-8 months ago   Substance and Sexual Activity   • Alcohol use: Yes     Comment: occ   • Drug use: Yes     Types: Inhaled     Comment: Meth 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   • Sexual activity: Not on file   Lifestyle   • Physical activity     Days per week: Not on file     Minutes per session: Not on file   • Stress: Not on file   Relationships   • Social connections     Talks on phone: Not on file     Gets together: Not on file     Attends Jew service: Not on file     Active member of club or organization: Not on file      "Attends meetings of clubs or organizations: Not on file     Relationship status: Not on file   • Intimate partner violence     Fear of current or ex partner: Not on file     Emotionally abused: Not on file     Physically abused: Not on file     Forced sexual activity: Not on file   Other Topics Concern   • Not on file   Social History Narrative    ** Merged History Encounter **                Past Medical History:   Diagnosis Date   • Allergy, unspecified not elsewhere classified    • Anxiety    • Arthritis    • Cancer ([facility])     cervical ca - hysterectomy   • Chronic airway obstruction, not elsewhere classified    • Depression    • Fibromyalgia    • GERD (gastroesophageal reflux disease)    • Hypertension     takes lisinopril   • Indigestion     hx colitis   • Migraine    • Osteoporosis, unspecified    • Other emphysema ([facility])    • Other specified symptom associated with female genital organs     total hyster   • Psychiatric disorder 1/29/2018   • Renal disorder     kidney stone   • Severe sepsis ([facility]) 5/13/2018   • Type II or unspecified type diabetes mellitus without mention of complication, not stated as uncontrolled    • Ulcer    • Unspecified asthma(493.90)    • Unspecified cataract    • Urolithiasis     has lithotripsy           Review of Systems   Constitutional: Negative for fever, chills and weight loss.   Respiratory: Negative for cough and wheezing.    Cardiovascular: Negative for chest pain.   Gastrointestinal: Positive for abdominal pain diarrhea and nausea. Negative for  vomiting and blood in stool.   Neurological: Negative for dizziness and headaches.   All other systems reviewed and are negative.         Objective:     /72   Pulse 88   Temp 36.6 °C (97.8 °F) (Oral)   Resp 16   Ht 1.448 m (4' 9\")   Wt 68.5 kg (151 lb)   SpO2 95%     Physical Exam   Constitutional: Obese. pt is oriented to person, place, and time and appears well-developed. No distress.   HENT:   Head: Normocephalic and " atraumatic.   Mouth/Throat: No oropharyngeal exudate.   Eyes: Conjunctivae are normal. No scleral icterus.   Cardiovascular: Normal rate, regular rhythm and normal heart sounds.    Pulmonary/Chest: Effort normal and breath sounds normal. No respiratory distress. Pt has no wheezes. Pt has no rales.   Abdominal: Normal appearance and bowel sounds are normal. There is no splenomegaly or hepatomegaly. There is mild generalized abdominal tenderness. There is no rebound, no guarding, no CVA tenderness and no tenderness at McBurney's point.   Lymphadenopathy:     Pt has no cervical adenopathy.   Neurological: pt is alert and oriented to person, place, and time.   Skin:Skin is warm. Pt is not diaphoretic. No erythema.   Psychiatric:  behavior is normal.   Nursing note and vitals reviewed.              Assessment/Plan:     1. Generalized abdominal pain  - POCT Urinalysis    2. Diarrhea, unspecified type  3. Abdominal pain  Differential diagnosis, natural history, supportive care, and indications for immediate follow-up discussed at length. Discussed acute gastroenteritis vs gallbladder disease as potential etiologies. Patient does report known h/o gallstones. Will follow up with PCP  -Increase fluids.  -Rest.  -Advance diet as tolerated starting with bland, low fat meals. Boiled starches and cereals with salt are indicated in patients with watery diarrhea; crackers, bananas, soup, and boiled vegetables may also be consumed.   -Avoid alcohol, coffee, nicotine and spicy foods.  -Probiotics  -Follow up with PCP or clinic in 3 days if not feeling better.    -Follow up emergently for more than 6 watery stools in a 24 hour period, blood or mucus in stool, fever greater than 101.2, inability to tolerated fluids, signs of dehydration, weakness, elevated heart rate, increased or persistent abdominal pain.

## 2020-04-27 ENCOUNTER — APPOINTMENT (OUTPATIENT)
Dept: RADIOLOGY | Facility: MEDICAL CENTER | Age: 60
End: 2020-04-27
Attending: EMERGENCY MEDICINE
Payer: MEDICAID

## 2020-04-27 ENCOUNTER — HOSPITAL ENCOUNTER (EMERGENCY)
Facility: MEDICAL CENTER | Age: 60
End: 2020-04-27
Attending: EMERGENCY MEDICINE
Payer: MEDICAID

## 2020-04-27 VITALS
TEMPERATURE: 99.2 F | DIASTOLIC BLOOD PRESSURE: 57 MMHG | WEIGHT: 145 LBS | SYSTOLIC BLOOD PRESSURE: 111 MMHG | HEIGHT: 58 IN | BODY MASS INDEX: 30.44 KG/M2 | RESPIRATION RATE: 16 BRPM | HEART RATE: 61 BPM | OXYGEN SATURATION: 93 %

## 2020-04-27 DIAGNOSIS — R10.9 ABDOMINAL PAIN, UNSPECIFIED ABDOMINAL LOCATION: ICD-10-CM

## 2020-04-27 DIAGNOSIS — K59.00 CONSTIPATION, UNSPECIFIED CONSTIPATION TYPE: ICD-10-CM

## 2020-04-27 DIAGNOSIS — M79.604 LEG PAIN, DIFFUSE, RIGHT: ICD-10-CM

## 2020-04-27 LAB
ALBUMIN SERPL BCP-MCNC: 3.7 G/DL (ref 3.2–4.9)
ALBUMIN/GLOB SERPL: 1.4 G/DL
ALP SERPL-CCNC: 56 U/L (ref 30–99)
ALT SERPL-CCNC: 12 U/L (ref 2–50)
ANION GAP SERPL CALC-SCNC: 9 MMOL/L (ref 7–16)
APPEARANCE UR: CLEAR
APTT PPP: 28.8 SEC (ref 24.7–36)
AST SERPL-CCNC: 28 U/L (ref 12–45)
BACTERIA #/AREA URNS HPF: NEGATIVE /HPF
BASOPHILS # BLD AUTO: 0.9 % (ref 0–1.8)
BASOPHILS # BLD: 0.05 K/UL (ref 0–0.12)
BILIRUB SERPL-MCNC: 0.2 MG/DL (ref 0.1–1.5)
BILIRUB UR QL STRIP.AUTO: NEGATIVE
BUN SERPL-MCNC: 12 MG/DL (ref 8–22)
CALCIUM SERPL-MCNC: 8.7 MG/DL (ref 8.5–10.5)
CHLORIDE SERPL-SCNC: 108 MMOL/L (ref 96–112)
CO2 SERPL-SCNC: 21 MMOL/L (ref 20–33)
COLOR UR: YELLOW
COVID ORDER STATUS COVID19: YES
CREAT SERPL-MCNC: 0.84 MG/DL (ref 0.5–1.4)
EOSINOPHIL # BLD AUTO: 0.13 K/UL (ref 0–0.51)
EOSINOPHIL NFR BLD: 2.4 % (ref 0–6.9)
EPI CELLS #/AREA URNS HPF: ABNORMAL /HPF
ERYTHROCYTE [DISTWIDTH] IN BLOOD BY AUTOMATED COUNT: 46.6 FL (ref 35.9–50)
GLOBULIN SER CALC-MCNC: 2.6 G/DL (ref 1.9–3.5)
GLUCOSE SERPL-MCNC: 88 MG/DL (ref 65–99)
GLUCOSE UR STRIP.AUTO-MCNC: NEGATIVE MG/DL
HCT VFR BLD AUTO: 41.9 % (ref 37–47)
HGB BLD-MCNC: 13.4 G/DL (ref 12–16)
HYALINE CASTS #/AREA URNS LPF: ABNORMAL /LPF
IMM GRANULOCYTES # BLD AUTO: 0.02 K/UL (ref 0–0.11)
IMM GRANULOCYTES NFR BLD AUTO: 0.4 % (ref 0–0.9)
INR PPP: 0.96 (ref 0.87–1.13)
KETONES UR STRIP.AUTO-MCNC: NEGATIVE MG/DL
LEUKOCYTE ESTERASE UR QL STRIP.AUTO: ABNORMAL
LIPASE SERPL-CCNC: 42 U/L (ref 11–82)
LYMPHOCYTES # BLD AUTO: 1.73 K/UL (ref 1–4.8)
LYMPHOCYTES NFR BLD: 32 % (ref 22–41)
MCH RBC QN AUTO: 30.2 PG (ref 27–33)
MCHC RBC AUTO-ENTMCNC: 32 G/DL (ref 33.6–35)
MCV RBC AUTO: 94.4 FL (ref 81.4–97.8)
MICRO URNS: ABNORMAL
MONOCYTES # BLD AUTO: 0.37 K/UL (ref 0–0.85)
MONOCYTES NFR BLD AUTO: 6.8 % (ref 0–13.4)
NEUTROPHILS # BLD AUTO: 3.11 K/UL (ref 2–7.15)
NEUTROPHILS NFR BLD: 57.5 % (ref 44–72)
NITRITE UR QL STRIP.AUTO: NEGATIVE
NRBC # BLD AUTO: 0 K/UL
NRBC BLD-RTO: 0 /100 WBC
PH UR STRIP.AUTO: 5.5 [PH] (ref 5–8)
PLATELET # BLD AUTO: 127 K/UL (ref 164–446)
PMV BLD AUTO: 10.3 FL (ref 9–12.9)
POTASSIUM SERPL-SCNC: 4.3 MMOL/L (ref 3.6–5.5)
PROT SERPL-MCNC: 6.3 G/DL (ref 6–8.2)
PROT UR QL STRIP: NEGATIVE MG/DL
PROTHROMBIN TIME: 13 SEC (ref 12–14.6)
RBC # BLD AUTO: 4.44 M/UL (ref 4.2–5.4)
RBC # URNS HPF: ABNORMAL /HPF
RBC UR QL AUTO: NEGATIVE
SARS-COV-2 RNA RESP QL NAA+PROBE: NEGATIVE
SODIUM SERPL-SCNC: 138 MMOL/L (ref 135–145)
SP GR UR STRIP.AUTO: 1.04
SPECIMEN SOURCE: NORMAL
UROBILINOGEN UR STRIP.AUTO-MCNC: 0.2 MG/DL
WBC # BLD AUTO: 5.4 K/UL (ref 4.8–10.8)
WBC #/AREA URNS HPF: ABNORMAL /HPF

## 2020-04-27 PROCEDURE — 85730 THROMBOPLASTIN TIME PARTIAL: CPT

## 2020-04-27 PROCEDURE — 85610 PROTHROMBIN TIME: CPT

## 2020-04-27 PROCEDURE — 96374 THER/PROPH/DIAG INJ IV PUSH: CPT | Mod: XU

## 2020-04-27 PROCEDURE — 74177 CT ABD & PELVIS W/CONTRAST: CPT

## 2020-04-27 PROCEDURE — G2023 SPECIMEN COLLECT COVID-19: HCPCS | Performed by: EMERGENCY MEDICINE

## 2020-04-27 PROCEDURE — 96375 TX/PRO/DX INJ NEW DRUG ADDON: CPT

## 2020-04-27 PROCEDURE — 700111 HCHG RX REV CODE 636 W/ 250 OVERRIDE (IP): Performed by: EMERGENCY MEDICINE

## 2020-04-27 PROCEDURE — 81001 URINALYSIS AUTO W/SCOPE: CPT

## 2020-04-27 PROCEDURE — 99285 EMERGENCY DEPT VISIT HI MDM: CPT

## 2020-04-27 PROCEDURE — 85025 COMPLETE CBC W/AUTO DIFF WBC: CPT

## 2020-04-27 PROCEDURE — 94760 N-INVAS EAR/PLS OXIMETRY 1: CPT

## 2020-04-27 PROCEDURE — 80053 COMPREHEN METABOLIC PANEL: CPT

## 2020-04-27 PROCEDURE — 700117 HCHG RX CONTRAST REV CODE 255: Performed by: EMERGENCY MEDICINE

## 2020-04-27 PROCEDURE — 83690 ASSAY OF LIPASE: CPT

## 2020-04-27 PROCEDURE — U0004 COV-19 TEST NON-CDC HGH THRU: HCPCS

## 2020-04-27 PROCEDURE — 93971 EXTREMITY STUDY: CPT | Mod: RT

## 2020-04-27 RX ORDER — ONDANSETRON 2 MG/ML
4 INJECTION INTRAMUSCULAR; INTRAVENOUS ONCE
Status: COMPLETED | OUTPATIENT
Start: 2020-04-27 | End: 2020-04-27

## 2020-04-27 RX ORDER — MORPHINE SULFATE 4 MG/ML
4 INJECTION, SOLUTION INTRAMUSCULAR; INTRAVENOUS ONCE
Status: COMPLETED | OUTPATIENT
Start: 2020-04-27 | End: 2020-04-27

## 2020-04-27 RX ADMIN — ONDANSETRON 4 MG: 2 INJECTION INTRAMUSCULAR; INTRAVENOUS at 18:41

## 2020-04-27 RX ADMIN — IOHEXOL 100 ML: 350 INJECTION, SOLUTION INTRAVENOUS at 19:43

## 2020-04-27 RX ADMIN — MORPHINE SULFATE 4 MG: 4 INJECTION INTRAVENOUS at 18:41

## 2020-04-27 ASSESSMENT — FIBROSIS 4 INDEX: FIB4 SCORE: 2.22

## 2020-04-28 NOTE — ED TRIAGE NOTES
Chief Complaint   Patient presents with   • Abdominal Pain      lower abdominal pain x 1 week    • Headache     x 6 days   • Chest Pain     continuous x 1 week     Patient ambulatory to [place] [address] with steady gait. Patient states that she was diagnosed with the norovirus a week ago. Patient has had lower abdominal pain x 1 week, headache x 6 days, continuous chest pain x 1 day, N/V/D for several days, and woke up with numbness to her leg today.

## 2020-08-20 ENCOUNTER — HOSPITAL ENCOUNTER (OUTPATIENT)
Facility: MEDICAL CENTER | Age: 60
End: 2020-08-20
Attending: FAMILY MEDICINE
Payer: MEDICAID

## 2020-08-20 ENCOUNTER — APPOINTMENT (OUTPATIENT)
Dept: RADIOLOGY | Facility: IMAGING CENTER | Age: 60
End: 2020-08-20
Attending: FAMILY MEDICINE
Payer: MEDICAID

## 2020-08-20 ENCOUNTER — OFFICE VISIT (OUTPATIENT)
Dept: URGENT CARE | Facility: CLINIC | Age: 60
End: 2020-08-20
Payer: MEDICAID

## 2020-08-20 VITALS
TEMPERATURE: 98.2 F | SYSTOLIC BLOOD PRESSURE: 114 MMHG | HEART RATE: 93 BPM | OXYGEN SATURATION: 94 % | BODY MASS INDEX: 29.23 KG/M2 | HEIGHT: 59 IN | DIASTOLIC BLOOD PRESSURE: 72 MMHG | WEIGHT: 145 LBS | RESPIRATION RATE: 14 BRPM

## 2020-08-20 DIAGNOSIS — R19.7 DIARRHEA, UNSPECIFIED TYPE: ICD-10-CM

## 2020-08-20 DIAGNOSIS — R51.9 ACUTE NONINTRACTABLE HEADACHE, UNSPECIFIED HEADACHE TYPE: ICD-10-CM

## 2020-08-20 DIAGNOSIS — Z20.822 EXPOSURE TO COVID-19 VIRUS: ICD-10-CM

## 2020-08-20 DIAGNOSIS — J44.1 COPD EXACERBATION (HCC): ICD-10-CM

## 2020-08-20 DIAGNOSIS — R06.02 SHORTNESS OF BREATH: ICD-10-CM

## 2020-08-20 LAB — COVID ORDER STATUS COVID19: NORMAL

## 2020-08-20 PROCEDURE — 99214 OFFICE O/P EST MOD 30 MIN: CPT | Mod: 25,CS | Performed by: FAMILY MEDICINE

## 2020-08-20 PROCEDURE — U0003 INFECTIOUS AGENT DETECTION BY NUCLEIC ACID (DNA OR RNA); SEVERE ACUTE RESPIRATORY SYNDROME CORONAVIRUS 2 (SARS-COV-2) (CORONAVIRUS DISEASE [COVID-19]), AMPLIFIED PROBE TECHNIQUE, MAKING USE OF HIGH THROUGHPUT TECHNOLOGIES AS DESCRIBED BY CMS-2020-01-R: HCPCS

## 2020-08-20 PROCEDURE — 71046 X-RAY EXAM CHEST 2 VIEWS: CPT | Mod: TC | Performed by: FAMILY MEDICINE

## 2020-08-20 RX ORDER — DEXAMETHASONE SODIUM PHOSPHATE 10 MG/ML
10 INJECTION INTRAMUSCULAR; INTRAVENOUS ONCE
Status: COMPLETED | OUTPATIENT
Start: 2020-08-20 | End: 2020-08-20

## 2020-08-20 RX ORDER — DEXAMETHASONE 4 MG/1
4 TABLET ORAL DAILY
Qty: 5 TAB | Refills: 0 | Status: CANCELLED | OUTPATIENT
Start: 2020-08-20 | End: 2020-08-25

## 2020-08-20 RX ORDER — SUMATRIPTAN 50 MG/1
50 TABLET, FILM COATED ORAL
COMMUNITY
End: 2021-10-20 | Stop reason: SDUPTHER

## 2020-08-20 RX ORDER — OXYCODONE AND ACETAMINOPHEN 7.5; 325 MG/1; MG/1
1 TABLET ORAL EVERY 6 HOURS PRN
COMMUNITY

## 2020-08-20 RX ORDER — KETOROLAC TROMETHAMINE 30 MG/ML
45 INJECTION, SOLUTION INTRAMUSCULAR; INTRAVENOUS ONCE
Status: COMPLETED | OUTPATIENT
Start: 2020-08-20 | End: 2020-08-20

## 2020-08-20 RX ADMIN — DEXAMETHASONE SODIUM PHOSPHATE 10 MG: 10 INJECTION INTRAMUSCULAR; INTRAVENOUS at 15:19

## 2020-08-20 RX ADMIN — KETOROLAC TROMETHAMINE 45 MG: 30 INJECTION, SOLUTION INTRAMUSCULAR; INTRAVENOUS at 14:43

## 2020-08-20 ASSESSMENT — FIBROSIS 4 INDEX: FIB4 SCORE: 3.76
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
## 2020-08-21 LAB
SARS-COV-2 RNA RESP QL NAA+PROBE: NOTDETECTED
SPECIMEN SOURCE: NORMAL

## 2021-02-17 ENCOUNTER — HOSPITAL ENCOUNTER (OUTPATIENT)
Facility: MEDICAL CENTER | Age: 61
End: 2021-02-17
Attending: PHYSICIAN ASSISTANT
Payer: MEDICAID

## 2021-02-17 ENCOUNTER — OFFICE VISIT (OUTPATIENT)
Dept: URGENT CARE | Facility: CLINIC | Age: 61
End: 2021-02-17
Payer: MEDICAID

## 2021-02-17 ENCOUNTER — APPOINTMENT (OUTPATIENT)
Dept: RADIOLOGY | Facility: IMAGING CENTER | Age: 61
End: 2021-02-17
Attending: PHYSICIAN ASSISTANT
Payer: MEDICAID

## 2021-02-17 VITALS
HEIGHT: 58 IN | DIASTOLIC BLOOD PRESSURE: 74 MMHG | TEMPERATURE: 98 F | BODY MASS INDEX: 31.49 KG/M2 | OXYGEN SATURATION: 91 % | RESPIRATION RATE: 16 BRPM | WEIGHT: 150 LBS | SYSTOLIC BLOOD PRESSURE: 118 MMHG | HEART RATE: 112 BPM

## 2021-02-17 DIAGNOSIS — J98.8 VIRAL RESPIRATORY ILLNESS: ICD-10-CM

## 2021-02-17 DIAGNOSIS — Z20.822 SUSPECTED COVID-19 VIRUS INFECTION: ICD-10-CM

## 2021-02-17 DIAGNOSIS — B97.89 VIRAL RESPIRATORY ILLNESS: ICD-10-CM

## 2021-02-17 DIAGNOSIS — R05.9 COUGH: ICD-10-CM

## 2021-02-17 PROCEDURE — 0240U HCHG SARS-COV-2 COVID-19 NFCT DS RESP RNA 3 TRGT MIC: CPT

## 2021-02-17 PROCEDURE — 99214 OFFICE O/P EST MOD 30 MIN: CPT | Performed by: PHYSICIAN ASSISTANT

## 2021-02-17 PROCEDURE — 71046 X-RAY EXAM CHEST 2 VIEWS: CPT | Mod: TC | Performed by: PHYSICIAN ASSISTANT

## 2021-02-17 RX ORDER — ALBUTEROL SULFATE 90 UG/1
2 AEROSOL, METERED RESPIRATORY (INHALATION) EVERY 6 HOURS PRN
Qty: 8.5 G | Refills: 0 | Status: SHIPPED | OUTPATIENT
Start: 2021-02-17 | End: 2021-10-20

## 2021-02-17 ASSESSMENT — FIBROSIS 4 INDEX: FIB4 SCORE: 3.76

## 2021-02-17 ASSESSMENT — ENCOUNTER SYMPTOMS
EYE DISCHARGE: 0
HEADACHES: 1
DIARRHEA: 0
COUGH: 1
FEVER: 1
SHORTNESS OF BREATH: 1
WHEEZING: 0
NAUSEA: 0
SORE THROAT: 0
VOMITING: 0
MYALGIAS: 1
EYE REDNESS: 0

## 2021-02-17 NOTE — PATIENT INSTRUCTIONS
INSTRUCTIONS FOR COVID-19 OR ANY OTHER INFECTIOUS RESPIRATORY ILLNESSES    The Centers for Disease Control and Prevention (CDC) states that early indications for COVID-19 include cough, shortness of breath, difficulty breathing, or at least two of the following symptoms: chills, shaking with chills, muscle pain, headache, sore throat, and loss of taste or smell. Symptoms can range from mild to severe and may appear up to two weeks after exposure to the virus.    The practice of self-isolation and quarantine helps protect the public and your family by  preventing exposure to people who have or may have a contagious disease. Please follow the prevention steps below as based on CDC guidelines:    WHEN TO STOP ISOLATION: Persons with COVID-19 or any other infectious respiratory illness who have symptoms and were advised to care for themselves at home may discontinue home isolation under the following conditions:  · At least 24 hours have passed since recovery defined as resolution of fever without the use of fever-reducing medications; AND,  · Improvement in respiratory symptoms (e.g., cough, shortness of breath); AND,  · At least 10 days have passed since symptoms first appeared and have had no subsequent illness.    MONITOR YOUR SYMPTOMS: If your illness is worsening, seek prompt medical attention. If you have a medical emergency and need to call 911, notify the dispatch personnel that you have, or are being evaluated for confirmed or suspected COVID-19 or another infectious respiratory illness. Wear a facemask if possible.    ACTIVITY RESTRICTION: restrict activities outside your home, except for getting medical care. Do not go to work, school, or public areas. Avoid using public transportation, ride-sharing, or taxis.    SCHEDULED MEDICAL APPOINTMENTS: Notify your provider that you have, or are being evaluated for, confirmed or suspected COVID-19 or another infectious respiratory. This will help the healthcare  provider’s office safely take care of you and keep other people from getting exposed or infected.    FACEMASKS, when to wear: Anytime you are away from your home or around other people or pets. If you are unable to wear one, maintain a minimum of 6 feet distancing from others.    LIVING ENVIRONMENT: Stay in a separate room from other people and pets. If possible, use a separate bathroom, have someone else care for your pets and avoid sharing household items. Any items used should be washed thoroughly with soap and water. Clean all “high-touch” surfaces every day. Use a household cleaning spray or wipe, according to the label instructions. High touch surfaces include (but are not limited to) counters, tabletops, doorknobs, bathroom fixtures, toilets, phones, keyboards, tablets, and bedside tables.     HAND WASHING: Frequently wash hands with soap and water for at least 20 seconds,  especially after blowing your nose, coughing, or sneezing; going to the bathroom; before and after interacting with pets; and before and after eating or preparing food. If hands are visibly dirty use soap and water. If soap and water are not available, use an alcohol-based hand  with at least 60% alcohol. Avoid touching your eyes, nose, and mouth with unwashed hands. Cover your coughs and sneezes with a tissue. Throw used tissues in a lined trash can. Immediately wash your hands.    ACTIVE/FACILITATED SELF-MONITORING: Follow instructions provided by your local health department or health professionals, as appropriate. When working with your local health department check their available hours.    [facility]   Phone Number   [facility] [phone]   [facility] [place] [phone]   [place] [address]   [place] [phone]     IF YOU HAVE CONFIRMED POSITIVE COVID-19:    Those who have completely recovered from COVID-19 may have immune-boosting antibodies in their plasma--called “convalescent plasma”--that could be  used to treat critically ill COVID19 patients.    [facility] is excited to begin working with [facility] on collecting convalescent plasma from  people who have recovered from COVID-19 as part of a program to treat patients infected with the virus. This FDA-approved “emergency investigational new drug” is a special blood product containing antibodies that may give patients an extra boost to fight the virus.    To be eligible to donate convalescent plasma, you must have a prior COVID-19 diagnosis documented by a laboratory test (or a positive test result for SARS-CoV-2 antibodies) and meet additional eligibility requirements.    If you are interested in donating convalescent plasma or have any additional questions, please contact the [facility] Convalescent Plasma  at [phone] or via e-mail at [email or web address].

## 2021-02-17 NOTE — PROGRESS NOTES
Subjective:      Martha Mosher is a 60 y.o. female who presents with URI (body aches, headache, stomach pains, chills x 1 week)        URI   This is a new problem. Episode onset: x 1 week ago. The problem has been unchanged. Maximum temperature: The patient reports a subjective fever, now resolved. Associated symptoms include congestion, coughing (The patient reports an associated dry cough.) and headaches. Pertinent negatives include no chest pain, diarrhea, ear pain, nausea, rash, sore throat, vomiting or wheezing. Treatments tried: The patient has been taking her pain medication as prescribed.  She has also been taking prescribed anti-nausea medicine.  The patient has not taken any OTC medications for her current symptoms.     The patient reports no known exposure to COVID-19.  She reports no additional sick contacts.    PMH:  has a past medical history of Allergy, unspecified not elsewhere classified, Anxiety, Arthritis, Cancer ([facility]), Chronic airway obstruction, not elsewhere classified, Depression, Fibromyalgia, GERD (gastroesophageal reflux disease), Hypertension, Indigestion, Migraine, Osteoporosis, unspecified, Other emphysema ([facility]), Other specified symptom associated with female genital organs, Psychiatric disorder (1/29/2018), Renal disorder, Severe sepsis ([facility]) (5/13/2018), Type II or unspecified type diabetes mellitus without mention of complication, not stated as uncontrolled, Ulcer, Unspecified asthma(493.90), Unspecified cataract, and Urolithiasis. She also has no past medical history of Angina, Arrhythmia, ASTHMA, Backpain, Bronchitis, CATARACT, Congestive heart failure (HCC), COPD, Diabetes, Dialysis, Glaucoma, Heart murmur, Heart valve disease, Jaundice, Myocardial infarct ([facility]), Pacemaker, Personal history of venous thrombosis and embolism, Pneumonia, Renal disorder, Rheumatic fever, Seizure ([facility]), Stroke ([facility]), Unspecified disorder of thyroid, Unspecified hemorrhagic conditions, or  Unspecified urinary incontinence.  MEDS:   Current Outpatient Medications:   •  oxyCODONE-acetaminophen (PERCOCET) 7.5-325 MG per tablet, Take 1 Tab by mouth every 6 hours as needed., Disp: , Rfl:   •  SUMAtriptan (IMITREX) 50 MG Tab, Take 50 mg by mouth Once PRN for Migraine., Disp: , Rfl:   •  DULoxetine (CYMBALTA) 60 MG Cap DR Particles delayed-release capsule, TAKE 1 CAPSULE BY MOUTH EVERY DAY, Disp: 30 Cap, Rfl: 0  •  traZODone (DESYREL) 100 MG Tab, TAKE 3 TABLETS BY MOUTH EVERY NIGHT AT BEDTIME, Disp: 90 Tab, Rfl: 0  •  polyethylene glycol 3350 (MIRALAX) Powder, MIX 1 CAPFUL WITH LIQUID AND DRINK EVERY DAY, Disp: 255 g, Rfl: 2  •  PROVENTIL  (90 Base) MCG/ACT Aero Soln inhalation aerosol, INHALE 2 PUFFS BY MOUTH EVERY 6 HOURS AS NEEDED FOR SHORTNESS OF BREATH, Disp: 6.7 g, Rfl: 2  •  quetiapine (SEROQUEL) 50 MG tablet, Take 1 Tab by mouth 3 times a day., Disp: 90 Tab, Rfl: 1  •  tiotropium (SPIRIVA HANDIHALER) 18 MCG Cap, Inhale 1 Cap by mouth every day., Disp: 30 Cap, Rfl: 3  •  gabapentin (NEURONTIN) 300 MG Cap, Take 2 Caps by mouth 3 times a day., Disp: 180 Cap, Rfl: 3  •  Cholecalciferol 4000 units Cap, Take 1 Capsule by mouth every day., Disp: 30 Cap, Rfl: 5  •  topiramate (TOPAMAX) 25 MG Tab, Take 25 mg by mouth every evening., Disp: , Rfl:   •  magnesium gluconate (MAG-G) 500 MG tablet, Take 500 mg by mouth every day., Disp: , Rfl:   •  cyclobenzaprine (FLEXERIL) 10 MG Tab, Take 10 mg by mouth every bedtime., Disp: , Rfl:   •  asa/apap/caffeine (EXCEDRIN) 250-250-65 MG Tab, Take 1 Tab by mouth every 6 hours as needed for Headache., Disp: , Rfl:   ALLERGIES:   Allergies   Allergen Reactions   • Lyrica Unspecified     Hallucinations   • Sulfa Drugs Hives and Unspecified     Pt states that she hallucinates on this as well as getting hives     SURGHX:   Past Surgical History:   Procedure Laterality Date   • COLONOSCOPY WITH BIOPSY  8/3/2009    Performed by GRAEME LOCK JR at ENDOSCOPY [facility]  "ORS   • GASTROSCOPY WITH BIOPSY  3/1/2009    Performed by LUIS ARMANDO SIERRA at ENDOSCOPY [facility] ORS   • ABDOMINAL HYSTERECTOMY TOTAL     • APPENDECTOMY     • GYN SURGERY      hysterectomy   • HERNIA REPAIR     • OTHER      lithotripsy   • OTHER ABDOMINAL SURGERY      hysterectomy, hernia,    • PRIMARY C SECTION       SOCHX:  reports that she has been smoking cigarettes. She has a 15.00 pack-year smoking history. She uses smokeless tobacco. She reports current alcohol use. She reports previous drug use. Drug: Inhaled.  FH: Family history was reviewed, no pertinent findings to report      Review of Systems   Constitutional: Positive for fever (The patient reports an associated subjective fever, now resolved.).   HENT: Positive for congestion. Negative for ear pain and sore throat.    Eyes: Negative for discharge and redness.   Respiratory: Positive for cough (The patient reports an associated dry cough.) and shortness of breath. Negative for wheezing.    Cardiovascular: Negative for chest pain and leg swelling.   Gastrointestinal: Negative for diarrhea, nausea and vomiting.   Musculoskeletal: Positive for myalgias.   Skin: Negative for rash.   Neurological: Positive for headaches.          Objective:     /74 (BP Location: Left arm, Patient Position: Sitting, BP Cuff Size: Adult long)   Pulse (!) 112   Temp 36.7 °C (98 °F) (Temporal)   Resp 16   Ht 1.473 m (4' 10\")   Wt 68 kg (150 lb)   LMP  (LMP Unknown)   SpO2 91%   BMI 31.35 kg/m²      Physical Exam  Constitutional:       General: She is not in acute distress.     Appearance: Normal appearance. She is not ill-appearing.   HENT:      Head: Normocephalic and atraumatic.      Right Ear: External ear normal.      Left Ear: External ear normal.      Nose: Nose normal.      Mouth/Throat:      Mouth: Mucous membranes are moist.      Pharynx: Oropharynx is clear. No posterior oropharyngeal erythema.   Eyes:      Extraocular Movements: Extraocular " movements intact.      Conjunctiva/sclera: Conjunctivae normal.   Cardiovascular:      Rate and Rhythm: Normal rate and regular rhythm.      Heart sounds: Normal heart sounds.   Pulmonary:      Effort: Pulmonary effort is normal. No respiratory distress.      Breath sounds: Decreased breath sounds (slight) present. No wheezing.   Musculoskeletal:         General: Normal range of motion.      Cervical back: Normal range of motion and neck supple.   Skin:     General: Skin is warm and dry.   Neurological:      Mental Status: She is alert and oriented to person, place, and time.            Progress:  CXR:   FINDINGS:  LUNGS: The lungs are clear.     HEART and MEDIASTINUM: normal in size.     Pleura: There are no pleural effusion or pneumothoraces.     Osseous structures: No significant bony abnormality.     IMPRESSION:  No acute cardiopulmonary abnormality identified.    Recheck:  HR: 82  POX: 92% on room air    COVID-19 PCR - pending     The patient is also requesting be tested for influenza at this time.    Influenza A/B PCR - pending        Assessment/Plan:        1. Viral respiratory illness    2. Cough  - DX-CHEST-2 VIEWS; Future  - albuterol 108 (90 Base) MCG/ACT Aero Soln inhalation aerosol; Inhale 2 Puffs every 6 hours as needed for Shortness of Breath.  Dispense: 8.5 g; Refill: 0    3. Suspected COVID-19 virus infection  - CoV-2 and Flu A/B by PCR (24 hour In-House): Collect NP swab in VTM; Future    The patient's presenting symptoms and physical exam findings are consistent with a viral respiratory illness with an associated cough.  On physical exam, patient had slight decreased breath sounds without wheezing or rhonchi.  The patient's pulse ox was within normal limits.  The remainder the patient's physical exam today in clinic was normal.  The patient appears in no acute distress.  The patient's vital signs are stable and within normal limits.  The patient's heart rate was slightly elevated upon her arrival,  but returned to within normal limits during her clinic visit.  She is afebrile today in clinic.  A chest x-ray was obtained to further evaluate the patient's current symptoms.  The patient's chest x-ray showed no acute cardiopulmonary abnormality.  Discussed likely viral etiology with the patient.  Based on the patient's presenting symptoms, will test the patient for COVID-19.  The patient is also requesting testing for influenza at this time.  Therefore, will test the patient propofol COVID-19 and influenza via PCR.  Advised the patient stay home under self quarantine while awaiting the test results.  Provided the patient with home isolation self quarantine instructions.  Will prescribe the patient an albuterol inhaler for symptomatic relief of her current symptoms.  Recommend OTC medications and supportive care for symptomatic management.  Recommend the patient follow-up with PCP as needed.  Discussed STRICT ED precautions with the patient, and she verbalized understanding.    Differential diagnoses, supportive care, and indications for immediate follow-up discussed with patient.   Instructed to return to clinic or nearest emergency department for any change in condition, further concerns, or worsening of symptoms.    OTC Tylenol or Motrin for fever/discomfort.  OTC cough/cold medication for symptomatic relief  OTC Supportive Care for Congestion - saline nasal spray or neti pot  Drink plenty of fluids  Advised the patient to stay at home under self-isolation until symptoms have been present for at least 10 days and are improved, and there has been no fever for at least 72 hours without the use of medications and/or no vomiting or diarrhea for 48 hours.  --Provided the patient with home isolation and self quarantine instructions  Work note provided  Follow-up with PCP  Return to clinic or go to the ED if symptoms worsen or fail to improve, or if the patient should develop worsening/increasing cough, congestion, ear  pain, sore throat, shortness of breath, wheezing, chest pain, fever/chills, and/or any concerning symptoms.    Discussed plan with the patient, and she agrees to the above.     I personally reviewed prior external notes and test results pertinent to today's visit.  I have independently reviewed and interpreted all diagnostics ordered during this urgent care visit.     Time spent evaluating this patient was at least 30 minutes and includes preparing for visit, obtaining history, exam and evaluation, ordering labs/tests/procedures/medications, independent interpretation, and counseling/education.    Please note that this dictation was created using voice recognition software. I have made every reasonable attempt to correct obvious errors, but I expect that there may be errors of grammar and possibly content that I did not discover before finalizing the note.     This note was electronically signed by Milena Villa PA-C

## 2021-02-18 LAB
FLUAV RNA SPEC QL NAA+PROBE: NEGATIVE
FLUBV RNA SPEC QL NAA+PROBE: NEGATIVE
SARS-COV-2 RNA RESP QL NAA+PROBE: NOTDETECTED
SPECIMEN SOURCE: NORMAL

## 2021-02-19 ENCOUNTER — TELEPHONE (OUTPATIENT)
Dept: URGENT CARE | Facility: CLINIC | Age: 61
End: 2021-02-19

## 2021-02-19 NOTE — TELEPHONE ENCOUNTER
2/19/2021 @ 10:45AM    Spoke with the patient regarding her test results.  Informed the patient her COVID-19 and influenza testing was negative.  Advised the patient her symptoms are likely related to a viral illness.  Recommend the patient return to clinic for any worsening or concerning symptoms.  The patient had no further questions at this time.

## 2021-03-16 ENCOUNTER — OFFICE VISIT (OUTPATIENT)
Dept: URGENT CARE | Facility: CLINIC | Age: 61
End: 2021-03-16
Payer: MEDICAID

## 2021-03-16 VITALS
HEART RATE: 89 BPM | DIASTOLIC BLOOD PRESSURE: 78 MMHG | RESPIRATION RATE: 16 BRPM | BODY MASS INDEX: 31.91 KG/M2 | TEMPERATURE: 97.1 F | OXYGEN SATURATION: 95 % | SYSTOLIC BLOOD PRESSURE: 120 MMHG | WEIGHT: 152 LBS | HEIGHT: 58 IN

## 2021-03-16 DIAGNOSIS — K11.21 ACUTE PAROTITIS: ICD-10-CM

## 2021-03-16 DIAGNOSIS — R22.0 JAW SWELLING: ICD-10-CM

## 2021-03-16 PROCEDURE — 99214 OFFICE O/P EST MOD 30 MIN: CPT | Mod: 25 | Performed by: PHYSICIAN ASSISTANT

## 2021-03-16 RX ORDER — AMOXICILLIN AND CLAVULANATE POTASSIUM 875; 125 MG/1; MG/1
1 TABLET, FILM COATED ORAL 2 TIMES DAILY
Qty: 20 TABLET | Refills: 0 | Status: SHIPPED | OUTPATIENT
Start: 2021-03-16 | End: 2021-10-20

## 2021-03-16 ASSESSMENT — FIBROSIS 4 INDEX: FIB4 SCORE: 3.82

## 2021-03-17 ASSESSMENT — ENCOUNTER SYMPTOMS
GASTROINTESTINAL NEGATIVE: 1
DIZZINESS: 0
EYES NEGATIVE: 1
FEVER: 0
SWOLLEN GLANDS: 1
SORE THROAT: 0
HEADACHES: 1
MYALGIAS: 0
DIARRHEA: 0
CHILLS: 0
VOMITING: 0
COUGH: 0
TROUBLE SWALLOWING: 1
WHEEZING: 0
NECK PAIN: 1
STRIDOR: 0
SHORTNESS OF BREATH: 0
CARDIOVASCULAR NEGATIVE: 1

## 2021-03-17 NOTE — PROGRESS NOTES
Subjective:      Martha Mosher is a 61 y.o. female who presents with Bump (lump on L side of jaw causing ear pain x4 days)            Left-sided jaw swelling with palpable lump.  This is radiating into her left ear.  She does feel pain in her mouth but no significant throat pain, swelling or difficulty swallowing.  She states she does not feel well but denies fever chills.  No vomiting or dizziness.    Pharyngitis   This is a new problem. The current episode started in the past 7 days (4 days). The problem has been gradually worsening. The pain is worse on the left side. There has been no fever. The fever has been present for less than 1 day. Associated symptoms include ear pain, headaches, neck pain, swollen glands and trouble swallowing. Pertinent negatives include no congestion, coughing, diarrhea, drooling, shortness of breath, stridor or vomiting. She has had no exposure to strep. She has tried nothing for the symptoms. The treatment provided no relief.       PMH:  has a past medical history of Allergy, unspecified not elsewhere classified, Anxiety, Arthritis, Cancer ([facility]), Chronic airway obstruction, not elsewhere classified, Depression, Fibromyalgia, GERD (gastroesophageal reflux disease), Hypertension, Indigestion, Migraine, Osteoporosis, unspecified, Other emphysema ([facility]), Other specified symptom associated with female genital organs, Psychiatric disorder (1/29/2018), Renal disorder, Severe sepsis ([facility]) (5/13/2018), Type II or unspecified type diabetes mellitus without mention of complication, not stated as uncontrolled, Ulcer, Unspecified asthma(493.90), Unspecified cataract, and Urolithiasis. She also has no past medical history of Angina, Arrhythmia, ASTHMA, Backpain, Bronchitis, CATARACT, Congestive heart failure ([facility]), COPD, Diabetes, Dialysis, Glaucoma, Heart murmur, Heart valve disease, Jaundice, Myocardial infarct ([facility]), Pacemaker, Personal history of venous thrombosis and embolism,  Pneumonia, Renal disorder, Rheumatic fever, Seizure (HCC), Stroke (HCC), Unspecified disorder of thyroid, Unspecified hemorrhagic conditions, or Unspecified urinary incontinence.  MEDS:   Current Outpatient Medications:   •  amoxicillin-clavulanate (AUGMENTIN) 875-125 MG Tab, Take 1 tablet by mouth 2 times a day., Disp: 20 tablet, Rfl: 0  •  albuterol 108 (90 Base) MCG/ACT Aero Soln inhalation aerosol, Inhale 2 Puffs every 6 hours as needed for Shortness of Breath., Disp: 8.5 g, Rfl: 0  •  oxyCODONE-acetaminophen (PERCOCET) 7.5-325 MG per tablet, Take 1 Tab by mouth every 6 hours as needed., Disp: , Rfl:   •  SUMAtriptan (IMITREX) 50 MG Tab, Take 50 mg by mouth Once PRN for Migraine., Disp: , Rfl:   •  traZODone (DESYREL) 100 MG Tab, TAKE 3 TABLETS BY MOUTH EVERY NIGHT AT BEDTIME, Disp: 90 Tab, Rfl: 0  •  polyethylene glycol 3350 (MIRALAX) Powder, MIX 1 CAPFUL WITH LIQUID AND DRINK EVERY DAY, Disp: 255 g, Rfl: 2  •  PROVENTIL  (90 Base) MCG/ACT Aero Soln inhalation aerosol, INHALE 2 PUFFS BY MOUTH EVERY 6 HOURS AS NEEDED FOR SHORTNESS OF BREATH, Disp: 6.7 g, Rfl: 2  •  quetiapine (SEROQUEL) 50 MG tablet, Take 1 Tab by mouth 3 times a day., Disp: 90 Tab, Rfl: 1  •  tiotropium (SPIRIVA HANDIHALER) 18 MCG Cap, Inhale 1 Cap by mouth every day., Disp: 30 Cap, Rfl: 3  •  gabapentin (NEURONTIN) 300 MG Cap, Take 2 Caps by mouth 3 times a day., Disp: 180 Cap, Rfl: 3  •  topiramate (TOPAMAX) 25 MG Tab, Take 25 mg by mouth every evening., Disp: , Rfl:   •  magnesium gluconate (MAG-G) 500 MG tablet, Take 500 mg by mouth every day., Disp: , Rfl:   •  cyclobenzaprine (FLEXERIL) 10 MG Tab, Take 10 mg by mouth every bedtime., Disp: , Rfl:   •  DULoxetine (CYMBALTA) 60 MG Cap DR Particles delayed-release capsule, TAKE 1 CAPSULE BY MOUTH EVERY DAY, Disp: 30 Cap, Rfl: 0  •  Cholecalciferol 4000 units Cap, Take 1 Capsule by mouth every day., Disp: 30 Cap, Rfl: 5  •  asa/apap/caffeine (EXCEDRIN) 250-250-65 MG Tab, Take 1 Tab by  mouth every 6 hours as needed for Headache., Disp: , Rfl:   ALLERGIES:   Allergies   Allergen Reactions   • Lyrica Unspecified     Hallucinations   • Sulfa Drugs Hives and Unspecified     Pt states that she hallucinates on this as well as getting hives     SURGHX:   Past Surgical History:   Procedure Laterality Date   • COLONOSCOPY WITH BIOPSY  8/3/2009    Performed by GRAEME LOCK JR at ENDOSCOPY [facility] ORS   • GASTROSCOPY WITH BIOPSY  3/1/2009    Performed by LUIS ARMANDO SIERRA at ENDOSCOPY [facility] ORS   • ABDOMINAL HYSTERECTOMY TOTAL     • APPENDECTOMY     • GYN SURGERY      hysterectomy   • HERNIA REPAIR     • OTHER      lithotripsy   • OTHER ABDOMINAL SURGERY      hysterectomy, hernia,    • PRIMARY C SECTION       SOCHX:  reports that she has been smoking cigarettes. She has a 15.00 pack-year smoking history. She uses smokeless tobacco. She reports current alcohol use. She reports previous drug use. Drug: Inhaled.  FH: family history includes Alcohol/Drug in her brother and father; Arthritis in her father, maternal aunt, and maternal uncle; Cancer in her father, paternal grandfather, and paternal grandmother; Diabetes in her father and mother; Hypertension in her brother and father; Lung Disease in her mother; Stroke in her brother, father, and mother.    Review of Systems   Constitutional: Positive for malaise/fatigue. Negative for chills and fever.   HENT: Positive for ear pain and trouble swallowing. Negative for congestion, drooling and sore throat.    Eyes: Negative.    Respiratory: Negative for cough, shortness of breath, wheezing and stridor.    Cardiovascular: Negative.    Gastrointestinal: Negative.  Negative for diarrhea and vomiting.   Musculoskeletal: Positive for neck pain. Negative for joint pain and myalgias.   Neurological: Positive for headaches. Negative for dizziness.       Medications, Allergies, and current problem list reviewed today in Epic     Objective:     /78  "  Pulse 89   Temp 36.2 °C (97.1 °F) (Temporal)   Resp 16   Ht 1.473 m (4' 10\")   Wt 68.9 kg (152 lb)   LMP  (LMP Unknown)   SpO2 95%   BMI 31.77 kg/m²      Physical Exam  Vitals and nursing note reviewed.   Constitutional:       General: She is not in acute distress.     Appearance: She is well-developed. She is not ill-appearing, toxic-appearing or diaphoretic.   HENT:      Head: Normocephalic and atraumatic.      Jaw: There is normal jaw occlusion. No trismus, tenderness, swelling or pain on movement.      Salivary Glands: Right salivary gland is not diffusely enlarged or tender. Left salivary gland is diffusely enlarged and tender.        Right Ear: Tympanic membrane, ear canal and external ear normal.      Left Ear: Tympanic membrane, ear canal and external ear normal.      Nose: Nose normal. No congestion or rhinorrhea.      Mouth/Throat:      Mouth: Mucous membranes are moist.      Tongue: No lesions.      Palate: No lesions.      Pharynx: Oropharynx is clear. Uvula midline. No pharyngeal swelling, oropharyngeal exudate, posterior oropharyngeal erythema or uvula swelling.      Tonsils: No tonsillar exudate or tonsillar abscesses.        Comments: Tenderness at the left inferior salivary glands.  No stones or obstruction noted.  Eyes:      General:         Right eye: No discharge.         Left eye: No discharge.      Conjunctiva/sclera: Conjunctivae normal.   Cardiovascular:      Rate and Rhythm: Normal rate and regular rhythm.      Pulses: Normal pulses.      Heart sounds: Normal heart sounds.   Pulmonary:      Effort: Pulmonary effort is normal. No respiratory distress.      Breath sounds: Normal breath sounds. No wheezing, rhonchi or rales.   Musculoskeletal:         General: No swelling or tenderness. Normal range of motion.      Cervical back: Normal range of motion and neck supple.      Right lower leg: No edema.      Left lower leg: No edema.   Lymphadenopathy:      Head:      Left side of head: " Submandibular, tonsillar and preauricular adenopathy present. No posterior auricular or occipital adenopathy.      Cervical: No cervical adenopathy.      Left cervical: No superficial cervical adenopathy.      Upper Body:      Right upper body: No axillary adenopathy.      Left upper body: No supraclavicular adenopathy.   Skin:     General: Skin is warm and dry.   Neurological:      Mental Status: She is alert and oriented to person, place, and time.   Psychiatric:         Mood and Affect: Mood normal.         Behavior: Behavior normal.         Thought Content: Thought content normal.         Judgment: Judgment normal.                 Assessment/Plan:         1. Jaw swelling  amoxicillin-clavulanate (AUGMENTIN) 875-125 MG Tab    cefTRIAXone (ROCEPHIN) 1 g, lidocaine (XYLOCAINE) 1 % 3.6 mL for IM use   2. Acute parotitis  amoxicillin-clavulanate (AUGMENTIN) 875-125 MG Tab    cefTRIAXone (ROCEPHIN) 1 g, lidocaine (XYLOCAINE) 1 % 3.6 mL for IM use     Left-sided jaw swelling and tenderness.  Has been increasing pain for last 5 days.  Denies fever, chills, body ache, sore throat patient states she does not feel well however.  Mild headache.  Some pain relief.  Sore throat or difficulty swallowing.  On exam her vital signs are normal.  She has left salivary gland enlargement and tenderness.  She has regional adenopathy noted.  She does have some tenderness of the lymph anterior internal salivary gland without obstruction or deformity seen.  Rest of ENT exam normal.  Appears to be an acute parotitis.  She is well-appearing in no apparent distress and her vital signs are normal.  Advised patient that if this continues to worsen or she develops any new symptoms she will need to be seen in the ER immediately.  I will attempt to treat aggressively in the outpatient setting.  Patient acknowledges risks and agrees to the plan.  Rocephin given in clinic, monitored for 15 minutes, no adverse reaction  Augmentin twice daily  OTC  meds and conservative measures as discussed    Return to clinic or go to ED if symptoms worsen or persist. Indications for ED discussed at length. Patient/Parent/Guardian voices understanding. Follow-up with your primary care provider in 3-5 days. Red flag symptoms discussed. All side effects of medication discussed including allergic response, GI upset, tendon injury, rash, sedation etc.    Please note that this dictation was created using voice recognition software. I have made every reasonable attempt to correct obvious errors, but I expect that there are errors of grammar and possibly content that I did not discover before finalizing the note.

## 2021-04-21 ENCOUNTER — HOSPITAL ENCOUNTER (OUTPATIENT)
Dept: RADIOLOGY | Facility: MEDICAL CENTER | Age: 61
End: 2021-04-21
Payer: MEDICAID

## 2021-05-25 ENCOUNTER — HOSPITAL ENCOUNTER (OUTPATIENT)
Dept: LAB | Facility: MEDICAL CENTER | Age: 61
End: 2021-05-25
Attending: STUDENT IN AN ORGANIZED HEALTH CARE EDUCATION/TRAINING PROGRAM
Payer: MEDICAID

## 2021-05-25 PROCEDURE — 36415 COLL VENOUS BLD VENIPUNCTURE: CPT

## 2021-05-25 PROCEDURE — 86235 NUCLEAR ANTIGEN ANTIBODY: CPT

## 2021-05-29 NOTE — PROGRESS NOTES
Medication reconciliation reviewed with patient denied taking  any anti- inflammatories medication.Patient stated that she's been  off  Excedrin  for  5 days . Discharge instruction given to patient and verbalized understanding. Patient  has ride home ( Josse / friend /  ). Dr. Pozo notified and spoke to the patient..    no

## 2021-06-01 LAB
ENA SS-B IGG SER IA-ACNC: 0 AU/ML (ref 0–40)
SSA52 R0ENA AB IGG Q0420: 2 AU/ML (ref 0–40)
SSA60 R0ENA AB IGG Q0419: 0 AU/ML (ref 0–40)

## 2021-10-18 ENCOUNTER — TELEPHONE (OUTPATIENT)
Dept: MEDICAL GROUP | Facility: CLINIC | Age: 61
End: 2021-10-18

## 2021-10-18 NOTE — TELEPHONE ENCOUNTER
I called patient and left message asking her to call us back about medications if needed.     Alfonso Irvin M.D.    ----- Message from Josue Law, Med Ass't sent at 10/18/2021 11:14 AM PDT -----  Regarding: FW: Med Refill    ----- Message -----  From: Destiny Medina  Sent: 10/13/2021   4:24 PM PDT  To: Matteo Dunlap Ma  Subject: Med Refill                                       Martha was a previous patient of Nely Garcia, she called her pharmacy to get her meds refilled and they were unable to process. I have scheduled her a new patient appointment on 10/20 but she is requesting someone from the office please call her to discuss her med refills as she will be out of her medication prior to her new patient appointment next week.

## 2021-10-20 ENCOUNTER — OFFICE VISIT (OUTPATIENT)
Dept: MEDICAL GROUP | Facility: CLINIC | Age: 61
End: 2021-10-20
Payer: MEDICAID

## 2021-10-20 VITALS
SYSTOLIC BLOOD PRESSURE: 119 MMHG | OXYGEN SATURATION: 86 % | TEMPERATURE: 98 F | HEART RATE: 90 BPM | WEIGHT: 168 LBS | DIASTOLIC BLOOD PRESSURE: 84 MMHG | BODY MASS INDEX: 35.26 KG/M2 | RESPIRATION RATE: 12 BRPM | HEIGHT: 58 IN

## 2021-10-20 DIAGNOSIS — Z76.0 MEDICATION REFILL: ICD-10-CM

## 2021-10-20 DIAGNOSIS — J43.9 PULMONARY EMPHYSEMA, UNSPECIFIED EMPHYSEMA TYPE (HCC): ICD-10-CM

## 2021-10-20 PROBLEM — K11.8 MASS OF PAROTID GLAND: Status: ACTIVE | Noted: 2021-04-20

## 2021-10-20 PROBLEM — Z85.41 PERSONAL HISTORY OF MALIGNANT NEOPLASM OF CERVIX UTERI: Status: ACTIVE | Noted: 2018-11-08

## 2021-10-20 PROBLEM — G43.119 MIGRAINE WITH AURA, INTRACTABLE, WITHOUT STATUS MIGRAINOSUS: Status: ACTIVE | Noted: 2018-11-08

## 2021-10-20 PROBLEM — G89.29: Status: ACTIVE | Noted: 2019-05-09

## 2021-10-20 PROBLEM — M79.7 FIBROMYOSITIS: Status: ACTIVE | Noted: 2021-01-26

## 2021-10-20 PROBLEM — R94.31 ABNORMAL EKG: Status: ACTIVE | Noted: 2021-06-01

## 2021-10-20 PROBLEM — K21.9 GASTRO-ESOPHAGEAL REFLUX DISEASE WITHOUT ESOPHAGITIS: Status: ACTIVE | Noted: 2019-10-18

## 2021-10-20 PROBLEM — F32.9 MAJOR DEPRESSIVE DISORDER, SINGLE EPISODE, UNSPECIFIED: Status: ACTIVE | Noted: 2018-11-08

## 2021-10-20 PROBLEM — Z13.220 ENCOUNTER FOR LIPID SCREENING FOR CARDIOVASCULAR DISEASE: Status: ACTIVE | Noted: 2019-08-14

## 2021-10-20 PROBLEM — Z71.6 TOBACCO ABUSE COUNSELING: Status: ACTIVE | Noted: 2019-11-18

## 2021-10-20 PROBLEM — R06.09 DYSPNEA ON EXERTION: Status: ACTIVE | Noted: 2021-06-01

## 2021-10-20 PROBLEM — Z72.51 HIGH-RISK SEXUAL BEHAVIOR: Status: ACTIVE | Noted: 2018-11-08

## 2021-10-20 PROBLEM — Z13.6 ENCOUNTER FOR LIPID SCREENING FOR CARDIOVASCULAR DISEASE: Status: ACTIVE | Noted: 2019-08-14

## 2021-10-20 PROCEDURE — 99214 OFFICE O/P EST MOD 30 MIN: CPT | Performed by: STUDENT IN AN ORGANIZED HEALTH CARE EDUCATION/TRAINING PROGRAM

## 2021-10-20 RX ORDER — SUMATRIPTAN 50 MG/1
50 TABLET, FILM COATED ORAL
Qty: 10 TABLET | Refills: 3 | Status: SHIPPED | OUTPATIENT
Start: 2021-10-20 | End: 2023-09-05 | Stop reason: SDUPTHER

## 2021-10-20 RX ORDER — PROMETHAZINE HYDROCHLORIDE 25 MG/1
25 TABLET ORAL EVERY 6 HOURS PRN
Qty: 30 TABLET | Refills: 1 | Status: SHIPPED | OUTPATIENT
Start: 2021-10-20 | End: 2021-12-20

## 2021-10-20 RX ORDER — ALBUTEROL SULFATE 90 UG/1
2 AEROSOL, METERED RESPIRATORY (INHALATION) EVERY 6 HOURS PRN
Qty: 6.7 G | Refills: 2 | Status: SHIPPED | OUTPATIENT
Start: 2021-10-20 | End: 2022-03-01 | Stop reason: SDUPTHER

## 2021-10-20 RX ORDER — POLYETHYLENE GLYCOL 3350 17 G/17G
17 POWDER, FOR SOLUTION ORAL DAILY
Qty: 255 G | Refills: 2 | Status: SHIPPED | OUTPATIENT
Start: 2021-10-20 | End: 2022-06-24 | Stop reason: SDUPTHER

## 2021-10-20 ASSESSMENT — FIBROSIS 4 INDEX: FIB4 SCORE: 3.82

## 2021-10-20 NOTE — PROGRESS NOTES
Subjective:     CC: medication refill    HPI:   Martha presents today with     Problem   Medication Refill    Patient here for medication refill.  She had recent parotid gland excision roughly 2 months ago and is doing fairly well after that.  She follows up with surgery tomorrow.  She otherwise needed refills of her albuterol, Phenergan, Spiriva. She continues to smoke E-cigs and has tried to quit with patches in the past.  She does not note any significant shortness of breath, sputum production or chronic cough.     Abnormal Ekg   Dyspnea On Exertion   Mass of Parotid Gland   Fibromyositis   Tobacco Abuse Counseling   Gastro-Esophageal Reflux Disease Without Esophagitis   Encounter for Lipid Screening for Cardiovascular Disease   Primary Chronic Pain   High-Risk Sexual Behavior   Major Depressive Disorder, Single Episode, Unspecified   Migraine With Aura, Intractable, Without Status Migrainosus   Personal History of Malignant Neoplasm of Cervix Uteri       Current Outpatient Medications Ordered in Epic   Medication Sig Dispense Refill   • polyethylene glycol 3350 (MIRALAX) 17 GM/SCOOP Powder Take 17 g by mouth every day. 255 g 2   • albuterol (PROVENTIL HFA) 108 (90 Base) MCG/ACT Aero Soln inhalation aerosol Inhale 2 Puffs every 6 hours as needed for Shortness of Breath. 6.7 g 2   • SUMAtriptan (IMITREX) 50 MG Tab Take 1 Tablet by mouth one time as needed for Migraine. 10 Tablet 3   • promethazine (PHENERGAN) 25 MG Tab Take 1 Tablet by mouth every 6 hours as needed for Nausea/Vomiting. 30 Tablet 1   • oxyCODONE-acetaminophen (PERCOCET) 7.5-325 MG per tablet Take 1 Tab by mouth every 6 hours as needed.     • DULoxetine (CYMBALTA) 60 MG Cap DR Particles delayed-release capsule TAKE 1 CAPSULE BY MOUTH EVERY DAY 30 Cap 0   • traZODone (DESYREL) 100 MG Tab TAKE 3 TABLETS BY MOUTH EVERY NIGHT AT BEDTIME 90 Tab 0   • quetiapine (SEROQUEL) 50 MG tablet Take 1 Tab by mouth 3 times a day. 90 Tab 1   • tiotropium (SPIRIVA  "HANDIHALER) 18 MCG Cap Inhale 1 Cap by mouth every day. 30 Cap 3   • gabapentin (NEURONTIN) 300 MG Cap Take 2 Caps by mouth 3 times a day. 180 Cap 3   • Cholecalciferol 4000 units Cap Take 1 Capsule by mouth every day. 30 Cap 5   • topiramate (TOPAMAX) 25 MG Tab Take 25 mg by mouth every evening.     • cyclobenzaprine (FLEXERIL) 10 MG Tab Take 10 mg by mouth every bedtime.     • asa/apap/caffeine (EXCEDRIN) 250-250-65 MG Tab Take 1 Tab by mouth every 6 hours as needed for Headache.       No current Epic-ordered facility-administered medications on file.       ROS:  Gen: no fevers/chills, no changes in weight  Eyes: no changes in vision  ENT: no sore throat, no hearing loss, no bloody nose  Pulm: no SOB, no cough  CV: no chest pain, no palpitations  GI: no nausea/vomiting, no diarrhea  : no dysuria  MSk: no myalgias  Skin: no rash  Neuro: no headaches, no numbness/tingling  Heme/Lymph: no easy bruising      Objective:     Exam:  /84 (BP Location: Right arm, Patient Position: Sitting, BP Cuff Size: Adult)   Pulse 90   Temp 36.7 °C (98 °F) (Temporal)   Resp 12   Ht 1.473 m (4' 10\")   Wt 76.2 kg (168 lb)   LMP  (LMP Unknown)   SpO2 (!) 86%   BMI 35.11 kg/m²  Body mass index is 35.11 kg/m².    Gen: Alert and oriented, No apparent distress.  HEENT: PERRL, EOMI, external ears normal bilat, nose roughly midline, atraumatic, normocephalic  Neck: Neck is supple without lymphadenopathy.  Lungs: CTA bilaterally, no wheezes, rhonchi, or rales, symmetric chest rise  CV: Regular rate and rhythm. No murmurs, rubs, or gallops.  GI:       No rebound, no guarding, normal bowel sounds  :      No CVA tenderness, bladder non-tender to palp  Ext: No clubbing, cyanosis, edema.  Neuro: Gait appropriate for age, no focal deficits  Psych: Affect and mood congruent without abnormality  Skin:    No rashes, no jaundice      Labs: none    Assessment & Plan:     61 y.o. female with the following -     Problem List Items Addressed " This Visit     Medication refill     Refills of albuterol, Spiriva, Phenergan sent.  Patient will follow up regarding other medical issues as she was 45 minutes late today.  ER return precautions discussed.  She will see me again in 2 weeks.           Other Visit Diagnoses     Pulmonary emphysema, unspecified emphysema type (HCC)        Relevant Medications    albuterol (PROVENTIL HFA) 108 (90 Base) MCG/ACT Aero Soln inhalation aerosol    promethazine (PHENERGAN) 25 MG Tab          I spent a total of 30 minutes with record review, exam, communication with the patient, communication with other providers, and documentation of this encounter.      Return in about 2 weeks (around 11/3/2021).    Please note that this dictation was created using voice recognition software. I have made every reasonable attempt to correct obvious errors, but I expect that there are errors of grammar and possibly content that I did not discover before finalizing the note.

## 2021-10-20 NOTE — ASSESSMENT & PLAN NOTE
Refills of albuterol, Spiriva, Phenergan sent.  Patient will follow up regarding other medical issues as she was 45 minutes late today.  ER return precautions discussed.  She will see me again in 2 weeks.

## 2021-12-20 RX ORDER — PROMETHAZINE HYDROCHLORIDE 25 MG/1
TABLET ORAL
Qty: 30 TABLET | Refills: 1 | Status: SHIPPED | OUTPATIENT
Start: 2021-12-20 | End: 2022-03-01 | Stop reason: SDUPTHER

## 2022-01-05 RX ORDER — TIOTROPIUM BROMIDE 18 UG/1
CAPSULE ORAL; RESPIRATORY (INHALATION)
COMMUNITY
Start: 2019-05-09 | End: 2022-12-20

## 2022-01-05 RX ORDER — QUETIAPINE 200 MG/1
200 TABLET, FILM COATED, EXTENDED RELEASE ORAL
COMMUNITY
End: 2022-12-20

## 2022-01-05 RX ORDER — QUETIAPINE FUMARATE 100 MG/1
TABLET, FILM COATED ORAL
COMMUNITY
Start: 2021-10-25 | End: 2022-12-20

## 2022-01-05 RX ORDER — CONJUGATED ESTROGENS 0.62 MG/G
CREAM VAGINAL
COMMUNITY
Start: 2019-07-10 | End: 2022-09-27

## 2022-01-05 RX ORDER — ARIPIPRAZOLE 10 MG/1
TABLET ORAL
COMMUNITY
Start: 2019-05-09 | End: 2022-09-27

## 2022-01-05 RX ORDER — BISACODYL 10 MG
SUPPOSITORY, RECTAL RECTAL
Status: ON HOLD | COMMUNITY
Start: 2019-09-24 | End: 2023-01-03

## 2022-01-05 RX ORDER — OMEPRAZOLE 20 MG/1
20 CAPSULE, DELAYED RELEASE ORAL
COMMUNITY
Start: 2019-10-18 | End: 2022-01-05 | Stop reason: SDUPTHER

## 2022-01-10 RX ORDER — OMEPRAZOLE 20 MG/1
20 CAPSULE, DELAYED RELEASE ORAL DAILY
Qty: 30 CAPSULE | Refills: 2 | Status: SHIPPED | OUTPATIENT
Start: 2022-01-10 | End: 2022-03-01 | Stop reason: SDUPTHER

## 2022-02-22 ENCOUNTER — APPOINTMENT (OUTPATIENT)
Dept: MEDICAL GROUP | Facility: CLINIC | Age: 62
End: 2022-02-22
Payer: MEDICAID

## 2022-03-01 ENCOUNTER — OFFICE VISIT (OUTPATIENT)
Dept: MEDICAL GROUP | Facility: CLINIC | Age: 62
End: 2022-03-01
Payer: MEDICAID

## 2022-03-01 VITALS
SYSTOLIC BLOOD PRESSURE: 136 MMHG | DIASTOLIC BLOOD PRESSURE: 84 MMHG | HEIGHT: 58 IN | BODY MASS INDEX: 34.43 KG/M2 | OXYGEN SATURATION: 90 % | HEART RATE: 88 BPM | WEIGHT: 164 LBS

## 2022-03-01 DIAGNOSIS — J43.9 PULMONARY EMPHYSEMA, UNSPECIFIED EMPHYSEMA TYPE (HCC): ICD-10-CM

## 2022-03-01 DIAGNOSIS — G89.4 CHRONIC PAIN SYNDROME: ICD-10-CM

## 2022-03-01 DIAGNOSIS — G89.29: ICD-10-CM

## 2022-03-01 PROCEDURE — 99213 OFFICE O/P EST LOW 20 MIN: CPT | Mod: GE | Performed by: STUDENT IN AN ORGANIZED HEALTH CARE EDUCATION/TRAINING PROGRAM

## 2022-03-01 RX ORDER — DULOXETIN HYDROCHLORIDE 60 MG/1
CAPSULE, DELAYED RELEASE ORAL
COMMUNITY
Start: 2018-11-08 | End: 2022-12-20

## 2022-03-01 RX ORDER — PROMETHAZINE HYDROCHLORIDE 25 MG/1
TABLET ORAL
Qty: 30 TABLET | Refills: 11 | Status: SHIPPED | OUTPATIENT
Start: 2022-03-01 | End: 2023-03-14

## 2022-03-01 RX ORDER — DEXAMETHASONE SODIUM PHOSPHATE 10 MG/ML
INJECTION INTRAMUSCULAR; INTRAVENOUS
COMMUNITY
Start: 2022-02-16 | End: 2022-05-20

## 2022-03-01 RX ORDER — OMEPRAZOLE 20 MG/1
20 CAPSULE, DELAYED RELEASE ORAL DAILY
Qty: 30 CAPSULE | Refills: 11 | Status: SHIPPED | OUTPATIENT
Start: 2022-03-01 | End: 2022-04-18 | Stop reason: SDUPTHER

## 2022-03-01 RX ORDER — TIOTROPIUM BROMIDE 18 UG/1
18 CAPSULE ORAL; RESPIRATORY (INHALATION) DAILY
Qty: 30 CAPSULE | Refills: 11 | Status: SHIPPED | OUTPATIENT
Start: 2022-03-01 | End: 2023-02-27

## 2022-03-01 RX ORDER — ALBUTEROL SULFATE 90 UG/1
2 AEROSOL, METERED RESPIRATORY (INHALATION) EVERY 6 HOURS PRN
Qty: 6.7 G | Refills: 5 | Status: SHIPPED | OUTPATIENT
Start: 2022-03-01 | End: 2023-03-15 | Stop reason: SDUPTHER

## 2022-03-01 ASSESSMENT — FIBROSIS 4 INDEX: FIB4 SCORE: 3.82

## 2022-03-01 NOTE — ASSESSMENT & PLAN NOTE
I have completed the patient's the DMV paperwork on her behalf.  I do not see any reason that the patient cannot drive, as long as she is not taking her opiate pain medication or gabapentin prior to operating a vehicle.  She has good mentation, and has no history of seizures or loss of consciousness.

## 2022-03-01 NOTE — PROGRESS NOTES
Subjective:     CC: DMV paperwork    HPI:   Martha presents today with the above chief complaint    Problem   Primary Chronic Pain    Patient has longstanding history of chronic pain.  She follows with pain management for this.  She takes opiate pain medications, as well as gabapentin.    She states that she attempted to renew her 's license recently, and was told that she needed a clearance from her physician before they could provide her with a license.  She is unclear why they require this.  She denies any history of seizure or loss of consciousness ever.  She does take opioid medications, as well as gabapentin but does not drive after taking opioid medications, and only takes gabapentin at bedtime.  She is aware of the risks of driving or operating heavy machinery while taking either of these.         Current Outpatient Medications Ordered in Epic   Medication Sig Dispense Refill   • DULoxetine (CYMBALTA) 60 MG Cap DR Particles delayed-release capsule CYMBALTA 60 MG CPEP     • promethazine (PHENERGAN) 25 MG Tab TAKE 1 TABLET BY MOUTH EVERY 6 HOURS AS NEEDED FOR NAUSEA OR VOMITING 30 Tablet 11   • albuterol (PROVENTIL HFA) 108 (90 Base) MCG/ACT Aero Soln inhalation aerosol Inhale 2 Puffs every 6 hours as needed for Shortness of Breath. 6.7 g 5   • omeprazole (PRILOSEC) 20 MG delayed-release capsule Take 1 Capsule by mouth every day. 30 Capsule 11   • tiotropium (SPIRIVA HANDIHALER) 18 MCG Cap Place 1 Capsule into inhaler and inhale every day. 30 Capsule 11   • asa/apap/caffeine (EXCEDRIN) 250-250-65 MG Tab Take 1 Tab by mouth every 6 hours as needed for Headache.     • dexamethasone (DECADRON) 10 MG/ML Solution      • estrogens, conjugated (PREMARIN) 0.625 MG/GM Cream PREMARIN 0.625 MG/GM CREA     • bisacodyl (DULCOLAX) 10 MG Suppos BISACODYL 10 MG SUPP     • ARIPiprazole (ABILIFY) 10 MG Tab ABILIFY 10 MG TABS     • quetiapine (SEROQUEL XR) 200 MG XR tablet Take 200 mg by mouth.     • QUEtiapine (SEROQUEL)  "100 MG Tab      • tiotropium (SPIRIVA HANDIHALER) 18 MCG Cap SPIRIVA HANDIHALER 18 MCG CAPS     • polyethylene glycol 3350 (MIRALAX) 17 GM/SCOOP Powder Take 17 g by mouth every day. 255 g 2   • SUMAtriptan (IMITREX) 50 MG Tab Take 1 Tablet by mouth one time as needed for Migraine. 10 Tablet 3   • oxyCODONE-acetaminophen (PERCOCET) 7.5-325 MG per tablet Take 1 Tab by mouth every 6 hours as needed.     • traZODone (DESYREL) 100 MG Tab TAKE 3 TABLETS BY MOUTH EVERY NIGHT AT BEDTIME 90 Tab 0   • quetiapine (SEROQUEL) 50 MG tablet Take 1 Tab by mouth 3 times a day. 90 Tab 1   • gabapentin (NEURONTIN) 300 MG Cap Take 2 Caps by mouth 3 times a day. 180 Cap 3   • Cholecalciferol 4000 units Cap Take 1 Capsule by mouth every day. 30 Cap 5   • topiramate (TOPAMAX) 25 MG Tab Take 25 mg by mouth every evening.     • cyclobenzaprine (FLEXERIL) 10 MG Tab Take 10 mg by mouth every bedtime.       No current Epic-ordered facility-administered medications on file.       Health Maintenance: Deferred    ROS:  Gen: no fevers/chills, no changes in weight  Eyes: no changes in vision  ENT: no sore throat, no hearing loss, no rhinorrhea  Pulm: no sob, no cough  CV: no chest pain, no palpitations  GI: no nausea/vomiting, no diarrhea  : no dysuria  MSk: Chronic pain diffusely in multiple different joints  Skin: no rash  Neuro: no headaches, no numbness/tingling  Heme/Lymph: no easy bruising      Objective:     Exam:  /84   Pulse 88   Ht 1.473 m (4' 10\")   Wt 74.4 kg (164 lb)   LMP  (LMP Unknown)   SpO2 90%   BMI 34.28 kg/m²  Body mass index is 34.28 kg/m².    Gen: Alert and oriented, No apparent distress.  Neck: Neck is supple without lymphadenopathy.  Lungs: Normal effort, CTA bilaterally, no wheezes, rhonchi, or rales  CV: Regular rate and rhythm. No murmurs, rubs, or gallops.  Ext: No clubbing, cyanosis, edema.  Walks with a cane.  Antalgic gait.        Assessment & Plan:     61 y.o. female with the following -     Problem " List Items Addressed This Visit     Chronic pain syndrome    Relevant Medications    dexamethasone (DECADRON) 10 MG/ML Solution    DULoxetine (CYMBALTA) 60 MG Cap DR Particles delayed-release capsule    Primary chronic pain     I have completed the patient's the DMV paperwork on her behalf.  I do not see any reason that the patient cannot drive, as long as she is not taking her opiate pain medication or gabapentin prior to operating a vehicle.  She has good mentation, and has no history of seizures or loss of consciousness.         Relevant Medications    dexamethasone (DECADRON) 10 MG/ML Solution    DULoxetine (CYMBALTA) 60 MG Cap DR Particles delayed-release capsule      Other Visit Diagnoses     Pulmonary emphysema, unspecified emphysema type (HCC)        Relevant Medications    dexamethasone (DECADRON) 10 MG/ML Solution    promethazine (PHENERGAN) 25 MG Tab    albuterol (PROVENTIL HFA) 108 (90 Base) MCG/ACT Aero Soln inhalation aerosol    tiotropium (SPIRIVA HANDIHALER) 18 MCG Cap              No follow-ups on file.

## 2022-04-18 RX ORDER — OMEPRAZOLE 20 MG/1
20 CAPSULE, DELAYED RELEASE ORAL DAILY
Qty: 30 CAPSULE | Refills: 11 | Status: SHIPPED | OUTPATIENT
Start: 2022-04-18 | End: 2022-11-03 | Stop reason: SDUPTHER

## 2022-04-18 NOTE — TELEPHONE ENCOUNTER
Received request via: Patient    Was the patient seen in the last year in this department? Yes    Does the patient have an active prescription (recently filled or refills available) for medication(s) requested? No       PHARMACY STATED THAT SHE HAS NO REFILLS.

## 2022-04-25 ENCOUNTER — HOSPITAL ENCOUNTER (OUTPATIENT)
Dept: RADIOLOGY | Facility: MEDICAL CENTER | Age: 62
End: 2022-04-25
Attending: STUDENT IN AN ORGANIZED HEALTH CARE EDUCATION/TRAINING PROGRAM
Payer: MEDICAID

## 2022-04-25 DIAGNOSIS — Z12.31 ENCOUNTER FOR MAMMOGRAM TO ESTABLISH BASELINE MAMMOGRAM: ICD-10-CM

## 2022-04-25 PROCEDURE — 77063 BREAST TOMOSYNTHESIS BI: CPT

## 2022-04-28 ENCOUNTER — TELEPHONE (OUTPATIENT)
Dept: MEDICAL GROUP | Facility: CLINIC | Age: 62
End: 2022-04-28
Payer: MEDICAID

## 2022-04-28 NOTE — TELEPHONE ENCOUNTER
Caller Name: Martha Mosher    Call Back Number: [phone] (home) [phone] (work)    Called patient left voicemail to have her call me back so I can go over results

## 2022-04-28 NOTE — TELEPHONE ENCOUNTER
----- Message from Mylene Magaña M.D. sent at 4/27/2022  8:02 PM PDT -----  Please let the patient know I have received the results of her mammogram. She has fibroglandular breast tissue but there is no signs of malignancy. Repeat screening mammogram is recommended in one year.

## 2022-05-20 ENCOUNTER — OFFICE VISIT (OUTPATIENT)
Dept: MEDICAL GROUP | Facility: CLINIC | Age: 62
End: 2022-05-20
Payer: MEDICAID

## 2022-05-20 VITALS — OXYGEN SATURATION: 90 % | HEART RATE: 83 BPM

## 2022-05-20 DIAGNOSIS — J40 BRONCHITIS: ICD-10-CM

## 2022-05-20 PROCEDURE — 99214 OFFICE O/P EST MOD 30 MIN: CPT | Mod: GC | Performed by: STUDENT IN AN ORGANIZED HEALTH CARE EDUCATION/TRAINING PROGRAM

## 2022-05-20 RX ORDER — PREDNISONE 20 MG/1
40 TABLET ORAL DAILY
Qty: 10 TABLET | Refills: 0 | Status: SHIPPED | OUTPATIENT
Start: 2022-05-20 | End: 2022-09-27

## 2022-05-20 RX ORDER — DULOXETIN HYDROCHLORIDE 30 MG/1
CAPSULE, DELAYED RELEASE ORAL
COMMUNITY
Start: 2022-05-18 | End: 2022-09-27

## 2022-05-20 RX ORDER — AZITHROMYCIN 500 MG/1
500 TABLET, FILM COATED ORAL DAILY
Qty: 3 TABLET | Refills: 0 | Status: SHIPPED | OUTPATIENT
Start: 2022-05-20 | End: 2022-05-23

## 2022-05-20 NOTE — ASSESSMENT & PLAN NOTE
Suspect recurrent bronchitis, COPD exacerbation  COVID-negative, flu negative in-house  Steroid burst-prednisone 40 for 5 days  A topical antibiotic-azithromycin 500 mg for 3 days versus possible bacterial source of exacerbation  Continue daily tiotropium  Continue as needed albuterol  Provided emergency room precautions  Provided return to clinic precautions  RTC in 2 weeks to followup symptoms

## 2022-05-20 NOTE — PROGRESS NOTES
HPI  Martha Mosher is a 62 y.o. female presenting for evaluation cough and lower respiratory symptoms.    This visit was converted to a car visit due to significant symptoms.    Problem   Bronchitis    History of emphysematous COPD with recurrent bronchitis.  About 4 days ago developed respiratory symptoms that quickly moved to her chest including significant cough mildly productive, dyspnea, fevers, generalized malaise and discomfort.  This feels identical to prior episodes of bronchitis that she has had.  Generally resolves well with steroids.  Does have inhalers that she uses at home both daily and as needed, has been using these.              PMH   Past Medical History:   Diagnosis Date   • Allergy, unspecified not elsewhere classified    • Anxiety    • Arthritis    • Bronchitis 5/20/2022   • Cancer (HCC)     cervical ca - hysterectomy   • Chronic airway obstruction, not elsewhere classified    • Depression    • Fibromyalgia    • GERD (gastroesophageal reflux disease)    • Hypertension     takes lisinopril   • Indigestion     hx colitis   • Migraine    • Osteoporosis, unspecified    • Other emphysema (HCC)    • Other specified symptom associated with female genital organs     total hyster   • Psychiatric disorder 1/29/2018   • Renal disorder     kidney stone   • Severe sepsis (HCC) 5/13/2018   • Type II or unspecified type diabetes mellitus without mention of complication, not stated as uncontrolled    • Ulcer    • Unspecified asthma(493.90)    • Unspecified cataract    • Urolithiasis     has lithotripsy       Past Surgical History:   Procedure Laterality Date   • COLONOSCOPY WITH BIOPSY  8/3/2009    Performed by GRAEME LOCK JR at ENDOSCOPY [facility]   • GASTROSCOPY WITH BIOPSY  3/1/2009    Performed by LUIS ARMANDO SIERRA at ENDOSCOPY [facility]   • ABDOMINAL HYSTERECTOMY TOTAL     • APPENDECTOMY     • GYN SURGERY      hysterectomy   • HERNIA REPAIR     • OTHER      lithotripsy   • OTHER  ABDOMINAL SURGERY      hysterectomy, hernia,    • PRIMARY C SECTION         Social History     Tobacco Use   • Smoking status: Current Every Day Smoker     Packs/day: 0.50     Years: 30.00     Pack years: 15.00     Types: Cigarettes   • Smokeless tobacco: Current User   • Tobacco comment: 1ppd - quit 7-8 months ago   Substance Use Topics   • Alcohol use: Yes     Comment: occ       Family History   Problem Relation Age of Onset   • Lung Disease Mother    • Diabetes Mother    • Stroke Mother    • Arthritis Father    • Cancer Father    • Diabetes Father    • Hypertension Father    • Stroke Father    • Alcohol/Drug Father    • Hypertension Brother    • Stroke Brother    • Alcohol/Drug Brother    • Arthritis Maternal Aunt    • Arthritis Maternal Uncle    • Cancer Paternal Grandmother    • Cancer Paternal Grandfather          PE  Pulse 83   LMP  (LMP Unknown)   SpO2 90%     Physical Exam  Constitutional:       Appearance: She is ill-appearing.   HENT:      Head: Normocephalic and atraumatic.      Nose: Congestion present.      Mouth/Throat:      Mouth: Mucous membranes are moist.   Eyes:      Extraocular Movements: Extraocular movements intact.      Conjunctiva/sclera: Conjunctivae normal.      Comments: Sunken   Cardiovascular:      Rate and Rhythm: Normal rate and regular rhythm.      Pulses: Normal pulses.      Heart sounds: Normal heart sounds. No murmur heard.  Pulmonary:      Effort: Pulmonary effort is normal. No respiratory distress.      Comments: Multiple significant coughing fits throughout evaluation.  Rhonchorous breath sounds throughout, after coughing fit, lung sounds clear but with slight expiratory wheeze throughout  Abdominal:      General: Abdomen is flat.   Musculoskeletal:         General: Normal range of motion.      Cervical back: Normal range of motion and neck supple.   Skin:     General: Skin is warm and dry.   Neurological:      General: No focal deficit present.      Mental Status:  She is alert and oriented to person, place, and time. Mental status is at baseline.   Psychiatric:         Mood and Affect: Mood normal.         Behavior: Behavior normal.          A/P:  Bronchitis  Suspect recurrent bronchitis, COPD exacerbation  COVID-negative, flu negative in-house  Steroid burst-prednisone 40 for 5 days  A topical antibiotic-azithromycin 500 mg for 3 days versus possible bacterial source of exacerbation  Continue daily tiotropium  Continue as needed albuterol  Provided emergency room precautions  Provided return to clinic precautions  RTC in 2 weeks to followup symptoms

## 2022-06-24 RX ORDER — POLYETHYLENE GLYCOL 3350 17 G/17G
17 POWDER, FOR SOLUTION ORAL DAILY
Qty: 255 G | Refills: 12 | Status: SHIPPED | OUTPATIENT
Start: 2022-06-24 | End: 2022-11-03 | Stop reason: SDUPTHER

## 2022-06-24 NOTE — TELEPHONE ENCOUNTER
Received request via: Patient    Was the patient seen in the last year in this department? Yes    Does the patient have an active prescription (recently filled or refills available) for medication(s) requested? No       Patient called needing a refill on this medication because she has been out for a week and she has been having stomach issues/trouble going to the bathroom. She would like the medication by the end of the day.

## 2022-09-27 ENCOUNTER — RESEARCH ENCOUNTER (OUTPATIENT)
Dept: MEDICAL GROUP | Facility: CLINIC | Age: 62
End: 2022-09-27
Payer: MEDICAID

## 2022-09-27 ENCOUNTER — OFFICE VISIT (OUTPATIENT)
Dept: MEDICAL GROUP | Facility: CLINIC | Age: 62
End: 2022-09-27
Payer: MEDICAID

## 2022-09-27 VITALS — WEIGHT: 169 LBS | BODY MASS INDEX: 35.48 KG/M2 | RESPIRATION RATE: 17 BRPM | HEIGHT: 58 IN

## 2022-09-27 DIAGNOSIS — G43.109 MIGRAINE WITH AURA AND WITHOUT STATUS MIGRAINOSUS, NOT INTRACTABLE: ICD-10-CM

## 2022-09-27 DIAGNOSIS — Z00.6 RESEARCH STUDY PATIENT: ICD-10-CM

## 2022-09-27 DIAGNOSIS — Z79.899 ENCOUNTER FOR MEDICATION REVIEW: ICD-10-CM

## 2022-09-27 DIAGNOSIS — M65.339 TRIGGER MIDDLE FINGER, UNSPECIFIED LATERALITY: ICD-10-CM

## 2022-09-27 DIAGNOSIS — I73.00 RAYNAUD'S PHENOMENON WITHOUT GANGRENE: ICD-10-CM

## 2022-09-27 DIAGNOSIS — J06.9 VIRAL URI: ICD-10-CM

## 2022-09-27 DIAGNOSIS — G56.03 BILATERAL CARPAL TUNNEL SYNDROME: ICD-10-CM

## 2022-09-27 PROCEDURE — 99213 OFFICE O/P EST LOW 20 MIN: CPT | Mod: GE | Performed by: STUDENT IN AN ORGANIZED HEALTH CARE EDUCATION/TRAINING PROGRAM

## 2022-09-27 RX ORDER — TOPIRAMATE 25 MG/1
25 TABLET ORAL EVERY EVENING
Qty: 90 TABLET | Refills: 3 | Status: SHIPPED | OUTPATIENT
Start: 2022-09-27 | End: 2023-03-14

## 2022-09-27 NOTE — PROGRESS NOTES
[facility] FAMILY MEDICINE OFFICE VISIT    Date: 9/27/2022    MRN: [id number]  Patient ID: Martha Mosher    SUBJECTIVE:  Martha Mosher is a 62 y.o. female here for multiple medical concerns.  Patient reports that she recently had 1 week of runny nose and watery eyes, which she thought might potentially be due to seasonal allergies.  Has never had seasonal allergies before, and has lived in the [place] for a long time.  States that symptoms also resolved after 1 week, and have not occurred since.  Denies any known sick contacts, fevers, chills during that time.    Martha notes that she ran out of her topiramate for history of chronic migraine headaches.  Presently has been having migraines approximately twice per week.  Would like this refilled at this time.    Martha reports that she has been having bilateral hand swelling.  This is been an issue for many years.  Notes that her hands quickly become cold and change color in cold weather environments, which causes her to avoid them.  Swelling is noticeably worse during the morning, and gradually dissipates over the course of the day.  Martha does report that she continues to smoke cigarettes.  Is planning to quit prior to her surgery, though she does not yet have a date scheduled for this.  Notes that she does have a history of carpal tunnel syndrome.  Previously wear a wrist brace at night which helped with extent of pain, however not with swelling.  Also has trigger finger of her middle finger.  Has not had an injection on this yet.    PMHx/PSHx:  Past Medical History:   Diagnosis Date    Allergy, unspecified not elsewhere classified     Anxiety     Arthritis     Bronchitis 5/20/2022    Cancer (HCC)     cervical ca - hysterectomy    Chronic airway obstruction, not elsewhere classified     Depression     Fibromyalgia     GERD (gastroesophageal reflux disease)     Hypertension     takes lisinopril    Indigestion     hx colitis    Migraine      "Osteoporosis, unspecified     Other emphysema (HCC)     Other specified symptom associated with female genital organs     total hyster    Psychiatric disorder 2018    Renal disorder     kidney stone    Severe sepsis (HCC) 2018    Type II or unspecified type diabetes mellitus without mention of complication, not stated as uncontrolled     Ulcer     Unspecified asthma(493.90)     Unspecified cataract     Urolithiasis     has lithotripsy     Past Surgical History:   Procedure Laterality Date    COLONOSCOPY WITH BIOPSY  8/3/2009    Performed by GRAEME LOCK JR at ENDOSCOPY [facility]    GASTROSCOPY WITH BIOPSY  3/1/2009    Performed by LUIS ARMANDO SIERRA at ENDOSCOPY [facility]    ABDOMINAL HYSTERECTOMY TOTAL      APPENDECTOMY      GYN SURGERY      hysterectomy    HERNIA REPAIR      OTHER      lithotripsy    OTHER ABDOMINAL SURGERY      hysterectomy, hernia,     PRIMARY C SECTION         Allergies: Sulfa drugs and Lyrica    OBJECTIVE:  Vitals:    22 0855   BP: (P) 116/82   Pulse: (P) 88   Resp: 17   SpO2: (!) (P) 86%     Vitals:    22 0855   BP: (P) 116/82   Weight: 76.7 kg (169 lb)   Height: 1.473 m (4' 10\")       Physical Examination:  General: Well appearing female in no acute distress, resting on arrival to room  HEENT: Normocephalic, atraumatic, EOMI, bilateral tympanic membranes clear, nares patent, swollen bilateral nasal turbinates, no posterior oropharyngeal erythema or exudates  Cardiovascular: RRR, no murmurs, gallops, or rubs, 2+ radial pulses  Pulmonary: CTAB, symmetrical chest expansion, no rales, rhonchi, or wheezes  Extremities: Moves all spontaneously, mild swelling of bilateral hand digits, trigger finger of middle finger  Neurological: Alert and oriented, positive Tinnel's test bilateral wrists, negative at elbows    ASSESSMENT & PLAN:  Martha Mosher is a 62 y.o. female with multiple medical concerns as addressed below.      1. Viral URI        2. " Migraine with aura and without status migrainosus, not intractable  topiramate (TOPAMAX) 25 MG Tab      3. Bilateral carpal tunnel syndrome        4. Raynaud's phenomenon without gangrene        5. Trigger middle finger, unspecified laterality        6. Encounter for medication review            Orders Placed This Encounter    topiramate (TOPAMAX) 25 MG Tab       #Viral URI  Patient with recently reported watery eyes and runny nose, with largely unremarkable physical examination other than swollen nasal turbinates.  Given course as well as fact the patient has never had seasonal allergies before, this seems an unlikely diagnosis.  Discussed with patient that this most likely represented recent viral URI.  As this is already resolved, advised patient to notify physician if this is reoccurring, in which case we could attempt to treat for seasonal allergies.  Patient verbalized understanding and agreement of plan of care.    #Migraine headaches  Refilled patient's topiramate 25 mg oral nightly and sent to pharmacy of choice.    #Bilateral carpal tunnel syndrome  Patient with positive Tinel's test at the wrist, negative at the elbows bilaterally.  Discussed potential benefits of wearing nightly wrist braces to prevent worsening of symptoms, and provided to patient at today's visit.  If these do not significantly alleviate disease, could consider referral for carpal tunnel injection or surgical release.  We will continue to monitor at future visits.    #Raynaud's phenomenon without gangrene  Patient reporting intermittent swelling of hands, with significant cold sensitivity of hands, significant with Raynaud's syndrome.  Discussed with patient that #1 way to treat this would be avoidance of further tobacco use.  Martha states that she will discontinue use closer to her surgery.  Discussed with patient that at this time would not start amlodipine due to risk for lowering blood pressure significantly.  We will continue to  monitor at future visits.    #Middle finger trigger finger  Patient with present above listed finding.  Advised patient to schedule a visit in the next 2 to 3 weeks for trigger finger injection.  Patient verbalized understanding.    #Encounter for medication review  Reviewed patient's currently active medications and updated chart accordingly.    Janusz Larsen M.D.  Family Medicine Resident  PGY-4

## 2022-10-14 ENCOUNTER — TELEPHONE (OUTPATIENT)
Dept: MEDICAL GROUP | Facility: CLINIC | Age: 62
End: 2022-10-14
Payer: MEDICAID

## 2022-10-14 NOTE — TELEPHONE ENCOUNTER
Patient called saying she is very sick with colitis and has not been able to eat for the last few days. She is requesting an antibiotic be sent over to her pharmacy so she does not have to go to the hospital again.

## 2022-10-14 NOTE — TELEPHONE ENCOUNTER
She needs to get in for eval, if she cannot get in here, then an Urgent Care.  Not something I feel comfortable prescribing over the phone.

## 2022-10-25 ENCOUNTER — APPOINTMENT (OUTPATIENT)
Dept: MEDICAL GROUP | Facility: CLINIC | Age: 62
End: 2022-10-25
Payer: MEDICAID

## 2022-11-01 LAB
APOB+LDLR+PCSK9 GENE MUT ANL BLD/T: NOT DETECTED
BRCA1+BRCA2 DEL+DUP + FULL MUT ANL BLD/T: NOT DETECTED
MLH1+MSH2+MSH6+PMS2 GN DEL+DUP+FUL M: NOT DETECTED

## 2022-11-03 ENCOUNTER — OFFICE VISIT (OUTPATIENT)
Dept: MEDICAL GROUP | Facility: CLINIC | Age: 62
End: 2022-11-03
Payer: MEDICAID

## 2022-11-03 VITALS
BODY MASS INDEX: 35.26 KG/M2 | HEART RATE: 96 BPM | SYSTOLIC BLOOD PRESSURE: 122 MMHG | WEIGHT: 168 LBS | HEIGHT: 58 IN | DIASTOLIC BLOOD PRESSURE: 83 MMHG | OXYGEN SATURATION: 92 %

## 2022-11-03 DIAGNOSIS — K52.9 COLITIS: ICD-10-CM

## 2022-11-03 DIAGNOSIS — K21.9 GASTROESOPHAGEAL REFLUX DISEASE, UNSPECIFIED WHETHER ESOPHAGITIS PRESENT: ICD-10-CM

## 2022-11-03 DIAGNOSIS — Z12.11 COLON CANCER SCREENING: ICD-10-CM

## 2022-11-03 DIAGNOSIS — Z23 NEED FOR VACCINATION: ICD-10-CM

## 2022-11-03 DIAGNOSIS — K59.00 CONSTIPATION, UNSPECIFIED CONSTIPATION TYPE: ICD-10-CM

## 2022-11-03 PROCEDURE — 90686 IIV4 VACC NO PRSV 0.5 ML IM: CPT | Performed by: STUDENT IN AN ORGANIZED HEALTH CARE EDUCATION/TRAINING PROGRAM

## 2022-11-03 PROCEDURE — 90471 IMMUNIZATION ADMIN: CPT | Performed by: STUDENT IN AN ORGANIZED HEALTH CARE EDUCATION/TRAINING PROGRAM

## 2022-11-03 PROCEDURE — 99214 OFFICE O/P EST MOD 30 MIN: CPT | Mod: 25,GC | Performed by: STUDENT IN AN ORGANIZED HEALTH CARE EDUCATION/TRAINING PROGRAM

## 2022-11-03 RX ORDER — SENNA AND DOCUSATE SODIUM 50; 8.6 MG/1; MG/1
1 TABLET, FILM COATED ORAL DAILY
Qty: 30 TABLET | Refills: 11 | Status: ON HOLD | OUTPATIENT
Start: 2022-11-03 | End: 2023-01-03

## 2022-11-03 RX ORDER — OMEPRAZOLE 20 MG/1
40 CAPSULE, DELAYED RELEASE ORAL DAILY
Qty: 30 CAPSULE | Refills: 11 | Status: SHIPPED | OUTPATIENT
Start: 2022-11-03 | End: 2022-12-08 | Stop reason: SDUPTHER

## 2022-11-03 RX ORDER — POLYETHYLENE GLYCOL 3350 17 G/17G
17 POWDER, FOR SOLUTION ORAL DAILY
Qty: 255 G | Refills: 12 | Status: SHIPPED | OUTPATIENT
Start: 2022-11-03 | End: 2023-09-05 | Stop reason: SDUPTHER

## 2022-11-03 NOTE — PROGRESS NOTES
"Subjective:     CC: Blood per rectum, diarrhea, constipation     HPI:   Martha presents today with     Bloody bowel movements, diarrhea, constipation--  She reported a bloody stool 2 weeks ago. She reported \"pouring\" blood at that time. She had stomach pain and nausea. She drank broth which helped her stay hydrated x1 week. Then, she had diarrhea for three days starting 2 weeks ago, which stopped. She has not had a bowel movement in 1 week. She has been eating \"normal\" for one week. When she wipes she has blood on her bath tissue yesterday and today. She reported straining with bowel habits.  She has been diagnosed with colitis. Her father had a history of ischemic colitis. Her father passed away from colon cancer at 73 years of age. The patient's previous colonoscopy was in 2009. The reports her current symptome are similar to those felt when colitis was active.  She reported feeling nausea and lightened previously, however not now. She has increased her omeprazole which has helped with her pain. She is taking percocet 4 times a day for her spine pain. She denied any changes to her percocet dosing.     Current Outpatient Medications Ordered in Epic   Medication Sig Dispense Refill    sennosides-docusate sodium (SENOKOT-S) 8.6-50 MG tablet Take 1 Tablet by mouth every day. 30 Tablet 11    polyethylene glycol 3350 (MIRALAX) 17 GM/SCOOP Powder Take 17 g by mouth every day. 255 g 12    omeprazole (PRILOSEC) 20 MG delayed-release capsule Take 2 Capsules by mouth every day. 30 Capsule 11    topiramate (TOPAMAX) 25 MG Tab Take 1 Tablet by mouth every evening. 90 Tablet 3    DULoxetine (CYMBALTA) 60 MG Cap DR Particles delayed-release capsule CYMBALTA 60 MG CPEP      promethazine (PHENERGAN) 25 MG Tab TAKE 1 TABLET BY MOUTH EVERY 6 HOURS AS NEEDED FOR NAUSEA OR VOMITING 30 Tablet 11    albuterol (PROVENTIL HFA) 108 (90 Base) MCG/ACT Aero Soln inhalation aerosol Inhale 2 Puffs every 6 hours as needed for Shortness of " "Breath. 6.7 g 5    tiotropium (SPIRIVA HANDIHALER) 18 MCG Cap Place 1 Capsule into inhaler and inhale every day. 30 Capsule 11    bisacodyl (DULCOLAX) 10 MG Suppos BISACODYL 10 MG SUPP      quetiapine (SEROQUEL XR) 200 MG XR tablet Take 200 mg by mouth.      QUEtiapine (SEROQUEL) 100 MG Tab       tiotropium (SPIRIVA HANDIHALER) 18 MCG Cap SPIRIVA HANDIHALER 18 MCG CAPS      SUMAtriptan (IMITREX) 50 MG Tab Take 1 Tablet by mouth one time as needed for Migraine. 10 Tablet 3    oxyCODONE-acetaminophen (PERCOCET) 7.5-325 MG per tablet Take 1 Tab by mouth every 6 hours as needed.      traZODone (DESYREL) 100 MG Tab TAKE 3 TABLETS BY MOUTH EVERY NIGHT AT BEDTIME 90 Tab 0    quetiapine (SEROQUEL) 50 MG tablet Take 1 Tab by mouth 3 times a day. 90 Tab 1    gabapentin (NEURONTIN) 300 MG Cap Take 2 Caps by mouth 3 times a day. 180 Cap 3    Cholecalciferol 4000 units Cap Take 1 Capsule by mouth every day. 30 Cap 5    cyclobenzaprine (FLEXERIL) 10 MG Tab Take 10 mg by mouth every bedtime.      asa/apap/caffeine (EXCEDRIN) 250-250-65 MG Tab Take 1 Tab by mouth every 6 hours as needed for Headache.       No current Epic-ordered facility-administered medications on file.     ROS negative unless stated in HPI.    Objective:     Exam:  /83 (BP Location: Left arm, Patient Position: Sitting, BP Cuff Size: Adult)   Pulse 96   Ht 1.473 m (4' 10\")   Wt 76.2 kg (168 lb)   LMP  (LMP Unknown)   SpO2 92%   BMI 35.11 kg/m²  Body mass index is 35.11 kg/m².    Physical Exam  Cardiovascular:      Rate and Rhythm: Normal rate and regular rhythm.   Pulmonary:      Effort: Pulmonary effort is normal.      Breath sounds: Normal breath sounds.   Abdominal:      General: Bowel sounds are normal.      Palpations: Abdomen is soft.      Tenderness: There is abdominal tenderness.   Skin:     General: Skin is warm and dry.   Neurological:      Mental Status: She is alert.       No pertinent labs nor imaging avaliable.       Assessment & Plan: "     62 y.o. female with the following -     #Melena  #Nausea  #GERD  2/2 Colitis vs. constipation. Vs. Fecal impaction vs. Hemorrhoids. Vs. Ulcer   --The patient was encouraged to undergo formal evaluation and management at the emergency department as she would likely benefit from blood work to assess for anemia in the setting of GI bleed, XR imaging to assess for stool burden vs bowel perforation, CT for signs of inflammation. She will likely need consultation to assess for benefit of endoscopy vs colonscopy to assess for source of bleeding if not clearly colitis in nature. She expressed understanding.   --Patient provide referral to GI outpatient to establish care and undergo long term management of colitis vs gi bleeding, which includes, but not limited too colonoscopy in the setting of family history of colon cancer in first degree relative.   --Patient provided an increase in omeprazole dosing to 40 mg  --Patient provided PEG and senna for constipation     #Prevention and screening  --Patient provided flu vaccination today in clinic.     Return following ED visit and/or establishing care with GI.

## 2022-11-11 ENCOUNTER — TELEPHONE (OUTPATIENT)
Dept: MEDICAL GROUP | Facility: CLINIC | Age: 62
End: 2022-11-11
Payer: MEDICAID

## 2022-11-12 NOTE — TELEPHONE ENCOUNTER
The patient called requesting clearance for surgery since she was just seen at our office. She said the surgery clinic will be reaching out to us. Please let me know if she will need future appointments. Thank you !

## 2022-11-16 NOTE — TELEPHONE ENCOUNTER
Phone Number Called: [phone] (home) [phone] (work)     Call outcome: Did not leave a detailed message. Requested patient to call back.    Message: Patients mailbox is full. I was unable to ask the patient to schedule an appointment with our office for her surgical clearance.

## 2022-12-08 ENCOUNTER — OFFICE VISIT (OUTPATIENT)
Dept: MEDICAL GROUP | Facility: CLINIC | Age: 62
End: 2022-12-08
Payer: MEDICAID

## 2022-12-08 DIAGNOSIS — Z01.818 PREOPERATIVE CLEARANCE: ICD-10-CM

## 2022-12-08 DIAGNOSIS — M79.604 PAIN OF RIGHT LOWER EXTREMITY: ICD-10-CM

## 2022-12-08 DIAGNOSIS — K21.9 GASTROESOPHAGEAL REFLUX DISEASE, UNSPECIFIED WHETHER ESOPHAGITIS PRESENT: ICD-10-CM

## 2022-12-08 PROCEDURE — 99213 OFFICE O/P EST LOW 20 MIN: CPT | Mod: GE | Performed by: STUDENT IN AN ORGANIZED HEALTH CARE EDUCATION/TRAINING PROGRAM

## 2022-12-08 RX ORDER — OMEPRAZOLE 20 MG/1
40 CAPSULE, DELAYED RELEASE ORAL DAILY
Qty: 60 CAPSULE | Refills: 5 | Status: ON HOLD | OUTPATIENT
Start: 2022-12-08 | End: 2023-01-03

## 2022-12-08 ASSESSMENT — FIBROSIS 4 INDEX: FIB4 SCORE: 4.2

## 2022-12-09 NOTE — PROGRESS NOTES
[facility] FAMILY MEDICINE OFFICE VISIT    Date: 12/9/2022    MRN: [id number]  Patient ID: Martha Mosher    SUBJECTIVE:  Martha Mosher is a 62 y.o. female here for preoperative clearance.  Martha reports that she had surgery approximately 1 year ago for parotid gland removal, and had no complications related to the procedure or anesthesia at that time.  Patient has an upcoming procedure planned for L4-L5 laminectomy.  Martha denies any history of issues with anesthesia.  Does note that she becomes short of breath with moderate exertion, though can walk 1 city block without becoming short of breath.  Patient does note that she continues to smoke cigarettes, and has a history of COPD for which she takes daily Spiriva.    Martha also notes a history of chronic acid reflux, and request refills of her omeprazole at this time.    Martha also reports several days of right lower leg swelling and pain just distal to the knee.  States that extent of swelling is very mild, and wonders whether she might have hit her knee on something.  Has not been taking medication for this as of yet.    PMHx/PSHx:  Past Medical History:   Diagnosis Date    Allergy, unspecified not elsewhere classified     Anxiety     Arthritis     Bronchitis 5/20/2022    Cancer (HCC)     cervical ca - hysterectomy    Chronic airway obstruction, not elsewhere classified     Depression     Fibromyalgia     GERD (gastroesophageal reflux disease)     Hypertension     takes lisinopril    Indigestion     hx colitis    Migraine     Osteoporosis, unspecified     Other emphysema (HCC)     Other specified symptom associated with female genital organs     total hyster    Psychiatric disorder 1/29/2018    Renal disorder     kidney stone    Severe sepsis (HCC) 5/13/2018    Type II or unspecified type diabetes mellitus without mention of complication, not stated as uncontrolled     Ulcer     Unspecified asthma(493.90)     Unspecified cataract     Urolithiasis   "   has lithotripsy     Past Surgical History:   Procedure Laterality Date    COLONOSCOPY WITH BIOPSY  8/3/2009    Performed by GRAEME LOCK JR at ENDOSCOPY [facility] ORS    GASTROSCOPY WITH BIOPSY  3/1/2009    Performed by LUIS ARMANDO SIERRA at ENDOSCOPY [facility] ORS    ABDOMINAL HYSTERECTOMY TOTAL      APPENDECTOMY      GYN SURGERY      hysterectomy    HERNIA REPAIR      OTHER      lithotripsy    OTHER ABDOMINAL SURGERY      hysterectomy, hernia,     PRIMARY C SECTION         Allergies: Sulfa drugs and Lyrica    OBJECTIVE:  Vitals:    22 1122   BP: (P) 119/84   Pulse: (P) 94   Resp: (P) 18   SpO2: (P) 92%     Vitals:    22 1122   BP: (P) 119/84   Weight: (P) 78 kg (172 lb)   Height: (P) 1.473 m (4' 10\")       Physical Examination:  General: Well appearing female in no acute distress, resting on arrival to room  HEENT: Normocephalic, atraumatic, EOMI  Cardiovascular: RRR, no murmurs, gallops, or rubs  Pulmonary: Diminished breath sounds though CTAB, symmetrical chest expansion, no rales, rhonchi, or wheezes  Extremities: Moves all spontaneously, tenderness to palpation associated with mild swelling of the dermal tissue just medial to right tibial tuberosity, nontender to palpation of knee  Neurological: Alert and oriented    ASSESSMENT & PLAN:  Martha Mosher is a 62 y.o. female here for preoperative clearance, found to have other concerns as addressed below.    1. Preoperative clearance        2. Gastroesophageal reflux disease, unspecified whether esophagitis present  omeprazole (PRILOSEC) 20 MG delayed-release capsule      3. Pain of right lower extremity            Orders Placed This Encounter    omeprazole (PRILOSEC) 20 MG delayed-release capsule       #Preoperative clearance  Physician performed NSQIP risk calculation, returning a 10.8% risk of serious adverse outcome and 12.7% risk of any complication.  Discussed with patient that this is higher than average risk, and " recommended total cessation of cigarettes prior to her surgery.  However, this reduces overall risk only by 1%, and appears to be the only means by which to optimize patient prior to surgery.  As patient tolerated surgery 1 year ago, discussed with patient that at this time she is likely capable of undergoing surgery yet again.  Did discuss potential 1 and 10 risk for severe adverse outcome, which patient states that she is willing to accept.  Physician signed preoperative clearance form, and left with MA to fax to patient's surgeon.    #Gastroesophageal reflux disease  Refilled omeprazole to 40 mg oral daily and sent to pharmacy of choice.    #Pain of right lower extremity  Physician performed in-house point-of-care ultrasound, revealing vaguely increased lucency of the subdermal tissue most likely representing local inflammation.  This is localized to the right leg just medial of the tibial tuberosity.  Remainder of leg not swollen, unlikely to be acute blood clot.  Discussed with patient that it is possible that she indeed did kick something she did not intend to.  Recommended the use of ice packs to reduce swelling, as well as topical diclofenac 3 times daily for at least 5 days.  Discussed that should this fail to resolve symptoms, she should return for repeat evaluation.  Patient verbalized understanding.    Janusz Larsen M.D.  Family Medicine Resident  PGY-4

## 2022-12-20 ENCOUNTER — HOSPITAL ENCOUNTER (OUTPATIENT)
Dept: RADIOLOGY | Facility: MEDICAL CENTER | Age: 62
End: 2022-12-20
Attending: NEUROLOGICAL SURGERY
Payer: MEDICAID

## 2022-12-20 ENCOUNTER — PRE-ADMISSION TESTING (OUTPATIENT)
Dept: ADMISSIONS | Facility: MEDICAL CENTER | Age: 62
End: 2022-12-20
Attending: NEUROLOGICAL SURGERY
Payer: MEDICAID

## 2022-12-20 DIAGNOSIS — Z01.810 PRE-OPERATIVE CARDIOVASCULAR EXAMINATION: ICD-10-CM

## 2022-12-20 DIAGNOSIS — Z01.811 PRE-OPERATIVE RESPIRATORY EXAMINATION: ICD-10-CM

## 2022-12-20 DIAGNOSIS — Z01.812 PRE-OPERATIVE LABORATORY EXAMINATION: ICD-10-CM

## 2022-12-20 LAB
ANION GAP SERPL CALC-SCNC: 8 MMOL/L (ref 7–16)
APTT PPP: 29.5 SEC (ref 24.7–36)
BASOPHILS # BLD AUTO: 1.2 % (ref 0–1.8)
BASOPHILS # BLD: 0.05 K/UL (ref 0–0.12)
BUN SERPL-MCNC: 14 MG/DL (ref 8–22)
CALCIUM SERPL-MCNC: 9.6 MG/DL (ref 8.5–10.5)
CHLORIDE SERPL-SCNC: 107 MMOL/L (ref 96–112)
CO2 SERPL-SCNC: 24 MMOL/L (ref 20–33)
CREAT SERPL-MCNC: 0.88 MG/DL (ref 0.5–1.4)
EKG IMPRESSION: NORMAL
EOSINOPHIL # BLD AUTO: 0.13 K/UL (ref 0–0.51)
EOSINOPHIL NFR BLD: 3.2 % (ref 0–6.9)
ERYTHROCYTE [DISTWIDTH] IN BLOOD BY AUTOMATED COUNT: 46.7 FL (ref 35.9–50)
EST. AVERAGE GLUCOSE BLD GHB EST-MCNC: 111 MG/DL
GFR SERPLBLD CREATININE-BSD FMLA CKD-EPI: 74 ML/MIN/1.73 M 2
GLUCOSE SERPL-MCNC: 85 MG/DL (ref 65–99)
HBA1C MFR BLD: 5.5 % (ref 4–5.6)
HCT VFR BLD AUTO: 44.8 % (ref 37–47)
HGB BLD-MCNC: 14.4 G/DL (ref 12–16)
IMM GRANULOCYTES # BLD AUTO: 0.02 K/UL (ref 0–0.11)
IMM GRANULOCYTES NFR BLD AUTO: 0.5 % (ref 0–0.9)
INR PPP: 1.01 (ref 0.87–1.13)
LYMPHOCYTES # BLD AUTO: 1.43 K/UL (ref 1–4.8)
LYMPHOCYTES NFR BLD: 35 % (ref 22–41)
MCH RBC QN AUTO: 30.4 PG (ref 27–33)
MCHC RBC AUTO-ENTMCNC: 32.1 G/DL (ref 33.6–35)
MCV RBC AUTO: 94.7 FL (ref 81.4–97.8)
MONOCYTES # BLD AUTO: 0.3 K/UL (ref 0–0.85)
MONOCYTES NFR BLD AUTO: 7.3 % (ref 0–13.4)
NEUTROPHILS # BLD AUTO: 2.16 K/UL (ref 2–7.15)
NEUTROPHILS NFR BLD: 52.8 % (ref 44–72)
NRBC # BLD AUTO: 0 K/UL
NRBC BLD-RTO: 0 /100 WBC
PLATELET # BLD AUTO: 173 K/UL (ref 164–446)
PMV BLD AUTO: 10.5 FL (ref 9–12.9)
POTASSIUM SERPL-SCNC: 4.5 MMOL/L (ref 3.6–5.5)
PROTHROMBIN TIME: 13.2 SEC (ref 12–14.6)
RBC # BLD AUTO: 4.73 M/UL (ref 4.2–5.4)
SODIUM SERPL-SCNC: 139 MMOL/L (ref 135–145)
WBC # BLD AUTO: 4.1 K/UL (ref 4.8–10.8)

## 2022-12-20 PROCEDURE — 85610 PROTHROMBIN TIME: CPT

## 2022-12-20 PROCEDURE — 80048 BASIC METABOLIC PNL TOTAL CA: CPT

## 2022-12-20 PROCEDURE — 36415 COLL VENOUS BLD VENIPUNCTURE: CPT

## 2022-12-20 PROCEDURE — 83036 HEMOGLOBIN GLYCOSYLATED A1C: CPT

## 2022-12-20 PROCEDURE — 85025 COMPLETE CBC W/AUTO DIFF WBC: CPT

## 2022-12-20 PROCEDURE — 71046 X-RAY EXAM CHEST 2 VIEWS: CPT

## 2022-12-20 PROCEDURE — 85730 THROMBOPLASTIN TIME PARTIAL: CPT

## 2022-12-20 PROCEDURE — 93005 ELECTROCARDIOGRAM TRACING: CPT

## 2022-12-20 PROCEDURE — 93010 ELECTROCARDIOGRAM REPORT: CPT | Performed by: INTERNAL MEDICINE

## 2022-12-20 RX ORDER — DULOXETIN HYDROCHLORIDE 30 MG/1
30 CAPSULE, DELAYED RELEASE ORAL EVERY MORNING
Status: ON HOLD | COMMUNITY
Start: 2022-11-15 | End: 2024-01-11

## 2022-12-20 RX ORDER — BUSPIRONE HYDROCHLORIDE 7.5 MG/1
7.5 TABLET ORAL 2 TIMES DAILY
Status: ON HOLD | COMMUNITY
Start: 2022-11-15 | End: 2024-01-11

## 2022-12-20 ASSESSMENT — FIBROSIS 4 INDEX: FIB4 SCORE: 4.2

## 2023-01-03 ENCOUNTER — HOSPITAL ENCOUNTER (OUTPATIENT)
Facility: MEDICAL CENTER | Age: 63
End: 2023-01-05
Attending: NEUROLOGICAL SURGERY | Admitting: NEUROLOGICAL SURGERY
Payer: MEDICAID

## 2023-01-03 ENCOUNTER — APPOINTMENT (OUTPATIENT)
Dept: RADIOLOGY | Facility: MEDICAL CENTER | Age: 63
End: 2023-01-03
Attending: NEUROLOGICAL SURGERY
Payer: MEDICAID

## 2023-01-03 ENCOUNTER — ANESTHESIA EVENT (OUTPATIENT)
Dept: SURGERY | Facility: MEDICAL CENTER | Age: 63
End: 2023-01-03
Payer: MEDICAID

## 2023-01-03 ENCOUNTER — ANESTHESIA (OUTPATIENT)
Dept: SURGERY | Facility: MEDICAL CENTER | Age: 63
End: 2023-01-03
Payer: MEDICAID

## 2023-01-03 DIAGNOSIS — R09.02 HYPOXIA: ICD-10-CM

## 2023-01-03 DIAGNOSIS — M54.50 LOW BACK PAIN ASSOCIATED WITH A SPINAL DISORDER OTHER THAN RADICULOPATHY OR SPINAL STENOSIS: ICD-10-CM

## 2023-01-03 PROBLEM — M48.061 LUMBAR STENOSIS WITHOUT NEUROGENIC CLAUDICATION: Status: ACTIVE | Noted: 2023-01-03

## 2023-01-03 LAB
GLUCOSE BLD STRIP.AUTO-MCNC: 124 MG/DL (ref 65–99)
GLUCOSE BLD STRIP.AUTO-MCNC: 96 MG/DL (ref 65–99)

## 2023-01-03 PROCEDURE — 00630 ANES PX LUMBAR REGION NOS: CPT | Performed by: STUDENT IN AN ORGANIZED HEALTH CARE EDUCATION/TRAINING PROGRAM

## 2023-01-03 PROCEDURE — 160035 HCHG PACU - 1ST 60 MINS PHASE I: Performed by: NEUROLOGICAL SURGERY

## 2023-01-03 PROCEDURE — 160009 HCHG ANES TIME/MIN: Performed by: NEUROLOGICAL SURGERY

## 2023-01-03 PROCEDURE — 700105 HCHG RX REV CODE 258: Performed by: NEUROLOGICAL SURGERY

## 2023-01-03 PROCEDURE — 110454 HCHG SHELL REV 250: Performed by: NEUROLOGICAL SURGERY

## 2023-01-03 PROCEDURE — 160029 HCHG SURGERY MINUTES - 1ST 30 MINS LEVEL 4: Performed by: NEUROLOGICAL SURGERY

## 2023-01-03 PROCEDURE — 700102 HCHG RX REV CODE 250 W/ 637 OVERRIDE(OP): Performed by: STUDENT IN AN ORGANIZED HEALTH CARE EDUCATION/TRAINING PROGRAM

## 2023-01-03 PROCEDURE — 160002 HCHG RECOVERY MINUTES (STAT): Performed by: NEUROLOGICAL SURGERY

## 2023-01-03 PROCEDURE — 160047 HCHG PACU  - EA ADDL 30 MINS PHASE II: Performed by: NEUROLOGICAL SURGERY

## 2023-01-03 PROCEDURE — 700111 HCHG RX REV CODE 636 W/ 250 OVERRIDE (IP): Performed by: NURSE PRACTITIONER

## 2023-01-03 PROCEDURE — 700105 HCHG RX REV CODE 258: Performed by: NURSE PRACTITIONER

## 2023-01-03 PROCEDURE — A9270 NON-COVERED ITEM OR SERVICE: HCPCS | Performed by: NURSE PRACTITIONER

## 2023-01-03 PROCEDURE — 72020 X-RAY EXAM OF SPINE 1 VIEW: CPT

## 2023-01-03 PROCEDURE — 700101 HCHG RX REV CODE 250: Performed by: NURSE PRACTITIONER

## 2023-01-03 PROCEDURE — 96375 TX/PRO/DX INJ NEW DRUG ADDON: CPT

## 2023-01-03 PROCEDURE — 700105 HCHG RX REV CODE 258: Performed by: STUDENT IN AN ORGANIZED HEALTH CARE EDUCATION/TRAINING PROGRAM

## 2023-01-03 PROCEDURE — 160041 HCHG SURGERY MINUTES - EA ADDL 1 MIN LEVEL 4: Performed by: NEUROLOGICAL SURGERY

## 2023-01-03 PROCEDURE — 700111 HCHG RX REV CODE 636 W/ 250 OVERRIDE (IP): Performed by: STUDENT IN AN ORGANIZED HEALTH CARE EDUCATION/TRAINING PROGRAM

## 2023-01-03 PROCEDURE — A9270 NON-COVERED ITEM OR SERVICE: HCPCS | Performed by: STUDENT IN AN ORGANIZED HEALTH CARE EDUCATION/TRAINING PROGRAM

## 2023-01-03 PROCEDURE — 110371 HCHG SHELL REV 272: Performed by: NEUROLOGICAL SURGERY

## 2023-01-03 PROCEDURE — 96365 THER/PROPH/DIAG IV INF INIT: CPT

## 2023-01-03 PROCEDURE — 160048 HCHG OR STATISTICAL LEVEL 1-5: Performed by: NEUROLOGICAL SURGERY

## 2023-01-03 PROCEDURE — 700101 HCHG RX REV CODE 250: Performed by: STUDENT IN AN ORGANIZED HEALTH CARE EDUCATION/TRAINING PROGRAM

## 2023-01-03 PROCEDURE — G0378 HOSPITAL OBSERVATION PER HR: HCPCS

## 2023-01-03 PROCEDURE — 700102 HCHG RX REV CODE 250 W/ 637 OVERRIDE(OP): Performed by: NURSE PRACTITIONER

## 2023-01-03 PROCEDURE — 160025 RECOVERY II MINUTES (STATS): Performed by: NEUROLOGICAL SURGERY

## 2023-01-03 PROCEDURE — 700111 HCHG RX REV CODE 636 W/ 250 OVERRIDE (IP): Performed by: NEUROLOGICAL SURGERY

## 2023-01-03 PROCEDURE — 82962 GLUCOSE BLOOD TEST: CPT

## 2023-01-03 PROCEDURE — 700101 HCHG RX REV CODE 250: Performed by: NEUROLOGICAL SURGERY

## 2023-01-03 PROCEDURE — 160046 HCHG PACU - 1ST 60 MINS PHASE II: Performed by: NEUROLOGICAL SURGERY

## 2023-01-03 RX ORDER — ONDANSETRON 4 MG/1
4 TABLET, ORALLY DISINTEGRATING ORAL EVERY 4 HOURS PRN
Status: DISCONTINUED | OUTPATIENT
Start: 2023-01-03 | End: 2023-01-05 | Stop reason: HOSPADM

## 2023-01-03 RX ORDER — CEFAZOLIN SODIUM 1 G/3ML
INJECTION, POWDER, FOR SOLUTION INTRAMUSCULAR; INTRAVENOUS
Status: DISCONTINUED | OUTPATIENT
Start: 2023-01-03 | End: 2023-01-03 | Stop reason: HOSPADM

## 2023-01-03 RX ORDER — ALBUTEROL SULFATE 90 UG/1
2 AEROSOL, METERED RESPIRATORY (INHALATION) EVERY 6 HOURS PRN
Status: DISCONTINUED | OUTPATIENT
Start: 2023-01-03 | End: 2023-01-05 | Stop reason: HOSPADM

## 2023-01-03 RX ORDER — PROMETHAZINE HYDROCHLORIDE 25 MG/1
12.5-25 TABLET ORAL EVERY 4 HOURS PRN
Status: DISCONTINUED | OUTPATIENT
Start: 2023-01-03 | End: 2023-01-05 | Stop reason: HOSPADM

## 2023-01-03 RX ORDER — TOPIRAMATE 25 MG/1
25 TABLET ORAL EVERY EVENING
Status: DISCONTINUED | OUTPATIENT
Start: 2023-01-03 | End: 2023-01-05 | Stop reason: HOSPADM

## 2023-01-03 RX ORDER — TRAZODONE HYDROCHLORIDE 150 MG/1
150 TABLET ORAL
Status: ON HOLD | COMMUNITY
End: 2024-01-11

## 2023-01-03 RX ORDER — BUSPIRONE HYDROCHLORIDE 5 MG/1
7.5 TABLET ORAL 2 TIMES DAILY
Status: DISCONTINUED | OUTPATIENT
Start: 2023-01-03 | End: 2023-01-05 | Stop reason: HOSPADM

## 2023-01-03 RX ORDER — POLYETHYLENE GLYCOL 3350 17 G/17G
17 POWDER, FOR SOLUTION ORAL DAILY
Status: DISCONTINUED | OUTPATIENT
Start: 2023-01-03 | End: 2023-01-03

## 2023-01-03 RX ORDER — DIPHENHYDRAMINE HCL 25 MG
25 TABLET ORAL EVERY 6 HOURS PRN
Status: DISCONTINUED | OUTPATIENT
Start: 2023-01-03 | End: 2023-01-05 | Stop reason: HOSPADM

## 2023-01-03 RX ORDER — METOPROLOL TARTRATE 1 MG/ML
1 INJECTION, SOLUTION INTRAVENOUS
Status: DISCONTINUED | OUTPATIENT
Start: 2023-01-03 | End: 2023-01-03 | Stop reason: HOSPADM

## 2023-01-03 RX ORDER — LIDOCAINE HYDROCHLORIDE 20 MG/ML
INJECTION, SOLUTION EPIDURAL; INFILTRATION; INTRACAUDAL; PERINEURAL PRN
Status: DISCONTINUED | OUTPATIENT
Start: 2023-01-03 | End: 2023-01-03 | Stop reason: SURG

## 2023-01-03 RX ORDER — DEXAMETHASONE SODIUM PHOSPHATE 4 MG/ML
INJECTION, SOLUTION INTRA-ARTICULAR; INTRALESIONAL; INTRAMUSCULAR; INTRAVENOUS; SOFT TISSUE PRN
Status: DISCONTINUED | OUTPATIENT
Start: 2023-01-03 | End: 2023-01-03 | Stop reason: SURG

## 2023-01-03 RX ORDER — LABETALOL HYDROCHLORIDE 5 MG/ML
5 INJECTION, SOLUTION INTRAVENOUS
Status: DISCONTINUED | OUTPATIENT
Start: 2023-01-03 | End: 2023-01-03 | Stop reason: HOSPADM

## 2023-01-03 RX ORDER — BUPIVACAINE HYDROCHLORIDE AND EPINEPHRINE 5; 5 MG/ML; UG/ML
INJECTION, SOLUTION PERINEURAL
Status: DISCONTINUED | OUTPATIENT
Start: 2023-01-03 | End: 2023-01-03 | Stop reason: HOSPADM

## 2023-01-03 RX ORDER — OMEPRAZOLE 20 MG/1
20 CAPSULE, DELAYED RELEASE ORAL DAILY
Status: DISCONTINUED | OUTPATIENT
Start: 2023-01-04 | End: 2023-01-05 | Stop reason: HOSPADM

## 2023-01-03 RX ORDER — OXYCODONE HCL 5 MG/5 ML
10 SOLUTION, ORAL ORAL
Status: COMPLETED | OUTPATIENT
Start: 2023-01-03 | End: 2023-01-03

## 2023-01-03 RX ORDER — ONDANSETRON 2 MG/ML
INJECTION INTRAMUSCULAR; INTRAVENOUS PRN
Status: DISCONTINUED | OUTPATIENT
Start: 2023-01-03 | End: 2023-01-03 | Stop reason: SURG

## 2023-01-03 RX ORDER — SODIUM CHLORIDE, SODIUM LACTATE, POTASSIUM CHLORIDE, CALCIUM CHLORIDE 600; 310; 30; 20 MG/100ML; MG/100ML; MG/100ML; MG/100ML
INJECTION, SOLUTION INTRAVENOUS CONTINUOUS
Status: ACTIVE | OUTPATIENT
Start: 2023-01-03 | End: 2023-01-03

## 2023-01-03 RX ORDER — QUETIAPINE FUMARATE 50 MG/1
50 TABLET, FILM COATED ORAL 3 TIMES DAILY
Status: DISCONTINUED | OUTPATIENT
Start: 2023-01-03 | End: 2023-01-05 | Stop reason: HOSPADM

## 2023-01-03 RX ORDER — PROCHLORPERAZINE EDISYLATE 5 MG/ML
5-10 INJECTION INTRAMUSCULAR; INTRAVENOUS EVERY 4 HOURS PRN
Status: DISCONTINUED | OUTPATIENT
Start: 2023-01-03 | End: 2023-01-05 | Stop reason: HOSPADM

## 2023-01-03 RX ORDER — SODIUM CHLORIDE AND POTASSIUM CHLORIDE 150; 900 MG/100ML; MG/100ML
INJECTION, SOLUTION INTRAVENOUS CONTINUOUS
Status: DISCONTINUED | OUTPATIENT
Start: 2023-01-03 | End: 2023-01-04

## 2023-01-03 RX ORDER — LIDOCAINE HYDROCHLORIDE 40 MG/ML
SOLUTION TOPICAL PRN
Status: DISCONTINUED | OUTPATIENT
Start: 2023-01-03 | End: 2023-01-03 | Stop reason: SURG

## 2023-01-03 RX ORDER — GABAPENTIN 300 MG/1
600 CAPSULE ORAL 3 TIMES DAILY
Status: DISCONTINUED | OUTPATIENT
Start: 2023-01-03 | End: 2023-01-05 | Stop reason: HOSPADM

## 2023-01-03 RX ORDER — OXYCODONE HCL 5 MG/5 ML
5 SOLUTION, ORAL ORAL
Status: COMPLETED | OUTPATIENT
Start: 2023-01-03 | End: 2023-01-03

## 2023-01-03 RX ORDER — SCOLOPAMINE TRANSDERMAL SYSTEM 1 MG/1
1 PATCH, EXTENDED RELEASE TRANSDERMAL
Status: DISCONTINUED | OUTPATIENT
Start: 2023-01-03 | End: 2023-01-03 | Stop reason: HOSPADM

## 2023-01-03 RX ORDER — METHOCARBAMOL 750 MG/1
750 TABLET, FILM COATED ORAL EVERY 8 HOURS PRN
Status: DISCONTINUED | OUTPATIENT
Start: 2023-01-03 | End: 2023-01-05 | Stop reason: HOSPADM

## 2023-01-03 RX ORDER — CEFAZOLIN SODIUM 1 G/3ML
INJECTION, POWDER, FOR SOLUTION INTRAMUSCULAR; INTRAVENOUS PRN
Status: DISCONTINUED | OUTPATIENT
Start: 2023-01-03 | End: 2023-01-03 | Stop reason: SURG

## 2023-01-03 RX ORDER — CYCLOBENZAPRINE HCL 10 MG
10 TABLET ORAL EVERY 8 HOURS PRN
Status: DISCONTINUED | OUTPATIENT
Start: 2023-01-03 | End: 2023-01-05 | Stop reason: HOSPADM

## 2023-01-03 RX ORDER — DIPHENHYDRAMINE HYDROCHLORIDE 50 MG/ML
25 INJECTION INTRAMUSCULAR; INTRAVENOUS EVERY 6 HOURS PRN
Status: DISCONTINUED | OUTPATIENT
Start: 2023-01-03 | End: 2023-01-05 | Stop reason: HOSPADM

## 2023-01-03 RX ORDER — BISACODYL 10 MG
10 SUPPOSITORY, RECTAL RECTAL
Status: DISCONTINUED | OUTPATIENT
Start: 2023-01-03 | End: 2023-01-05 | Stop reason: HOSPADM

## 2023-01-03 RX ORDER — OMEPRAZOLE 20 MG/1
20 CAPSULE, DELAYED RELEASE ORAL DAILY
COMMUNITY
End: 2023-04-17

## 2023-01-03 RX ORDER — LABETALOL HYDROCHLORIDE 5 MG/ML
10 INJECTION, SOLUTION INTRAVENOUS
Status: DISCONTINUED | OUTPATIENT
Start: 2023-01-03 | End: 2023-01-05 | Stop reason: HOSPADM

## 2023-01-03 RX ORDER — POLYETHYLENE GLYCOL 3350 17 G/17G
1 POWDER, FOR SOLUTION ORAL 2 TIMES DAILY PRN
Status: DISCONTINUED | OUTPATIENT
Start: 2023-01-03 | End: 2023-01-05 | Stop reason: HOSPADM

## 2023-01-03 RX ORDER — MEPERIDINE HYDROCHLORIDE 25 MG/ML
12.5 INJECTION INTRAMUSCULAR; INTRAVENOUS; SUBCUTANEOUS
Status: DISCONTINUED | OUTPATIENT
Start: 2023-01-03 | End: 2023-01-03 | Stop reason: HOSPADM

## 2023-01-03 RX ORDER — ACETAMINOPHEN 500 MG
1000 TABLET ORAL ONCE
Status: COMPLETED | OUTPATIENT
Start: 2023-01-03 | End: 2023-01-03

## 2023-01-03 RX ORDER — ONDANSETRON 2 MG/ML
4 INJECTION INTRAMUSCULAR; INTRAVENOUS
Status: COMPLETED | OUTPATIENT
Start: 2023-01-03 | End: 2023-01-03

## 2023-01-03 RX ORDER — PROMETHAZINE HYDROCHLORIDE 25 MG/1
12.5-25 SUPPOSITORY RECTAL EVERY 4 HOURS PRN
Status: DISCONTINUED | OUTPATIENT
Start: 2023-01-03 | End: 2023-01-05 | Stop reason: HOSPADM

## 2023-01-03 RX ORDER — HYDRALAZINE HYDROCHLORIDE 20 MG/ML
5 INJECTION INTRAMUSCULAR; INTRAVENOUS
Status: DISCONTINUED | OUTPATIENT
Start: 2023-01-03 | End: 2023-01-03 | Stop reason: HOSPADM

## 2023-01-03 RX ORDER — HALOPERIDOL 5 MG/ML
1 INJECTION INTRAMUSCULAR
Status: DISCONTINUED | OUTPATIENT
Start: 2023-01-03 | End: 2023-01-03 | Stop reason: HOSPADM

## 2023-01-03 RX ORDER — AMOXICILLIN 250 MG
1 CAPSULE ORAL
Status: DISCONTINUED | OUTPATIENT
Start: 2023-01-03 | End: 2023-01-05 | Stop reason: HOSPADM

## 2023-01-03 RX ORDER — DOCUSATE SODIUM 100 MG/1
100 CAPSULE, LIQUID FILLED ORAL 2 TIMES DAILY
Status: DISCONTINUED | OUTPATIENT
Start: 2023-01-03 | End: 2023-01-05 | Stop reason: HOSPADM

## 2023-01-03 RX ORDER — DULOXETIN HYDROCHLORIDE 60 MG/1
60 CAPSULE, DELAYED RELEASE ORAL NIGHTLY
Status: DISCONTINUED | OUTPATIENT
Start: 2023-01-03 | End: 2023-01-05 | Stop reason: HOSPADM

## 2023-01-03 RX ORDER — DIAZEPAM 5 MG/1
5 TABLET ORAL EVERY 4 HOURS PRN
Status: DISCONTINUED | OUTPATIENT
Start: 2023-01-03 | End: 2023-01-05 | Stop reason: HOSPADM

## 2023-01-03 RX ORDER — SUMATRIPTAN 50 MG/1
50 TABLET, FILM COATED ORAL
Status: DISCONTINUED | OUTPATIENT
Start: 2023-01-03 | End: 2023-01-05 | Stop reason: HOSPADM

## 2023-01-03 RX ORDER — HYDROMORPHONE HYDROCHLORIDE 1 MG/ML
0.2 INJECTION, SOLUTION INTRAMUSCULAR; INTRAVENOUS; SUBCUTANEOUS
Status: DISCONTINUED | OUTPATIENT
Start: 2023-01-03 | End: 2023-01-03 | Stop reason: HOSPADM

## 2023-01-03 RX ORDER — GABAPENTIN 300 MG/1
300 CAPSULE ORAL ONCE
Status: COMPLETED | OUTPATIENT
Start: 2023-01-03 | End: 2023-01-03

## 2023-01-03 RX ORDER — DIPHENHYDRAMINE HYDROCHLORIDE 50 MG/ML
12.5 INJECTION INTRAMUSCULAR; INTRAVENOUS
Status: DISCONTINUED | OUTPATIENT
Start: 2023-01-03 | End: 2023-01-03 | Stop reason: HOSPADM

## 2023-01-03 RX ORDER — HYDROMORPHONE HYDROCHLORIDE 1 MG/ML
0.1 INJECTION, SOLUTION INTRAMUSCULAR; INTRAVENOUS; SUBCUTANEOUS
Status: DISCONTINUED | OUTPATIENT
Start: 2023-01-03 | End: 2023-01-03 | Stop reason: HOSPADM

## 2023-01-03 RX ORDER — TRAZODONE HYDROCHLORIDE 100 MG/1
200 TABLET ORAL NIGHTLY
Status: DISCONTINUED | OUTPATIENT
Start: 2023-01-03 | End: 2023-01-05 | Stop reason: HOSPADM

## 2023-01-03 RX ORDER — HYDRALAZINE HYDROCHLORIDE 20 MG/ML
10 INJECTION INTRAMUSCULAR; INTRAVENOUS
Status: DISCONTINUED | OUTPATIENT
Start: 2023-01-03 | End: 2023-01-05 | Stop reason: HOSPADM

## 2023-01-03 RX ORDER — IPRATROPIUM BROMIDE AND ALBUTEROL SULFATE 2.5; .5 MG/3ML; MG/3ML
3 SOLUTION RESPIRATORY (INHALATION)
Status: DISCONTINUED | OUTPATIENT
Start: 2023-01-03 | End: 2023-01-03 | Stop reason: HOSPADM

## 2023-01-03 RX ORDER — HYDROMORPHONE HYDROCHLORIDE 1 MG/ML
0.4 INJECTION, SOLUTION INTRAMUSCULAR; INTRAVENOUS; SUBCUTANEOUS
Status: DISCONTINUED | OUTPATIENT
Start: 2023-01-03 | End: 2023-01-03 | Stop reason: HOSPADM

## 2023-01-03 RX ORDER — AMOXICILLIN 250 MG
1 CAPSULE ORAL NIGHTLY
Status: DISCONTINUED | OUTPATIENT
Start: 2023-01-03 | End: 2023-01-05 | Stop reason: HOSPADM

## 2023-01-03 RX ORDER — CYCLOBENZAPRINE HCL 10 MG
10 TABLET ORAL EVERY EVENING
Status: DISCONTINUED | OUTPATIENT
Start: 2023-01-03 | End: 2023-01-05 | Stop reason: HOSPADM

## 2023-01-03 RX ORDER — ENEMA 19; 7 G/133ML; G/133ML
1 ENEMA RECTAL
Status: DISCONTINUED | OUTPATIENT
Start: 2023-01-03 | End: 2023-01-05 | Stop reason: HOSPADM

## 2023-01-03 RX ORDER — ONDANSETRON 2 MG/ML
4 INJECTION INTRAMUSCULAR; INTRAVENOUS EVERY 4 HOURS PRN
Status: DISCONTINUED | OUTPATIENT
Start: 2023-01-03 | End: 2023-01-05 | Stop reason: HOSPADM

## 2023-01-03 RX ADMIN — LIDOCAINE HYDROCHLORIDE 4 ML: 40 SOLUTION TOPICAL at 11:31

## 2023-01-03 RX ADMIN — PROPOFOL 50 MG: 10 INJECTION, EMULSION INTRAVENOUS at 11:29

## 2023-01-03 RX ADMIN — FENTANYL CITRATE 100 MCG: 50 INJECTION, SOLUTION INTRAMUSCULAR; INTRAVENOUS at 11:29

## 2023-01-03 RX ADMIN — SCOPOLAMINE 1 PATCH: 1.5 PATCH, EXTENDED RELEASE TRANSDERMAL at 11:01

## 2023-01-03 RX ADMIN — SUGAMMADEX 200 MG: 100 INJECTION, SOLUTION INTRAVENOUS at 12:43

## 2023-01-03 RX ADMIN — DOCUSATE SODIUM 100 MG: 100 CAPSULE, LIQUID FILLED ORAL at 18:37

## 2023-01-03 RX ADMIN — ACETAMINOPHEN 1000 MG: 500 TABLET ORAL at 11:01

## 2023-01-03 RX ADMIN — ONDANSETRON 4 MG: 2 INJECTION INTRAMUSCULAR; INTRAVENOUS at 13:43

## 2023-01-03 RX ADMIN — CYCLOBENZAPRINE 10 MG: 10 TABLET, FILM COATED ORAL at 18:37

## 2023-01-03 RX ADMIN — CEFAZOLIN 2 G: 2 INJECTION, POWDER, FOR SOLUTION INTRAMUSCULAR; INTRAVENOUS at 21:55

## 2023-01-03 RX ADMIN — GABAPENTIN 300 MG: 300 CAPSULE ORAL at 11:01

## 2023-01-03 RX ADMIN — ONDANSETRON 4 MG: 2 INJECTION INTRAMUSCULAR; INTRAVENOUS at 11:53

## 2023-01-03 RX ADMIN — MIDAZOLAM 2 MG: 1 INJECTION INTRAMUSCULAR; INTRAVENOUS at 11:29

## 2023-01-03 RX ADMIN — TOPIRAMATE 25 MG: 25 TABLET, FILM COATED ORAL at 18:37

## 2023-01-03 RX ADMIN — MORPHINE SULFATE: 50 INJECTION, SOLUTION, CONCENTRATE INTRAVENOUS at 19:55

## 2023-01-03 RX ADMIN — GABAPENTIN 600 MG: 300 CAPSULE ORAL at 18:37

## 2023-01-03 RX ADMIN — FENTANYL CITRATE 50 MCG: 50 INJECTION, SOLUTION INTRAMUSCULAR; INTRAVENOUS at 13:18

## 2023-01-03 RX ADMIN — QUETIAPINE FUMARATE 50 MG: 50 TABLET ORAL at 18:37

## 2023-01-03 RX ADMIN — PHENYLEPHRINE HYDROCHLORIDE 0.5 MCG/KG/MIN: 10 INJECTION INTRAVENOUS at 11:41

## 2023-01-03 RX ADMIN — LIDOCAINE HYDROCHLORIDE 100 MG: 20 INJECTION, SOLUTION EPIDURAL; INFILTRATION; INTRACAUDAL at 11:29

## 2023-01-03 RX ADMIN — BUSPIRONE HYDROCHLORIDE 7.5 MG: 5 TABLET ORAL at 18:38

## 2023-01-03 RX ADMIN — DEXAMETHASONE SODIUM PHOSPHATE 8 MG: 4 INJECTION, SOLUTION INTRA-ARTICULAR; INTRALESIONAL; INTRAMUSCULAR; INTRAVENOUS; SOFT TISSUE at 11:38

## 2023-01-03 RX ADMIN — POTASSIUM CHLORIDE AND SODIUM CHLORIDE: 900; 150 INJECTION, SOLUTION INTRAVENOUS at 18:42

## 2023-01-03 RX ADMIN — ROCURONIUM BROMIDE 50 MG: 10 INJECTION, SOLUTION INTRAVENOUS at 11:30

## 2023-01-03 RX ADMIN — CEFAZOLIN 2 G: 330 INJECTION, POWDER, FOR SOLUTION INTRAMUSCULAR; INTRAVENOUS at 11:31

## 2023-01-03 RX ADMIN — SODIUM CHLORIDE, POTASSIUM CHLORIDE, SODIUM LACTATE AND CALCIUM CHLORIDE: 600; 310; 30; 20 INJECTION, SOLUTION INTRAVENOUS at 11:02

## 2023-01-03 RX ADMIN — HALOPERIDOL LACTATE 1 MG: 5 INJECTION, SOLUTION INTRAMUSCULAR at 13:35

## 2023-01-03 RX ADMIN — OXYCODONE HYDROCHLORIDE 10 MG: 5 SOLUTION ORAL at 13:18

## 2023-01-03 ASSESSMENT — PAIN DESCRIPTION - PAIN TYPE
TYPE: SURGICAL PAIN
TYPE: SURGICAL PAIN
TYPE: ACUTE PAIN;SURGICAL PAIN
TYPE: SURGICAL PAIN;ACUTE PAIN
TYPE: ACUTE PAIN;SURGICAL PAIN

## 2023-01-03 ASSESSMENT — FIBROSIS 4 INDEX: FIB4 SCORE: 3.79

## 2023-01-03 ASSESSMENT — PAIN SCALES - GENERAL: PAIN_LEVEL: 7

## 2023-01-03 NOTE — OR NURSING
Dr. Crowder at the bedside assessing patient. Pt is requiring oxygen at this time and her pain is elevated, 8/10 stabbing in her lower back. Dr. Crowder will reach out to his PA and get an admit order placed.

## 2023-01-03 NOTE — OR NURSING
1257: Pt arrived from OR post L4-5 laminectomy under anesthesia. Pt is arousable to RN voice. Lumbar sight dressing is CDI with hemovac to bulb suction. Cardiac rhythm appears to be SR. FSBG 124.    1410: Pt using IS. Reports pain of 7 is tolerable. Report to Jumana; pt to phase II with RN. Dressings are CDI; hemovac drain output is 20ml.

## 2023-01-03 NOTE — ANESTHESIA PROCEDURE NOTES
Airway    Date/Time: 1/3/2023 11:31 AM  Performed by: Vikram Ybarra M.D.  Authorized by: Vikram Ybarra M.D.     Location:  OR  Urgency:  Elective  Indications for Airway Management:  Anesthesia      Spontaneous Ventilation: absent    Sedation Level:  Deep  Preoxygenated: Yes    Patient Position:  Sniffing  Final Airway Type:  Endotracheal airway  Final Endotracheal Airway:  ETT  Cuffed: Yes    Technique Used for Successful ETT Placement:  Direct laryngoscopy    Insertion Site:  Oral  Blade Type:  Jennings  Laryngoscope Blade/Videolaryngoscope Blade Size:  2  ETT Size (mm):  7.0  Measured from:  Teeth  ETT to Teeth (cm):  21  Placement Verified by: auscultation and capnometry    Cormack-Lehane Classification:  Grade I - full view of glottis  Number of Attempts at Approach:  1  Ventilation Between Attempts:  None  Number of Other Approaches Attempted:  0

## 2023-01-03 NOTE — OR NURSING
Assume care for pt in pre-op. Patient allergies and NPO status verified. Belongings secured. Patient verbalizes understanding of pain scale, expected course of stay and plan of care. Surgical site verified with patient. IV access established. FBs = 96. Call light within reach. No further needs at this time. Hourly rounding in place.

## 2023-01-03 NOTE — ANESTHESIA POSTPROCEDURE EVALUATION
Patient: Martha Mosher    Procedure Summary     Date: 01/03/23 Room / Location: [facility] 05 / SURGERY [facility]    Anesthesia Start: 1127 Anesthesia Stop: 1300    Procedure: POSTERIOR L4-5 LAMINECTOMY (Spine Lumbar) Diagnosis: (SPINAL STENOSIS OF LUMBAR REGION)    Surgeons: Royce Crowder M.D. Responsible Provider: Vikram Ybarra M.D.    Anesthesia Type: general ASA Status: 4          Final Anesthesia Type: general  Last vitals  BP   Blood Pressure: 97/55    Temp   36.4 °C (97.5 °F)    Pulse   70   Resp   20    SpO2   91 %      Anesthesia Post Evaluation    Patient location during evaluation: PACU  Patient participation: complete - patient participated  Level of consciousness: awake and alert  Pain score: 7    Airway patency: patent  Anesthetic complications: no  Cardiovascular status: hemodynamically stable  Respiratory status: acceptable  Hydration status: euvolemic    PONV: none          No notable events documented.     Nurse Pain Score: 7 (NPRS)

## 2023-01-03 NOTE — ANESTHESIA PREPROCEDURE EVALUATION
Case: [id number] Date/Time: 01/03/23 1215    Procedure: POSTERIOR L4-5 LAMINECTOMY    Pre-op diagnosis: SPINAL STENOSIS OF LUMBAR REGION    Location: [facility] OR 05 / SURGERY [facility]    Surgeons: Royce Crowder M.D.        TTE 5/13/18 Compared to the images of the prior study done on 03/28/12: The   diastolic function is now considered normal. The estimated pulmonary   artery pressure has increased from 34mmHg to 46mmHg.     Relevant Problems   PULMONARY   (positive) Dyspnea on exertion   (positive) Other emphysema (HCC)      NEURO   (positive) Cervicogenic headache   (positive) Migraine with aura, intractable, without status migrainosus      CARDIAC   (positive) Dyspnea on exertion   (positive) Migraine with aura, intractable, without status migrainosus      GI   (positive) Gastro-esophageal reflux disease without esophagitis       Physical Exam    Airway   Mallampati: II  TM distance: >3 FB  Neck ROM: full       Cardiovascular - normal exam  Rhythm: regular  Rate: normal  (-) murmur     Dental - normal exam           Pulmonary - normal exam  Breath sounds clear to auscultation     Abdominal    Neurological - normal exam                 Anesthesia Plan    ASA 4       Plan - general       Airway plan will be ETT    (Pt is in substance use d/o recovery, hx of COPD. Significant pulmonary HTN -- worsening.)      Induction: intravenous    Postoperative Plan: Postoperative administration of opioids is intended.    Pertinent diagnostic labs and testing reviewed    Informed Consent:    Anesthetic plan and risks discussed with patient.    Use of blood products discussed with: patient whom consented to blood products.

## 2023-01-03 NOTE — OP REPORT
DATE OF SERVICE:  01/03/2023     PREOPERATIVE DIAGNOSIS:  L4-5 stenosis with neurogenic claudication.     POSTOPERATIVE DIAGNOSES:  L4-5 stenosis with neurogenic claudication.     OPERATIONS:  Bilateral L4-5 laminotomies, medial facetectomies, decompression   of thecal sac and bilateral L5 nerve roots.     SURGEON:  Royce Crowder MD     ASSISTANT:  BRANDEN Conrad     ANESTHESIA:  General endotracheal.     ANESTHESIOLOGIST:  Vikram Ybarra MD     PREPARATION:  ChloraPrep.     MEDICATIONS:  The patient given Ancef prior to incision.     INDICATIONS:  The patient with back and radicular pain refractory to   nonoperative therapies.  The patient's MRI disclosed significant stenosis at   4-5.  The patient was felt to be a candidate for operative decompression to   attempt to improve her back and leg pain.  The patient understood major risks   and complications, paralysis exceedingly rare, biggest risk of surgery is   nonresponse, which I told her was 10-20%, the chance she might require surgery   the rest of her life 15-20%, small risk of wound infection, spinal fluid   leak.  The patient understanding, agreed to proceed, and signed consent.     NEED FOR SURGICAL ASSISTANCE:  Surgical assistant required throughout the case   for retraction, suction, irrigation, cleaning of instruments, keep the case   moving forward to minimize operative time.     DESCRIPTION OF PROCEDURE:  The patient was brought to the operating room.    Peripheral venous line was in place.  General anesthesia was induced.  The   patient intubated.  No Sands catheter was placed.  The patient laid prone on   the OSI table using 6 posts, pressure points carefully padded.  The back was   doubly prepped with ChloraPrep by myself and draped.  Planned incision was   marked in midline over spinous processes of L4 and 5.  Skin was infiltrated   with local and incised with scalpel. Using electrocautery, dissection deep in   the midline down to and  through deep fashion using a subperiosteal dissection.    Paravertebral muscles dissected off spinous processes, lamina of L4 and 5   bilaterally.  Levels confirmed with intraoperative fluoroscopy.  Using Midas   Alfonzo drill AM-8 bit, I drilled the inferior portion of lamina 4, superior   portion of lamina 5, and medial facets bilaterally to thin shelf of bone.    Thin shelf of bone was carefully dissected away from the thecal sac and   thickened ligamentum flavum plus thinned out bone removed with 2 and 3 mm   Kerrisons.  We worked superiorly to insertion of ligamentum flavum inferiorly   to lower border of the pedicle.  I worked underneath the midline to provide   central decompression.  I could slide the nerve hook superiorly from one side   to the other over the thecal sac superiorly and inferiorly under the remaining   4 and 5 lamina around the lateral recesses and pedicles bilaterally at L5.    Epidural exploration revealed no herniated disk, no disk work was required.    We were able to preserve the spinous process of L4 and 5 to maximize   postoperative stability.  Once decompression was completed, retractor   withdrawn.  Muscle bleeders controlled with bipolar electrocautery.  Wound was   irrigated with antibiotic irrigation.  A medium Hemovac drain was placed in   depth of the wound, brought out through separate stab incision.  Deep fascia   was closed with 0 Vicryl, subcutaneous fascia with 2-0 Vicryl, subcuticular   closed with 3-0 Vicryl, and skin edges approximated with quarter-inch   Steri-Strips.  Sterile dressing was placed.  The patient laid supine on the   bed, extubated, and taken to recovery room in satisfactory condition.     ESTIMATED BLOOD LOSS:  150 mL        ______________________________  LEIGH PAIZ MD    DFV/DURGA    DD:  01/03/2023 13:27  DT:  01/03/2023 14:36    Job#:  [id number]    CC:Vikram Ybarra MD

## 2023-01-03 NOTE — ANESTHESIA TIME REPORT
Anesthesia Start and Stop Event Times     Date Time Event    1/3/2023 1120 Ready for Procedure     1127 Anesthesia Start     1300 Anesthesia Stop        Responsible Staff  01/03/23    Name Role Begin End    Vikram Ybarra M.D. Anesth 1127 1300        Overtime Reason:  no overtime (within assigned shift)    Comments:

## 2023-01-04 PROCEDURE — 96367 TX/PROPH/DG ADDL SEQ IV INF: CPT

## 2023-01-04 PROCEDURE — A9270 NON-COVERED ITEM OR SERVICE: HCPCS | Performed by: NURSE PRACTITIONER

## 2023-01-04 PROCEDURE — 700105 HCHG RX REV CODE 258: Performed by: NURSE PRACTITIONER

## 2023-01-04 PROCEDURE — 97162 PT EVAL MOD COMPLEX 30 MIN: CPT

## 2023-01-04 PROCEDURE — G0378 HOSPITAL OBSERVATION PER HR: HCPCS

## 2023-01-04 PROCEDURE — 700102 HCHG RX REV CODE 250 W/ 637 OVERRIDE(OP): Performed by: NURSE PRACTITIONER

## 2023-01-04 PROCEDURE — 700111 HCHG RX REV CODE 636 W/ 250 OVERRIDE (IP): Performed by: NURSE PRACTITIONER

## 2023-01-04 PROCEDURE — 96366 THER/PROPH/DIAG IV INF ADDON: CPT

## 2023-01-04 PROCEDURE — 700101 HCHG RX REV CODE 250: Performed by: NURSE PRACTITIONER

## 2023-01-04 RX ORDER — OXYCODONE AND ACETAMINOPHEN 10; 325 MG/1; MG/1
1 TABLET ORAL EVERY 4 HOURS PRN
Status: DISCONTINUED | OUTPATIENT
Start: 2023-01-04 | End: 2023-01-05 | Stop reason: HOSPADM

## 2023-01-04 RX ADMIN — OXYCODONE AND ACETAMINOPHEN 1 TABLET: 10; 325 TABLET ORAL at 14:03

## 2023-01-04 RX ADMIN — POTASSIUM CHLORIDE AND SODIUM CHLORIDE: 900; 150 INJECTION, SOLUTION INTRAVENOUS at 08:08

## 2023-01-04 RX ADMIN — DULOXETINE HYDROCHLORIDE 60 MG: 60 CAPSULE, DELAYED RELEASE ORAL at 21:09

## 2023-01-04 RX ADMIN — QUETIAPINE FUMARATE 50 MG: 50 TABLET ORAL at 17:12

## 2023-01-04 RX ADMIN — OXYCODONE AND ACETAMINOPHEN 1 TABLET: 10; 325 TABLET ORAL at 22:48

## 2023-01-04 RX ADMIN — QUETIAPINE FUMARATE 50 MG: 50 TABLET ORAL at 11:21

## 2023-01-04 RX ADMIN — GABAPENTIN 600 MG: 300 CAPSULE ORAL at 11:21

## 2023-01-04 RX ADMIN — CYCLOBENZAPRINE 10 MG: 10 TABLET, FILM COATED ORAL at 13:09

## 2023-01-04 RX ADMIN — GABAPENTIN 600 MG: 300 CAPSULE ORAL at 05:38

## 2023-01-04 RX ADMIN — BUSPIRONE HYDROCHLORIDE 7.5 MG: 5 TABLET ORAL at 05:38

## 2023-01-04 RX ADMIN — DOCUSATE SODIUM 50 MG AND SENNOSIDES 8.6 MG 1 TABLET: 8.6; 5 TABLET, FILM COATED ORAL at 21:09

## 2023-01-04 RX ADMIN — DOCUSATE SODIUM 100 MG: 100 CAPSULE, LIQUID FILLED ORAL at 17:11

## 2023-01-04 RX ADMIN — SUMATRIPTAN SUCCINATE 50 MG: 50 TABLET ORAL at 01:58

## 2023-01-04 RX ADMIN — DOCUSATE SODIUM 100 MG: 100 CAPSULE, LIQUID FILLED ORAL at 05:38

## 2023-01-04 RX ADMIN — OXYCODONE AND ACETAMINOPHEN 1 TABLET: 10; 325 TABLET ORAL at 10:06

## 2023-01-04 RX ADMIN — TIOTROPIUM BROMIDE INHALATION SPRAY 5 MCG: 3.12 SPRAY, METERED RESPIRATORY (INHALATION) at 08:09

## 2023-01-04 RX ADMIN — QUETIAPINE FUMARATE 50 MG: 50 TABLET ORAL at 05:38

## 2023-01-04 RX ADMIN — OMEPRAZOLE 20 MG: 20 CAPSULE, DELAYED RELEASE ORAL at 05:38

## 2023-01-04 RX ADMIN — TOPIRAMATE 25 MG: 25 TABLET, FILM COATED ORAL at 17:11

## 2023-01-04 RX ADMIN — BUSPIRONE HYDROCHLORIDE 7.5 MG: 5 TABLET ORAL at 17:11

## 2023-01-04 RX ADMIN — OXYCODONE AND ACETAMINOPHEN 1 TABLET: 10; 325 TABLET ORAL at 18:05

## 2023-01-04 RX ADMIN — CYCLOBENZAPRINE 10 MG: 10 TABLET, FILM COATED ORAL at 17:11

## 2023-01-04 RX ADMIN — TRAZODONE HYDROCHLORIDE 200 MG: 100 TABLET ORAL at 21:09

## 2023-01-04 RX ADMIN — CEFAZOLIN 2 G: 2 INJECTION, POWDER, FOR SOLUTION INTRAMUSCULAR; INTRAVENOUS at 05:40

## 2023-01-04 ASSESSMENT — COGNITIVE AND FUNCTIONAL STATUS - GENERAL
DRESSING REGULAR LOWER BODY CLOTHING: A LITTLE
WALKING IN HOSPITAL ROOM: A LOT
MOVING FROM LYING ON BACK TO SITTING ON SIDE OF FLAT BED: A LOT
DAILY ACTIVITIY SCORE: 21
SUGGESTED CMS G CODE MODIFIER DAILY ACTIVITY: CJ
CLIMB 3 TO 5 STEPS WITH RAILING: A LOT
CLIMB 3 TO 5 STEPS WITH RAILING: A LITTLE
STANDING UP FROM CHAIR USING ARMS: A LOT
MOBILITY SCORE: 15
TURNING FROM BACK TO SIDE WHILE IN FLAT BAD: A LOT
HELP NEEDED FOR BATHING: A LITTLE
MOVING TO AND FROM BED TO CHAIR: A LOT
TURNING FROM BACK TO SIDE WHILE IN FLAT BAD: A LITTLE
TOILETING: A LITTLE
MOBILITY SCORE: 13
MOVING TO AND FROM BED TO CHAIR: UNABLE
WALKING IN HOSPITAL ROOM: A LITTLE
MOVING FROM LYING ON BACK TO SITTING ON SIDE OF FLAT BED: A LITTLE
SUGGESTED CMS G CODE MODIFIER MOBILITY: CK
STANDING UP FROM CHAIR USING ARMS: A LITTLE
SUGGESTED CMS G CODE MODIFIER MOBILITY: CL

## 2023-01-04 ASSESSMENT — PAIN DESCRIPTION - PAIN TYPE
TYPE: SURGICAL PAIN
TYPE: ACUTE PAIN;SURGICAL PAIN
TYPE: SURGICAL PAIN
TYPE: ACUTE PAIN;SURGICAL PAIN
TYPE: ACUTE PAIN;SURGICAL PAIN
TYPE: SURGICAL PAIN

## 2023-01-04 ASSESSMENT — GAIT ASSESSMENTS
DISTANCE (FEET): 90
ASSISTIVE DEVICE: FRONT WHEEL WALKER

## 2023-01-04 NOTE — DISCHARGE PLANNING
Received Choice form at 5351  Agency/Facility Name: [facility]   Referral sent per Choice form @ [phone]

## 2023-01-04 NOTE — PROGRESS NOTES
"Bedside report received.  Assessment complete.  A&O x 4. Patient calls appropriately.  Patient ambulates with standby/one assist and FWW. Bed alarm on.   Patient has 7-8/10 pain. Pain managed with prescribed medications.  Denies N&V. Tolerating regular diet.  Surgical dressing CDI. Hemovac Dc'd.  + void, + flatus, last BM PTA.  Patient denies SOB.  SCD's off.  Patient is pleasant and cooperative with the care plan.  Review plan with of care with patient. Call light and personal belongings within reach. Hourly rounding in place. All needs met at this time.   BP 90/51   Pulse 73   Temp 36.8 °C (98.2 °F) (Temporal)   Resp 16   Ht 1.473 m (4' 10\")   Wt 76 kg (167 lb 8.8 oz)   LMP  (LMP Unknown)   SpO2 93%   BMI 35.02 kg/m²     "

## 2023-01-04 NOTE — PROGRESS NOTES
4 Eyes Skin Assessment Completed by TEJAS Donahue and TEJAS Blanco.    Head WDL  Ears WDL  Nose WDL  Mouth WDL  Neck WDL  Breast/Chest WDL  Shoulder Blades WDL  Spine Incision, dressing CDI, hemovac in place  (R) Arm/Elbow/Hand WDL  (L) Arm/Elbow/Hand WDL  Abdomen WDL  Groin WDL  Scrotum/Coccyx/Buttocks WDL  (R) Leg WDL  (L) Leg WDL  (R) Heel/Foot/Toe WDL  (L) Heel/Foot/Toe WDL          Devices In Places Blood Pressure Cuff, Pulse Ox, Nasal Cannula, and CROW's, hemovac      Interventions In Place Pillows and Pressure Redistribution Mattress    Possible Skin Injury No    Pictures Uploaded Into Epic N/A  Wound Consult Placed N/A  RN Wound Prevention Protocol Ordered No

## 2023-01-04 NOTE — CARE PLAN
The patient is Stable - Low risk of patient condition declining or worsening    Shift Goals  Clinical Goals: pain control, mobility, safety, monitor vitals/ PCA/ drain, ween O2  Patient Goals: pain control, comfort, rest  Family Goals: not present at bedside    Progress made toward(s) clinical / shift goals:      Problem: Pain - Standard  Goal: Alleviation of pain or a reduction in pain to the patient’s comfort goal  Outcome: Progressing  Note: Pt states pain is being managed by current medication regimen. Medications administered per MAR, ice pack applied, pillows for comfort and repositioning.      Problem: Respiratory  Goal: Patient will achieve/maintain optimum respiratory ventilation and gas exchange  Outcome: Progressing  Note: Patient weened from 4L NC to 3.5L. Working with incentive spirometer.

## 2023-01-04 NOTE — DISCHARGE PLANNING
Case Management Discharge Planning    Admission Date: 1/3/2023  GMLOS:    ALOS: 0    6-Clicks ADL Score:    6-Clicks Mobility Score:        Anticipated Discharge Dispo:      DME Needed: Yes    DME Ordered: Yes, home 02    Action(s) Taken: Updated Provider/Nurse on Discharge Plan, Choice obtained, Referral(s) sent, and DME Face to Face     Escalations Completed: None    Medically Clear: Yes    Next Steps: Pt needs home 02. Discussed options. Pt chose [facility] or [facility]. DPA will send referral.     Barriers to Discharge: Pending Insurance Authorization and DME    Is the patient up for discharge tomorrow: No, anticipate discharge today when DME delivered.

## 2023-01-04 NOTE — CARE PLAN
Problem: Knowledge Deficit - Standard  Goal: Patient and family/care givers will demonstrate understanding of plan of care, disease process/condition, diagnostic tests and medications  Outcome: Progressing     Problem: Pain - Standard  Goal: Alleviation of pain or a reduction in pain to the patient’s comfort goal  Outcome: Progressing     Problem: Fall Risk  Goal: Patient will remain free from falls  Outcome: Progressing   The patient is Stable - Low risk of patient condition declining or worsening    Shift Goals  Clinical Goals: wean O2, pain control  Patient Goals: pain control  Family Goals: not present at bedside    Progress made toward(s) clinical / shift goals:  Patient requires oxygen at baseline and home oxygen is needed for discharge. Pain managed with prescribed medication.    Patient is not progressing towards the following goals:

## 2023-01-04 NOTE — DISCHARGE SUMMARY
DATE OF ADMISSION:  01/03/2023    DATE OF DISCHARGE:      DATE OF POSSIBLE DISCHARGE:  01/04/2023     OPERATION PERFORMED:  L4-L5 laminotomy with Dr. Crowder on 01/03/2023.     HOSPITAL COURSE:  On date of admission, operation was performed.    Postoperatively, the patient did have low oxygenation.  She has a history of   smoking and COPD.  Therefore, she was admitted to the floor with respiratory   therapy protocol, incentive spirometer in hopes of weaning her off the oxygen.    Today, she is dropping down to the low 80s without oxygen.  She has no   cough.  She has no chest pain.  She will be sent on oxygen.  She does state   her lower extremities are somewhat improved.  She is eating, ambulating, and   voiding.  Her bilateral lower extremity strength is grossly intact.     DISCHARGE INSTRUCTIONS:  Given to patient in paper format.     DISCHARGE MEDICATIONS:  I did discuss the case with Reji Lee who is her pain   management provider.  He has called in extra Percocet for her, two extra per   day for two weeks.  I did notify her and she is aware.     ASSESSMENT AND PLAN:  1.  Status post above delineated surgery with Dr. Crowder on 01/03/2023.  The   patient will follow up in 4 weeks as planned.   2.  The patient will remove Steri-Strips in 7 to 10 days.  3.  The patient will follow up with her primary care in regards to weaning off   the oxygen.  4.  Should the patient have further questions or concerns, they will not   hesitate to give our office a call.          ___________________________________  BRANDEN Harvey      ___________________________________  LEIGH CROWDER MD      DD: 01/04/2023 13:50:26  DT: 01/04/2023 17:59:38  Job#: [id number]

## 2023-01-04 NOTE — PROGRESS NOTES
Pt arrived to unit from PACU. Pt AAOx4, VSS on 4L NC. Pt ambulated from the gurney to bathroom with SBA steady gait. Pt rating pain 5/10. Denies SOB, chest pain, or nausea. Surgical dressing to back CDI with hemovac in place. Discussed POC, pt verbalized understanding. Bed locked and in lowest position. Call light and belongings within reach.

## 2023-01-04 NOTE — FACE TO FACE
"Face to Face Note  -  Durable Medical Equipment    FERMIN Dinh - NPI: [id number]  I certify that this patient is under my care and that they had a durable medical equipment(DME)face to face encounter by myself that meets the physician DME face-to-face encounter requirements with this patient on:    Date of encounter:   Patient:                    MRN:                       YOB: 2022  Martha Mosher  [id number]  1960     The encounter with the patient was in whole, or in part, for the following medical condition, which is the primary reason for durable medical equipment:  COPD    I certify that, based on my findings, the following durable medical equipment is medically necessary:    Oxygen   HOME O2 Saturation Measurements:(Values must be present for Home Oxygen orders)  Room air sat at rest: 83  Room air sat with amb: 83  With liters of O2: 3, O2 sat at rest with O2: 91  With Liters of O2: 4, O2 sat with amb with O2 : 91  Is the patient mobile?: Yes  If patient feels more short of breath, they can go up to 6 liters per minute and contact healthcare provider.    Supporting Symptoms: The patient requires supplemental oxygen, as the following interventions have been tried with limited or no improvement: \"Bronchodilators and/or steroid inhalers, \"Ambulation with oximetry, and \"Incentive spirometry.    My Clinical findings support the need for the above equipment due to:  Hypoxia  " Taltz Counseling: I discussed with the patient the risks of ixekizumab including but not limited to immunosuppression, serious infections, worsening of inflammatory bowel disease and drug reactions.  The patient understands that monitoring is required including a PPD at baseline and must alert us or the primary physician if symptoms of infection or other concerning signs are noted.

## 2023-01-04 NOTE — PROGRESS NOTES
0000 Hemovac found without suction. Line was disconnected. Hemovac reconnected and functioning correctly    0010 IV infiltrated. Pending US IV access.     0130 New IV placed

## 2023-01-05 VITALS
RESPIRATION RATE: 17 BRPM | SYSTOLIC BLOOD PRESSURE: 91 MMHG | TEMPERATURE: 97 F | HEART RATE: 77 BPM | WEIGHT: 167.55 LBS | HEIGHT: 58 IN | DIASTOLIC BLOOD PRESSURE: 57 MMHG | OXYGEN SATURATION: 93 % | BODY MASS INDEX: 35.17 KG/M2

## 2023-01-05 PROCEDURE — G0378 HOSPITAL OBSERVATION PER HR: HCPCS

## 2023-01-05 PROCEDURE — A9270 NON-COVERED ITEM OR SERVICE: HCPCS | Performed by: NURSE PRACTITIONER

## 2023-01-05 PROCEDURE — 700102 HCHG RX REV CODE 250 W/ 637 OVERRIDE(OP): Performed by: NURSE PRACTITIONER

## 2023-01-05 RX ADMIN — POLYETHYLENE GLYCOL 3350 1 PACKET: 17 POWDER, FOR SOLUTION ORAL at 08:35

## 2023-01-05 RX ADMIN — OXYCODONE AND ACETAMINOPHEN 1 TABLET: 10; 325 TABLET ORAL at 03:03

## 2023-01-05 RX ADMIN — QUETIAPINE FUMARATE 50 MG: 50 TABLET ORAL at 11:25

## 2023-01-05 RX ADMIN — OMEPRAZOLE 20 MG: 20 CAPSULE, DELAYED RELEASE ORAL at 04:16

## 2023-01-05 RX ADMIN — QUETIAPINE FUMARATE 50 MG: 50 TABLET ORAL at 04:15

## 2023-01-05 RX ADMIN — DOCUSATE SODIUM 100 MG: 100 CAPSULE, LIQUID FILLED ORAL at 04:16

## 2023-01-05 RX ADMIN — BUSPIRONE HYDROCHLORIDE 7.5 MG: 5 TABLET ORAL at 04:15

## 2023-01-05 RX ADMIN — OXYCODONE AND ACETAMINOPHEN 1 TABLET: 10; 325 TABLET ORAL at 14:36

## 2023-01-05 RX ADMIN — OXYCODONE AND ACETAMINOPHEN 1 TABLET: 10; 325 TABLET ORAL at 08:27

## 2023-01-05 ASSESSMENT — PAIN DESCRIPTION - PAIN TYPE
TYPE: SURGICAL PAIN

## 2023-01-05 ASSESSMENT — GAIT ASSESSMENTS
GAIT LEVEL OF ASSIST: CONTACT GUARD ASSIST
ASSISTIVE DEVICE: FRONT WHEEL WALKER
DISTANCE (FEET): 40
DEVIATION: BRADYKINETIC;DECREASED HEEL STRIKE;DECREASED TOE OFF

## 2023-01-05 ASSESSMENT — COGNITIVE AND FUNCTIONAL STATUS - GENERAL
CLIMB 3 TO 5 STEPS WITH RAILING: A LITTLE
MOBILITY SCORE: 12
SUGGESTED CMS G CODE MODIFIER MOBILITY: CL
STANDING UP FROM CHAIR USING ARMS: A LITTLE
MOVING FROM LYING ON BACK TO SITTING ON SIDE OF FLAT BED: UNABLE
MOVING TO AND FROM BED TO CHAIR: UNABLE
TURNING FROM BACK TO SIDE WHILE IN FLAT BAD: UNABLE
WALKING IN HOSPITAL ROOM: A LITTLE

## 2023-01-05 NOTE — DISCHARGE PLANNING
Pt needs HH. Insurance is FFS. Most agencies do not accept FFS. Have left ms with [facility], [place], [facility], and Cm.

## 2023-01-05 NOTE — DISCHARGE PLANNING
Care Transition Team Assessment    In the case of an emergency, pt's legal NOK is son, Reginaldo Garay     RNCM met with pt at bedside and obtained the information used in this assessment. Pt verified accuracy of facesheet. Pt lives in a single story home alone.   Pt uses [facility]. Prior to current hospitalization, pt was completely independent in ADLS/IADLS. Pt drives and is able to attend necessary MD appointments. Pt has a good support system. Pt denies any hx of substance use and denies any dx of mh. Pt owns FWW    Information Source:pt  Orientation Level: Oriented X4  Information Given By: Patient  Informant's Name: Danielle  Who is responsible for making decisions for patient? : Patient         Elopement Risk  Legal Hold: No  Ambulatory or Self Mobile in Wheelchair: Yes  Disoriented: No  Psychiatric Symptoms: None  History of Wandering: No  Elopement this Admit: No  Vocalizing Wanting to Leave: No  Displays Behaviors, Body Language Wanting to Leave: No-Not at Risk for Elopement  Elopement Risk: Not at Risk for Elopement    Interdisciplinary Discharge Planning  Does Admitting Nurse Feel This Could be a Complex Discharge?: No  Primary Care Physician: Suzie  Lives with - Patient's Self Care Capacity: Alone and Able to Care For Self  Patient or legal guardian wants to designate a caregiver: No  Support Systems: Family Member(s)  Housing / Facility: 1 Story Apartment / Condo  Do You Take your Prescribed Medications Regularly: Yes  Mobility Issues: Yes  Prior Services: None  Durable Medical Equipment: Walker    Discharge Preparedness  What is your plan after discharge?: Home with help  What are your discharge supports?: Other (comment)  Prior Functional Level: Ambulatory, Drives Self, Independent with Activities of Daily Living, Independent with Medication Management, Uses Walker  Difficulity with ADLs: None  Difficulity with IADLs: None    Functional Assesment  Prior Functional Level: Ambulatory,  Drives Self, Independent with Activities of Daily Living, Independent with Medication Management, Uses Walker         Vision / Hearing Impairment  Right Eye Vision: Impaired, Wears Glasses  Left Eye Vision: Impaired, Wears Glasses              Domestic Abuse  Physical Abuse or Sexual Abuse: No  Verbal Abuse or Emotional Abuse: No  Possible Abuse/Neglect Reported to:: Not Applicable    Psychological Assessment  History of Substance Abuse: None  History of Psychiatric Problems: No         Anticipated Discharge Information  Discharge Disposition: Discharged to home/self care (01)

## 2023-01-05 NOTE — DISCHARGE PLANNING
"Case Management Discharge Planning    Admission Date: 1/3/2023  GMLOS:    ALOS: 0    6-Clicks ADL Score: 21  6-Clicks Mobility Score: 15  PT and/or OT Eval ordered: Yes  Post-acute Referrals Ordered: No  Post-acute Choice Obtained: NA  Has referral(s) been sent to post-acute provider:  ANNEL      Anticipated Discharge Dispo: Discharge Disposition: Discharged to home/self care (01)    DME Needed: Yes    DME Ordered: Yes, home 02 has beed delivered to bedside.    Action(s) Taken: Updated Provider/Nurse on Discharge Plan, Choice obtained, and Referral(s) sent    Escalations Completed: None    Medically Clear: Yes    Next Steps: Pt is in OBV status. Was medically cleared yesterday but PT recommending HH. Unable to find HH agency that accepts FFS. PT states pt not safe to discharge to home alone. Pt tells me she was 100% independent prior to surgery and that teen age grandson and granddaughter will be staying with her 24/7 until they go back to school next Tuesday. Pt adamantly refusing Rehab or SNF placement. \"I  have to get home to my cats.\" States family is unable to care for cats but they can help her. Home 02 has been delivered to pt's bedside. Pt will discharge to home with family support even though team is recommending skilled placement vs HH. Unable to get HH due to insurance.    Barriers to Discharge: None    Is the patient up for discharge tomorrow: No, discharge today.       "

## 2023-01-05 NOTE — CARE PLAN
The patient is Watcher - Medium risk of patient condition declining or worsening    Shift Goals  Clinical Goals: Pain control, stable BP  Patient Goals: Pain control, comfort  Family Goals: Not present    Progress made toward(s) clinical / shift goals:  Patient educated on the pain scale and to notify RN if pain remains uncontrolled. Patient verbalizes pain control at this time using PRN oral medication. Patient medicated per MAR.    Patient is not progressing towards the following goals:

## 2023-01-05 NOTE — CARE PLAN
The patient is Stable - Low risk of patient condition declining or worsening    Shift Goals  Clinical Goals: pain control, safety, mobility, BM  Patient Goals: pain control, comfort  Family Goals: no family member    Progress made toward(s) clinical / shift goals:        Problem: Knowledge Deficit - Standard  Goal: Patient and family/care givers will demonstrate understanding of plan of care, disease process/condition, diagnostic tests and medications  Outcome: Progressing  Note: POC discussed with the patient. Discharge instructions given by Discharge RN at Discharge lounge. Questions answered. Verbalized understanding.     Problem: Pain - Standard  Goal: Alleviation of pain or a reduction in pain to the patient’s comfort goal  Outcome: Progressing  Note: Educated on pain scale. Encouraged to verbalized pain. Will medicate as per MAR.     Problem: Fall Risk  Goal: Patient will remain free from falls  Outcome: Progressing  Note: Fall protocol in effect. Non skid socks in use. Call light within reach. Reminded patient to call for assist. Hourly rounds in effect. Kept room clutter free. Verbalized understanding.       Patient is not progressing towards the following goals:

## 2023-01-05 NOTE — FACE TO FACE
Face to Face Supporting Documentation - Home Health    The encounter with this patient was in whole or in part the primary reason for home health admission.    Date of encounter:   Patient:                    MRN:                       YOB: 2023  Martha Mosher  [id number]  1960     Home health to see patient for:  Physical Therapy evaluation and treatment    Skilled need for:  Surgical Aftercare post op    Skilled nursing interventions to include:  Posto p    Homebound status evidenced by:  Needs the assistance of another person in order to leave the home. Leaving home requires a considerable and taxing effort. There is a normal inability to leave the home.    Community Physician to provide follow up care: Janusz Larsen M.D.     Optional Interventions? No      I certify the face to face encounter for this home health care referral meets the CMS requirements and the encounter/clinical assessment with the patient was, in whole, or in part, for the medical condition(s) listed above, which is the primary reason for home health care. Based on my clinical findings: the service(s) are medically necessary, support the need for home health care, and the homebound criteria are met.  I certify that this patient has had a face to face encounter by myself.  ALISHA Dinh. - NPI: [id number]

## 2023-01-05 NOTE — PROGRESS NOTES
Neurosurgery Progress Note    Subjective:  No new LE symptoms  Eating, ambulating, voiding.  O2 delivered working on     Exam:  BLE str intact CDI steristtrips    BP  Min: 81/55  Max: 98/65  Pulse  Av.8  Min: 70  Max: 77  Resp  Av.2  Min: 16  Max: 17  Temp  Av.4 °C (97.6 °F)  Min: 36 °C (96.8 °F)  Max: 37 °C (98.6 °F)  SpO2  Av.4 %  Min: 90 %  Max: 94 %    No data recorded                      Intake/Output                         23 - 23 0659 23 - 23 0659      Total  Total                 Intake    I.V.  30  -- 30  --  -- --    PCA End of Shift Total Volume (ml) 30 -- 30 -- -- --    Total Intake 30 -- 30 -- -- --       Output    Drains  10  -- 10  --  -- --    Output (mL) ([REMOVED] Closed/Suction Drain 1 Anterior Back Hemovac) 10 -- 10 -- -- --    Total Output 10 -- 10 -- -- --       Net I/O     20 -- 20 -- -- --            No intake or output data in the 24 hours ending 23 1127          oxyCODONE-acetaminophen  1 Tablet Q4HRS PRN    albuterol  2 Puff Q6HRS PRN    busPIRone  7.5 mg BID    cyclobenzaprine  10 mg Q EVENING    DULoxetine  60 mg Nightly    gabapentin  600 mg TID    omeprazole  20 mg DAILY    QUEtiapine  50 mg TID    SUMAtriptan  50 mg Once PRN    tiotropium  5 mcg QDAILY (RT)    topiramate  25 mg Q EVENING    traZODone  200 mg Nightly    Pharmacy Consult Request  1 Each PHARMACY TO DOSE    MD ALERT...DO NOT ADMINISTER NSAIDS or ASPIRIN unless ORDERED By Neurosurgery  1 Each PRN    docusate sodium  100 mg BID    senna-docusate  1 Tablet Nightly    senna-docusate  1 Tablet Q24HRS PRN    polyethylene glycol/lytes  1 Packet BID PRN    magnesium hydroxide  30 mL QDAY PRN    bisacodyl  10 mg Q24HRS PRN    sodium phosphate  1 Each Once PRN    Respiratory Therapy Consult   Continuous RT    diphenhydrAMINE  25 mg Q6HRS PRN    Or    diphenhydrAMINE  25 mg Q6HRS PRN    ondansetron  4 mg Q4HRS PRN    ondansetron  4  mg Q4HRS PRN    promethazine  12.5-25 mg Q4HRS PRN    promethazine  12.5-25 mg Q4HRS PRN    prochlorperazine  5-10 mg Q4HRS PRN    methocarbamol  750 mg Q8HRS PRN    Or    cyclobenzaprine  10 mg Q8HRS PRN    Or    diazePAM  5 mg Q4HRS PRN    labetalol  10 mg Q HOUR PRN    hydrALAZINE  10 mg Q HOUR PRN       Assessment and Plan:    POD #  2 L4-5 laminotomy  Prophylactic anticoagulation: no         Start date/time: tbd    Plan:  DC home

## 2023-01-05 NOTE — THERAPY
Physical Therapy   Initial Evaluation     Patient Name: Martha Mosher  Age:  62 y.o., Sex:  female  Medical Record #: [id number]  Today's Date: 1/4/2023    Precautions: Fall Risk;Spinal / Back Precautions   Comments: no brace ordered    Assessment  Patient is a 62 y.o. female presenting s/p B L4-5 laminotomies and decompression on 1/3. Pt seen for PT evaluation at this time. Educated and provided handout regarding post-op spine precautions, log roll technique, and improved body mechanics with mobility. Pt required intermittent CGA during ambulation with FWW, demo'd B knee buckling with incr time ambulating, no overt LOB and pt reports this happens at home but denies falls. Educated on rehab and benefits given pt presentation, pt declines and wishes to return home with HHPT. Pt states her grandson will be present when she is mobilizing at home and pt does demo good safety awareness with current deficits. Appears capable to return home with HHPT if grandson is available to assist as needed. Will follow while in house.     Plan   Treatment Plan : Bed Mobility, Gait Training, Neuro Re-Education / Balance, Self Care / Home Evaluation, Stair Training, Therapeutic Activities, Therapeutic Exercise  Treatment Frequency: 5 Times per Week  Duration: Until Therapy Goals Met  DC Equipment Recommendations: None  Discharge Recommendations: Recommend home health for continued physical therapy services        01/04/23 1251   Prior Living Situation   Prior Services None   Housing / Facility 1 Story Apartment / Condo   Steps Into Home 0   Steps In Home 0   Bathroom Set up Walk In Shower;Shower Chair   Equipment Owned 4-Wheel Walker;Single Point Cane   Lives with -  Adult Children   Comments pt reports her grandson will be staying with her and will be present to provide assist and spv   Prior Level of Functional Mobility   Bed Mobility Independent   Transfer Status Independent   Ambulation Independent   Distance Ambulation (Feet)  household - limited community   Assistive Devices Used Single Point Cane   Comments 4WW in the community, SPC in the home   Cognition    Level of Consciousness Alert   Comments pleasant and cooperative, appears to have good insight and safety awareness, reports her gson will be present for mobility   Balance Assessment   Sitting Balance (Static) Fair   Sitting Balance (Dynamic) Fair   Standing Balance (Static) Fair   Standing Balance (Dynamic) Fair -   Weight Shift Sitting Fair   Weight Shift Standing Fair   Comments standing withFWW   Bed Mobility    Supine to Sit Minimal Assist   Scooting Supervised   Comments cues for log roll, reports austyn can assist   Gait Analysis   Gait Level Of Assist SBA - CGA   Assistive Device Front Wheel Walker   Distance (Feet) 90   Weight Bearing Status no restrictions   Comments initially with SBA, then began to require CGA given B knee buckling, incr with incr time. pt reports this happens at home but she does not fall. she did catch herself each time with no overt LOB.   Functional Mobility   Sit to Stand Supervised   Bed, Chair, Wheelchair Transfer Supervised   Short Term Goals    Short Term Goal # 1 pt will perform supine <> sit without bed features with SPV in 6 visits for improved indepedence   Short Term Goal # 2 pt will ambulate 150ft with FWW and SPV in 6 visits for improved independence

## 2023-01-05 NOTE — PROGRESS NOTES
0715 Patient's in bed. Bedside report received from CELSO Hickey RN at the beginning of the shift.     0827 Patient's sitting up in bed. Educated on the importance/use of IS at least 10x every hour while awake, able to reach 750. Medicated with Percocet (see MAR) for c/o's back pain, rates pain 8/10. Fall protocol in effect. Call light within reach. Reminded patient to call for assist. Assessment completed. No distress noted. Plan of care reviewed with the patient. Verbalized understanding.    0835 Miralax given (see MAR).    1115 KRISTY Marie worked with the patient. Ambulated to the door then up to a chair only.    1134 New order received and acknowledged from BRANDEN Schwarz for the patient to be discharged.    1300 Patient's in bed. No distress noted.    1436 Medicated with Percocet (see MAR) for c/o's back pain, rates pain 8/10.    1533  Patient was discharged with patient's belongings, Oxygen concentrator, via w/c accompanied by one female care Aid to Discharge unge.

## 2023-01-06 NOTE — THERAPY
Physical Therapy   Daily Treatment     Patient Name: Martha Mosher  Age:  62 y.o., Sex:  female  Medical Record #: [id number]  Today's Date: 1/5/2023     Precautions  Precautions: Fall Risk;Spinal / Back Precautions   Comments: no brace per orders    Assessment    Pt seen for PT treatment session. More limited activity tolerance today, only able to ambulate 40 ft before requesting to sit. Pt reports her main support will be her 11 y/o grandson, possibly 16 y/o granddtr, during the day until school resumes; then she will be alone. PT will continue to follow while in house. Concern for self progression and overall safety upon DC home with added fall risk of home O2.     Plan    Physical Therapy Treatment Plan  Physical Therapy Treatment Plan: Continue Current Treatment Plan    DC Equipment Recommendations: None  Discharge Recommendations: Recommend post-acute placement for additional physical therapy services prior to discharge home         01/05/23 1120   Precautions   Precautions Fall Risk;Spinal / Back Precautions    Comments no brace per orders   Vitals   O2 (LPM) 3  (to 4L O2)   O2 Delivery Device Nasal Cannula   Vitals Comments SpO2 around 90-93% at rest on 3L   Pain 0 - 10 Group   Therapist Pain Assessment Nurse Notified  (post op pain, not rated)   Cognition    Level of Consciousness Alert   Comments cooperative with PT. Reports she normally lives alone, but will have her 11 y/o Grandson there during the day to help as needed. Has her son who works during the day and a 16 y/o granddtr who has just been sick.   Balance   Sitting Balance (Static) Fair   Sitting Balance (Dynamic) Fair   Standing Balance (Static) Fair -   Standing Balance (Dynamic) Fair -   Weight Shift Sitting Fair   Weight Shift Standing Fair   Skilled Intervention Compensatory Strategies;Verbal Cuing   Comments with FWW   Bed Mobility    Supine to Sit Minimal Assist   Sit to Supine   (seated in chair at end of session)   Scooting  Supervised   Skilled Intervention Verbal Cuing;Compensatory Strategies   Gait Analysis   Gait Level Of Assist Contact Guard Assist   Assistive Device Front Wheel Walker   Distance (Feet) 40   # of Times Distance was Traveled 1   Deviation Bradykinetic;Decreased Heel Strike;Decreased Toe Off  (dec step length and poor foot clearance in swing)   # of Stairs Climbed 0   Weight Bearing Status no restrictions   Skilled Intervention Verbal Cuing   Functional Mobility   Sit to Stand Supervised   Bed, Chair, Wheelchair Transfer Contact Guard Assist   Transfer Method Stand Step   Skilled Intervention Verbal Cuing   Comments for safety   How much difficulty does the patient currently have...   Turning over in bed (including adjusting bedclothes, sheets and blankets)? 1   Sitting down on and standing up from a chair with arms (e.g., wheelchair, bedside commode, etc.) 1   Moving from lying on back to sitting on the side of the bed? 1   How much help from another person does the patient currently need...   Moving to and from a bed to a chair (including a wheelchair)? 3   Need to walk in a hospital room? 3   Climbing 3-5 steps with a railing? 3   6 clicks Mobility Score 12   Short Term Goals    Short Term Goal # 1 pt will perform supine <> sit without bed features with SPV in 6 visits for improved indepedence   Goal Outcome # 1 goal not met   Short Term Goal # 2 pt will ambulate 150ft with FWW and SPV in 6 visits for improved independence   Goal Outcome # 2 Goal not met   Physical Therapy Treatment Plan   Physical Therapy Treatment Plan Continue Current Treatment Plan   Anticipated Discharge Equipment and Recommendations   Discharge Recommendations Recommend post-acute placement for additional physical therapy services prior to discharge home   Interdisciplinary Plan of Care Collaboration   IDT Collaboration with  Nursing;   Patient Position at End of Therapy Seated;Call Light within Reach;Tray Table within  Reach;Phone within Reach   Collaboration Comments aware of limited tolerance for therapy session and concern for DC home with intermittent assist from 13 y/o austyn   Session Information   Date / Session Number  1/5- 2 (2/5, 1/10)

## 2023-02-19 ENCOUNTER — OFFICE VISIT (OUTPATIENT)
Dept: URGENT CARE | Facility: CLINIC | Age: 63
End: 2023-02-19
Payer: MEDICAID

## 2023-02-19 VITALS
OXYGEN SATURATION: 92 % | WEIGHT: 169 LBS | DIASTOLIC BLOOD PRESSURE: 68 MMHG | RESPIRATION RATE: 16 BRPM | SYSTOLIC BLOOD PRESSURE: 102 MMHG | BODY MASS INDEX: 35.48 KG/M2 | TEMPERATURE: 98.8 F | HEIGHT: 58 IN | HEART RATE: 72 BPM

## 2023-02-19 DIAGNOSIS — Z87.09 HISTORY OF COPD: ICD-10-CM

## 2023-02-19 DIAGNOSIS — U07.1 COVID: ICD-10-CM

## 2023-02-19 PROCEDURE — 99214 OFFICE O/P EST MOD 30 MIN: CPT | Performed by: NURSE PRACTITIONER

## 2023-02-19 RX ORDER — PREDNISONE 10 MG/1
40 TABLET ORAL DAILY
Qty: 20 TABLET | Refills: 0 | Status: SHIPPED | OUTPATIENT
Start: 2023-02-19 | End: 2023-02-24

## 2023-02-19 ASSESSMENT — ENCOUNTER SYMPTOMS
EYE PAIN: 0
DIZZINESS: 0
MYALGIAS: 0
CHILLS: 0
RHINORRHEA: 1
FEVER: 1
SORE THROAT: 0
NAUSEA: 0
COUGH: 1
VOMITING: 0
SHORTNESS OF BREATH: 1

## 2023-02-19 ASSESSMENT — FIBROSIS 4 INDEX: FIB4 SCORE: 3.79

## 2023-02-20 NOTE — PROGRESS NOTES
Subjective:   Martha Mosher is a 62 y.o. female who presents for Coronavirus Screening (Pt. Tested Positive for Covid last night and today and is asking for Paxlovid. Hx of COPD. )      URI   This is a new problem. [facility] The problem has been unchanged. There has been no fever. Associated symptoms include coughing and rhinorrhea. Pertinent negatives include no chest pain, nausea, rash, sore throat or vomiting. She has tried acetaminophen for the symptoms.   Patient does have a history of COPD wears oxygen at home.  Last COVID-vaccine over a year ago  Review of Systems   Constitutional:  Positive for fever and malaise/fatigue. Negative for chills.   HENT:  Positive for rhinorrhea. Negative for sore throat.    Eyes:  Negative for pain.   Respiratory:  Positive for cough and shortness of breath.    Cardiovascular:  Negative for chest pain.   Gastrointestinal:  Negative for nausea and vomiting.   Genitourinary:  Negative for hematuria.   Musculoskeletal:  Negative for myalgias.   Skin:  Negative for rash.   Neurological:  Negative for dizziness.     Medications:    albuterol Aers  busPIRone  cyclobenzaprine Tabs  DULoxetine Cpep  gabapentin Caps  Nirmatrelvir&Ritonavir 300/100 Tbpk  omeprazole  oxyCODONE-acetaminophen  polyethylene glycol 3350 Powd  predniSONE Tabs  promethazine Tabs  QUEtiapine  Spiriva HandiHaler Caps  SUMAtriptan Tabs  topiramate Tabs  traZODone Tabs    Allergies: Sulfa drugs and Lyrica    Problem List: Martha Mosher does not have any pertinent problems on file.    Surgical History:  Past Surgical History:   Procedure Laterality Date    LUMBAR LAMINECTOMY DISKECTOMY  1/3/2023    Procedure: POSTERIOR L4-5 LAMINECTOMY;  Surgeon: Royce Crowder M.D.;  Location: SURGERY [facility];  Service: Neurosurgery    COLONOSCOPY WITH BIOPSY  08/03/2009    Performed by GRAEME LOCK JR at ENDOSCOPY [facility]    GASTROSCOPY WITH BIOPSY  03/01/2009  "   Performed by LUIS ARMANDO SIERRA at ENDOSCOPY [facility] ORS    ABDOMINAL HYSTERECTOMY TOTAL      APPENDECTOMY      GYN SURGERY      hysterectomy    HERNIA REPAIR      NECK EXPLORATION      OTHER      lithotripsy    OTHER ABDOMINAL SURGERY      hysterectomy, hernia,     PRIMARY C SECTION         Past Social Hx: Martha Mosher  reports that she has been smoking cigarettes. She has a 7.50 pack-year smoking history. She uses smokeless tobacco. She reports that she does not currently use alcohol. She reports that she does not currently use drugs after having used the following drugs: Inhaled.     Past Family Hx:  Martha Mosher family history includes Alcohol/Drug in her brother and father; Arthritis in her father, maternal aunt, and maternal uncle; Cancer in her father, paternal grandfather, and paternal grandmother; Diabetes in her father and mother; Hypertension in her brother and father; Lung Disease in her mother; Stroke in her brother, father, and mother.     Problem list, medications, and allergies reviewed by myself today in Epic.     Objective:     /68   Pulse 72   Temp 37.1 °C (98.8 °F) (Temporal)   Resp 16   Ht 1.473 m (4' 10\")   Wt 76.7 kg (169 lb)   LMP  (LMP Unknown)   SpO2 92%   BMI 35.32 kg/m²     Physical Exam  Vitals and nursing note reviewed.   Constitutional:       General: She is not in acute distress.     Appearance: She is well-developed.   HENT:      Head: Normocephalic and atraumatic.      Right Ear: External ear normal.      Left Ear: External ear normal.      Nose: Nose normal.      Mouth/Throat:      Mouth: Mucous membranes are moist.   Eyes:      Conjunctiva/sclera: Conjunctivae normal.   Cardiovascular:      Rate and Rhythm: Normal rate.   Pulmonary:      Effort: Pulmonary effort is normal. No respiratory distress.      Breath sounds: Wheezing present. No rhonchi or rales.   Abdominal:      General: There is no distension.   Musculoskeletal:         " General: Normal range of motion.   Skin:     General: Skin is warm and dry.   Neurological:      General: No focal deficit present.      Mental Status: She is alert and oriented to person, place, and time. Mental status is at baseline.      Gait: Gait (gait at baseline) normal.   Psychiatric:         Judgment: Judgment normal.       Assessment/Plan:     Diagnosis and associated orders:     1. COVID  predniSONE (DELTASONE) 10 MG Tab    Nirmatrelvir&Ritonavir 300/100 20 x 150 MG & 10 x 100MG Tablet Therapy Pack      2. History of COPD              Comments/MDM:     I personally reviewed prior external notes and prior test results pertinent to today's visit.  Patient has a chronic history of COPD having acute exacerbation due to COVID.  Does wear oxygen at home.  Patient does take ZzzQuil and oxycodone daily which is risk with antiviral.  Discussed contraindication with patient did recommend decreasing dose of Seroquel by half monitoring symptoms discontinuing if necessary.  Patient understanding the risks however patient is a high risk patient with COPD having severe symptoms due to COVID.  Discussed management options, risks and benefits, and alternatives to treatment plan agreed upon.   Red flags discussed and indications to immediately call 911 or present to the Emergency Department.   Supportive care, differential diagnoses, and indications for immediate follow-up discussed with patient.    Patient expresses understanding and agrees to plan. Patient denies any other questions or concerns.              Please note that this dictation was created using voice recognition software. I have made a reasonable attempt to correct obvious errors, but I expect that there are errors of grammar and possibly content that I did not discover before finalizing the note.    This note was electronically signed by Godfrey ECHEVARRIA.

## 2023-02-27 RX ORDER — TIOTROPIUM BROMIDE 18 UG/1
CAPSULE ORAL; RESPIRATORY (INHALATION)
Qty: 30 CAPSULE | Refills: 11 | Status: SHIPPED | OUTPATIENT
Start: 2023-02-27 | End: 2023-09-05

## 2023-03-14 ENCOUNTER — OFFICE VISIT (OUTPATIENT)
Dept: MEDICAL GROUP | Facility: CLINIC | Age: 63
End: 2023-03-14
Payer: MEDICAID

## 2023-03-14 VITALS — HEIGHT: 58 IN | WEIGHT: 170 LBS | BODY MASS INDEX: 35.68 KG/M2

## 2023-03-14 DIAGNOSIS — J06.9 UPPER RESPIRATORY TRACT INFECTION, UNSPECIFIED TYPE: ICD-10-CM

## 2023-03-14 DIAGNOSIS — R07.9 CHEST PAIN, UNSPECIFIED TYPE: ICD-10-CM

## 2023-03-14 PROCEDURE — 99214 OFFICE O/P EST MOD 30 MIN: CPT | Mod: GC | Performed by: STUDENT IN AN ORGANIZED HEALTH CARE EDUCATION/TRAINING PROGRAM

## 2023-03-14 RX ORDER — AZITHROMYCIN 250 MG/1
TABLET, FILM COATED ORAL
Qty: 5 TABLET | Refills: 1 | Status: SHIPPED | OUTPATIENT
Start: 2023-03-14 | End: 2023-03-14

## 2023-03-14 RX ORDER — ONDANSETRON 2 MG/ML
INJECTION INTRAMUSCULAR; INTRAVENOUS
COMMUNITY
Start: 2023-01-03 | End: 2023-03-14

## 2023-03-14 RX ORDER — DEXAMETHASONE SODIUM PHOSPHATE 4 MG/ML
INJECTION, SOLUTION INTRA-ARTICULAR; INTRALESIONAL; INTRAMUSCULAR; INTRAVENOUS; SOFT TISSUE
COMMUNITY
Start: 2023-01-03 | End: 2023-03-14

## 2023-03-14 RX ORDER — CEFAZOLIN SODIUM 1 G/3ML
INJECTION, POWDER, FOR SOLUTION INTRAMUSCULAR; INTRAVENOUS
COMMUNITY
Start: 2023-01-03 | End: 2023-03-14

## 2023-03-14 RX ORDER — AMOXICILLIN AND CLAVULANATE POTASSIUM 875; 125 MG/1; MG/1
1 TABLET, FILM COATED ORAL 2 TIMES DAILY
Qty: 10 TABLET | Refills: 0 | Status: SHIPPED | OUTPATIENT
Start: 2023-03-14 | End: 2023-03-19

## 2023-03-14 RX ORDER — SODIUM CHLORIDE, SODIUM LACTATE, POTASSIUM CHLORIDE, CALCIUM CHLORIDE 600; 310; 30; 20 MG/100ML; MG/100ML; MG/100ML; MG/100ML
INJECTION, SOLUTION INTRAVENOUS
COMMUNITY
Start: 2023-01-03 | End: 2023-03-14

## 2023-03-14 RX ORDER — FENTANYL CITRATE 50 UG/ML
INJECTION, SOLUTION INTRAMUSCULAR; INTRAVENOUS
COMMUNITY
Start: 2023-01-03 | End: 2023-03-14

## 2023-03-14 RX ORDER — CEFAZOLIN 2 G/1
INJECTION, POWDER, FOR SOLUTION INTRAMUSCULAR; INTRAVENOUS
COMMUNITY
Start: 2023-01-04 | End: 2023-03-14

## 2023-03-14 RX ORDER — MORPHINE SULFATE 50 MG/ML
INJECTION, SOLUTION, CONCENTRATE INTRAVENOUS
COMMUNITY
Start: 2023-01-03 | End: 2023-03-14

## 2023-03-14 RX ORDER — QUETIAPINE FUMARATE 100 MG/1
200 TABLET, FILM COATED ORAL
COMMUNITY
Start: 2023-03-08

## 2023-03-14 RX ORDER — AZITHROMYCIN 250 MG/1
250 TABLET, FILM COATED ORAL DAILY
Qty: 6 TABLET | Refills: 0 | Status: SHIPPED | OUTPATIENT
Start: 2023-03-14 | End: 2023-09-05

## 2023-03-14 RX ORDER — PROMETHAZINE HYDROCHLORIDE 25 MG/1
TABLET ORAL
Qty: 30 TABLET | Refills: 11 | Status: SHIPPED | OUTPATIENT
Start: 2023-03-14 | End: 2023-09-05 | Stop reason: SDUPTHER

## 2023-03-14 RX ORDER — DULOXETIN HYDROCHLORIDE 60 MG/1
60 CAPSULE, DELAYED RELEASE ORAL
COMMUNITY
Start: 2023-03-08

## 2023-03-14 RX ORDER — HALOPERIDOL 5 MG/ML
INJECTION INTRAMUSCULAR
COMMUNITY
Start: 2023-01-03 | End: 2023-03-14

## 2023-03-14 RX ORDER — SODIUM CHLORIDE 9 MG/ML
INJECTION, SOLUTION INTRAVENOUS
COMMUNITY
Start: 2023-01-04 | End: 2023-03-14

## 2023-03-14 RX ORDER — PHENYLEPHRINE HYDROCHLORIDE 10 MG/ML
INJECTION INTRAVENOUS
COMMUNITY
Start: 2023-01-03 | End: 2023-03-14

## 2023-03-14 RX ORDER — CEPHALEXIN 500 MG/1
CAPSULE ORAL
COMMUNITY
Start: 2023-02-03 | End: 2023-03-14

## 2023-03-14 ASSESSMENT — FIBROSIS 4 INDEX: FIB4 SCORE: 3.85

## 2023-03-14 NOTE — PROGRESS NOTES
"Subjective:     CC: URI    HPI:   Martha presents today with    URI  Duration: 1 week  Overall worsening  Symptoms: Cough, shortness of breath, pain with coughing, sore throat, rhinorrhea and subjective fevers  Patient has history of COVID-19 infection in 2/19 that was treated and symptoms resolved  Patient also has history of COPD on 3 L nocturnal oxygen  She has had some desaturations and oxygen at home but no consistent desaturations  Denies hemoptysis, history of blood clots, hormone use, or cancer    Chest pain  Patient reports experiencing chest pain for last 2 days  Chest pain is located over left chest  She has been coughing which may be contributing to chest pain      ROS: See HPI.     Objective:     Exam:  BP (P) 119/85 (BP Location: Right arm, Patient Position: Sitting, BP Cuff Size: Adult)   Pulse (P) 91   Temp (P) 36.6 °C (97.9 °F) (Temporal)   Ht 1.473 m (4' 10\")   Wt 77.1 kg (170 lb)   LMP  (LMP Unknown)   SpO2 (P) 95%   BMI 35.53 kg/m²  Body mass index is 35.53 kg/m².    Physical Exam:  General: Pt resting in NAD, cooperative   Skin:  No cyanosis or jaundice   HEENT: NC/AT. EOMI. No conjunctival injection or sclera icterus. Bilateral ears: Canals   Lungs:  CTAB, good air movement. Non-labored.   Cardiovascular:  S1/S2 RRR   Abdomen:  Abdomen is soft, non-tender, non-distended, +BS  Extremities:  No lower extremity edema   CNS:  No gross focal neurologic deficits  Psych: Appropriate mood and affect     EKG Interpretation   Ordered and interpreted by Junior Lopez DO  Rhythm: normal sinus   Rate: normal   Axis: normal   ST Segments: no acute change   T Waves: no acute change   Clinical Impression: no acute changes and normal EKG        Assessment & Plan:     63 y.o. female with the following -     1. Upper respiratory tract infection, unspecified type  Acute.  Patient presents with symptoms consistent with URI.  She has been experiencing shortness of breath but vital signs are stable.  " Discussed with patient that she likely has viral URI.  If symptoms do not improve over the next 1 to 2 days, provided conditional prescription for antibiotics given patient has significant risk factors with oxygen dependent COPD for development of severe pneumonia.  Discussed ER precautions.  - amoxicillin-clavulanate (AUGMENTIN) 875-125 MG Tab; Take 1 Tablet by mouth 2 times a day for 5 days.  Dispense: 10 Tablet; Refill: 0  - azithromycin (ZITHROMAX) 250 MG Tab; Take 1 Tablet by mouth every day. Take 2 tablets (500 mg) PO on day 1 and then take 1 tablet (250 mg) daily on 2-5  Dispense: 6 Tablet; Refill: 0    2. Chest pain, unspecified type  Acute.  Patient reports experiencing chest pain for 2 days.  She has been coughing due to viral URI.  EKG normal sinus with no ST elevations.  Chest pain likely muscular etiology.  Discussed ER precautions.

## 2023-03-15 DIAGNOSIS — J43.9 PULMONARY EMPHYSEMA, UNSPECIFIED EMPHYSEMA TYPE (HCC): ICD-10-CM

## 2023-03-15 RX ORDER — ALBUTEROL SULFATE 90 UG/1
2 AEROSOL, METERED RESPIRATORY (INHALATION) EVERY 6 HOURS PRN
Qty: 6.7 G | Refills: 5 | Status: SHIPPED | OUTPATIENT
Start: 2023-03-15

## 2023-04-25 ENCOUNTER — OFFICE VISIT (OUTPATIENT)
Dept: MEDICAL GROUP | Facility: CLINIC | Age: 63
End: 2023-04-25
Payer: MEDICAID

## 2023-04-25 DIAGNOSIS — E66.01 CLASS 2 SEVERE OBESITY DUE TO EXCESS CALORIES WITH SERIOUS COMORBIDITY AND BODY MASS INDEX (BMI) OF 35.0 TO 35.9 IN ADULT (HCC): ICD-10-CM

## 2023-04-25 PROBLEM — E66.812 CLASS 2 SEVERE OBESITY DUE TO EXCESS CALORIES WITH SERIOUS COMORBIDITY AND BODY MASS INDEX (BMI) OF 35.0 TO 35.9 IN ADULT (HCC): Status: ACTIVE | Noted: 2023-04-25

## 2023-04-25 PROCEDURE — 99213 OFFICE O/P EST LOW 20 MIN: CPT | Mod: GE | Performed by: STUDENT IN AN ORGANIZED HEALTH CARE EDUCATION/TRAINING PROGRAM

## 2023-04-25 ASSESSMENT — FIBROSIS 4 INDEX: FIB4 SCORE: 3.85

## 2023-04-25 NOTE — PROGRESS NOTES
"Subjective:     CC: Weight loss    HPI:   Martha presents today to discuss weight loss.     Weight:   Has had similar weight for long time. Interested in losing weight.   Diet:   One meal per day, dinner   Does not snack a lot   Does not drink sodas or juice   Drinks lots of water and coffee   Denies binge eating   Denies eating a lot sweets  Has never been to nutritionist/dietician    Medications:   Has never been on medications for weight loss   Exercise:   Does not exercise due to recent surgery lumbar spine surgery and chronic pain       ROS: See HPI.     Objective:     Exam:  BP (P) 105/73 (BP Location: Left arm, Patient Position: Sitting, BP Cuff Size: Adult)   Pulse (P) 80   Temp (P) 35.8 °C (96.5 °F) (Temporal)   Ht (P) 1.473 m (4' 10\")   Wt (P) 77.1 kg (170 lb)   LMP  (LMP Unknown)   SpO2 (P) 90%   BMI (P) 35.53 kg/m²  Body mass index is 35.53 kg/m² (pended).    Physical Exam:  General: Pt resting in NAD, cooperative   Skin:  No cyanosis or jaundice   HEENT: NC/AT. EOMI. No conjunctival injection or sclera icterus.   Lungs:  CTAB, good air movement. Non-labored.   Cardiovascular:  S1/S2 RRR   CNS:  No gross focal neurologic deficits  Psych: Appropriate mood and affect       Assessment & Plan:     63 y.o. female with the following -     Problem List Items Addressed This Visit       Class 2 severe obesity due to excess calories with serious comorbidity and body mass index (BMI) of 35.0 to 35.9 in adult (HCC)     Obesity Class (II, 35-39.9)  Discussed diet   Discussed exercise   Reviewed medications associated with weight gain. Patient is currently taking multiple medications that can be contributing to weight gain including Seroquel and chronic opiates. Plan to have further discussions at future appointment regarding medications.   Referral to nutrition  Discussed option for initiation of medication and discussed risk and benefits associated with use of medications. She is familiar with GLP-1 class of " medication and would like to start the medication. No contraindications (no Hx thyroid cancer, pancreatitis, MEN)  Start GLP-1 medication at low dose   Follow up in 2 weeks              Relevant Medications    Semaglutide,0.25 or 0.5MG/DOS, 2 MG/1.5ML Solution Pen-injector

## 2023-04-26 NOTE — ASSESSMENT & PLAN NOTE
Obesity Class (II, 35-39.9)  Discussed diet   Discussed exercise   Reviewed medications associated with weight gain. Patient is currently taking multiple medications that can be contributing to weight gain including Seroquel and chronic opiates. Plan to have further discussions at future appointment regarding medications.   Referral to nutrition  Discussed option for initiation of medication and discussed risk and benefits associated with use of medications. She is familiar with GLP-1 class of medication and would like to start the medication. No contraindications (no Hx thyroid cancer, pancreatitis, MEN)  Start GLP-1 medication at low dose   Follow up in 2 weeks

## 2023-04-28 ENCOUNTER — TELEPHONE (OUTPATIENT)
Dept: MEDICAL GROUP | Facility: CLINIC | Age: 63
End: 2023-04-28
Payer: MEDICAID

## 2023-04-28 NOTE — TELEPHONE ENCOUNTER
MEDICATION PRIOR AUTHORIZATION NEEDED:    1. Name of Medication: Ozempic (0.25 or 0.5 MG/DOSE) 2MG/1.5ML pen-injectors    2. Requested By (Name of Pharmacy): [facility]     3. Is insurance on file current? Yes    4. What is the name & phone number of the 3rd party payor? Covermymeds

## 2023-09-05 ENCOUNTER — OFFICE VISIT (OUTPATIENT)
Dept: MEDICAL GROUP | Facility: CLINIC | Age: 63
End: 2023-09-05
Payer: MEDICAID

## 2023-09-05 VITALS
BODY MASS INDEX: 35.18 KG/M2 | TEMPERATURE: 95.1 F | DIASTOLIC BLOOD PRESSURE: 94 MMHG | OXYGEN SATURATION: 87 % | HEIGHT: 58 IN | WEIGHT: 167.6 LBS | SYSTOLIC BLOOD PRESSURE: 130 MMHG | HEART RATE: 92 BPM

## 2023-09-05 DIAGNOSIS — Z00.00 ANNUAL PHYSICAL EXAM: ICD-10-CM

## 2023-09-05 DIAGNOSIS — K59.00 CONSTIPATION, UNSPECIFIED CONSTIPATION TYPE: ICD-10-CM

## 2023-09-05 DIAGNOSIS — G43.119 MIGRAINE WITH AURA, INTRACTABLE, WITHOUT STATUS MIGRAINOSUS: ICD-10-CM

## 2023-09-05 DIAGNOSIS — R06.09 DYSPNEA ON EXERTION: ICD-10-CM

## 2023-09-05 DIAGNOSIS — K21.9 GASTROESOPHAGEAL REFLUX DISEASE WITHOUT ESOPHAGITIS: ICD-10-CM

## 2023-09-05 DIAGNOSIS — J44.9 CHRONIC OBSTRUCTIVE PULMONARY DISEASE, UNSPECIFIED COPD TYPE (HCC): ICD-10-CM

## 2023-09-05 PROCEDURE — 3075F SYST BP GE 130 - 139MM HG: CPT

## 2023-09-05 PROCEDURE — 99213 OFFICE O/P EST LOW 20 MIN: CPT | Mod: GE

## 2023-09-05 PROCEDURE — 3080F DIAST BP >= 90 MM HG: CPT

## 2023-09-05 RX ORDER — SUMATRIPTAN 50 MG/1
50 TABLET, FILM COATED ORAL
Qty: 10 TABLET | Refills: 3 | Status: SHIPPED | OUTPATIENT
Start: 2023-09-05

## 2023-09-05 RX ORDER — PROMETHAZINE HYDROCHLORIDE 25 MG/1
25 TABLET ORAL EVERY 6 HOURS PRN
Qty: 30 TABLET | Refills: 11 | Status: ON HOLD | OUTPATIENT
Start: 2023-09-05 | End: 2024-01-11

## 2023-09-05 RX ORDER — OMEPRAZOLE 20 MG/1
20 CAPSULE, DELAYED RELEASE ORAL
Qty: 90 CAPSULE | Refills: 3 | Status: SHIPPED | OUTPATIENT
Start: 2023-09-05 | End: 2024-01-05

## 2023-09-05 RX ORDER — POLYETHYLENE GLYCOL 3350 17 G/17G
17 POWDER, FOR SOLUTION ORAL DAILY
Qty: 255 G | Refills: 12 | Status: SHIPPED | OUTPATIENT
Start: 2023-09-05 | End: 2023-11-08

## 2023-09-05 NOTE — ASSESSMENT & PLAN NOTE
On presentation to clinic, SpO2 87% after ambulation with walker.     Plan  -DME for home oxygen, pulse oximeter and portable oxygen tank  -We will continue albuterol and BuSpar and discontinue Spiriva.
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
## 2023-09-05 NOTE — ASSESSMENT & PLAN NOTE
- Labs ordered: CBC, CMP, TSH with reflex T4 and lipid panel to be completed before next visit for annual exam

## 2023-09-22 ENCOUNTER — TELEPHONE (OUTPATIENT)
Dept: MEDICAL GROUP | Facility: CLINIC | Age: 63
End: 2023-09-22
Payer: MEDICAID

## 2023-09-22 NOTE — TELEPHONE ENCOUNTER
VOICEMAIL  1. Caller Name: Danielle                       Call Back Number: [phone] (home)       2. Message: DME order, patient called asking about an order for Oxygen that was order on the last visit.Phone Number Called: Danielle     Call outcome: Spoke to patient regarding message below.    Message: Re: DME order. I have called the patient to let her know that [facility] And hopes they can get her an Oxygen for her home. Patient informed.    3. Patient approves office to leave a detailed voicemail/MyChart message: yes

## 2023-11-04 DIAGNOSIS — K59.00 CONSTIPATION, UNSPECIFIED CONSTIPATION TYPE: ICD-10-CM

## 2023-11-08 RX ORDER — POLYETHYLENE GLYCOL 3350 17 G/17G
POWDER, FOR SOLUTION ORAL
Qty: 238 G | Refills: 6 | Status: SHIPPED | OUTPATIENT
Start: 2023-11-08 | End: 2024-01-05

## 2024-01-05 ENCOUNTER — APPOINTMENT (OUTPATIENT)
Dept: RADIOLOGY | Facility: MEDICAL CENTER | Age: 64
DRG: 177 | End: 2024-01-05
Attending: EMERGENCY MEDICINE
Payer: MEDICAID

## 2024-01-05 ENCOUNTER — HOSPITAL ENCOUNTER (INPATIENT)
Facility: MEDICAL CENTER | Age: 64
LOS: 6 days | DRG: 177 | End: 2024-01-11
Attending: EMERGENCY MEDICINE | Admitting: FAMILY MEDICINE
Payer: MEDICAID

## 2024-01-05 DIAGNOSIS — M47.817 SPONDYLOSIS OF LUMBOSACRAL REGION WITHOUT MYELOPATHY OR RADICULOPATHY: ICD-10-CM

## 2024-01-05 DIAGNOSIS — Z72.0 NICOTINE ABUSE: ICD-10-CM
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
## 2024-01-05 DIAGNOSIS — G89.29: ICD-10-CM

## 2024-01-05 DIAGNOSIS — J10.1 INFLUENZA A: ICD-10-CM

## 2024-01-05 DIAGNOSIS — J44.1 ACUTE EXACERBATION OF CHRONIC OBSTRUCTIVE PULMONARY DISEASE (COPD) (HCC): ICD-10-CM

## 2024-01-05 DIAGNOSIS — J44.1 COPD EXACERBATION (HCC): ICD-10-CM

## 2024-01-05 DIAGNOSIS — M48.061 LUMBAR STENOSIS WITHOUT NEUROGENIC CLAUDICATION: ICD-10-CM

## 2024-01-05 DIAGNOSIS — M51.36 DDD (DEGENERATIVE DISC DISEASE), LUMBAR: ICD-10-CM

## 2024-01-05 DIAGNOSIS — U07.1 COVID-19: ICD-10-CM

## 2024-01-05 DIAGNOSIS — Z71.6 TOBACCO ABUSE COUNSELING: ICD-10-CM

## 2024-01-05 DIAGNOSIS — J96.21 ACUTE ON CHRONIC HYPOXIC RESPIRATORY FAILURE (HCC): ICD-10-CM

## 2024-01-05 DIAGNOSIS — M54.16 LUMBAR RADICULOPATHY: ICD-10-CM

## 2024-01-05 PROBLEM — N30.90 CYSTITIS: Status: RESOLVED | Noted: 2018-08-01 | Resolved: 2024-01-05

## 2024-01-05 PROBLEM — Z76.0 MEDICATION REFILL: Status: RESOLVED | Noted: 2021-10-20 | Resolved: 2024-01-05

## 2024-01-05 PROBLEM — S62.102A: Status: RESOLVED | Noted: 2018-08-01 | Resolved: 2024-01-05

## 2024-01-05 PROBLEM — R59.9 SWOLLEN LYMPH NODES: Status: RESOLVED | Noted: 2018-05-31 | Resolved: 2024-01-05

## 2024-01-05 PROBLEM — D69.6 THROMBOCYTOPENIA (HCC): Status: ACTIVE | Noted: 2024-01-05

## 2024-01-05 PROBLEM — K59.00 CONSTIPATION: Status: RESOLVED | Noted: 2018-05-15 | Resolved: 2024-01-05

## 2024-01-05 PROBLEM — N12 PYELONEPHRITIS: Status: RESOLVED | Noted: 2018-05-15 | Resolved: 2024-01-05

## 2024-01-05 PROBLEM — R68.89 FLU-LIKE SYMPTOMS: Status: RESOLVED | Noted: 2018-01-29 | Resolved: 2024-01-05

## 2024-01-05 PROBLEM — R73.09 ELEVATED GLUCOSE LEVEL: Status: ACTIVE | Noted: 2024-01-05

## 2024-01-05 PROBLEM — J40 BRONCHITIS: Status: RESOLVED | Noted: 2022-05-20 | Resolved: 2024-01-05

## 2024-01-05 PROBLEM — Z00.00 ANNUAL PHYSICAL EXAM: Status: RESOLVED | Noted: 2019-08-14 | Resolved: 2024-01-05

## 2024-01-05 PROBLEM — R10.9 ABDOMINAL DISCOMFORT: Status: RESOLVED | Noted: 2017-12-26 | Resolved: 2024-01-05

## 2024-01-05 PROBLEM — E86.0 DEHYDRATION: Status: ACTIVE | Noted: 2024-01-05

## 2024-01-05 LAB
ALBUMIN SERPL BCP-MCNC: 3.8 G/DL (ref 3.2–4.9)
ALBUMIN/GLOB SERPL: 1.1 G/DL
ALP SERPL-CCNC: 63 U/L (ref 30–99)
ALT SERPL-CCNC: 22 U/L (ref 2–50)
ANION GAP SERPL CALC-SCNC: 14 MMOL/L (ref 7–16)
AST SERPL-CCNC: 52 U/L (ref 12–45)
BASOPHILS # BLD AUTO: 0.2 % (ref 0–1.8)
BASOPHILS # BLD: 0.01 K/UL (ref 0–0.12)
BILIRUB SERPL-MCNC: 0.4 MG/DL (ref 0.1–1.5)
BUN SERPL-MCNC: 16 MG/DL (ref 8–22)
CALCIUM ALBUM COR SERPL-MCNC: 9 MG/DL (ref 8.5–10.5)
CALCIUM SERPL-MCNC: 8.8 MG/DL (ref 8.5–10.5)
CHLORIDE SERPL-SCNC: 101 MMOL/L (ref 96–112)
CO2 SERPL-SCNC: 21 MMOL/L (ref 20–33)
CREAT SERPL-MCNC: 0.69 MG/DL (ref 0.5–1.4)
EKG IMPRESSION: NORMAL
EOSINOPHIL # BLD AUTO: 0 K/UL (ref 0–0.51)
EOSINOPHIL NFR BLD: 0 % (ref 0–6.9)
ERYTHROCYTE [DISTWIDTH] IN BLOOD BY AUTOMATED COUNT: 48.6 FL (ref 35.9–50)
FLUAV RNA SPEC QL NAA+PROBE: POSITIVE
FLUBV RNA SPEC QL NAA+PROBE: NEGATIVE
GFR SERPLBLD CREATININE-BSD FMLA CKD-EPI: 98 ML/MIN/1.73 M 2
GLOBULIN SER CALC-MCNC: 3.6 G/DL (ref 1.9–3.5)
GLUCOSE SERPL-MCNC: 164 MG/DL (ref 65–99)
HCT VFR BLD AUTO: 43.8 % (ref 37–47)
HGB BLD-MCNC: 14.1 G/DL (ref 12–16)
IMM GRANULOCYTES # BLD AUTO: 0.01 K/UL (ref 0–0.11)
IMM GRANULOCYTES NFR BLD AUTO: 0.2 % (ref 0–0.9)
LYMPHOCYTES # BLD AUTO: 0.44 K/UL (ref 1–4.8)
LYMPHOCYTES NFR BLD: 8.8 % (ref 22–41)
MCH RBC QN AUTO: 29.6 PG (ref 27–33)
MCHC RBC AUTO-ENTMCNC: 32.2 G/DL (ref 32.2–35.5)
MCV RBC AUTO: 91.8 FL (ref 81.4–97.8)
MONOCYTES # BLD AUTO: 0.22 K/UL (ref 0–0.85)
MONOCYTES NFR BLD AUTO: 4.4 % (ref 0–13.4)
NEUTROPHILS # BLD AUTO: 4.31 K/UL (ref 1.82–7.42)
NEUTROPHILS NFR BLD: 86.4 % (ref 44–72)
NRBC # BLD AUTO: 0 K/UL
NRBC BLD-RTO: 0 /100 WBC (ref 0–0.2)
PLATELET # BLD AUTO: 107 K/UL (ref 164–446)
PMV BLD AUTO: 10.5 FL (ref 9–12.9)
POTASSIUM SERPL-SCNC: 4.1 MMOL/L (ref 3.6–5.5)
PROCALCITONIN SERPL-MCNC: 0.09 NG/ML
PROT SERPL-MCNC: 7.4 G/DL (ref 6–8.2)
RBC # BLD AUTO: 4.77 M/UL (ref 4.2–5.4)
RSV RNA SPEC QL NAA+PROBE: NEGATIVE
SARS-COV-2 RNA RESP QL NAA+PROBE: DETECTED
SODIUM SERPL-SCNC: 136 MMOL/L (ref 135–145)
SPECIMEN SOURCE: ABNORMAL
WBC # BLD AUTO: 5 K/UL (ref 4.8–10.8)

## 2024-01-05 PROCEDURE — A9270 NON-COVERED ITEM OR SERVICE: HCPCS | Performed by: EMERGENCY MEDICINE

## 2024-01-05 PROCEDURE — 770001 HCHG ROOM/CARE - MED/SURG/GYN PRIV*

## 2024-01-05 PROCEDURE — 96374 THER/PROPH/DIAG INJ IV PUSH: CPT

## 2024-01-05 PROCEDURE — 93005 ELECTROCARDIOGRAM TRACING: CPT | Performed by: EMERGENCY MEDICINE

## 2024-01-05 PROCEDURE — 700101 HCHG RX REV CODE 250

## 2024-01-05 PROCEDURE — 700102 HCHG RX REV CODE 250 W/ 637 OVERRIDE(OP)

## 2024-01-05 PROCEDURE — 700111 HCHG RX REV CODE 636 W/ 250 OVERRIDE (IP): Mod: JZ | Performed by: EMERGENCY MEDICINE

## 2024-01-05 PROCEDURE — 94640 AIRWAY INHALATION TREATMENT: CPT

## 2024-01-05 PROCEDURE — A9270 NON-COVERED ITEM OR SERVICE: HCPCS

## 2024-01-05 PROCEDURE — 700102 HCHG RX REV CODE 250 W/ 637 OVERRIDE(OP): Performed by: EMERGENCY MEDICINE

## 2024-01-05 PROCEDURE — 700111 HCHG RX REV CODE 636 W/ 250 OVERRIDE (IP): Mod: JZ,UD

## 2024-01-05 PROCEDURE — 94669 MECHANICAL CHEST WALL OSCILL: CPT

## 2024-01-05 PROCEDURE — 71045 X-RAY EXAM CHEST 1 VIEW: CPT

## 2024-01-05 PROCEDURE — 96372 THER/PROPH/DIAG INJ SC/IM: CPT

## 2024-01-05 PROCEDURE — 0241U HCHG SARS-COV-2 COVID-19 NFCT DS RESP RNA 4 TRGT MIC: CPT

## 2024-01-05 PROCEDURE — 85025 COMPLETE CBC W/AUTO DIFF WBC: CPT

## 2024-01-05 PROCEDURE — 700111 HCHG RX REV CODE 636 W/ 250 OVERRIDE (IP): Mod: JZ

## 2024-01-05 PROCEDURE — 80053 COMPREHEN METABOLIC PANEL: CPT

## 2024-01-05 PROCEDURE — 93005 ELECTROCARDIOGRAM TRACING: CPT

## 2024-01-05 PROCEDURE — 700101 HCHG RX REV CODE 250: Mod: UD

## 2024-01-05 PROCEDURE — 84145 PROCALCITONIN (PCT): CPT

## 2024-01-05 PROCEDURE — 99285 EMERGENCY DEPT VISIT HI MDM: CPT

## 2024-01-05 PROCEDURE — 700105 HCHG RX REV CODE 258: Mod: UD | Performed by: EMERGENCY MEDICINE

## 2024-01-05 PROCEDURE — 96375 TX/PRO/DX INJ NEW DRUG ADDON: CPT

## 2024-01-05 PROCEDURE — 8E0ZXY6 ISOLATION: ICD-10-PCS | Performed by: FAMILY MEDICINE

## 2024-01-05 PROCEDURE — 36415 COLL VENOUS BLD VENIPUNCTURE: CPT

## 2024-01-05 RX ORDER — DULOXETIN HYDROCHLORIDE 30 MG/1
30 CAPSULE, DELAYED RELEASE ORAL EVERY MORNING
Status: DISCONTINUED | OUTPATIENT
Start: 2024-01-06 | End: 2024-01-11 | Stop reason: HOSPADM

## 2024-01-05 RX ORDER — PROMETHAZINE HYDROCHLORIDE 25 MG/1
12.5-25 SUPPOSITORY RECTAL EVERY 4 HOURS PRN
Status: DISCONTINUED | OUTPATIENT
Start: 2024-01-05 | End: 2024-01-11 | Stop reason: HOSPADM

## 2024-01-05 RX ORDER — LABETALOL HYDROCHLORIDE 5 MG/ML
10 INJECTION, SOLUTION INTRAVENOUS EVERY 4 HOURS PRN
Status: DISCONTINUED | OUTPATIENT
Start: 2024-01-05 | End: 2024-01-11 | Stop reason: HOSPADM

## 2024-01-05 RX ORDER — HYDROMORPHONE HYDROCHLORIDE 1 MG/ML
0.5 INJECTION, SOLUTION INTRAMUSCULAR; INTRAVENOUS; SUBCUTANEOUS
Status: DISCONTINUED | OUTPATIENT
Start: 2024-01-05 | End: 2024-01-09

## 2024-01-05 RX ORDER — BENZONATATE 100 MG/1
100 CAPSULE ORAL 3 TIMES DAILY PRN
Status: DISCONTINUED | OUTPATIENT
Start: 2024-01-05 | End: 2024-01-11 | Stop reason: HOSPADM

## 2024-01-05 RX ORDER — IPRATROPIUM BROMIDE AND ALBUTEROL SULFATE 2.5; .5 MG/3ML; MG/3ML
3 SOLUTION RESPIRATORY (INHALATION)
Status: DISCONTINUED | OUTPATIENT
Start: 2024-01-05 | End: 2024-01-07

## 2024-01-05 RX ORDER — METHYLPREDNISOLONE SODIUM SUCCINATE 40 MG/ML
40 INJECTION, POWDER, LYOPHILIZED, FOR SOLUTION INTRAMUSCULAR; INTRAVENOUS ONCE
Status: COMPLETED | OUTPATIENT
Start: 2024-01-05 | End: 2024-01-05

## 2024-01-05 RX ORDER — PROMETHAZINE HYDROCHLORIDE 25 MG/1
12.5-25 TABLET ORAL EVERY 4 HOURS PRN
Status: DISCONTINUED | OUTPATIENT
Start: 2024-01-05 | End: 2024-01-11 | Stop reason: HOSPADM

## 2024-01-05 RX ORDER — NICOTINE 21 MG/24HR
14 PATCH, TRANSDERMAL 24 HOURS TRANSDERMAL
Status: DISCONTINUED | OUTPATIENT
Start: 2024-01-05 | End: 2024-01-11 | Stop reason: HOSPADM

## 2024-01-05 RX ORDER — PROCHLORPERAZINE EDISYLATE 5 MG/ML
5-10 INJECTION INTRAMUSCULAR; INTRAVENOUS EVERY 4 HOURS PRN
Status: DISCONTINUED | OUTPATIENT
Start: 2024-01-05 | End: 2024-01-11 | Stop reason: HOSPADM

## 2024-01-05 RX ORDER — IPRATROPIUM BROMIDE AND ALBUTEROL SULFATE 2.5; .5 MG/3ML; MG/3ML
3 SOLUTION RESPIRATORY (INHALATION) ONCE
Status: COMPLETED | OUTPATIENT
Start: 2024-01-05 | End: 2024-01-05

## 2024-01-05 RX ORDER — SODIUM CHLORIDE, SODIUM LACTATE, POTASSIUM CHLORIDE, CALCIUM CHLORIDE 600; 310; 30; 20 MG/100ML; MG/100ML; MG/100ML; MG/100ML
1000 INJECTION, SOLUTION INTRAVENOUS ONCE
Status: COMPLETED | OUTPATIENT
Start: 2024-01-05 | End: 2024-01-05

## 2024-01-05 RX ORDER — BISACODYL 10 MG
10 SUPPOSITORY, RECTAL RECTAL
Status: DISCONTINUED | OUTPATIENT
Start: 2024-01-05 | End: 2024-01-05

## 2024-01-05 RX ORDER — ENOXAPARIN SODIUM 100 MG/ML
40 INJECTION SUBCUTANEOUS DAILY
Status: DISCONTINUED | OUTPATIENT
Start: 2024-01-05 | End: 2024-01-11 | Stop reason: HOSPADM

## 2024-01-05 RX ORDER — ACETAMINOPHEN 500 MG
1000 TABLET ORAL EVERY 6 HOURS PRN
Status: DISCONTINUED | OUTPATIENT
Start: 2024-01-10 | End: 2024-01-11 | Stop reason: HOSPADM

## 2024-01-05 RX ORDER — ALBUTEROL SULFATE 90 UG/1
2 AEROSOL, METERED RESPIRATORY (INHALATION) EVERY 6 HOURS PRN
Status: DISCONTINUED | OUTPATIENT
Start: 2024-01-05 | End: 2024-01-07

## 2024-01-05 RX ORDER — IPRATROPIUM BROMIDE AND ALBUTEROL SULFATE 2.5; .5 MG/3ML; MG/3ML
3 SOLUTION RESPIRATORY (INHALATION)
Status: DISCONTINUED | OUTPATIENT
Start: 2024-01-05 | End: 2024-01-06

## 2024-01-05 RX ORDER — GABAPENTIN 400 MG/1
800 CAPSULE ORAL 3 TIMES DAILY
Status: DISCONTINUED | OUTPATIENT
Start: 2024-01-05 | End: 2024-01-11 | Stop reason: HOSPADM

## 2024-01-05 RX ORDER — DULOXETIN HYDROCHLORIDE 60 MG/1
60 CAPSULE, DELAYED RELEASE ORAL
Status: DISCONTINUED | OUTPATIENT
Start: 2024-01-05 | End: 2024-01-11 | Stop reason: HOSPADM

## 2024-01-05 RX ORDER — POLYETHYLENE GLYCOL 3350 17 G/17G
17 POWDER, FOR SOLUTION ORAL DAILY
COMMUNITY

## 2024-01-05 RX ORDER — BISACODYL 10 MG
10 SUPPOSITORY, RECTAL RECTAL
Status: DISCONTINUED | OUTPATIENT
Start: 2024-01-05 | End: 2024-01-11 | Stop reason: HOSPADM

## 2024-01-05 RX ORDER — AZITHROMYCIN 250 MG/1
500 TABLET, FILM COATED ORAL DAILY
Status: COMPLETED | OUTPATIENT
Start: 2024-01-05 | End: 2024-01-07

## 2024-01-05 RX ORDER — ONDANSETRON 2 MG/ML
4 INJECTION INTRAMUSCULAR; INTRAVENOUS EVERY 4 HOURS PRN
Status: DISCONTINUED | OUTPATIENT
Start: 2024-01-05 | End: 2024-01-11 | Stop reason: HOSPADM

## 2024-01-05 RX ORDER — SODIUM CHLORIDE, SODIUM LACTATE, POTASSIUM CHLORIDE, CALCIUM CHLORIDE 600; 310; 30; 20 MG/100ML; MG/100ML; MG/100ML; MG/100ML
INJECTION, SOLUTION INTRAVENOUS CONTINUOUS
Status: DISCONTINUED | OUTPATIENT
Start: 2024-01-05 | End: 2024-01-07

## 2024-01-05 RX ORDER — CYCLOBENZAPRINE HCL 10 MG
10 TABLET ORAL
Status: DISCONTINUED | OUTPATIENT
Start: 2024-01-05 | End: 2024-01-11 | Stop reason: HOSPADM

## 2024-01-05 RX ORDER — DEXAMETHASONE 4 MG/1
6 TABLET ORAL DAILY
Status: DISCONTINUED | OUTPATIENT
Start: 2024-01-06 | End: 2024-01-11 | Stop reason: HOSPADM

## 2024-01-05 RX ORDER — OMEPRAZOLE 20 MG/1
20 CAPSULE, DELAYED RELEASE ORAL
COMMUNITY
End: 2024-02-16 | Stop reason: SDUPTHER

## 2024-01-05 RX ORDER — BUSPIRONE HYDROCHLORIDE 5 MG/1
7.5 TABLET ORAL 2 TIMES DAILY
Status: DISCONTINUED | OUTPATIENT
Start: 2024-01-05 | End: 2024-01-11 | Stop reason: HOSPADM

## 2024-01-05 RX ORDER — OMEPRAZOLE 20 MG/1
20 CAPSULE, DELAYED RELEASE ORAL
Status: DISCONTINUED | OUTPATIENT
Start: 2024-01-05 | End: 2024-01-11 | Stop reason: HOSPADM

## 2024-01-05 RX ORDER — DOXYCYCLINE HYCLATE 100 MG
100 TABLET ORAL 2 TIMES DAILY
Status: SHIPPED | COMMUNITY
End: 2024-01-05

## 2024-01-05 RX ORDER — OSELTAMIVIR PHOSPHATE 75 MG/1
75 CAPSULE ORAL TWICE DAILY
Status: COMPLETED | OUTPATIENT
Start: 2024-01-05 | End: 2024-01-10

## 2024-01-05 RX ORDER — TRAZODONE HYDROCHLORIDE 150 MG/1
150 TABLET ORAL
Status: DISCONTINUED | OUTPATIENT
Start: 2024-01-05 | End: 2024-01-11 | Stop reason: HOSPADM

## 2024-01-05 RX ORDER — DEXTROMETHORPHAN POLISTIREX 30 MG/5ML
60 SUSPENSION ORAL EVERY 12 HOURS PRN
Status: ON HOLD | COMMUNITY
End: 2024-01-11

## 2024-01-05 RX ORDER — OXYCODONE HYDROCHLORIDE AND ACETAMINOPHEN 5; 325 MG/1; MG/1
1 TABLET ORAL ONCE
Status: COMPLETED | OUTPATIENT
Start: 2024-01-05 | End: 2024-01-05

## 2024-01-05 RX ORDER — OXYCODONE HYDROCHLORIDE 10 MG/1
10 TABLET ORAL
Status: DISCONTINUED | OUTPATIENT
Start: 2024-01-05 | End: 2024-01-09

## 2024-01-05 RX ORDER — POLYETHYLENE GLYCOL 3350 17 G/17G
1 POWDER, FOR SOLUTION ORAL
Status: DISCONTINUED | OUTPATIENT
Start: 2024-01-05 | End: 2024-01-05

## 2024-01-05 RX ORDER — POLYETHYLENE GLYCOL 3350 17 G/17G
1 POWDER, FOR SOLUTION ORAL DAILY
Status: DISCONTINUED | OUTPATIENT
Start: 2024-01-06 | End: 2024-01-11 | Stop reason: HOSPADM

## 2024-01-05 RX ORDER — BUPROPION HYDROCHLORIDE 150 MG/1
150 TABLET, EXTENDED RELEASE ORAL DAILY
Status: DISCONTINUED | OUTPATIENT
Start: 2024-01-05 | End: 2024-01-11 | Stop reason: HOSPADM

## 2024-01-05 RX ORDER — SUMATRIPTAN 50 MG/1
50 TABLET, FILM COATED ORAL ONCE
Status: COMPLETED | OUTPATIENT
Start: 2024-01-05 | End: 2024-01-05

## 2024-01-05 RX ORDER — BUPROPION HYDROCHLORIDE 150 MG/1
150 TABLET ORAL EVERY MORNING
Status: ON HOLD | COMMUNITY
Start: 2023-10-13 | End: 2024-01-11

## 2024-01-05 RX ORDER — GABAPENTIN 800 MG/1
800 TABLET ORAL 3 TIMES DAILY
COMMUNITY

## 2024-01-05 RX ORDER — ONDANSETRON 4 MG/1
4 TABLET, ORALLY DISINTEGRATING ORAL EVERY 4 HOURS PRN
Status: DISCONTINUED | OUTPATIENT
Start: 2024-01-05 | End: 2024-01-11 | Stop reason: HOSPADM

## 2024-01-05 RX ORDER — SUMATRIPTAN 50 MG/1
50 TABLET, FILM COATED ORAL
Status: DISCONTINUED | OUTPATIENT
Start: 2024-01-05 | End: 2024-01-11 | Stop reason: HOSPADM

## 2024-01-05 RX ORDER — BENZONATATE 100 MG/1
100 CAPSULE ORAL 3 TIMES DAILY PRN
Status: ON HOLD | COMMUNITY
End: 2024-01-11

## 2024-01-05 RX ORDER — ACETAMINOPHEN 500 MG
1000 TABLET ORAL EVERY 6 HOURS
Status: DISPENSED | OUTPATIENT
Start: 2024-01-05 | End: 2024-01-10

## 2024-01-05 RX ORDER — AMOXICILLIN 250 MG
2 CAPSULE ORAL 2 TIMES DAILY
Status: DISCONTINUED | OUTPATIENT
Start: 2024-01-05 | End: 2024-01-05

## 2024-01-05 RX ORDER — OXYCODONE HYDROCHLORIDE 5 MG/1
5 TABLET ORAL
Status: DISCONTINUED | OUTPATIENT
Start: 2024-01-05 | End: 2024-01-09

## 2024-01-05 RX ORDER — PREDNISONE 20 MG/1
40 TABLET ORAL DAILY
Status: SHIPPED | COMMUNITY
End: 2024-01-05

## 2024-01-05 RX ORDER — ONDANSETRON 2 MG/ML
4 INJECTION INTRAMUSCULAR; INTRAVENOUS ONCE
Status: COMPLETED | OUTPATIENT
Start: 2024-01-05 | End: 2024-01-05

## 2024-01-05 RX ADMIN — ENOXAPARIN SODIUM 40 MG: 100 INJECTION SUBCUTANEOUS at 17:13

## 2024-01-05 RX ADMIN — CYCLOBENZAPRINE 10 MG: 10 TABLET, FILM COATED ORAL at 20:25

## 2024-01-05 RX ADMIN — BUPROPION HYDROCHLORIDE 150 MG: 150 TABLET, EXTENDED RELEASE ORAL at 17:13

## 2024-01-05 RX ADMIN — TRAZODONE HYDROCHLORIDE 150 MG: 150 TABLET ORAL at 20:25

## 2024-01-05 RX ADMIN — AZITHROMYCIN DIHYDRATE 500 MG: 250 TABLET, FILM COATED ORAL at 17:13

## 2024-01-05 RX ADMIN — GABAPENTIN 800 MG: 400 CAPSULE ORAL at 17:12

## 2024-01-05 RX ADMIN — OMEPRAZOLE 20 MG: 20 CAPSULE, DELAYED RELEASE ORAL at 20:26

## 2024-01-05 RX ADMIN — ACETAMINOPHEN 1000 MG: 500 TABLET ORAL at 23:53

## 2024-01-05 RX ADMIN — METHYLPREDNISOLONE SODIUM SUCCINATE 40 MG: 40 INJECTION, POWDER, FOR SOLUTION INTRAMUSCULAR; INTRAVENOUS at 12:33

## 2024-01-05 RX ADMIN — ACETAMINOPHEN 1000 MG: 500 TABLET ORAL at 17:12

## 2024-01-05 RX ADMIN — IPRATROPIUM BROMIDE AND ALBUTEROL SULFATE 3 ML: 2.5; .5 SOLUTION RESPIRATORY (INHALATION) at 13:02

## 2024-01-05 RX ADMIN — DULOXETINE HYDROCHLORIDE 60 MG: 60 CAPSULE, DELAYED RELEASE ORAL at 20:25

## 2024-01-05 RX ADMIN — IPRATROPIUM BROMIDE AND ALBUTEROL SULFATE 3 ML: 2.5; .5 SOLUTION RESPIRATORY (INHALATION) at 22:59

## 2024-01-05 RX ADMIN — BENZONATATE 100 MG: 100 CAPSULE ORAL at 17:18

## 2024-01-05 RX ADMIN — SUMATRIPTAN SUCCINATE 50 MG: 50 TABLET ORAL at 14:33

## 2024-01-05 RX ADMIN — OSELTAMIVIR PHOSPHATE 75 MG: 75 CAPSULE ORAL at 15:14

## 2024-01-05 RX ADMIN — IPRATROPIUM BROMIDE AND ALBUTEROL SULFATE 3 ML: 2.5; .5 SOLUTION RESPIRATORY (INHALATION) at 16:57

## 2024-01-05 RX ADMIN — ONDANSETRON 4 MG: 2 INJECTION INTRAMUSCULAR; INTRAVENOUS at 14:33

## 2024-01-05 RX ADMIN — IPRATROPIUM BROMIDE AND ALBUTEROL SULFATE 3 ML: 2.5; .5 SOLUTION RESPIRATORY (INHALATION) at 20:42

## 2024-01-05 RX ADMIN — SODIUM CHLORIDE, POTASSIUM CHLORIDE, SODIUM LACTATE AND CALCIUM CHLORIDE 1000 ML: 600; 310; 30; 20 INJECTION, SOLUTION INTRAVENOUS at 13:36

## 2024-01-05 RX ADMIN — BUSPIRONE HYDROCHLORIDE 7.5 MG: 5 TABLET ORAL at 20:25

## 2024-01-05 RX ADMIN — OXYCODONE AND ACETAMINOPHEN 1 TABLET: 5; 325 TABLET ORAL at 14:33

## 2024-01-05 RX ADMIN — NICOTINE 14 MG: 14 PATCH TRANSDERMAL at 17:14

## 2024-01-05 RX ADMIN — QUETIAPINE FUMARATE 150 MG: 100 TABLET ORAL at 20:25

## 2024-01-05 RX ADMIN — IPRATROPIUM BROMIDE AND ALBUTEROL SULFATE 3 ML: 2.5; .5 SOLUTION RESPIRATORY (INHALATION) at 20:48

## 2024-01-05 ASSESSMENT — COGNITIVE AND FUNCTIONAL STATUS - GENERAL
SUGGESTED CMS G CODE MODIFIER DAILY ACTIVITY: CH
MOBILITY SCORE: 19
STANDING UP FROM CHAIR USING ARMS: A LOT
CLIMB 3 TO 5 STEPS WITH RAILING: A LOT
WALKING IN HOSPITAL ROOM: A LITTLE
SUGGESTED CMS G CODE MODIFIER MOBILITY: CK
DAILY ACTIVITIY SCORE: 24

## 2024-01-05 ASSESSMENT — LIFESTYLE VARIABLES
EVER FELT BAD OR GUILTY ABOUT YOUR DRINKING: NO
EVER HAD A DRINK FIRST THING IN THE MORNING TO STEADY YOUR NERVES TO GET RID OF A HANGOVER: NO
TOTAL SCORE: 0
DOES PATIENT WANT TO STOP DRINKING: NO
HOW MANY TIMES IN THE PAST YEAR HAVE YOU HAD 5 OR MORE DRINKS IN A DAY: 0
ALCOHOL_USE: NO
HAVE PEOPLE ANNOYED YOU BY CRITICIZING YOUR DRINKING: NO
ON A TYPICAL DAY WHEN YOU DRINK ALCOHOL HOW MANY DRINKS DO YOU HAVE: 0
TOTAL SCORE: 0
TOTAL SCORE: 0
HAVE YOU EVER FELT YOU SHOULD CUT DOWN ON YOUR DRINKING: NO
AVERAGE NUMBER OF DAYS PER WEEK YOU HAVE A DRINK CONTAINING ALCOHOL: 0
CONSUMPTION TOTAL: NEGATIVE

## 2024-01-05 ASSESSMENT — PAIN DESCRIPTION - PAIN TYPE
TYPE: ACUTE PAIN

## 2024-01-05 ASSESSMENT — COPD QUESTIONNAIRES
DURING THE PAST 4 WEEKS HOW MUCH DID YOU FEEL SHORT OF BREATH: SOME OF THE TIME
DO YOU EVER COUGH UP ANY MUCUS OR PHLEGM?: NO/ONLY WITH OCCASIONAL COLDS OR INFECTIONS
COPD SCREENING SCORE: 7
HAVE YOU SMOKED AT LEAST 100 CIGARETTES IN YOUR ENTIRE LIFE: YES

## 2024-01-05 ASSESSMENT — PATIENT HEALTH QUESTIONNAIRE - PHQ9
1. LITTLE INTEREST OR PLEASURE IN DOING THINGS: NOT AT ALL
2. FEELING DOWN, DEPRESSED, IRRITABLE, OR HOPELESS: NOT AT ALL
SUM OF ALL RESPONSES TO PHQ9 QUESTIONS 1 AND 2: 0

## 2024-01-05 NOTE — ED PROVIDER NOTES
ED Provider Note    CHIEF COMPLAINT  Chief Complaint   Patient presents with    Shortness of Breath     Sob x 1 week, patient supposed to have o2 at home but her DrAbi Never ordered it, that was thanksgiving, patient has hx of COPD, pt was given albuterol plus duo neb in route        EXTERNAL RECORDS REVIEWED  Outpatient Notes reviewed clinic note from September 2023.  Patient was evaluated for home oxygen and an order was placed.  Spiriva was discontinued and patient was continued on albuterol and BuSpar.    HPI/ROS  LIMITATION TO HISTORY   Select: : None  OUTSIDE HISTORIAN(S):  None    Martha Mosher is a 63 y.o. female who presents with shortness of breath the past week.  Patient states that it worsened today and did not improve after taking her albuterol at 6 and 10 this morning.  Patient has also had a cough, subjective fever, chills, nausea, and bodyaches for the past week.  She also describes pain in her chest from her cough.  Patient has had multiple sick contacts.  She has been taking her inhalers at home.  The patient is supposed to be using oxygen at home but states that the order never went through.  Her last hospitalization for COPD exacerbation was 1 year ago.    PAST MEDICAL HISTORY   has a past medical history of Allergy, unspecified not elsewhere classified, Anxiety, Arthritis, Bronchitis (5/20/2022), Cancer ([facility]), Chronic airway obstruction, not elsewhere classified, COPD exacerbation ([facility]) (1/5/2024), Depression, Fibromyalgia, GERD (gastroesophageal reflux disease), Hypertension, Indigestion, Migraine, Osteoporosis, unspecified, Other emphysema ([facility]), Other specified symptom associated with female genital organs, Psychiatric disorder (1/29/2018), Renal disorder, Severe sepsis ([facility]) (5/13/2018), Type II or unspecified type diabetes mellitus without mention of complication, not stated as uncontrolled, Ulcer, Unspecified asthma(493.90), Unspecified cataract, and Urolithiasis.    SURGICAL  "HISTORY   has a past surgical history that includes gastroscopy with biopsy (03/01/2009); colonoscopy with biopsy (08/03/2009); other abdominal surgery; gyn surgery; other; appendectomy; primary c section; hernia repair; abdominal hysterectomy total; neck exploration; and lumbar laminectomy diskectomy (1/3/2023).    FAMILY HISTORY  Family History   Problem Relation Age of Onset    Lung Disease Mother     Diabetes Mother     Stroke Mother     Arthritis Father     Cancer Father     Diabetes Father     Hypertension Father     Stroke Father     Alcohol/Drug Father     Hypertension Brother     Stroke Brother     Alcohol/Drug Brother     Arthritis Maternal Aunt     Arthritis Maternal Uncle     Cancer Paternal Grandmother     Cancer Paternal Grandfather        SOCIAL HISTORY  Social History     Tobacco Use    Smoking status: Every Day     Current packs/day: 0.25     Average packs/day: 0.3 packs/day for 30.0 years (7.5 ttl pk-yrs)     Types: Cigarettes    Smokeless tobacco: Current    Tobacco comments:     1ppd - quit 7-8 months ago   Vaping Use    Vaping Use: Every day    Substances: Nicotine   Substance and Sexual Activity    Alcohol use: Not Currently     Comment: occ    Drug use: Not Currently     Types: Inhaled     Comment: Meth 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    Sexual activity: Not on file       CURRENT MEDICATIONS  Home Medications    **Home medications have not yet been reviewed for this encounter**         ALLERGIES  Allergies   Allergen Reactions    Sulfa Drugs Hives, Unspecified and Shortness of Breath     Pt states that she hallucinates on this as well as getting hives    Lyrica Unspecified     Hallucinations       PHYSICAL EXAM  VITAL SIGNS: /56   Pulse 93   Temp 36.8 °C (98.3 °F) (Oral)   Resp 20   Ht 1.473 m (4' 10\")   Wt 69.4 kg (153 lb)   LMP  (LMP Unknown)   SpO2 96%   BMI 31.98 kg/m²    Physical Exam  Constitutional:       General: She is not in acute distress.     Appearance: She is obese.   HENT:    "   Head: Normocephalic and atraumatic.      Mouth/Throat:      Pharynx: Oropharynx is clear. No oropharyngeal exudate.   Eyes:      Extraocular Movements: Extraocular movements intact.   Cardiovascular:      Rate and Rhythm: Regular rhythm. Tachycardia present.      Heart sounds: No murmur heard.  Pulmonary:      Effort: Tachypnea present.      Breath sounds: Examination of the right-lower field reveals decreased breath sounds and wheezing. Examination of the left-lower field reveals decreased breath sounds and wheezing. Decreased breath sounds and wheezing present. No rhonchi or rales.   Abdominal:      General: Bowel sounds are normal.      Palpations: Abdomen is soft.   Musculoskeletal:         General: Normal range of motion.      Cervical back: Normal range of motion and neck supple.   Skin:     General: Skin is warm and dry.   Neurological:      General: No focal deficit present.      Mental Status: She is alert.          DIAGNOSTIC STUDIES / PROCEDURES  EKG  I have independently interpreted this EKG  Sinus tachycardia rate 108.  Normal axis.  Right bundle branch block.    LABS  Results for orders placed or performed during the hospital encounter of 01/05/24   CoV-2, Flu A/B, And RSV by PCR (Footnote)    Specimen: Nasal; Respirate   Result Value Ref Range    Influenza virus A RNA POSITIVE (A) Negative    Influenza virus B, PCR Negative Negative    RSV, PCR Negative Negative    SARS-CoV-2 by PCR DETECTED (AA)     SARS-CoV-2 Source NP Swab    CBC WITH DIFFERENTIAL   Result Value Ref Range    WBC 5.0 4.8 - 10.8 K/uL    RBC 4.77 4.20 - 5.40 M/uL    Hemoglobin 14.1 12.0 - 16.0 g/dL    Hematocrit 43.8 37.0 - 47.0 %    MCV 91.8 81.4 - 97.8 fL    MCH 29.6 27.0 - 33.0 pg    MCHC 32.2 32.2 - 35.5 g/dL    RDW 48.6 35.9 - 50.0 fL    Platelet Count 107 (L) 164 - 446 K/uL    MPV 10.5 9.0 - 12.9 fL    Neutrophils-Polys 86.40 (H) 44.00 - 72.00 %    Lymphocytes 8.80 (L) 22.00 - 41.00 %    Monocytes 4.40 0.00 - 13.40 %     Eosinophils 0.00 0.00 - 6.90 %    Basophils 0.20 0.00 - 1.80 %    Immature Granulocytes 0.20 0.00 - 0.90 %    Nucleated RBC 0.00 0.00 - 0.20 /100 WBC    Neutrophils (Absolute) 4.31 1.82 - 7.42 K/uL    Lymphs (Absolute) 0.44 (L) 1.00 - 4.80 K/uL    Monos (Absolute) 0.22 0.00 - 0.85 K/uL    Eos (Absolute) 0.00 0.00 - 0.51 K/uL    Baso (Absolute) 0.01 0.00 - 0.12 K/uL    Immature Granulocytes (abs) 0.01 0.00 - 0.11 K/uL    NRBC (Absolute) 0.00 K/uL   Comp Metabolic Panel   Result Value Ref Range    Sodium 136 135 - 145 mmol/L    Potassium 4.1 3.6 - 5.5 mmol/L    Chloride 101 96 - 112 mmol/L    Co2 21 20 - 33 mmol/L    Anion Gap 14.0 7.0 - 16.0    Glucose 164 (H) 65 - 99 mg/dL    Bun 16 8 - 22 mg/dL    Creatinine 0.69 0.50 - 1.40 mg/dL    Calcium 8.8 8.5 - 10.5 mg/dL    Correct Calcium 9.0 8.5 - 10.5 mg/dL    AST(SGOT) 52 (H) 12 - 45 U/L    ALT(SGPT) 22 2 - 50 U/L    Alkaline Phosphatase 63 30 - 99 U/L    Total Bilirubin 0.4 0.1 - 1.5 mg/dL    Albumin 3.8 3.2 - 4.9 g/dL    Total Protein 7.4 6.0 - 8.2 g/dL    Globulin 3.6 (H) 1.9 - 3.5 g/dL    A-G Ratio 1.1 g/dL   ESTIMATED GFR   Result Value Ref Range    GFR (CKD-EPI) 98 >60 mL/min/1.73 m 2   EKG   Result Value Ref Range    Report       [facility] Emergency Dept.    Test Date:  2024  Pt Name:    EDENILSON FLETCHER             Department: ER  MRN:        [id number]                      Room:        20  Gender:     Female                       Technician: [id number]  :        1960                   Requested By:ER TRIAGE PROTOCOL  Order #:    [id number]                    Pamela MD:    Measurements  Intervals                                Axis  Rate:       108                          P:          77  CA:         127                          QRS:        98  QRSD:       142                          T:          34  QT:         359  QTc:        481    Interpretive Statements  Sinus tachycardia  RBBB and LPFB  Compared to ECG 2022  14:24:10  Left posterior fascicular block now present  Sinus rhythm no longer present          RADIOLOGY  I have independently interpreted the diagnostic imaging associated with this visit and am waiting the final reading from the radiologist.   My preliminary interpretation is as follows:   Chest XR-opacity versus atelectasis in left lower lobe    Radiologist interpretation:   DX-CHEST-PORTABLE (1 VIEW)   Final Result      Left basilar opacity, atelectasis and/or pneumonitis.          COURSE & MEDICAL DECISION MAKING    ED Observation Status? No; Patient does not meet criteria for ED Observation.     INITIAL ASSESSMENT, COURSE AND PLAN  Care Narrative: This is a 63-year-old female with past medical history of COPD presenting for shortness of breath.  She was tachypneic and tachycardic on presentation with a productive cough.  Patient received albuterol nebulizer en route with EMS.  She was treated with DuoNeb nebulizer and Solu-Medrol 40 mg IV.  Differentials include pneumonia, viral URI, COPD exacerbation.  ECG demonstrated no STEMI.  Chest x-ray demonstrated a left basilar opacity however, given lack of fevers and elevated white count I will defer treatment plan to hospitalist.  COVID and influenza A were positive.  On reevaluation, patient was on 5 L via nonrebreather for work of breathing and hypoxia.  She continues to have decreased air movement throughout.  Patient will be admitted for acute on chronic hypoxia likely COPD exacerbation.    HYDRATION: Based on the patient's presentation of Dehydration and Hypotension the patient was given IV fluids. IV Hydration was used because oral hydration was not adequate alone. Upon recheck following hydration, the patient was improved.      11:52 AM Patient was evaluated at bedside.  She is currently on 5 L of oxygen via rebreather mask receiving an albuterol treatment.  Patient has increased work of breathing and is tachycardic.    11:53 AM Ordered COVID, flu, and RSV  PCR as well as procalcitonin.  Will treat with DuoNeb and 40 mg IV Solu-Medrol.  Reviewed EKG which not concerning for ACS.    1:25 PM Reviewed CBC which demonstrated no elevated white count and a low platelet count which appears to be chronic.  Had an elevated AST on CMP and glucose which also appear to be chronic.    1:32 PM Ordered LR bolus for hydration given hypotension.    1:42 PM Reevaluated patient at bedside.  She is feeling slightly better after the DuoNeb treatment.  Patient continues to have poor air movement and is on 5 L nonrebreather.  Blood pressure improved to 103/56.    1:44 PM Paged Hospitalist for admission.    1:46 PM Reviewed chest XR.    2:15 PM Spoke to Dr. Aguero, [facility], who accepted patient for admission.     2:24 PM COVID and influenza A positive.    ADDITIONAL PROBLEM LIST  Attention   GERD  Migraine    I have discussed management of the patient with the following physicians and GAURAV's:  Admitting hospitalist    Discussion of management with other QHP or appropriate source(s): RT        Barriers to care at this time, including but not limited to: none.    Decision tools and prescription drugs considered including, but not limited to: Antibiotics however, no evidence of pneumonia. .    FINAL DIAGNOSIS  1. Acute exacerbation of chronic obstructive pulmonary disease (COPD) (HCC)    2. COVID-19    3. Influenza A           Electronically signed by: Rand Dash M.D., 1/5/2024 11:48 AM

## 2024-01-05 NOTE — ED NOTES
Assist RN. Lab called stating sample partially hemolyzed however results still wnl. Asked to post results.

## 2024-01-05 NOTE — ED NOTES
Med rec completed per patient at bedside and patient's pharmacies, [facility] on [address] ([phone]) and [facility] on [address] ([phone]).    Allergies reviewed with patient.    Patient is on a 4 day course of prednisone prescribed 1/3/2024.    Per [facility], patient is prescribed quetiapine 100 mg with directions to take 2 tablets (200 mg) at bedtime; patient states that she instead takes 1.5 tablets (150 mg) at bedtime.    Per [facility], patient is prescribed duloxetine 30 mg with directions to take 1 capsule every morning AND duloxetine 60 mg with directions to take 1 capsule at bedtime.    ANTICOAGULATION: NONE.    Outpatient antibiotics within the last 30 days: On 12/10/2023 patient was prescribed a 10 day course of doxycycline hyclate, which she states that she finished.    Rolando Jaime PhT

## 2024-01-05 NOTE — ED TRIAGE NOTES
Chief Complaint   Patient presents with    Shortness of Breath     Sob x 1 week, patient supposed to have o2 at home but her  Never ordered it, that was thanksgiving, patient has hx of COPD, pt was given albuterol plus duo neb in route      Pt also reports a headache  Pt on 6 L o2

## 2024-01-05 NOTE — ED NOTES
Assist RN. Pt on call light. Wanted bed position adjusted. Done. Pillow supplied. Denies additional needs at this time.

## 2024-01-06 PROBLEM — Z72.51 HIGH-RISK SEXUAL BEHAVIOR: Status: RESOLVED | Noted: 2018-11-08 | Resolved: 2024-01-06

## 2024-01-06 LAB
ALBUMIN SERPL BCP-MCNC: 3.3 G/DL (ref 3.2–4.9)
ALBUMIN/GLOB SERPL: 1.1 G/DL
ALP SERPL-CCNC: 52 U/L (ref 30–99)
ALT SERPL-CCNC: 15 U/L (ref 2–50)
ANION GAP SERPL CALC-SCNC: 11 MMOL/L (ref 7–16)
AST SERPL-CCNC: 30 U/L (ref 12–45)
BILIRUB SERPL-MCNC: 0.3 MG/DL (ref 0.1–1.5)
BUN SERPL-MCNC: 15 MG/DL (ref 8–22)
CALCIUM ALBUM COR SERPL-MCNC: 9.3 MG/DL (ref 8.5–10.5)
CALCIUM SERPL-MCNC: 8.7 MG/DL (ref 8.5–10.5)
CHLORIDE SERPL-SCNC: 104 MMOL/L (ref 96–112)
CO2 SERPL-SCNC: 24 MMOL/L (ref 20–33)
CREAT SERPL-MCNC: 0.58 MG/DL (ref 0.5–1.4)
ERYTHROCYTE [DISTWIDTH] IN BLOOD BY AUTOMATED COUNT: 50.7 FL (ref 35.9–50)
EST. AVERAGE GLUCOSE BLD GHB EST-MCNC: 108 MG/DL
GFR SERPLBLD CREATININE-BSD FMLA CKD-EPI: 102 ML/MIN/1.73 M 2
GLOBULIN SER CALC-MCNC: 3 G/DL (ref 1.9–3.5)
GLUCOSE SERPL-MCNC: 165 MG/DL (ref 65–99)
HBA1C MFR BLD: 5.4 % (ref 4–5.6)
HCT VFR BLD AUTO: 37.6 % (ref 37–47)
HGB BLD-MCNC: 12.3 G/DL (ref 12–16)
MCH RBC QN AUTO: 30.4 PG (ref 27–33)
MCHC RBC AUTO-ENTMCNC: 32.7 G/DL (ref 32.2–35.5)
MCV RBC AUTO: 93.1 FL (ref 81.4–97.8)
PLATELET # BLD AUTO: 112 K/UL (ref 164–446)
PMV BLD AUTO: 10.4 FL (ref 9–12.9)
POTASSIUM SERPL-SCNC: 4 MMOL/L (ref 3.6–5.5)
PROT SERPL-MCNC: 6.3 G/DL (ref 6–8.2)
RBC # BLD AUTO: 4.04 M/UL (ref 4.2–5.4)
SODIUM SERPL-SCNC: 139 MMOL/L (ref 135–145)
WBC # BLD AUTO: 5.5 K/UL (ref 4.8–10.8)

## 2024-01-06 PROCEDURE — 99406 BEHAV CHNG SMOKING 3-10 MIN: CPT

## 2024-01-06 PROCEDURE — 36415 COLL VENOUS BLD VENIPUNCTURE: CPT

## 2024-01-06 PROCEDURE — 80053 COMPREHEN METABOLIC PANEL: CPT

## 2024-01-06 PROCEDURE — 700102 HCHG RX REV CODE 250 W/ 637 OVERRIDE(OP)

## 2024-01-06 PROCEDURE — 99222 1ST HOSP IP/OBS MODERATE 55: CPT | Mod: GC | Performed by: FAMILY MEDICINE

## 2024-01-06 PROCEDURE — 94669 MECHANICAL CHEST WALL OSCILL: CPT

## 2024-01-06 PROCEDURE — 85027 COMPLETE CBC AUTOMATED: CPT

## 2024-01-06 PROCEDURE — 770001 HCHG ROOM/CARE - MED/SURG/GYN PRIV*

## 2024-01-06 PROCEDURE — 94640 AIRWAY INHALATION TREATMENT: CPT

## 2024-01-06 PROCEDURE — 700101 HCHG RX REV CODE 250

## 2024-01-06 PROCEDURE — 700105 HCHG RX REV CODE 258

## 2024-01-06 PROCEDURE — A9270 NON-COVERED ITEM OR SERVICE: HCPCS

## 2024-01-06 PROCEDURE — 700111 HCHG RX REV CODE 636 W/ 250 OVERRIDE (IP): Mod: JZ

## 2024-01-06 PROCEDURE — 83036 HEMOGLOBIN GLYCOSYLATED A1C: CPT

## 2024-01-06 PROCEDURE — 94664 DEMO&/EVAL PT USE INHALER: CPT

## 2024-01-06 RX ORDER — IPRATROPIUM BROMIDE AND ALBUTEROL SULFATE 2.5; .5 MG/3ML; MG/3ML
3 SOLUTION RESPIRATORY (INHALATION)
Status: DISCONTINUED | OUTPATIENT
Start: 2024-01-06 | End: 2024-01-09

## 2024-01-06 RX ORDER — IPRATROPIUM BROMIDE AND ALBUTEROL SULFATE 2.5; .5 MG/3ML; MG/3ML
3 SOLUTION RESPIRATORY (INHALATION)
Status: DISCONTINUED | OUTPATIENT
Start: 2024-01-07 | End: 2024-01-07

## 2024-01-06 RX ORDER — GUAIFENESIN/DEXTROMETHORPHAN 100-10MG/5
10 SYRUP ORAL EVERY 4 HOURS PRN
Status: DISCONTINUED | OUTPATIENT
Start: 2024-01-06 | End: 2024-01-11 | Stop reason: HOSPADM

## 2024-01-06 RX ORDER — IPRATROPIUM BROMIDE AND ALBUTEROL SULFATE 2.5; .5 MG/3ML; MG/3ML
3 SOLUTION RESPIRATORY (INHALATION)
Status: DISCONTINUED | OUTPATIENT
Start: 2024-01-06 | End: 2024-01-06

## 2024-01-06 RX ADMIN — GUAIFENESIN SYRUP AND DEXTROMETHORPHAN 10 ML: 100; 10 SYRUP ORAL at 22:53

## 2024-01-06 RX ADMIN — IPRATROPIUM BROMIDE AND ALBUTEROL SULFATE 3 ML: 2.5; .5 SOLUTION RESPIRATORY (INHALATION) at 19:38

## 2024-01-06 RX ADMIN — IPRATROPIUM BROMIDE AND ALBUTEROL SULFATE 3 ML: 2.5; .5 SOLUTION RESPIRATORY (INHALATION) at 11:02

## 2024-01-06 RX ADMIN — BUSPIRONE HYDROCHLORIDE 7.5 MG: 5 TABLET ORAL at 05:24

## 2024-01-06 RX ADMIN — GABAPENTIN 800 MG: 400 CAPSULE ORAL at 05:23

## 2024-01-06 RX ADMIN — OSELTAMIVIR PHOSPHATE 75 MG: 75 CAPSULE ORAL at 05:23

## 2024-01-06 RX ADMIN — DEXAMETHASONE 6 MG: 4 TABLET ORAL at 05:23

## 2024-01-06 RX ADMIN — BUPROPION HYDROCHLORIDE 150 MG: 150 TABLET, EXTENDED RELEASE ORAL at 05:23

## 2024-01-06 RX ADMIN — BENZONATATE 100 MG: 100 CAPSULE ORAL at 05:23

## 2024-01-06 RX ADMIN — OXYCODONE HYDROCHLORIDE 10 MG: 10 TABLET ORAL at 23:13

## 2024-01-06 RX ADMIN — GABAPENTIN 800 MG: 400 CAPSULE ORAL at 18:49

## 2024-01-06 RX ADMIN — GUAIFENESIN SYRUP AND DEXTROMETHORPHAN 10 ML: 100; 10 SYRUP ORAL at 10:35

## 2024-01-06 RX ADMIN — POLYETHYLENE GLYCOL 3350 1 PACKET: 17 POWDER, FOR SOLUTION ORAL at 05:22

## 2024-01-06 RX ADMIN — IPRATROPIUM BROMIDE AND ALBUTEROL SULFATE 3 ML: 2.5; .5 SOLUTION RESPIRATORY (INHALATION) at 07:30

## 2024-01-06 RX ADMIN — OSELTAMIVIR PHOSPHATE 75 MG: 75 CAPSULE ORAL at 18:50

## 2024-01-06 RX ADMIN — TRAZODONE HYDROCHLORIDE 150 MG: 150 TABLET ORAL at 22:54

## 2024-01-06 RX ADMIN — SODIUM CHLORIDE, POTASSIUM CHLORIDE, SODIUM LACTATE AND CALCIUM CHLORIDE: 600; 310; 30; 20 INJECTION, SOLUTION INTRAVENOUS at 23:02

## 2024-01-06 RX ADMIN — IPRATROPIUM BROMIDE AND ALBUTEROL SULFATE 3 ML: 2.5; .5 SOLUTION RESPIRATORY (INHALATION) at 02:33

## 2024-01-06 RX ADMIN — OMEPRAZOLE 20 MG: 20 CAPSULE, DELAYED RELEASE ORAL at 22:54

## 2024-01-06 RX ADMIN — ACETAMINOPHEN 1000 MG: 500 TABLET ORAL at 18:50

## 2024-01-06 RX ADMIN — GUAIFENESIN SYRUP AND DEXTROMETHORPHAN 10 ML: 100; 10 SYRUP ORAL at 15:28

## 2024-01-06 RX ADMIN — OXYCODONE HYDROCHLORIDE 10 MG: 10 TABLET ORAL at 15:28

## 2024-01-06 RX ADMIN — GABAPENTIN 800 MG: 400 CAPSULE ORAL at 12:36

## 2024-01-06 RX ADMIN — AZITHROMYCIN DIHYDRATE 500 MG: 250 TABLET, FILM COATED ORAL at 15:30

## 2024-01-06 RX ADMIN — PROMETHAZINE HYDROCHLORIDE 25 MG: 25 TABLET ORAL at 23:17

## 2024-01-06 RX ADMIN — DULOXETINE HYDROCHLORIDE 60 MG: 60 CAPSULE, DELAYED RELEASE ORAL at 22:54

## 2024-01-06 RX ADMIN — CYCLOBENZAPRINE 10 MG: 10 TABLET, FILM COATED ORAL at 22:54

## 2024-01-06 RX ADMIN — QUETIAPINE FUMARATE 150 MG: 100 TABLET ORAL at 22:54

## 2024-01-06 RX ADMIN — PROMETHAZINE HYDROCHLORIDE 25 MG: 25 TABLET ORAL at 15:30

## 2024-01-06 RX ADMIN — SODIUM CHLORIDE, POTASSIUM CHLORIDE, SODIUM LACTATE AND CALCIUM CHLORIDE: 600; 310; 30; 20 INJECTION, SOLUTION INTRAVENOUS at 07:02

## 2024-01-06 RX ADMIN — OXYCODONE HYDROCHLORIDE 10 MG: 10 TABLET ORAL at 10:09

## 2024-01-06 RX ADMIN — ACETAMINOPHEN 1000 MG: 500 TABLET ORAL at 05:24

## 2024-01-06 RX ADMIN — BUSPIRONE HYDROCHLORIDE 7.5 MG: 5 TABLET ORAL at 18:49

## 2024-01-06 RX ADMIN — ENOXAPARIN SODIUM 40 MG: 100 INJECTION SUBCUTANEOUS at 18:51

## 2024-01-06 RX ADMIN — NICOTINE 14 MG: 14 PATCH TRANSDERMAL at 05:22

## 2024-01-06 RX ADMIN — DULOXETINE HYDROCHLORIDE 30 MG: 30 CAPSULE, DELAYED RELEASE ORAL at 05:24

## 2024-01-06 RX ADMIN — PROMETHAZINE HYDROCHLORIDE 25 MG: 25 TABLET ORAL at 10:08

## 2024-01-06 RX ADMIN — SODIUM CHLORIDE, POTASSIUM CHLORIDE, SODIUM LACTATE AND CALCIUM CHLORIDE: 600; 310; 30; 20 INJECTION, SOLUTION INTRAVENOUS at 00:01

## 2024-01-06 ASSESSMENT — PAIN DESCRIPTION - PAIN TYPE
TYPE: ACUTE PAIN;CHRONIC PAIN
TYPE: ACUTE PAIN;CHRONIC PAIN
TYPE: CHRONIC PAIN

## 2024-01-06 ASSESSMENT — PAIN SCALES - WONG BAKER: WONGBAKER_NUMERICALRESPONSE: DOESN'T HURT AT ALL

## 2024-01-06 NOTE — H&P
As the [facility] Senior, I was present with Dr. Bird during the admission interview and supervised his medical decision making thereafter.    Tremaine Aguero MD  PGY3  [facility]
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
## 2024-01-06 NOTE — ASSESSMENT & PLAN NOTE
Positive on admission to ED. Symptoms noted worsening 1 day prior to admission.   - Tamiflu ordered, 5 days  - See #COPD exacerbation
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
## 2024-01-06 NOTE — RESPIRATORY CARE
"  COPD EDUCATION by COPD CLINICAL EDUCATOR  1/6/2024  at  1:32 PM by Abena Yan, RRT     Patient interviewed by education team.  Patient declined or is unable to participate in the full program.  Therefore, a short intervention has been conducted.  A comprehensive packet including information about COPD, types of treatments to manage their disease and safe home Oxygen usage was provided and reviewed with patient at the bedside. Smoking Cessation Intervention and education completed, 4 minutes spent on smoking cessation education with patient.  Provided smoking cessation packet with \"Tips to Quit\" and brochure for \"Free Smoking Cessation Classes\".     COPD Screen  COPD Risk Screening  Do you have a history of COPD?: Yes  Do you have a Pulmonologist?: No  COPD Population Screener  During the past 4 weeks, how much did you feel short of breath?: Some of the time  Do you ever cough up any mucus or phlegm?: No/only with occasional colds or infections  In the past 12 months, you do less than you used to because of your breathing problems: Strongly agree  Have you smoked at least 100 cigarettes in your entire life?: Yes  How old are you?: 60+  COPD Screening Score: 7  COPD Coordinator Recommended: Yes    COPD Assessment  COPD Clinical Specialists ONLY  COPD Education Initiated: Yes--Short Intervention  Is this a COPD exacerbation patient?: Yes ([facility])  [employer] [employer]: none prior pending Choice and walk testing  Physician Name: [facility] on [facility]; call to [phone] for discharge appt message left to schedule 7-10 d  Pulmonologist Name: declined-gave list of groups in area  Referrals Initiated: Yes  Smoking Cessation: Yes  $ Smoking Cessation 3-10 Minutes: Symptomatic  Home Health Care:  (TBD)  Mobile Urgent Care Services: Declined (discussed declined wants her PCP group)  [facility] Group: N/A  $ Demo/Eval of SVN's, MDI's and Aerosols: Yes (reviewed use of delivery and care/cleaning of nebulizer)  PFT Results  (OP) " "Pulmonary Function Testing: Yes (last 4/2/2018: FEV1-78, FEV1/FVC Ratio per MD mild obstruction)  Interdisciplinary Rounds: Attendance at Rounds (30 Min)    Meds to Beds  [facility] provides bedside medication delivery for all eligible patients at discharge.  Would you like to opt out of this program for any reason?: No - Stay Opted In     MY COPD ACTION PLAN     It is recommended that patients and physicians /healthcare providers complete this action plan together. This plan should be discussed at each physician visit and updated as needed.    The green, yellow and red zones show groups of symptoms of COPD. This list of symptoms is not comprehensive, and you may experience other symptoms. In the \"Actions\" column, your healthcare provider has recommended actions for you to take based on your symptoms.    Patient Name: Martha Mosher   YOB: 1960   Last Updated on: 1/6/2024  1:32 PM   Green Zone:  I am doing well today Actions     Usual activitiy and exercise level   Take daily medications     Usual amounts of cough and phlegm/mucus   Use oxygen as prescribed     Sleep well at night   Continue regular exercise/diet plan     Appetite is good   At all times avoid cigarette smoke, inhaled irritants     Daily Medications (these medications are taken every day):   Tiotropium Bromide Monohydrate (Spiriva) 2 Puffs Once daily        Yellow Zone:  I am having a bad day or a COPD flare Actions     More breathless than usual   Continue daily medications     I have less energy for my daily activities   Use quick relief inhaler as ordered     Increased or thicker phlegm/mucus   Use oxygen as prescribed     Using quick relief inhaler/nebulizer more often   Get plenty of rest     Swelling of ankles more than usual   Use pursed lip breathing     More coughing than usual   At all times avoid cigarette smoke, inhaled irritants     I feel like I have a \"chest cold\"     Poor sleep and my symptoms woke me up     My " appetite is not good     My medicine is not helping      Call provider immediately if symptoms don’t improve     Continue daily medications, add rescue medications:   Albuterol  Albuterol 3mL via nebulizer  2 Puffs Every 6 hours PRN  Every 6 hours PRN       Medications to be used during a flare up, (as Discussed with Provider):           Additional Information:  When Short of breath use your nebulizer  keep your nebulize clean- daily    Red Zone:  I need urgent medical care Actions     Severe shortness of breath even at rest   Call 911 or seek medical care immediately     Not able to do any activity because of breathing      Fever or shaking chills      Feeling confused or very drowsy       Chest pains      Coughing up blood

## 2024-01-06 NOTE — DIETARY
"Nutrition services: Day 1 of admit.  Martha Mosher is a 63 y.o. female with admitting DX of COPD exacerbation    Consult received for MST 3 with 14-23 lb weight loss in 1 month per nutrition screening. RD attempted to call pt d/t isolation precautions but she did not answer her phone. RD unable to interview pt at this time.     Assessment:  Height: 147.3 cm (4' 10\")  Weight: 69.4 kg (153 lb)- other healthcare provider  Body mass index is 31.98 kg/m²., BMI classification: Obesity  Diet/Intake: Regular Diet. PO intake % x1 snack so far per ADL's.     Evaluation:   Pt admitted for COPD with exacerbation with acute on chronic hypoxic respiratory failure, COVID-19, influenza A, dehydration, elevated glucose levels, thrombocytopenia, tobacco use, GERD, phychiatric disorder, chronic pain syndrome, cervicogenic headache  Based on weight history in chart, pt appears to have a weight loss of 6.6 kg/ 8/7% since 09/05/23 based on other health care provider wt. Pending updated admit weight.      Wt Readings from Last 2 Encounters:   01/05/24 69.4 kg (153 lb)   09/05/23 76 kg (167 lb 9.6 oz)     Labs: Glucose 165  Meds: Decadron, omeprazole, Miralax, bowel regimen, Zofran, Compazine, Phenergan, LR infusion @ 125 ml/hr  Skin: No staged wounds or edema noted  GI: Last BM PTA    Malnutrition Risk: Unable to fully assess at this time.     Recommendations/Plan:  Continue current PO diet   Encourage intake of meals  Document intake of all meals as % taken in ADL's to provide interdisciplinary communication across all shifts.   Monitor weight. Obtain admit weight  Nutrition rep will continue to see patient for ongoing meal and snack preferences.     RD following.         "

## 2024-01-06 NOTE — ASSESSMENT & PLAN NOTE
"Patient reports 25-pack-year history, currently smoking 7 cigarettes/day. Expresses interest in quitting, has had success in the past 3 years ago, using Chantix.  States \"Chantix was the only thing that works.\"  - Ordered tobacco cessation counseling, performed by COPD coordinator 1/6  - Ordered nicotine replacement  -Patient will continue nicotine replacement at this time and discuss Chantix with primary care provider  "

## 2024-01-06 NOTE — ASSESSMENT & PLAN NOTE
Low BP on presentation to the ED, resolved with IV fluid bolus. Pt reports decreased PO intake for the last several days.   - Hydration status back to baseline, euvolemic

## 2024-01-06 NOTE — ASSESSMENT & PLAN NOTE
"Unclear what all psychiatric diagnoses are at this time. Pt has past hx of MDD on chart review.  - 1/8, patient states she does not consistently take all his medicines at home, will monitor status throughout this admission  - Added hydroxyzine once overnight 1/7 due to increased anxiety overnight.  - Overnight 1/8, patient with fall due to \"legs feeling weak.\"  Will hold a.m. meds, as patient states she has not been taking these at home.  Will continue only duloxetine, quetiapine for now, holding BuSpar and trazodone, as well as cyclobenzaprine  -No clinical concerns with holding of meds as of 1/10, continue on current regimen with limited meds  "

## 2024-01-06 NOTE — ASSESSMENT & PLAN NOTE
"Patient with need for home oxygen, ordered in September 2023 but never delivered.  Worsening respiratory status over the last 2 weeks, acutely worsening today.  Had negative COVID testing 12/31, flu a and COVID testing positive on admission.  Patient formerly on Spiriva, stopped due to \"not liking it.\"  - Titrate O2 sats 88 to 92%, avoid over oxygenation  - Azithromycin 3-day course, steroid course-using dexamethasone due to COVID infection, completed in hospital  - Added Spiriva daily 1/7, home med but not taking regularly on admission  - Consultations to COPD coordinator, palliative team for goals of care  - Outpatient PFTs ordered  "

## 2024-01-06 NOTE — ASSESSMENT & PLAN NOTE
Glucose 168 on admission to ED.  No prior diagnosis of diabetes.  - Possibly related to course of steroids started outpatient  - 1/6/24: A1c 5.4
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
## 2024-01-06 NOTE — CARE PLAN
The patient is Stable - Low risk of patient condition declining or worsening    Shift Goals  Clinical Goals: O2 use, breathing treatment, safety  Patient Goals: comfort, rest  Family Goals: n/a    Progress made toward(s) clinical / shift goals:    Problem: Pain - Standard  Goal: Alleviation of pain or a reduction in pain to the patient’s comfort goal  Outcome: Progressing     Problem: Knowledge Deficit - Standard  Goal: Patient and family/care givers will demonstrate understanding of plan of care, disease process/condition, diagnostic tests and medications  Outcome: Progressing     Problem: Knowledge Deficit - COPD  Goal: Patient/significant other demonstrates understanding of disease process, utilization of the Action Plan, medications and discharge instruction  Outcome: Progressing     Problem: Risk for Infection - COPD  Goal: Patient will remain free from signs and symptoms of infection  Outcome: Progressing     Problem: Nutrition - Advanced  Goal: Patient will display progressive weight gain toward goal have adequate food and fluid intake  Outcome: Progressing     Problem: Ineffective Airway Clearance  Goal: Patient will maintain patent airway with clear/clearing breath sounds  Outcome: Progressing     Problem: Risk for Aspiration  Goal: Patient's risk for aspiration will be absent or decrease  Outcome: Progressing     Problem: Self Care  Goal: Patient will have the ability to perform ADLs independently or with assistance (bathe, groom, dress, toilet and feed)  Outcome: Progressing     Problem: Fall Risk  Goal: Patient will remain free from falls  Outcome: Progressing
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
## 2024-01-06 NOTE — ASSESSMENT & PLAN NOTE
See #COPD exacerbation  - 1/9 TTE shows EF 60%, stable from prior exam in 2018.  No clinical response to Lasix.  - Continue respiratory therapy, recommending de-escalation 1/10 as O2 requirements, breath sounds improving  - Home O2 eval shows need for home O2 which was patient's PCP plan of care approximately 2 months ago but was never enacted  -Patient is remained stable on 3.5-4 L supplemental oxygen, these are the parameters of the home O2 plan, counseled patient that short-term SNF stay for rehab would be my preferred option but patient is adamant that she is discharged home and would like to follow-up with physical therapy outpatient.  Made plan for patient to follow-up at primary care clinic within the next few days for continued reevaluation.

## 2024-01-06 NOTE — ASSESSMENT & PLAN NOTE
- See #COPD exacerbation   - Continue dexamethasone for 10 total days or until discharge  - Remdesivir recommended by NIH for all COVID-19 patients hospitalized and requiring supplemental O2.  However, not available at this time.

## 2024-01-06 NOTE — ASSESSMENT & PLAN NOTE
Platelet 107 on admission.  Patient asymptomatic, no signs of bleeding.  Baseline appears 150s 160s.  - Repeat platelet 1/6 stable, 112  - No need for close monitoring inpatient  - Monitor for signs of acute bleeding  - Recommend follow-up monitoring outpatient

## 2024-01-06 NOTE — PROGRESS NOTES
4 Eyes Skin Assessment Completed by TEJAS Floyd and TEJAS Blanco.    Head WDL  Ears WDL  Nose WDL  Mouth WDL  Neck WDL  Breast/Chest Redness and moisture under bilateral breasts, wicking sheets applied  Shoulder Blades WDL  Spine WDL  (R) Arm/Elbow/Hand Redness and Blanching to elbow  (L) Arm/Elbow/Hand Redness and Blanching to elbow  Abdomen WDL  Groin Redness and moisture  Scrotum/Coccyx/Buttocks Redness and Blanching  (R) Leg WDL  (L) Leg WDL  (R) Heel/Foot/Toe WDL  (L) Heel/Foot/Toe WDL          Devices In Places Pulse Ox and Oxy Mask      Interventions In Place InterDry and Pillows    Possible Skin Injury No    Pictures Uploaded Into Epic N/A  Wound Consult Placed N/A  RN Wound Prevention Protocol Ordered No

## 2024-01-07 PROBLEM — K11.8 MASS OF PAROTID GLAND: Status: RESOLVED | Noted: 2021-04-20 | Resolved: 2024-01-07

## 2024-01-07 PROBLEM — G43.119 MIGRAINE WITH AURA, INTRACTABLE, WITHOUT STATUS MIGRAINOSUS: Status: RESOLVED | Noted: 2018-11-08 | Resolved: 2024-01-07

## 2024-01-07 PROCEDURE — 700111 HCHG RX REV CODE 636 W/ 250 OVERRIDE (IP): Mod: JZ

## 2024-01-07 PROCEDURE — 94640 AIRWAY INHALATION TREATMENT: CPT

## 2024-01-07 PROCEDURE — A9270 NON-COVERED ITEM OR SERVICE: HCPCS

## 2024-01-07 PROCEDURE — 97535 SELF CARE MNGMENT TRAINING: CPT

## 2024-01-07 PROCEDURE — 700101 HCHG RX REV CODE 250: Performed by: FAMILY MEDICINE

## 2024-01-07 PROCEDURE — 700102 HCHG RX REV CODE 250 W/ 637 OVERRIDE(OP)

## 2024-01-07 PROCEDURE — 94669 MECHANICAL CHEST WALL OSCILL: CPT

## 2024-01-07 PROCEDURE — 770001 HCHG ROOM/CARE - MED/SURG/GYN PRIV*

## 2024-01-07 PROCEDURE — 99232 SBSQ HOSP IP/OBS MODERATE 35: CPT | Mod: GC | Performed by: FAMILY MEDICINE

## 2024-01-07 PROCEDURE — 97162 PT EVAL MOD COMPLEX 30 MIN: CPT

## 2024-01-07 PROCEDURE — 97166 OT EVAL MOD COMPLEX 45 MIN: CPT

## 2024-01-07 RX ORDER — ALBUTEROL SULFATE 90 UG/1
2 AEROSOL, METERED RESPIRATORY (INHALATION)
Status: DISCONTINUED | OUTPATIENT
Start: 2024-01-07 | End: 2024-01-11 | Stop reason: HOSPADM

## 2024-01-07 RX ADMIN — IPRATROPIUM BROMIDE AND ALBUTEROL SULFATE 3 ML: 2.5; .5 SOLUTION RESPIRATORY (INHALATION) at 20:58

## 2024-01-07 RX ADMIN — GABAPENTIN 800 MG: 400 CAPSULE ORAL at 04:49

## 2024-01-07 RX ADMIN — DEXAMETHASONE 6 MG: 4 TABLET ORAL at 04:49

## 2024-01-07 RX ADMIN — BUSPIRONE HYDROCHLORIDE 7.5 MG: 5 TABLET ORAL at 16:03

## 2024-01-07 RX ADMIN — IPRATROPIUM BROMIDE AND ALBUTEROL SULFATE 3 ML: 2.5; .5 SOLUTION RESPIRATORY (INHALATION) at 10:18

## 2024-01-07 RX ADMIN — Medication 1 APPLICATOR: at 04:50

## 2024-01-07 RX ADMIN — GUAIFENESIN SYRUP AND DEXTROMETHORPHAN 10 ML: 100; 10 SYRUP ORAL at 04:49

## 2024-01-07 RX ADMIN — IPRATROPIUM BROMIDE AND ALBUTEROL SULFATE 3 ML: 2.5; .5 SOLUTION RESPIRATORY (INHALATION) at 06:47

## 2024-01-07 RX ADMIN — DULOXETINE HYDROCHLORIDE 30 MG: 30 CAPSULE, DELAYED RELEASE ORAL at 04:50

## 2024-01-07 RX ADMIN — ENOXAPARIN SODIUM 40 MG: 100 INJECTION SUBCUTANEOUS at 16:02

## 2024-01-07 RX ADMIN — OSELTAMIVIR PHOSPHATE 75 MG: 75 CAPSULE ORAL at 16:03

## 2024-01-07 RX ADMIN — CYCLOBENZAPRINE 10 MG: 10 TABLET, FILM COATED ORAL at 20:35

## 2024-01-07 RX ADMIN — ALBUTEROL SULFATE 2 PUFF: 90 AEROSOL, METERED RESPIRATORY (INHALATION) at 04:50

## 2024-01-07 RX ADMIN — BUPROPION HYDROCHLORIDE 150 MG: 150 TABLET, EXTENDED RELEASE ORAL at 04:50

## 2024-01-07 RX ADMIN — BUSPIRONE HYDROCHLORIDE 7.5 MG: 5 TABLET ORAL at 04:50

## 2024-01-07 RX ADMIN — GUAIFENESIN SYRUP AND DEXTROMETHORPHAN 10 ML: 100; 10 SYRUP ORAL at 20:45

## 2024-01-07 RX ADMIN — POLYETHYLENE GLYCOL 3350 1 PACKET: 17 POWDER, FOR SOLUTION ORAL at 04:49

## 2024-01-07 RX ADMIN — ACETAMINOPHEN 1000 MG: 500 TABLET ORAL at 04:49

## 2024-01-07 RX ADMIN — ONDANSETRON 4 MG: 4 TABLET, ORALLY DISINTEGRATING ORAL at 04:49

## 2024-01-07 RX ADMIN — OMEPRAZOLE 20 MG: 20 CAPSULE, DELAYED RELEASE ORAL at 20:35

## 2024-01-07 RX ADMIN — OXYCODONE HYDROCHLORIDE 10 MG: 10 TABLET ORAL at 11:27

## 2024-01-07 RX ADMIN — DULOXETINE HYDROCHLORIDE 60 MG: 60 CAPSULE, DELAYED RELEASE ORAL at 20:35

## 2024-01-07 RX ADMIN — ACETAMINOPHEN 1000 MG: 500 TABLET ORAL at 16:03

## 2024-01-07 RX ADMIN — ACETAMINOPHEN 1000 MG: 500 TABLET ORAL at 11:27

## 2024-01-07 RX ADMIN — TRAZODONE HYDROCHLORIDE 150 MG: 150 TABLET ORAL at 20:35

## 2024-01-07 RX ADMIN — OXYCODONE HYDROCHLORIDE 10 MG: 10 TABLET ORAL at 20:45

## 2024-01-07 RX ADMIN — IPRATROPIUM BROMIDE AND ALBUTEROL SULFATE 3 ML: 2.5; .5 SOLUTION RESPIRATORY (INHALATION) at 14:01

## 2024-01-07 RX ADMIN — GUAIFENESIN SYRUP AND DEXTROMETHORPHAN 10 ML: 100; 10 SYRUP ORAL at 14:25

## 2024-01-07 RX ADMIN — MAGNESIUM HYDROXIDE 30 ML: 1200 LIQUID ORAL at 04:49

## 2024-01-07 RX ADMIN — GABAPENTIN 800 MG: 400 CAPSULE ORAL at 11:27

## 2024-01-07 RX ADMIN — OXYCODONE HYDROCHLORIDE 10 MG: 10 TABLET ORAL at 04:50

## 2024-01-07 RX ADMIN — OSELTAMIVIR PHOSPHATE 75 MG: 75 CAPSULE ORAL at 04:49

## 2024-01-07 RX ADMIN — GABAPENTIN 800 MG: 400 CAPSULE ORAL at 16:02

## 2024-01-07 RX ADMIN — SUMATRIPTAN SUCCINATE 50 MG: 50 TABLET ORAL at 16:03

## 2024-01-07 RX ADMIN — Medication 1 APPLICATOR: at 18:00

## 2024-01-07 RX ADMIN — NICOTINE 14 MG: 14 PATCH TRANSDERMAL at 04:50

## 2024-01-07 RX ADMIN — AZITHROMYCIN DIHYDRATE 500 MG: 250 TABLET, FILM COATED ORAL at 16:02

## 2024-01-07 RX ADMIN — QUETIAPINE FUMARATE 150 MG: 100 TABLET ORAL at 20:34

## 2024-01-07 ASSESSMENT — GAIT ASSESSMENTS
DEVIATION: DECREASED BASE OF SUPPORT;BRADYKINETIC
GAIT LEVEL OF ASSIST: CONTACT GUARD ASSIST
DISTANCE (FEET): 30
ASSISTIVE DEVICE: FRONT WHEEL WALKER

## 2024-01-07 ASSESSMENT — PAIN DESCRIPTION - PAIN TYPE
TYPE: CHRONIC PAIN

## 2024-01-07 ASSESSMENT — COGNITIVE AND FUNCTIONAL STATUS - GENERAL
SUGGESTED CMS G CODE MODIFIER MOBILITY: CJ
WALKING IN HOSPITAL ROOM: A LITTLE
DAILY ACTIVITIY SCORE: 19
STANDING UP FROM CHAIR USING ARMS: A LITTLE
DRESSING REGULAR UPPER BODY CLOTHING: A LITTLE
TOILETING: A LITTLE
HELP NEEDED FOR BATHING: A LITTLE
PERSONAL GROOMING: A LITTLE
SUGGESTED CMS G CODE MODIFIER DAILY ACTIVITY: CK
MOBILITY SCORE: 21
DRESSING REGULAR LOWER BODY CLOTHING: A LITTLE
CLIMB 3 TO 5 STEPS WITH RAILING: A LITTLE

## 2024-01-07 ASSESSMENT — ACTIVITIES OF DAILY LIVING (ADL): TOILETING: INDEPENDENT

## 2024-01-07 NOTE — CARE PLAN
Problem: Impaired Gas Exchange  Goal: Patient will demonstrate improved ventilation and adequate oxygenation and participate in treatment regimen within the level of ability/situation.  Outcome: Progressing     Problem: Self Care  Goal: Patient will have the ability to perform ADLs independently or with assistance (bathe, groom, dress, toilet and feed)  Outcome: Progressing   The patient is Stable - Low risk of patient condition declining or worsening    Shift Goals  Clinical Goals: O2, breathing treatment, safety  Patient Goals: rest, pain control, cough cessation  Family Goals: n/a    Progress made toward(s) clinical / shift goals:  yes    Patient is not progressing towards the following goals: n/a
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
## 2024-01-07 NOTE — CARE PLAN
Problem: Bronchoconstriction  Goal: Improve in air movement and diminished wheezing  Description: Target End Date:  2 to 3 days    1.  Implement inhaled treatments  2.  Evaluate and manage medication effects  Outcome: Progressing   Duo and Flutter QID

## 2024-01-07 NOTE — CARE PLAN
I called and spoke with [facility] and we are going to try to get her assistance on her Stiolto inhaler through the PAP program. I will be sending her and the provider their information that they will need to return to me. I did see [facility] might have her Carafate 10 dollars cheaper than [employer], patient will check it out.         [address] [facility] Specialist  Medication Assistance  [address] [facility], and [place]    (N) [phone]  (C) [phone] The patient is Stable - Low risk of patient condition declining or worsening    Shift Goals  Clinical Goals: O2, breathing treatment, safety  Patient Goals: rest, pain control, cough cessation  Family Goals: n/a    Progress made toward(s) clinical / shift goals:    Problem: Pain - Standard  Goal: Alleviation of pain or a reduction in pain to the patient’s comfort goal  Outcome: Progressing     Problem: Knowledge Deficit - Standard  Goal: Patient and family/care givers will demonstrate understanding of plan of care, disease process/condition, diagnostic tests and medications  Outcome: Progressing     Problem: Knowledge Deficit - COPD  Goal: Patient/significant other demonstrates understanding of disease process, utilization of the Action Plan, medications and discharge instruction  Outcome: Progressing     Problem: Risk for Infection - COPD  Goal: Patient will remain free from signs and symptoms of infection  Outcome: Progressing     Problem: Nutrition - Advanced  Goal: Patient will display progressive weight gain toward goal have adequate food and fluid intake  Outcome: Progressing     Problem: Ineffective Airway Clearance  Goal: Patient will maintain patent airway with clear/clearing breath sounds  Outcome: Progressing     Problem: Impaired Gas Exchange  Goal: Patient will demonstrate improved ventilation and adequate oxygenation and participate in treatment regimen within the level of ability/situation.  Outcome: Progressing     Problem: Risk for Aspiration  Goal: Patient's risk for aspiration will be absent or decrease  Outcome: Progressing     Problem: Self Care  Goal: Patient will have the ability to perform ADLs independently or with assistance (bathe, groom, dress, toilet and feed)  Outcome: Progressing     Problem: Fall Risk  Goal: Patient will remain free from falls  Outcome: Progressing       Patient is not progressing towards the following goals:

## 2024-01-08 ENCOUNTER — APPOINTMENT (OUTPATIENT)
Dept: RADIOLOGY | Facility: MEDICAL CENTER | Age: 64
DRG: 177 | End: 2024-01-08
Payer: MEDICAID

## 2024-01-08 LAB
ALBUMIN SERPL BCP-MCNC: 3.5 G/DL (ref 3.2–4.9)
ALBUMIN SERPL BCP-MCNC: 3.7 G/DL (ref 3.2–4.9)
ALBUMIN/GLOB SERPL: 1 G/DL
ALBUMIN/GLOB SERPL: 1.1 G/DL
ALP SERPL-CCNC: 60 U/L (ref 30–99)
ALP SERPL-CCNC: 61 U/L (ref 30–99)
ALT SERPL-CCNC: 26 U/L (ref 2–50)
ALT SERPL-CCNC: 31 U/L (ref 2–50)
ANION GAP SERPL CALC-SCNC: 10 MMOL/L (ref 7–16)
ANION GAP SERPL CALC-SCNC: 7 MMOL/L (ref 7–16)
AST SERPL-CCNC: 38 U/L (ref 12–45)
AST SERPL-CCNC: 39 U/L (ref 12–45)
BASOPHILS # BLD AUTO: 0.2 % (ref 0–1.8)
BASOPHILS # BLD: 0.02 K/UL (ref 0–0.12)
BILIRUB SERPL-MCNC: 0.6 MG/DL (ref 0.1–1.5)
BILIRUB SERPL-MCNC: 0.7 MG/DL (ref 0.1–1.5)
BUN SERPL-MCNC: 14 MG/DL (ref 8–22)
BUN SERPL-MCNC: 16 MG/DL (ref 8–22)
CALCIUM ALBUM COR SERPL-MCNC: 10.1 MG/DL (ref 8.5–10.5)
CALCIUM ALBUM COR SERPL-MCNC: 9.7 MG/DL (ref 8.5–10.5)
CALCIUM SERPL-MCNC: 9.5 MG/DL (ref 8.5–10.5)
CALCIUM SERPL-MCNC: 9.7 MG/DL (ref 8.5–10.5)
CHLORIDE SERPL-SCNC: 96 MMOL/L (ref 96–112)
CHLORIDE SERPL-SCNC: 97 MMOL/L (ref 96–112)
CO2 SERPL-SCNC: 29 MMOL/L (ref 20–33)
CO2 SERPL-SCNC: 36 MMOL/L (ref 20–33)
CREAT SERPL-MCNC: 0.46 MG/DL (ref 0.5–1.4)
CREAT SERPL-MCNC: 0.81 MG/DL (ref 0.5–1.4)
EKG IMPRESSION: NORMAL
EOSINOPHIL # BLD AUTO: 0.01 K/UL (ref 0–0.51)
EOSINOPHIL NFR BLD: 0.1 % (ref 0–6.9)
ERYTHROCYTE [DISTWIDTH] IN BLOOD BY AUTOMATED COUNT: 50.8 FL (ref 35.9–50)
GFR SERPLBLD CREATININE-BSD FMLA CKD-EPI: 108 ML/MIN/1.73 M 2
GFR SERPLBLD CREATININE-BSD FMLA CKD-EPI: 82 ML/MIN/1.73 M 2
GLOBULIN SER CALC-MCNC: 3.3 G/DL (ref 1.9–3.5)
GLOBULIN SER CALC-MCNC: 3.8 G/DL (ref 1.9–3.5)
GLUCOSE SERPL-MCNC: 114 MG/DL (ref 65–99)
GLUCOSE SERPL-MCNC: 121 MG/DL (ref 65–99)
HCT VFR BLD AUTO: 42.5 % (ref 37–47)
HGB BLD-MCNC: 13.5 G/DL (ref 12–16)
IMM GRANULOCYTES # BLD AUTO: 0.05 K/UL (ref 0–0.11)
IMM GRANULOCYTES NFR BLD AUTO: 0.5 % (ref 0–0.9)
LYMPHOCYTES # BLD AUTO: 1.47 K/UL (ref 1–4.8)
LYMPHOCYTES NFR BLD: 14.5 % (ref 22–41)
MAGNESIUM SERPL-MCNC: 2.1 MG/DL (ref 1.5–2.5)
MCH RBC QN AUTO: 30 PG (ref 27–33)
MCHC RBC AUTO-ENTMCNC: 31.8 G/DL (ref 32.2–35.5)
MCV RBC AUTO: 94.4 FL (ref 81.4–97.8)
MONOCYTES # BLD AUTO: 0.82 K/UL (ref 0–0.85)
MONOCYTES NFR BLD AUTO: 8.1 % (ref 0–13.4)
NEUTROPHILS # BLD AUTO: 7.77 K/UL (ref 1.82–7.42)
NEUTROPHILS NFR BLD: 76.6 % (ref 44–72)
NRBC # BLD AUTO: 0 K/UL
NRBC BLD-RTO: 0 /100 WBC (ref 0–0.2)
NT-PROBNP SERPL IA-MCNC: 2418 PG/ML (ref 0–125)
PHOSPHATE SERPL-MCNC: 2.1 MG/DL (ref 2.5–4.5)
PLATELET # BLD AUTO: 159 K/UL (ref 164–446)
PMV BLD AUTO: 10.3 FL (ref 9–12.9)
POTASSIUM SERPL-SCNC: 3.9 MMOL/L (ref 3.6–5.5)
POTASSIUM SERPL-SCNC: 4.8 MMOL/L (ref 3.6–5.5)
PROCALCITONIN SERPL-MCNC: 0.07 NG/ML
PROT SERPL-MCNC: 6.8 G/DL (ref 6–8.2)
PROT SERPL-MCNC: 7.5 G/DL (ref 6–8.2)
RBC # BLD AUTO: 4.5 M/UL (ref 4.2–5.4)
SODIUM SERPL-SCNC: 136 MMOL/L (ref 135–145)
SODIUM SERPL-SCNC: 139 MMOL/L (ref 135–145)
WBC # BLD AUTO: 10.1 K/UL (ref 4.8–10.8)

## 2024-01-08 PROCEDURE — 99254 IP/OBS CNSLTJ NEW/EST MOD 60: CPT | Mod: GC,25 | Performed by: STUDENT IN AN ORGANIZED HEALTH CARE EDUCATION/TRAINING PROGRAM

## 2024-01-08 PROCEDURE — 99497 ADVNCD CARE PLAN 30 MIN: CPT | Mod: GC | Performed by: STUDENT IN AN ORGANIZED HEALTH CARE EDUCATION/TRAINING PROGRAM

## 2024-01-08 PROCEDURE — 80053 COMPREHEN METABOLIC PANEL: CPT

## 2024-01-08 PROCEDURE — 85025 COMPLETE CBC W/AUTO DIFF WBC: CPT

## 2024-01-08 PROCEDURE — 36415 COLL VENOUS BLD VENIPUNCTURE: CPT

## 2024-01-08 PROCEDURE — 93010 ELECTROCARDIOGRAM REPORT: CPT | Performed by: INTERNAL MEDICINE

## 2024-01-08 PROCEDURE — 84145 PROCALCITONIN (PCT): CPT

## 2024-01-08 PROCEDURE — 770001 HCHG ROOM/CARE - MED/SURG/GYN PRIV*

## 2024-01-08 PROCEDURE — 99232 SBSQ HOSP IP/OBS MODERATE 35: CPT | Mod: GC | Performed by: FAMILY MEDICINE

## 2024-01-08 PROCEDURE — 700111 HCHG RX REV CODE 636 W/ 250 OVERRIDE (IP): Mod: JZ

## 2024-01-08 PROCEDURE — 84100 ASSAY OF PHOSPHORUS: CPT

## 2024-01-08 PROCEDURE — 94640 AIRWAY INHALATION TREATMENT: CPT

## 2024-01-08 PROCEDURE — 83880 ASSAY OF NATRIURETIC PEPTIDE: CPT

## 2024-01-08 PROCEDURE — A9270 NON-COVERED ITEM OR SERVICE: HCPCS

## 2024-01-08 PROCEDURE — 700102 HCHG RX REV CODE 250 W/ 637 OVERRIDE(OP)

## 2024-01-08 PROCEDURE — 83735 ASSAY OF MAGNESIUM: CPT

## 2024-01-08 PROCEDURE — 93005 ELECTROCARDIOGRAM TRACING: CPT | Performed by: STUDENT IN AN ORGANIZED HEALTH CARE EDUCATION/TRAINING PROGRAM

## 2024-01-08 PROCEDURE — 71045 X-RAY EXAM CHEST 1 VIEW: CPT

## 2024-01-08 RX ORDER — HYDROXYZINE 50 MG/1
25 TABLET, FILM COATED ORAL ONCE
Status: COMPLETED | OUTPATIENT
Start: 2024-01-08 | End: 2024-01-08

## 2024-01-08 RX ORDER — FUROSEMIDE 40 MG/1
40 TABLET ORAL ONCE
Status: COMPLETED | OUTPATIENT
Start: 2024-01-08 | End: 2024-01-08

## 2024-01-08 RX ADMIN — ACETAMINOPHEN 1000 MG: 500 TABLET ORAL at 05:49

## 2024-01-08 RX ADMIN — BUPROPION HYDROCHLORIDE 150 MG: 150 TABLET, EXTENDED RELEASE ORAL at 04:54

## 2024-01-08 RX ADMIN — BUSPIRONE HYDROCHLORIDE 7.5 MG: 5 TABLET ORAL at 16:10

## 2024-01-08 RX ADMIN — BUSPIRONE HYDROCHLORIDE 7.5 MG: 5 TABLET ORAL at 05:18

## 2024-01-08 RX ADMIN — SUMATRIPTAN SUCCINATE 50 MG: 50 TABLET ORAL at 04:52

## 2024-01-08 RX ADMIN — GABAPENTIN 800 MG: 400 CAPSULE ORAL at 16:10

## 2024-01-08 RX ADMIN — NICOTINE 14 MG: 14 PATCH TRANSDERMAL at 05:51

## 2024-01-08 RX ADMIN — QUETIAPINE FUMARATE 150 MG: 100 TABLET ORAL at 21:08

## 2024-01-08 RX ADMIN — OSELTAMIVIR PHOSPHATE 75 MG: 75 CAPSULE ORAL at 16:10

## 2024-01-08 RX ADMIN — GUAIFENESIN SYRUP AND DEXTROMETHORPHAN 10 ML: 100; 10 SYRUP ORAL at 16:09

## 2024-01-08 RX ADMIN — FUROSEMIDE 40 MG: 40 TABLET ORAL at 16:10

## 2024-01-08 RX ADMIN — ENOXAPARIN SODIUM 40 MG: 100 INJECTION SUBCUTANEOUS at 16:08

## 2024-01-08 RX ADMIN — OXYCODONE HYDROCHLORIDE 10 MG: 10 TABLET ORAL at 21:08

## 2024-01-08 RX ADMIN — ACETAMINOPHEN 1000 MG: 500 TABLET ORAL at 16:10

## 2024-01-08 RX ADMIN — GABAPENTIN 800 MG: 400 CAPSULE ORAL at 11:35

## 2024-01-08 RX ADMIN — ACETAMINOPHEN 1000 MG: 500 TABLET ORAL at 11:35

## 2024-01-08 RX ADMIN — DULOXETINE HYDROCHLORIDE 30 MG: 30 CAPSULE, DELAYED RELEASE ORAL at 04:54

## 2024-01-08 RX ADMIN — ALBUTEROL SULFATE 2 PUFF: 90 AEROSOL, METERED RESPIRATORY (INHALATION) at 16:15

## 2024-01-08 RX ADMIN — CYCLOBENZAPRINE 10 MG: 10 TABLET, FILM COATED ORAL at 21:08

## 2024-01-08 RX ADMIN — ACETAMINOPHEN 1000 MG: 500 TABLET ORAL at 23:35

## 2024-01-08 RX ADMIN — GUAIFENESIN SYRUP AND DEXTROMETHORPHAN 10 ML: 100; 10 SYRUP ORAL at 21:08

## 2024-01-08 RX ADMIN — OMEPRAZOLE 20 MG: 20 CAPSULE, DELAYED RELEASE ORAL at 21:08

## 2024-01-08 RX ADMIN — OXYCODONE HYDROCHLORIDE 10 MG: 10 TABLET ORAL at 00:01

## 2024-01-08 RX ADMIN — OSELTAMIVIR PHOSPHATE 75 MG: 75 CAPSULE ORAL at 05:18

## 2024-01-08 RX ADMIN — Medication 1 APPLICATOR: at 16:09

## 2024-01-08 RX ADMIN — OXYCODONE HYDROCHLORIDE 10 MG: 10 TABLET ORAL at 16:46

## 2024-01-08 RX ADMIN — Medication 1 APPLICATOR: at 06:02

## 2024-01-08 RX ADMIN — OXYCODONE HYDROCHLORIDE 10 MG: 10 TABLET ORAL at 11:36

## 2024-01-08 RX ADMIN — HYDROXYZINE HYDROCHLORIDE 25 MG: 50 TABLET, FILM COATED ORAL at 05:46

## 2024-01-08 RX ADMIN — GABAPENTIN 800 MG: 400 CAPSULE ORAL at 05:49

## 2024-01-08 RX ADMIN — DEXAMETHASONE 6 MG: 4 TABLET ORAL at 04:56

## 2024-01-08 RX ADMIN — GUAIFENESIN SYRUP AND DEXTROMETHORPHAN 10 ML: 100; 10 SYRUP ORAL at 11:48

## 2024-01-08 RX ADMIN — ALBUTEROL SULFATE 2 PUFF: 90 AEROSOL, METERED RESPIRATORY (INHALATION) at 04:45

## 2024-01-08 ASSESSMENT — PAIN DESCRIPTION - PAIN TYPE
TYPE: ACUTE PAIN
TYPE: CHRONIC PAIN
TYPE: ACUTE PAIN

## 2024-01-08 NOTE — THERAPY
Occupational Therapy   Initial Evaluation     Patient Name: Martha Mosher  Age:  63 y.o., Sex:  female  Medical Record #: [id number]  Today's Date: 1/7/2024     Precautions: Fall Risk  Comments: COVID/influenza A+    Assessment  Patient is 63 y.o. female admitted with SOB x1 week. Pt dx with acute on chronic hypoxic respiratory failure secondary to COPD exacerbation and Covid/Flu A+. PMHx of lumbar stenosis, COPD, and tobacco use. Pt seen for OT eval and tx. Pt currently resides alone in a 1-story house and was independent with her ADLs. Pt's granddaughter lives local, checks in on her daily, and provides assist with pt's IADLs.    During OT eval, pt primarily limited by decreased activity tolerance, persistent cough, and migraine causing her to require SBA-max A to complete ADLs, functional mobility, and txfs using FWW. Pt provided education on energy conservation, home safety, and compensatory strategies to safely complete ADLs. Recommend home health for further OT services after DC. Will continue to follow for ongoing acute OT services.     Plan    Occupational Therapy Initial Treatment Plan   Treatment Interventions: Self Care / Activities of Daily Living, Adaptive Equipment, Therapeutic Exercises, Therapeutic Activity  Treatment Frequency: 3 Times per Week  Duration: Until Therapy Goals Met    DC Equipment Recommendations: Unable to determine at this time  Discharge Recommendations: Recommend home health for continued occupational therapy services      Objective      Prior Living Situation   Prior Services Home-Independent   Housing / Facility 1 Story Apartment / Condo   Steps Into Home 0   Steps In Home 0   Bathroom Set up Walk In Shower;Shower Chair   Equipment Owned 4-Wheel Walker;Single Point Cane;Tub / Shower Seat   Lives with - Patient's Self Care Capacity Alone and Able to Care For Self   Comments Pt currently resides alone, but states that her granddaughter checks in on her daily after work.    Prior Level of ADL Function   Self Feeding Independent   Grooming / Hygiene Independent   Bathing Independent   Dressing Independent   Toileting Independent   Prior Level of IADL Function   Medication Management Requires Assist   Laundry Requires Assist   Kitchen Mobility Requires Assist   Finances Requires Assist   Home Management Requires Assist   Shopping Requires Assist   Prior Level Of Mobility Independent With Device in Community;Independent With Device in Home   Driving / Transportation Driving Independent   Comments Granddaughter provides assist with IADLs   History of Falls   History of Falls Yes   Date of Last Fall   (Reports having a fall 1 week ago. Pt was able to get up indepedently and reported no injuries)   Precautions   Precautions Fall Risk   Comments COVID/influenza A+   Vitals   O2 (LPM) 4   O2 Delivery Device Nasal Cannula   Vitals Comments States that her home O2 was never delivered   Pain 0 - 10 Group   Therapist Pain Assessment Post Activity Pain Same as Prior to Activity;Nurse Notified  (Not rated, reported having a migraine during session and requested pain meds. RN notified)   Cognition    Cognition / Consciousness WDL   Level of Consciousness Alert   Comments Pleasant, cooperative, and receptive to education. Limited by persistent cough during session   Active ROM Upper Body   Active ROM Upper Body  WDL   Dominant Hand Right   Comments Observed during functional activities   Strength Upper Body   Upper Body Strength  WDL   Balance Assessment   Sitting Balance (Static) Fair   Sitting Balance (Dynamic) Fair   Standing Balance (Static) Fair   Standing Balance (Dynamic) Fair -   Weight Shift Sitting Fair   Weight Shift Standing Fair   Comments w/FWW   Bed Mobility    Supine to Sit Supervised   Sit to Supine Supervised   Scooting Supervised   Comments HOB elevated   ADL Assessment   Eating Modified Independent   Grooming Standby Assist;Standing  (Hand washing at sink)   Lower Body Dressing  Maximal Assist   Toileting Contact Guard Assist  (Assist for balance, able to compelte pericare and clothing management after voiding on standard toilet)   6 Clicks Daily Activity Score 19   Functional Mobility   Sit to Stand Standby Assist   Bed, Chair, Wheelchair Transfer Contact Guard Assist   Toilet Transfers Contact Guard Assist   Mobility Functional mobility in room w/FWW   Comments Pt with 1 minor LOB to the right side, but able to self-correct   Activity Tolerance   Sitting in Chair 3 min  (Toilet)   Sitting Edge of Bed 3 min   Standing 5 min   Comments Limited by decreased activity tolerance   Patient / Family Goals   Patient / Family Goal #1 To get rid of the cough   Short Term Goals   Short Term Goal # 1 Pt will complete ADL txfs with supv   Short Term Goal # 2 Pt will complete LB dressing with supv using AE PRN   Short Term Goal # 3 Pt will independently implement 2 enegy conservation strategies during ADL tasks   Education Group   Education Provided Energy Conservation;Home Safety;Activities of Daily Living;Role of Occupational Therapist   Role of Occupational Therapist Patient Response Patient;Acceptance;Explanation;Verbal Demonstration   Energy Conservation Patient Response Patient;Acceptance;Explanation;Verbal Demonstration  (Pacing activites and structuring day to ensure adequate energy levels to complete all necessary tasks.)   Home Safety Patient Response Patient;Acceptance;Explanation;Verbal Demonstration  (Recommend use of shower chair and having supervision stepping in/out of shower to reduce risk of falls)   ADL Patient Response Patient;Acceptance;Explanation;Verbal Demonstration

## 2024-01-08 NOTE — PROGRESS NOTES
0445H- Patient complained of sob,VS taken, see flow chart, RT notified and did breathing treatment.Patient was very anxious,and complained of migraine, due medications given, see MAR.  0546H- Still very anxious, [facility] notified by charged nurse,TEJAS Joy with orders.Stat meds given,hydroxyzine see MAR. CXR taken by imaging staff.  0650H- Patient is calm and resting.

## 2024-01-08 NOTE — CONSULTS
Inpatient Palliative Medicine Evaluation     Martha Mosher  63 y.o. female  [id number]  Katharina Murdock M.D.  Location: [facility]  Referral Source:   Junior Chacon M.d.         Reason for palliative medicine consultation and/or visit: ACP     Assessment and Plan:     GOAL(S) OF CARE = longevity, Danielle wants to work on keeping her lungs as healthy as possible and wants to live for a long time, she does not like hospitals but is willing to be rehospitalized moving forward    SYMPTOMS ETIOLOGY/CAUSES = cough and shortness of breath secondary to COPD exacerbation and flu and COVID infection, chronic back pain    PROGNOSIS  - not hospice-eligible = GOLD class II on most recent PFTs  - not hospice-appropriate    CODE STATUS = FULL CODE    ADVANCE CARE PLANNING = she does not have advance directive, she would want her son to make medical decisions for her if she were unable to, recommend completing advance directive    PALLIATIVE CARE TEAM INTERVENTIONS = provided anticipatory guidance regarding COPD, counseled on importance of abstaining from smoking and following with PCP          Summary: Danielle Mosher is a 63-year-old female with a history of COPD and back pain who was admitted on 1/5/24 with COPD exacerbation and COVID and flu infection. Palliative Care was consulted to discuss goals of care.     Met with Danielle in her room with her grandson present. Discussed the role of palliative care in her care team. We chatted for a bit and I learned that she is from [place] originally but has been in [place] for many years. She is not  and has 1 son and 6 grandchildren, all of whom are local. Her parents are not alive and her mother had COPD so she is aware of what the end stages of the disease look like. She has a history of gyn cancer and had a hysterectomy. She does not drink alcohol or use recreational drugs. She used to work as a  and  but is now on disability. She enjoys spending time with her cats,  friends and family.     We discussed the chronic nature of COPD and the importance of abstaining from smoking as well as following with her PCP. We discussed how frequent rehospitalizations are common with this disease and that by working hard in the outpatient setting she can help prevent some of these on her end. We discussed how diseases of the lungs tend to require supplemental oxygen in the outpatient and inpatient setting. She is currently on nasal cannula during this hospitalization but we discussed how if her lungs get sicker she could require more oxygen via mask or intubation. She is currently willing to have these interventions. We discussed how this is likely reasonable at her current health status but if her COPD gets worse that we may need to revisit whether intubation is in line with her goals. She is willing to have CPR and would like to remain a full code.     She does not have an advance directive but she states that she would want her son to make medical decisions for her if she were unable to make decisions for herself. I would encourage her to discuss difficult medical hypothetical scenarios with her son so that he will know her wishes if it ever came to that. Palliative care will sign off. Please reconsult if needed.       Advance care planning:  The patient and/or legal decision maker has provided voluntary consent to discuss advance care planning. We discussed code status.     Total time spent in ACP discussion 15 minutes, which is separate from the time spent completing the evaluation and management visit.      I spent a total of 60 minutes reviewing medical records, direct face-to-face time with the patient and/or family, documentation and coordination of care. This is separate from the time spent on advance care planning, which is documented above.

## 2024-01-08 NOTE — CARE PLAN
The patient is Stable - Low risk of patient condition declining or worsening    Shift Goals  Clinical Goals: pain control, safety, O2  Patient Goals: rest, pain control  Family Goals: n/a    Progress made toward(s) clinical / shift goals:    Problem: Pain - Standard  Goal: Alleviation of pain or a reduction in pain to the patient’s comfort goal  Outcome: Progressing     Problem: Knowledge Deficit - Standard  Goal: Patient and family/care givers will demonstrate understanding of plan of care, disease process/condition, diagnostic tests and medications  Outcome: Progressing     Problem: Knowledge Deficit - COPD  Goal: Patient/significant other demonstrates understanding of disease process, utilization of the Action Plan, medications and discharge instruction  Outcome: Progressing     Problem: Fall Risk  Goal: Patient will remain free from falls  Outcome: Progressing       Patient is not progressing towards the following goals:

## 2024-01-08 NOTE — THERAPY
Physical Therapy   Initial Evaluation     Patient Name: Martha Mosher  Age:  63 y.o., Sex:  female  Medical Record #: [id number]  Today's Date: 1/7/2024     Precautions  Precautions: Fall Risk  Comments: covid/flu A+    Assessment  Patient is 63 y.o. female presenting with SOB x1 wk. Pt found to have acute on chronic hypoxic respiratory failure secondary to COPD exacerbation and Covid/Flu A+. Pt with PMH including lumbar stenosis, COPD, tobacco use. Pt is independent with functional mobility at baseline using 4WW. Lives alone but reports that granddtr checks in daily and assists with IADLs including driving. During current session, pt presents with constant and painful sounding cough, migraine pain, decreased endurance, and impaired balance requiring overall CGA-SBA for OOB mobility as detailed below. Able to walk 30 ft in the room with FWW and CGA. With multiple minor lateral LOB that improved somewhat with cues to keep FWW on the ground when walking. Encouraged pt to continue mobilizing OOB with nursing at least 3x/day for meals and toileting. Recommend d/c home with HHPT, no DME needs. Pt would benefit from continued acute PT services to improve functional mobility prior to d/c.    Plan    Physical Therapy Initial Treatment Plan   Treatment Plan : Bed Mobility, Equipment, Gait Training, Manual Therapy, Neuro Re-Education / Balance, Self Care / Home Evaluation, Therapeutic Activities, Therapeutic Exercise  Treatment Frequency: 3 Times per Week  Duration: Until Therapy Goals Met    DC Equipment Recommendations: None (pt owns 4WW)  Discharge Recommendations: Recommend home health for continued physical therapy services       Subjective    Pt received resting in bed, agreeable to participate.      Objective       01/07/24 1546   Initial Contact Note    Initial Contact Note Order Received and Verified, Physical Therapy Evaluation in Progress with Full Report to Follow.   Precautions   Precautions Fall Risk    Comments covid/flu A+   Vitals   O2 (LPM) 4   O2 Delivery Device Nasal Cannula   Vitals Comments pt reported that she should be using O2 at home but it was never ordered after her prior admit   Pain 0 - 10 Group   Therapist Pain Assessment Post Activity Pain Same as Prior to Activity;Nurse Notified  (c/o migraine pain during session, RN notified)   Prior Living Situation   Prior Services None   Housing / Facility 1 Story Apartment / Condo   Steps Into Home 0   Steps In Home 0   Equipment Owned 4-Wheel Walker;Single Point Cane   Lives with - Patient's Self Care Capacity Alone and Able to Care For Self   Comments Pt reports that granddtr checks in daily and assists with IADLs including driving   Prior Level of Functional Mobility   Bed Mobility Independent   Transfer Status Independent   Ambulation Independent   Ambulation Distance limited community   Assistive Devices Used 4-Wheel Walker   Stairs Other (Comments)  (no need to perform)   History of Falls   History of Falls Yes   Date of Last Fall   (reports falling 1 wk ago but was able to get up on her own, no injuries)   Cognition    Cognition / Consciousness WDL   Level of Consciousness Alert   Comments pleasant and cooperative, limited by constant deep coughing throughout session   Passive ROM Lower Body   Passive ROM Lower Body WDL   Comments assessed functionally   Active ROM Lower Body    Active ROM Lower Body  WDL   Comments assessed functionally   Strength Lower Body   Lower Body Strength  WDL   Comments assessed functionally   Sensation Lower Body   Comments no c/o altered sensation BLE   Coordination Lower Body    Coordination Lower Body  WDL   Comments assessed functionally   Balance Assessment   Sitting Balance (Static) Fair   Sitting Balance (Dynamic) Fair   Standing Balance (Static) Fair   Standing Balance (Dynamic) Fair -   Weight Shift Sitting Fair   Weight Shift Standing Fair   Comments FWW   Bed Mobility    Supine to Sit Supervised   Sit to Supine  Supervised   Scooting Supervised   Comments HOBE   Gait Analysis   Gait Level Of Assist Contact Guard Assist   Assistive Device Front Wheel Walker   Distance (Feet) 30   # of Times Distance was Traveled 1   Deviation Decreased Base Of Support;Bradykinetic   # of Stairs Climbed 0   Comments multiple minor lateral LOB, improved somewhat with cues to keep FWW on the ground when walking. Distance limited by painful sounding, constant cough   Functional Mobility   Sit to Stand Standby Assist   Bed, Chair, Wheelchair Transfer Contact Guard Assist   Toilet Transfers Contact Guard Assist   Transfer Method Stand Step   Mobility bed>toilet>up with FWW in room>bed   How much difficulty does the patient currently have...   Turning over in bed (including adjusting bedclothes, sheets and blankets)? 4   Sitting down on and standing up from a chair with arms (e.g., wheelchair, bedside commode, etc.) 4   Moving from lying on back to sitting on the side of the bed? 4   How much help from another person does the patient currently need...   Moving to and from a bed to a chair (including a wheelchair)? 3   Need to walk in a hospital room? 3   Climbing 3-5 steps with a railing? 3   6 clicks Mobility Score 21   Patient / Family Goals    Patient / Family Goal #1 to go home   Short Term Goals    Short Term Goal # 1 Pt will ambulate 150 ft with FWW and SPV to access apartment in 6 visits.   Education Group   Education Provided Role of Physical Therapist   Role of Physical Therapist Patient Response Patient;Acceptance;Explanation;Demonstration;Verbal Demonstration;Action Demonstration   Physical Therapy Initial Treatment Plan    Treatment Plan  Bed Mobility;Equipment;Gait Training;Manual Therapy;Neuro Re-Education / Balance;Self Care / Home Evaluation;Therapeutic Activities;Therapeutic Exercise   Treatment Frequency 3 Times per Week   Duration Until Therapy Goals Met   Problem List    Problems Pain;Impaired Transfers;Impaired  Ambulation;Impaired Balance;Decreased Activity Tolerance   Anticipated Discharge Equipment and Recommendations   DC Equipment Recommendations None  (pt owns 4WW)   Discharge Recommendations Recommend home health for continued physical therapy services   Interdisciplinary Plan of Care Collaboration   IDT Collaboration with  Nursing;Occupational Therapist   Patient Position at End of Therapy In Bed;Bed Alarm On;Call Light within Reach;Tray Table within Reach;Phone within Reach   Collaboration Comments RN and OT updated   Session Information   Date / Session Number  1/7- 1(1/3, 1/13)

## 2024-01-09 ENCOUNTER — APPOINTMENT (OUTPATIENT)
Dept: CARDIOLOGY | Facility: MEDICAL CENTER | Age: 64
DRG: 177 | End: 2024-01-09
Payer: MEDICAID
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
## 2024-01-09 LAB
LV EJECT FRACT  99904: 60
LV EJECT FRACT MOD 2C 99903: 51.94
LV EJECT FRACT MOD 4C 99902: 55.98
LV EJECT FRACT MOD BP 99901: 55.58

## 2024-01-09 PROCEDURE — 700101 HCHG RX REV CODE 250

## 2024-01-09 PROCEDURE — 97535 SELF CARE MNGMENT TRAINING: CPT

## 2024-01-09 PROCEDURE — 97530 THERAPEUTIC ACTIVITIES: CPT | Mod: CQ

## 2024-01-09 PROCEDURE — 770001 HCHG ROOM/CARE - MED/SURG/GYN PRIV*

## 2024-01-09 PROCEDURE — 93306 TTE W/DOPPLER COMPLETE: CPT

## 2024-01-09 PROCEDURE — A9270 NON-COVERED ITEM OR SERVICE: HCPCS

## 2024-01-09 PROCEDURE — 700102 HCHG RX REV CODE 250 W/ 637 OVERRIDE(OP)

## 2024-01-09 PROCEDURE — 97530 THERAPEUTIC ACTIVITIES: CPT

## 2024-01-09 PROCEDURE — 700111 HCHG RX REV CODE 636 W/ 250 OVERRIDE (IP): Mod: JZ

## 2024-01-09 PROCEDURE — 94640 AIRWAY INHALATION TREATMENT: CPT

## 2024-01-09 PROCEDURE — 99232 SBSQ HOSP IP/OBS MODERATE 35: CPT | Mod: GC | Performed by: FAMILY MEDICINE

## 2024-01-09 PROCEDURE — 93306 TTE W/DOPPLER COMPLETE: CPT | Mod: 26 | Performed by: INTERNAL MEDICINE

## 2024-01-09 PROCEDURE — 94760 N-INVAS EAR/PLS OXIMETRY 1: CPT

## 2024-01-09 RX ORDER — IPRATROPIUM BROMIDE AND ALBUTEROL SULFATE 2.5; .5 MG/3ML; MG/3ML
3 SOLUTION RESPIRATORY (INHALATION)
Status: DISCONTINUED | OUTPATIENT
Start: 2024-01-09 | End: 2024-01-10

## 2024-01-09 RX ORDER — CYCLOBENZAPRINE HCL 10 MG
10 TABLET ORAL
Status: CANCELLED | OUTPATIENT
Start: 2024-01-09

## 2024-01-09 RX ORDER — DULOXETIN HYDROCHLORIDE 60 MG/1
60 CAPSULE, DELAYED RELEASE ORAL
Status: CANCELLED | OUTPATIENT
Start: 2024-01-09

## 2024-01-09 RX ORDER — BUPROPION HYDROCHLORIDE 150 MG/1
150 TABLET, EXTENDED RELEASE ORAL DAILY
Status: CANCELLED | OUTPATIENT
Start: 2024-01-10

## 2024-01-09 RX ORDER — BUSPIRONE HYDROCHLORIDE 5 MG/1
7.5 TABLET ORAL 2 TIMES DAILY
Status: CANCELLED | OUTPATIENT
Start: 2024-01-09

## 2024-01-09 RX ORDER — IPRATROPIUM BROMIDE AND ALBUTEROL SULFATE 2.5; .5 MG/3ML; MG/3ML
3 SOLUTION RESPIRATORY (INHALATION)
Status: COMPLETED | OUTPATIENT
Start: 2024-01-09 | End: 2024-01-09

## 2024-01-09 RX ORDER — HYDROMORPHONE HYDROCHLORIDE 1 MG/ML
0.5 INJECTION, SOLUTION INTRAMUSCULAR; INTRAVENOUS; SUBCUTANEOUS EVERY 4 HOURS PRN
Status: DISCONTINUED | OUTPATIENT
Start: 2024-01-09 | End: 2024-01-11 | Stop reason: HOSPADM

## 2024-01-09 RX ORDER — OXYCODONE HYDROCHLORIDE 5 MG/1
5 TABLET ORAL EVERY 4 HOURS PRN
Status: DISCONTINUED | OUTPATIENT
Start: 2024-01-09 | End: 2024-01-11 | Stop reason: HOSPADM

## 2024-01-09 RX ORDER — OXYCODONE HYDROCHLORIDE 15 MG/1
7.5 TABLET ORAL EVERY 4 HOURS PRN
Status: DISCONTINUED | OUTPATIENT
Start: 2024-01-09 | End: 2024-01-11 | Stop reason: HOSPADM

## 2024-01-09 RX ADMIN — IPRATROPIUM BROMIDE AND ALBUTEROL SULFATE 3 ML: 2.5; .5 SOLUTION RESPIRATORY (INHALATION) at 16:56

## 2024-01-09 RX ADMIN — NICOTINE 14 MG: 14 PATCH TRANSDERMAL at 04:59

## 2024-01-09 RX ADMIN — DIBASIC SODIUM PHOSPHATE, MONOBASIC POTASSIUM PHOSPHATE AND MONOBASIC SODIUM PHOSPHATE 250 MG: 852; 155; 130 TABLET ORAL at 11:52

## 2024-01-09 RX ADMIN — GUAIFENESIN SYRUP AND DEXTROMETHORPHAN 10 ML: 100; 10 SYRUP ORAL at 04:58

## 2024-01-09 RX ADMIN — GABAPENTIN 800 MG: 400 CAPSULE ORAL at 11:52

## 2024-01-09 RX ADMIN — DULOXETINE HYDROCHLORIDE 60 MG: 60 CAPSULE, DELAYED RELEASE ORAL at 20:22

## 2024-01-09 RX ADMIN — ALBUTEROL SULFATE 2 PUFF: 90 AEROSOL, METERED RESPIRATORY (INHALATION) at 11:50

## 2024-01-09 RX ADMIN — OXYCODONE HYDROCHLORIDE 10 MG: 10 TABLET ORAL at 05:00

## 2024-01-09 RX ADMIN — OSELTAMIVIR PHOSPHATE 75 MG: 75 CAPSULE ORAL at 16:39

## 2024-01-09 RX ADMIN — OXYCODONE HYDROCHLORIDE 5 MG: 5 TABLET ORAL at 20:31

## 2024-01-09 RX ADMIN — OXYCODONE HYDROCHLORIDE 5 MG: 5 TABLET ORAL at 15:57

## 2024-01-09 RX ADMIN — GABAPENTIN 800 MG: 400 CAPSULE ORAL at 04:59

## 2024-01-09 RX ADMIN — Medication 1 APPLICATOR: at 16:39

## 2024-01-09 RX ADMIN — DIBASIC SODIUM PHOSPHATE, MONOBASIC POTASSIUM PHOSPHATE AND MONOBASIC SODIUM PHOSPHATE 250 MG: 852; 155; 130 TABLET ORAL at 07:48

## 2024-01-09 RX ADMIN — OSELTAMIVIR PHOSPHATE 75 MG: 75 CAPSULE ORAL at 04:59

## 2024-01-09 RX ADMIN — OXYCODONE HYDROCHLORIDE 5 MG: 5 TABLET ORAL at 11:52

## 2024-01-09 RX ADMIN — ALBUTEROL SULFATE 2 PUFF: 90 AEROSOL, METERED RESPIRATORY (INHALATION) at 16:38

## 2024-01-09 RX ADMIN — ACETAMINOPHEN 1000 MG: 500 TABLET ORAL at 11:52

## 2024-01-09 RX ADMIN — DEXAMETHASONE 6 MG: 4 TABLET ORAL at 04:59

## 2024-01-09 RX ADMIN — QUETIAPINE FUMARATE 150 MG: 100 TABLET ORAL at 20:22

## 2024-01-09 RX ADMIN — IPRATROPIUM BROMIDE AND ALBUTEROL SULFATE 3 ML: 2.5; .5 SOLUTION RESPIRATORY (INHALATION) at 08:02

## 2024-01-09 RX ADMIN — IPRATROPIUM BROMIDE AND ALBUTEROL SULFATE 3 ML: 2.5; .5 SOLUTION RESPIRATORY (INHALATION) at 21:18

## 2024-01-09 RX ADMIN — Medication 1 APPLICATOR: at 04:59

## 2024-01-09 RX ADMIN — GABAPENTIN 800 MG: 400 CAPSULE ORAL at 16:38

## 2024-01-09 RX ADMIN — BUPROPION HYDROCHLORIDE 150 MG: 150 TABLET, EXTENDED RELEASE ORAL at 04:58

## 2024-01-09 RX ADMIN — BUSPIRONE HYDROCHLORIDE 7.5 MG: 5 TABLET ORAL at 04:59

## 2024-01-09 RX ADMIN — DIBASIC SODIUM PHOSPHATE, MONOBASIC POTASSIUM PHOSPHATE AND MONOBASIC SODIUM PHOSPHATE 250 MG: 852; 155; 130 TABLET ORAL at 23:59

## 2024-01-09 RX ADMIN — ACETAMINOPHEN 1000 MG: 500 TABLET ORAL at 16:38

## 2024-01-09 RX ADMIN — GUAIFENESIN SYRUP AND DEXTROMETHORPHAN 10 ML: 100; 10 SYRUP ORAL at 18:36

## 2024-01-09 RX ADMIN — POLYETHYLENE GLYCOL 3350 1 PACKET: 17 POWDER, FOR SOLUTION ORAL at 05:05

## 2024-01-09 RX ADMIN — GUAIFENESIN SYRUP AND DEXTROMETHORPHAN 10 ML: 100; 10 SYRUP ORAL at 11:51

## 2024-01-09 RX ADMIN — ENOXAPARIN SODIUM 40 MG: 100 INJECTION SUBCUTANEOUS at 16:39

## 2024-01-09 RX ADMIN — ACETAMINOPHEN 1000 MG: 500 TABLET ORAL at 23:59

## 2024-01-09 RX ADMIN — DIBASIC SODIUM PHOSPHATE, MONOBASIC POTASSIUM PHOSPHATE AND MONOBASIC SODIUM PHOSPHATE 250 MG: 852; 155; 130 TABLET ORAL at 16:39

## 2024-01-09 RX ADMIN — OMEPRAZOLE 20 MG: 20 CAPSULE, DELAYED RELEASE ORAL at 20:23

## 2024-01-09 RX ADMIN — DULOXETINE HYDROCHLORIDE 30 MG: 30 CAPSULE, DELAYED RELEASE ORAL at 04:58

## 2024-01-09 ASSESSMENT — PAIN DESCRIPTION - PAIN TYPE
TYPE: ACUTE PAIN

## 2024-01-09 ASSESSMENT — COGNITIVE AND FUNCTIONAL STATUS - GENERAL
DAILY ACTIVITIY SCORE: 23
STANDING UP FROM CHAIR USING ARMS: A LITTLE
HELP NEEDED FOR BATHING: A LITTLE
CLIMB 3 TO 5 STEPS WITH RAILING: A LITTLE
SUGGESTED CMS G CODE MODIFIER MOBILITY: CJ
WALKING IN HOSPITAL ROOM: A LITTLE
SUGGESTED CMS G CODE MODIFIER DAILY ACTIVITY: CI
MOBILITY SCORE: 21

## 2024-01-09 ASSESSMENT — GAIT ASSESSMENTS
DISTANCE (FEET): 15
GAIT LEVEL OF ASSIST: CONTACT GUARD ASSIST
DEVIATION: DECREASED BASE OF SUPPORT;BRADYKINETIC
ASSISTIVE DEVICE: FRONT WHEEL WALKER
DISTANCE (FEET): 3

## 2024-01-09 NOTE — CARE PLAN
The patient is Stable - Low risk of patient condition declining or worsening    Shift Goals  Clinical Goals: safety, respiratory status, wean O2  Patient Goals: pain control  Family Goals: n/a    Progress made toward(s) clinical / shift goals:    Problem: Pain - Standard  Goal: Alleviation of pain or a reduction in pain to the patient’s comfort goal  Outcome: Progressing  Note: Pt states pain is relieved with current pain medication regimen. Pt medicated per MAR with + results. Pt provided ice packs and pillows for comfort.        Problem: Knowledge Deficit - COPD  Goal: Patient/significant other demonstrates understanding of disease process, utilization of the Action Plan, medications and discharge instruction  Outcome: Progressing

## 2024-01-09 NOTE — THERAPY
"Occupational Therapy  Daily Treatment     Patient Name: Martha Mosher  Age:  63 y.o., Sex:  female  Medical Record #: [id number]  Today's Date: 1/9/2024     Precautions: Fall Risk  Comments: Enhanced droplet and contact iso for COVID & influenza    Assessment    Pt seen for OT tx to include: transfer training, toileting, standing grooming, LB dressing. Pt had fall last night in the bathroom with nursing. She states she feels LOB was due to \"so many meds\". This session pt able to complete transfers and BADL with supv/SBA. No LOB with standing activity or transitions. Pt completes toilet hygiene with anterior approach due to stature, but she tends to wipe from back to front. Educated pt on wiping front to back even with anterior approach to decrease the likelihood of UTI. Also educated on having supv with shower transfers. Chart indicates pt may be ineligible for HH due to insurance. Inquired whether pt has family who may be able to stay with her temporarily on DC. Pt reports her daughter-in-law may be able to stay with her on DC. Agrees to check. Recommended family use N-95 masks when in patient's home Pt is progressing towards OT goals. Will benefit from ongoing acute OT while in-house.     Plan    Treatment Plan Status: Continue Current Treatment Plan  Type of Treatment: Self Care / Activities of Daily Living, Adaptive Equipment, Therapeutic Exercises, Therapeutic Activity  Treatment Frequency: 3 Times per Week  Treatment Duration: Until Therapy Goals Met    DC Equipment Recommendations: None  Discharge Recommendations: Recommend home health for continued occupational therapy services. If not eligible, recommend home with family support.     Subjective    \"My arms are too short. I always wipe from the front.\"     Objective       01/09/24 1247   Other Treatments   Other Treatments Provided Educated pt on home safety to include: bathing with supv, management of oxygen line, sid hygiene   Balance   Sitting " Balance (Static) Fair +   Sitting Balance (Dynamic) Fair +   Standing Balance (Static) Fair   Standing Balance (Dynamic) Fair   Weight Shift Sitting Good   Weight Shift Standing Fair   Comments FWW for standing   Bed Mobility    Scooting Supervised  (seated)   Comments up to recliner pre and post   Activities of Daily Living   Grooming Supervision;Standing  (hand hygiene at sink)   Lower Body Dressing Supervision  (doff/don B socks in tailor sit)   Toileting Standby Assist  (urination on toilet)   Comments Instructed on wiping front to back after BM   Functional Mobility   Sit to Stand Supervised   Bed, Chair, Wheelchair Transfer Standby Assist   Toilet Transfers Standby Assist   Transfer Method Stand Step  (FWW)   Mobility Short gait and transfers with FWW   Short Term Goals   Short Term Goal # 1 Pt will complete ADL txfs with supv   Goal Outcome # 1 Progressing as expected   Short Term Goal # 2 Pt will complete LB dressing with supv using AE PRN   Goal Outcome # 2 Goal met, new goal added   Short Term Goal # 2 B  Pt will complete toileting with supv   Short Term Goal # 3 Pt will independently implement 2 enegy conservation strategies during ADL tasks   Goal Outcome # 3 Progressing as expected   Education Group   Home Safety Patient Response Patient;Acceptance;Explanation;Verbal Demonstration  (use of shower chair with supv)   ADL Patient Response Patient;Acceptance;Explanation;Verbal Demonstration  (wiping front to back (even with anterior approach) to reduce chance of UTI)

## 2024-01-09 NOTE — THERAPY
Physical Therapy   Daily Treatment     Patient Name: Martha Mosher  Age:  63 y.o., Sex:  female  Medical Record #: [id number]  Today's Date: 1/9/2024     Precautions  Precautions: Fall Risk  Comments: COVID/ influenza A+    Assessment    Pt greeted and seen for PT treatment. Pt did not need assist for bed mobility. She is able to stand and take steps to transfer between surfaces w/ FWW and CGA, she has a persistent cough, she declined ambulating further distance. Pt fell last night with nursing which she attributes to medication she normally doesn't take. Pt currently limited by impaired balance and decreased activity tolerance which negatively impacts functional mobility. Pt will continue to benefit from skilled PT to address deficits.       Plan    Treatment Plan Status: Continue Current Treatment Plan  Type of Treatment: Bed Mobility, Equipment, Gait Training, Manual Therapy, Neuro Re-Education / Balance, Self Care / Home Evaluation, Therapeutic Activities, Therapeutic Exercise  Treatment Frequency: 3 Times per Week  Treatment Duration: Until Therapy Goals Met    DC Equipment Recommendations: None (pt owns 4WW)  Discharge Recommendations: Recommend home health for continued physical therapy services (if available)       01/09/24 1125   Cognition    Comments Cooperative, persistent cough   Balance   Sitting Balance (Static) Fair   Sitting Balance (Dynamic) Fair   Standing Balance (Static) Fair   Standing Balance (Dynamic) Fair -   Weight Shift Sitting Fair   Weight Shift Standing Fair   Skilled Intervention Verbal Cuing   Comments w/ FWW   Bed Mobility    Supine to Sit Supervised   Sit to Supine Supervised   Rolling Supervised   Comments HOB raised. Provided pt with reclining chair to encourage OOB, she ended session in chair.   Gait Analysis   Gait Level Of Assist Contact Guard Assist   Assistive Device Front Wheel Walker   Distance (Feet) 3   # of Times Distance was Traveled 2   Deviation Decreased Base Of  Support;Bradykinetic   Weight Bearing Status no restrictions   Skilled Intervention Verbal Cuing   Comments pt declined further ambulation today, she demo'd a persistant cough. Pt had a fall last night with staff, which she attributes to medications.   Functional Mobility   Sit to Stand Standby Assist   Bed, Chair, Wheelchair Transfer Contact Guard Assist   Transfer Method Stand Step   Mobility bed>BSC>chair   Skilled Intervention Verbal Cuing   Comments Pt needed ModA for pericares in standing   Short Term Goals    Short Term Goal # 1 Pt will ambulate 150 ft with FWW and SPV to access apartment in 6 visits.   Goal Outcome # 1 Progressing as expected   Supervising Physical Therapist (PTA Treatments Only)   Supervising Physical Therapist Rosa Jennings

## 2024-01-09 NOTE — PROGRESS NOTES
Bedside report received from hung RN, assumed care at 1900.  Assessment complete.  A&O x 4. Patient calls appropriately.  Patient ambulates with x2 assist. Bed alarm on.   Patient has 8/10 pain. Pain managed with prescribed medications.  Denies N&V. Tolerating regular diet.  + void, - flatus, - BM.  Patient denies SOB. On 4L nasal cannula  SCD's on.  Patient calm and cooperative.  Review plan with of care with patient. Call light and personal belongings within reach. Hourly rounding in place. All needs met at this time.

## 2024-01-09 NOTE — CARE PLAN
9The patient is Watcher - Medium risk of patient condition declining or worsening    Shift Goals  Clinical Goals: Safety, Wean O2  Patient Goals: Pain control  Family Goals: n/a    Progress made toward(s) clinical / shift goals:  Bed alarm in place, purewick in place, patient understanding and appropriate to call.    Patient is not progressing towards the following goals:  Patient needing more oxygen up to 3L during the night.

## 2024-01-09 NOTE — DISCHARGE PLANNING
RN CM met with patient to complete admission assessment. Patient A&Ox4 and able to verify information on face sheet.   Patient lives alone in single story apartment but she reports her adult son and grandchildren live two houses down and are able to walk to her house and assist her.   She owns a car and drives at baseline. She owns a FWW.   She receives $940/month from disability and pays $250/month for rent.   She was on oxygen in the past in [place] but after moving to [place] she has not had O2 orders or equipment.   Initially HH order placed but her only insurance in Medicaid FFS so that is not an option at this time.   Post-acute referrals placed by [facility] per request of RN CM.   Patient denies history of SA but reports history of MDD. She is on psychopharmacological medications. She denies having ever been on Legal hold or hospitalization for depression.   RN CM to follow up with post-acute referrals.   0935: ROBB initiated by TEJAS BRAVO. ROBB in manual review. RN CM to continue following up on PASRR and upload any necessary information in portal.   1537: ROBB IC issued: [id number]    Case Management Discharge Planning    Admission Date: 1/5/2024  GMLOS: 5  ALOS: 4    6-Clicks ADL Score: 19  6-Clicks Mobility Score: 21      Anticipated Discharge Dispo: Discharge Disposition: D/T to SNF with Medicare cert in anticipation of skilled care (03)    DME Needed: Yes    DME Ordered: Yes    Action(s) Taken: Updated Provider/Nurse on Discharge Plan, Patient Conference, and DC Assessment Complete (See below)    Escalations Completed: Provider    Medically Clear: No    Next Steps: Pending direction from hospitalist, for Home or SNF. If Home without home health will require oxygen order.     Barriers to Discharge: Medical clearance and Pending Placement    Is the patient up for discharge tomorrow: No    Care Transition Team Assessment    Information Source  Orientation Level: Oriented X4  Information Given By: Patient  Informant's  Name: Danielle  Who is responsible for making decisions for patient? : Patient    Readmission Evaluation  Is this a readmission?: No    Elopement Risk  Legal Hold: No  Ambulatory or Self Mobile in Wheelchair: Yes  Disoriented: No  Psychiatric Symptoms: None  History of Wandering: No  Elopement this Admit: No  Vocalizing Wanting to Leave: No  Displays Behaviors, Body Language Wanting to Leave: No-Not at Risk for Elopement  Elopement Risk: Not at Risk for Elopement    Interdisciplinary Discharge Planning  Does Admitting Nurse Feel This Could be a Complex Discharge?: No  Primary Care Physician: Katharina Murdock  Lives with - Patient's Self Care Capacity: Alone and Able to Care For Self  Patient or legal guardian wants to designate a caregiver: Yes  Caregiver name: Reginaldo Mosher  Housing / Facility: [address] / Condo  Prior Services: Home-Independent    Discharge Preparedness  What is your plan after discharge?: Uncertain - pending medical team collaboration  What are your discharge supports?: Child  Prior Functional Level: Ambulatory, Drives Self, Independent with Activities of Daily Living, Independent with Medication Management, Uses Walker  Difficulity with ADLs: None  Difficulity with IADLs: None    Functional Assesment  Prior Functional Level: Ambulatory, Drives Self, Independent with Activities of Daily Living, Independent with Medication Management, Uses Walker    Finances  Financial Barriers to Discharge: Yes  Average Monthly Income: 940 $  Source of Income: Social Security Disability  Prescription Coverage: Yes    Vision / Hearing Impairment  Vision Impairment : Yes  Hearing Impairment : No    Values / Beliefs / Concerns  Values / Beliefs Concerns : No    Advance Directive  Advance Directive?: None    Domestic Abuse  Have you ever been the victim of abuse or violence?: No  Physical Abuse or Sexual Abuse: No  Verbal Abuse or Emotional Abuse: No  Possible Abuse/Neglect Reported to:: Not  Applicable    Psychological Assessment  History of Substance Abuse: None  History of Psychiatric Problems: Yes  Non-compliant with Treatment: No  Newly Diagnosed Illness: Yes    Discharge Risks or Barriers  Discharge risks or barriers?: Uninsured / underinsured, Mental health, Complex medical needs  Patient risk factors: Lives alone and no community support, Uninsured or underinsured    Anticipated Discharge Information  Discharge Disposition: D/T to SNF with Medicare cert in anticipation of skilled care (03)
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
## 2024-01-10 LAB
ALBUMIN SERPL BCP-MCNC: 3.5 G/DL (ref 3.2–4.9)
ALBUMIN/GLOB SERPL: 1 G/DL
ALP SERPL-CCNC: 62 U/L (ref 30–99)
ALT SERPL-CCNC: 37 U/L (ref 2–50)
ANION GAP SERPL CALC-SCNC: 12 MMOL/L (ref 7–16)
AST SERPL-CCNC: 37 U/L (ref 12–45)
BILIRUB SERPL-MCNC: 0.7 MG/DL (ref 0.1–1.5)
BUN SERPL-MCNC: 16 MG/DL (ref 8–22)
CALCIUM ALBUM COR SERPL-MCNC: 9.8 MG/DL (ref 8.5–10.5)
CALCIUM SERPL-MCNC: 9.4 MG/DL (ref 8.5–10.5)
CHLORIDE SERPL-SCNC: 96 MMOL/L (ref 96–112)
CO2 SERPL-SCNC: 31 MMOL/L (ref 20–33)
CREAT SERPL-MCNC: 0.51 MG/DL (ref 0.5–1.4)
ERYTHROCYTE [DISTWIDTH] IN BLOOD BY AUTOMATED COUNT: 46 FL (ref 35.9–50)
GFR SERPLBLD CREATININE-BSD FMLA CKD-EPI: 105 ML/MIN/1.73 M 2
GLOBULIN SER CALC-MCNC: 3.5 G/DL (ref 1.9–3.5)
GLUCOSE SERPL-MCNC: 119 MG/DL (ref 65–99)
HCT VFR BLD AUTO: 40.5 % (ref 37–47)
HGB BLD-MCNC: 13.6 G/DL (ref 12–16)
MAGNESIUM SERPL-MCNC: 2.2 MG/DL (ref 1.5–2.5)
MCH RBC QN AUTO: 30.4 PG (ref 27–33)
MCHC RBC AUTO-ENTMCNC: 33.6 G/DL (ref 32.2–35.5)
MCV RBC AUTO: 90.4 FL (ref 81.4–97.8)
PHOSPHATE SERPL-MCNC: 4.2 MG/DL (ref 2.5–4.5)
PLATELET # BLD AUTO: 186 K/UL (ref 164–446)
PMV BLD AUTO: 10.9 FL (ref 9–12.9)
POTASSIUM SERPL-SCNC: 3.2 MMOL/L (ref 3.6–5.5)
PROT SERPL-MCNC: 7 G/DL (ref 6–8.2)
RBC # BLD AUTO: 4.48 M/UL (ref 4.2–5.4)
SODIUM SERPL-SCNC: 139 MMOL/L (ref 135–145)
WBC # BLD AUTO: 6.6 K/UL (ref 4.8–10.8)

## 2024-01-10 PROCEDURE — 84100 ASSAY OF PHOSPHORUS: CPT

## 2024-01-10 PROCEDURE — 700111 HCHG RX REV CODE 636 W/ 250 OVERRIDE (IP): Mod: JZ

## 2024-01-10 PROCEDURE — 770001 HCHG ROOM/CARE - MED/SURG/GYN PRIV*

## 2024-01-10 PROCEDURE — 700101 HCHG RX REV CODE 250

## 2024-01-10 PROCEDURE — 83735 ASSAY OF MAGNESIUM: CPT

## 2024-01-10 PROCEDURE — A9270 NON-COVERED ITEM OR SERVICE: HCPCS

## 2024-01-10 PROCEDURE — 99232 SBSQ HOSP IP/OBS MODERATE 35: CPT | Mod: GC | Performed by: FAMILY MEDICINE

## 2024-01-10 PROCEDURE — 700102 HCHG RX REV CODE 250 W/ 637 OVERRIDE(OP)

## 2024-01-10 PROCEDURE — 94760 N-INVAS EAR/PLS OXIMETRY 1: CPT

## 2024-01-10 PROCEDURE — 80053 COMPREHEN METABOLIC PANEL: CPT

## 2024-01-10 PROCEDURE — 36415 COLL VENOUS BLD VENIPUNCTURE: CPT

## 2024-01-10 PROCEDURE — 85027 COMPLETE CBC AUTOMATED: CPT

## 2024-01-10 PROCEDURE — 94640 AIRWAY INHALATION TREATMENT: CPT

## 2024-01-10 RX ORDER — POTASSIUM CHLORIDE 20 MEQ/1
40 TABLET, EXTENDED RELEASE ORAL ONCE
Status: COMPLETED | OUTPATIENT
Start: 2024-01-10 | End: 2024-01-10

## 2024-01-10 RX ADMIN — OXYCODONE HYDROCHLORIDE 5 MG: 5 TABLET ORAL at 03:02

## 2024-01-10 RX ADMIN — NICOTINE 14 MG: 14 PATCH TRANSDERMAL at 05:18

## 2024-01-10 RX ADMIN — ALBUTEROL SULFATE 2 PUFF: 90 AEROSOL, METERED RESPIRATORY (INHALATION) at 02:57

## 2024-01-10 RX ADMIN — GUAIFENESIN SYRUP AND DEXTROMETHORPHAN 10 ML: 100; 10 SYRUP ORAL at 00:00

## 2024-01-10 RX ADMIN — OXYCODONE HYDROCHLORIDE 5 MG: 5 TABLET ORAL at 22:38

## 2024-01-10 RX ADMIN — QUETIAPINE FUMARATE 150 MG: 100 TABLET ORAL at 20:32

## 2024-01-10 RX ADMIN — GABAPENTIN 800 MG: 400 CAPSULE ORAL at 16:37

## 2024-01-10 RX ADMIN — DULOXETINE HYDROCHLORIDE 60 MG: 60 CAPSULE, DELAYED RELEASE ORAL at 20:31

## 2024-01-10 RX ADMIN — POTASSIUM CHLORIDE 40 MEQ: 1500 TABLET, EXTENDED RELEASE ORAL at 08:32

## 2024-01-10 RX ADMIN — GABAPENTIN 800 MG: 400 CAPSULE ORAL at 05:17

## 2024-01-10 RX ADMIN — OXYCODONE HYDROCHLORIDE 5 MG: 5 TABLET ORAL at 14:41

## 2024-01-10 RX ADMIN — IPRATROPIUM BROMIDE AND ALBUTEROL SULFATE 3 ML: 2.5; .5 SOLUTION RESPIRATORY (INHALATION) at 03:08

## 2024-01-10 RX ADMIN — OMEPRAZOLE 20 MG: 20 CAPSULE, DELAYED RELEASE ORAL at 20:31

## 2024-01-10 RX ADMIN — Medication 1 APPLICATOR: at 16:37

## 2024-01-10 RX ADMIN — ENOXAPARIN SODIUM 40 MG: 100 INJECTION SUBCUTANEOUS at 16:37

## 2024-01-10 RX ADMIN — DEXAMETHASONE 6 MG: 4 TABLET ORAL at 05:17

## 2024-01-10 RX ADMIN — TIOTROPIUM BROMIDE INHALATION SPRAY 5 MCG: 3.12 SPRAY, METERED RESPIRATORY (INHALATION) at 08:32

## 2024-01-10 RX ADMIN — OXYCODONE HYDROCHLORIDE 5 MG: 5 TABLET ORAL at 09:44

## 2024-01-10 RX ADMIN — ACETAMINOPHEN 1000 MG: 500 TABLET ORAL at 05:18

## 2024-01-10 RX ADMIN — GABAPENTIN 800 MG: 400 CAPSULE ORAL at 12:29

## 2024-01-10 RX ADMIN — GUAIFENESIN SYRUP AND DEXTROMETHORPHAN 10 ML: 100; 10 SYRUP ORAL at 22:40

## 2024-01-10 RX ADMIN — OSELTAMIVIR PHOSPHATE 75 MG: 75 CAPSULE ORAL at 05:17

## 2024-01-10 RX ADMIN — DIBASIC SODIUM PHOSPHATE, MONOBASIC POTASSIUM PHOSPHATE AND MONOBASIC SODIUM PHOSPHATE 250 MG: 852; 155; 130 TABLET ORAL at 05:18

## 2024-01-10 RX ADMIN — OXYCODONE HYDROCHLORIDE 5 MG: 5 TABLET ORAL at 18:35

## 2024-01-10 RX ADMIN — ACETAMINOPHEN 1000 MG: 500 TABLET ORAL at 12:29

## 2024-01-10 RX ADMIN — GUAIFENESIN SYRUP AND DEXTROMETHORPHAN 10 ML: 100; 10 SYRUP ORAL at 05:16

## 2024-01-10 RX ADMIN — Medication 1 APPLICATOR: at 05:20

## 2024-01-10 ASSESSMENT — PAIN DESCRIPTION - PAIN TYPE
TYPE: ACUTE PAIN

## 2024-01-10 NOTE — CARE PLAN
Problem: Bronchoconstriction  Goal: Improve in air movement and diminished wheezing  Description: Target End Date:  2 to 3 days    1.  Implement inhaled treatments  2.  Evaluate and manage medication effects  Outcome: Progressing  Flowsheets (Taken 1/7/2024 2147 by Fernando Miller RRT)  Bronchodilator Goals/Outcome: Patient at Stable Baseline  Bronchodilator Indications: History / Diagnosis  Note:     Respiratory Update    Treatment modality: Duoneb  Frequency: Q6    Pt tolerating current treatments well with no adverse reactions.

## 2024-01-10 NOTE — FACE TO FACE
"Face to Face Note  -  Durable Medical Equipment    Tremaine Aguero M.D. - NPI: [id number]  I certify that this patient is under my care and that they had a durable medical equipment(DME)face to face encounter by myself that meets the physician DME face-to-face encounter requirements with this patient on:    Date of encounter:   Patient:                    MRN:                       YOB: 2023  Martha Mosher  [id number]  1960     The encounter with the patient was in whole, or in part, for the following medical condition, which is the primary reason for durable medical equipment:  COPD    I certify that, based on my findings, the following durable medical equipment is medically necessary:    Oxygen   HOME O2 Saturation Measurements:(Values must be present for Home Oxygen orders)  Room air sat at rest: 89  Room air sat with amb: 84  With liters of O2: 3.5, O2 sat at rest with O2: 95  With Liters of O2: 4, O2 sat with amb with O2 : 93  Is the patient mobile?: Yes  If patient feels more short of breath, they can go up to 6 liters per minute and contact healthcare provider.    Supporting Symptoms: The patient requires supplemental oxygen, as the following interventions have been tried with limited or no improvement: \"Bronchodilators and/or steroid inhalers, \"Oral and/or IV steroids, \"Ambulation with oximetry, and \"Incentive spirometry.    My Clinical findings support the need for the above equipment due to:  Hypoxia   "
Face to Face Supporting Documentation - Home Health    The encounter with this patient was in whole or in part the primary reason for home health admission.    Date of encounter:   Patient:                    MRN:                       YOB: 2023  Martha Mosher  [id number]  1960     Home health to see patient for:  Physical Therapy evaluation and treatment and Occupational therapy evaluation and treatment    Skilled need for:  Exacerbation of Chronic Disease State COPD, decreased mobility    Skilled nursing interventions to include:  Assessment, interventions, and education    Homebound status evidenced by:  Need the aid of supportive devices such as crutches, canes, wheelchairs or walkers. Leaving home requires a considerable and taxing effort. There is a normal inability to leave the home.    Community Physician to provide follow up care: Katharina Murdock M.D.     Optional Interventions? No      I certify the face to face encounter for this home health care referral meets the CMS requirements and the encounter/clinical assessment with the patient was, in whole, or in part, for the medical condition(s) listed above, which is the primary reason for home health care. Based on my clinical findings: the service(s) are medically necessary, support the need for home health care, and the homebound criteria are met.  I certify that this patient has had a face to face encounter by myself.  Brian Bird M.D. - NPI: [id number]  
Face to Face Supporting Documentation - Home Health    The encounter with this patient was in whole or in part the primary reason for home health admission.    Date of encounter:   Patient:                    MRN:                       YOB: 2023  Martha Mosher  [id number]  1960     Home health to see patient for:  Skilled Nursing care for assessment, interventions & education    Skilled need for:  Exacerbation of Chronic Disease State COPD exacerbation    Skilled nursing interventions to include:  Comment: RT evaluation/treatment    Homebound status evidenced by:  Needs the assistance of another person in order to leave the home. Leaving home requires a considerable and taxing effort. There is a normal inability to leave the home.    Community Physician to provide follow up care: Katharina Murdock M.D.     Optional Interventions? No      I certify the face to face encounter for this home health care referral meets the CMS requirements and the encounter/clinical assessment with the patient was, in whole, or in part, for the medical condition(s) listed above, which is the primary reason for home health care. Based on my clinical findings: the service(s) are medically necessary, support the need for home health care, and the homebound criteria are met.  I certify that this patient has had a face to face encounter by myself.  Brian Bird M.D. - NPI: [id number]  
no

## 2024-01-10 NOTE — CARE PLAN
The patient is Stable - Low risk of patient condition declining or worsening    Shift Goals  Clinical Goals: safety, wean O2  Patient Goals: pain control  Family Goals: n/a    Progress made toward(s) clinical / shift goals:    Problem: Pain - Standard  Goal: Alleviation of pain or a reduction in pain to the patient’s comfort goal  Outcome: Progressing  Note: Pt states pain is relieved with current pain medication regimen. Pt medicated per MAR with + results. Pt provided ice packs and pillows for comfort.        Problem: Fall Risk  Goal: Patient will remain free from falls  Outcome: Progressing  Note: Pt educated on importance of use of call light for assistance OOB. Bed alarm on, call light and side table in reach, treaded socks on, no DME at bedside, hourly rounding in place.

## 2024-01-10 NOTE — DISCHARGE PLANNING
Agency/Facility Name: [facility]  Outcome: Home 02 order did not come thru, re-faxed 02 order @ 9212

## 2024-01-10 NOTE — DISCHARGE PLANNING
Agency/Facility Name: [facility]   Outcome: LVM for admissions regarding status of patient referral, requested a call back.     Agency/Facility Name: [facility]  Outcome: LVM for admissions regarding status of patient referral, requested a call back.     Agency/Facility Name: [facility]  Spoke To: Di  Outcome: Can accept patient 10 days after positive COVID test, 1/15/24.

## 2024-01-10 NOTE — ASSESSMENT & PLAN NOTE
"Patient fell in bathroom overnight 1/8.  States \"my legs just give out.\"  She denies any LOC, no prior history of the symptoms, no dizziness.  No chest pains.  EKG, labs at that time unremarkable.  -Etiology includes deconditioning due to illness, hospitalization, as well as oversedation  - Due to fall, will have PT/OT reevaluate today.  May consider postacute placement in SNF for additional rehabilitation, prefer this over home health as the patient's insurance does not cover home health.  - Patient states she is not taking all of her psychiatric meds at home at baseline  - Holding most psych meds, continuing only duloxetine and quetiapine at this time  -Repeat PT/OT evals 1/9 indicate recommendations for home health.  However, as patient's insurance does not cover home health, will plan for SNF placement.  - 1/10 and 1/11, discussion with the patient regarding recommendations for SNF.  She would prefer to go directly home, stating she has good home support and has transportation to go to physical therapy outpatient if needed.  She acknowledges and expresses understanding of risks associated with going directly home instead of going to postacute placement, including repeat falls, and sequelae associated with falls including fracture, bleeds, or death.  She reaffirms her desire to go home after discharge.  - See #psychiatric disorder  "
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
## 2024-01-10 NOTE — DISCHARGE PLANNING
Received Choice form at 1016  Agency/Facility Name: [facility]   Referral sent per Choice form @ [id number]

## 2024-01-10 NOTE — DISCHARGE PLANNING
Agency/Facility Name: [facility]  Spoke To: Tanvi  Outcome: Referral currently being processed- per Tanvi will be delivered to bedside within two hours.     RN CM notified

## 2024-01-10 NOTE — CARE PLAN
The patient is Stable - Low risk of patient condition declining or worsening    Shift Goals  Clinical Goals: Wean off oxygen, managed cough,  and safety  Patient Goals: Pain control  Family Goals: No family at beside    Progress made toward(s) clinical / shift goals:    Problem: Knowledge Deficit - Standard  Goal: Patient and family/care givers will demonstrate understanding of plan of care, disease process/condition, diagnostic tests and medications  Description: Target End Date:  1-3 days or as soon as patient condition allows    Document in Patient Education    1.  Patient and family/caregiver oriented to unit, equipment, visitation policy and means for communicating concern  2.  Complete/review Learning Assessment  3.  Assess knowledge level of disease process/condition, treatment plan, diagnostic tests and medications  4.  Explain disease process/condition, treatment plan, diagnostic tests and medications  Outcome: Progressing     Problem: Pain - Standard  Goal: Alleviation of pain or a reduction in pain to the patient’s comfort goal  Description: Target End Date:  Prior to discharge or change in level of care    Document on Vitals flowsheet    1.  Document pain using the appropriate pain scale per order or unit policy  2.  Educate and implement non-pharmacologic comfort measures (i.e. relaxation, distraction, massage, cold/heat therapy, etc.)  3.  Pain management medications as ordered  4.  Reassess pain after pain med administration per policy  5.  If opiods administered assess patient's response to pain medication is appropriate per POSS sedation scale  6.  Follow pain management plan developed in collaboration with patient and interdisciplinary team (including palliative care or pain specialists if applicable)  Outcome: Progressing       Patient is not progressing towards the following goals:

## 2024-01-10 NOTE — CARE PLAN
The patient is Stable - Low risk of patient condition declining or worsening    Shift Goals  Clinical Goals: wean off O2, ambulation  Patient Goals: pain management  Family Goals: n/a      Problem: Pain - Standard  Goal: Alleviation of pain or a reduction in pain to the patient’s comfort goal  Outcome: Progressing     Problem: Knowledge Deficit - Standard  Goal: Patient and family/care givers will demonstrate understanding of plan of care, disease process/condition, diagnostic tests and medications  Outcome: Progressing     Problem: Knowledge Deficit - COPD  Goal: Patient/significant other demonstrates understanding of disease process, utilization of the Action Plan, medications and discharge instruction  Outcome: Progressing     Problem: Risk for Infection - COPD  Goal: Patient will remain free from signs and symptoms of infection  Outcome: Progressing

## 2024-01-10 NOTE — DIETARY
Nutrition Update:    Day 4 of admit.  Martha Mosher is a 63 y.o. female with admitting DX of COPD exacerbation (HCC) [J44.1].  Patient being followed to optimize nutrition.    Current Diet: Regular; PO intake >% x 2 meals and 1 meal <25%. No meals recorded since 1/7.     Pt would benefit from oral nutrition supplements TID to help optimize nutrition.     Problem: Nutritional:  Goal: Achieve adequate nutritional intake  Description: Patient will consume >50% of meals  Outcome: Progressing     Recommendations:  Please document meals in flowsheets  Provide Ensure Max BID     RD following

## 2024-01-11 VITALS
DIASTOLIC BLOOD PRESSURE: 69 MMHG | HEART RATE: 84 BPM | WEIGHT: 153 LBS | HEIGHT: 58 IN | OXYGEN SATURATION: 90 % | RESPIRATION RATE: 19 BRPM | TEMPERATURE: 97.4 F | BODY MASS INDEX: 32.12 KG/M2 | SYSTOLIC BLOOD PRESSURE: 122 MMHG

## 2024-01-11 PROCEDURE — 90471 IMMUNIZATION ADMIN: CPT

## 2024-01-11 PROCEDURE — 3E02340 INTRODUCTION OF INFLUENZA VACCINE INTO MUSCLE, PERCUTANEOUS APPROACH: ICD-10-PCS

## 2024-01-11 PROCEDURE — 99238 HOSP IP/OBS DSCHRG MGMT 30/<: CPT | Mod: GC | Performed by: FAMILY MEDICINE

## 2024-01-11 PROCEDURE — 700102 HCHG RX REV CODE 250 W/ 637 OVERRIDE(OP)

## 2024-01-11 PROCEDURE — A9270 NON-COVERED ITEM OR SERVICE: HCPCS

## 2024-01-11 PROCEDURE — 90686 IIV4 VACC NO PRSV 0.5 ML IM: CPT

## 2024-01-11 PROCEDURE — 700111 HCHG RX REV CODE 636 W/ 250 OVERRIDE (IP)

## 2024-01-11 RX ORDER — TIOTROPIUM BROMIDE 18 UG/1
18 CAPSULE ORAL; RESPIRATORY (INHALATION) DAILY
Qty: 30 CAPSULE | Refills: 3 | Status: SHIPPED | OUTPATIENT
Start: 2024-01-11

## 2024-01-11 RX ORDER — ACETAMINOPHEN 500 MG
1000 TABLET ORAL EVERY 8 HOURS PRN
COMMUNITY
Start: 2024-01-11

## 2024-01-11 RX ADMIN — GABAPENTIN 800 MG: 400 CAPSULE ORAL at 06:05

## 2024-01-11 RX ADMIN — INFLUENZA A VIRUS A/VICTORIA/4897/2022 IVR-238 (H1N1) ANTIGEN (FORMALDEHYDE INACTIVATED), INFLUENZA A VIRUS A/DARWIN/9/2021 SAN-010 (H3N2) ANTIGEN (FORMALDEHYDE INACTIVATED), INFLUENZA B VIRUS B/PHUKET/3073/2013 ANTIGEN (FORMALDEHYDE INACTIVATED), AND INFLUENZA B VIRUS B/MICHIGAN/01/2021 ANTIGEN (FORMALDEHYDE INACTIVATED) 0.5 ML: 15; 15; 15; 15 INJECTION, SUSPENSION INTRAMUSCULAR at 06:08

## 2024-01-11 RX ADMIN — Medication 1 APPLICATOR: at 06:11

## 2024-01-11 RX ADMIN — TIOTROPIUM BROMIDE INHALATION SPRAY 5 MCG: 3.12 SPRAY, METERED RESPIRATORY (INHALATION) at 10:10

## 2024-01-11 RX ADMIN — SUMATRIPTAN SUCCINATE 50 MG: 50 TABLET ORAL at 13:56

## 2024-01-11 RX ADMIN — DEXAMETHASONE 6 MG: 4 TABLET ORAL at 06:06

## 2024-01-11 RX ADMIN — OXYCODONE HYDROCHLORIDE 5 MG: 5 TABLET ORAL at 06:06

## 2024-01-11 RX ADMIN — GABAPENTIN 800 MG: 400 CAPSULE ORAL at 14:12

## 2024-01-11 RX ADMIN — ONDANSETRON 4 MG: 4 TABLET, ORALLY DISINTEGRATING ORAL at 13:56

## 2024-01-11 RX ADMIN — NICOTINE 14 MG: 14 PATCH TRANSDERMAL at 06:06

## 2024-01-11 RX ADMIN — POLYETHYLENE GLYCOL 3350 1 PACKET: 17 POWDER, FOR SOLUTION ORAL at 06:06

## 2024-01-11 RX ADMIN — OXYCODONE HYDROCHLORIDE 7.5 MG: 15 TABLET ORAL at 13:56

## 2024-01-11 ASSESSMENT — PAIN DESCRIPTION - PAIN TYPE
TYPE: ACUTE PAIN
TYPE: ACUTE PAIN

## 2024-01-11 NOTE — CARE PLAN
The patient is Stable - Low risk of patient condition declining or worsening    Shift Goals  Clinical Goals: pain control. pulm hygiene  Patient Goals: pain management  Family Goals: n/a    Progress made toward(s) clinical / shift goals:  pain controlled with oxy 5 mg for chronic back pain. +nonprod cough. Robitussin given. O2 on 2.5LNC    Patient is not progressing towards the following goals:    Problem: Pain - Standard  Goal: Alleviation of pain or a reduction in pain to the patient’s comfort goal  Outcome: Progressing     Problem: Ineffective Airway Clearance  Goal: Patient will maintain patent airway with clear/clearing breath sounds  Outcome: Progressing

## 2024-01-12 NOTE — PROGRESS NOTES
Discharge order received. PIV removed, tip intact, no issues noted.  Printed discharge instructions and prescriptions given to pt. All discharge education complete, specifically need to f/u with PCP and come back through the ED for new or worsening symptoms, all questions and concerns were addressed.
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
## 2024-01-12 NOTE — PROGRESS NOTES
"/69   Pulse 84   Temp 36.3 °C (97.4 °F) (Temporal)   Resp 19   Ht 1.473 m (4' 10\")   Wt 69.4 kg (153 lb)   SpO2 90%     Pa  "

## 2024-01-12 NOTE — DISCHARGE INSTRUCTIONS
Chronic Obstructive Pulmonary Disease (COPD) is a long-term, progressive lung disease that makes it harder to breathe. It includes chronic bronchitis, emphysema, and refractory (non-reversible) asthma. With COPD, the airways in your lungs become inflamed and thicken, and the tissue where oxygen is exchanged is destroyed. The flow of air in and out of your lungs decreases. When that happens, less oxygen gets into your body tissues, and it becomes harder to get rid of the waste gas carbon dioxide. As the disease gets worse, shortness of breath makes it harder to remain active. There is no cure for COPD, but it is preventable and treatable.    COPD Patient Discharge Instructions    Diet  Follow a low fat, low cholesterol, low salt diet unless instructed otherwise by your Doctor. Read the labels on the back of food products and track your intake of fat, cholesterol and salt.  No smoking  Discontinuing smoking will have the biggest impact on preventing progression of disease.  To participate in [employer]’s Quit Tobacco Program, call [phone] or visit [employer]/QuitTobacco  Oxygen  If your doctor has order that you wear oxygen at home, it is important to wear it as ordered.  Do not smoke, vape, or use e-cigarettes with oxygen on.  Store in an appropriate location: upright in its bell or laid flat down, away from open flames and stoves.   Do not use oil-based creams and moisturizers (ie: petroleum products, oil-based lip moisturizers) or aerosol sprays (ie: hair sprays or deodorants) when using your oxygen equipment.  Be careful with tubing placement to prevent yourself and others from tripping.  Medications  Refer to your personalized Action Plan to manage your symptoms.  Warning signs of an exacerbation  Breathing fast and shallow, worsening shortness of breath (like you just finished exercising)  Chest tightness  Increases in sputum production  Changes in sputum color  Lower oxygen levels than baseline  When  to call your doctor  If the warning signs of an exacerbation do not improve  Refer to your personalized Action Plan   Pulmonary Rehab  Your doctor has ordered you a referral to Pulmonary Rehab. Call [phone] to schedule an appointment  Attend your follow-up appointment with your PCP and/or Pulmonologist  Remote Monitoring: At the direction of the remote monitoring on-call provider, you may increase your oxygen by 2 liters above your baseline.     See the educational handout provided by your COPD Navigator for more information. This also explains more about COPD, symptoms of an exacerbation, and some of the tests that you have undergone.

## 2024-01-19 ENCOUNTER — TELEPHONE (OUTPATIENT)
Dept: VASCULAR LAB | Facility: MEDICAL CENTER | Age: 64
End: 2024-01-19
Payer: MEDICAID

## 2024-01-19 NOTE — TELEPHONE ENCOUNTER
[facility]     Received smoking cessation referral from Dr. Bird on 1/5/24.     Called patient to schedule an appt to establish care. No answer. LVM.     Insurance: Medicaid  PCP: [facility]  Locations to be seen: [place]      If no response by 2/5/24 (1 month from referral date) OR 2 no shows/cancellations, will remove from referral list and send FYI to referring provider    [facility] at [phone], fax [phone]    Norris Nieto, ElizabethD, BCACP

## 2024-01-25 ENCOUNTER — OFFICE VISIT (OUTPATIENT)
Dept: MEDICAL GROUP | Facility: CLINIC | Age: 64
End: 2024-01-25
Payer: MEDICAID

## 2024-01-25 VITALS
OXYGEN SATURATION: 95 % | DIASTOLIC BLOOD PRESSURE: 80 MMHG | HEIGHT: 58 IN | BODY MASS INDEX: 33.17 KG/M2 | WEIGHT: 158 LBS | SYSTOLIC BLOOD PRESSURE: 123 MMHG | TEMPERATURE: 97.1 F | HEART RATE: 85 BPM

## 2024-01-25 DIAGNOSIS — M54.41 CHRONIC BILATERAL LOW BACK PAIN WITH BILATERAL SCIATICA: ICD-10-CM

## 2024-01-25 DIAGNOSIS — G89.29 CHRONIC BILATERAL LOW BACK PAIN WITH BILATERAL SCIATICA: ICD-10-CM

## 2024-01-25 DIAGNOSIS — J44.9 CHRONIC OBSTRUCTIVE PULMONARY DISEASE, UNSPECIFIED COPD TYPE (HCC): ICD-10-CM

## 2024-01-25 DIAGNOSIS — K21.9 GASTROESOPHAGEAL REFLUX DISEASE WITHOUT ESOPHAGITIS: ICD-10-CM

## 2024-01-25 DIAGNOSIS — F99 PSYCHIATRIC DISORDER: ICD-10-CM

## 2024-01-25 DIAGNOSIS — Z72.0 TOBACCO USE: ICD-10-CM

## 2024-01-25 DIAGNOSIS — Z12.11 COLON CANCER SCREENING: ICD-10-CM

## 2024-01-25 DIAGNOSIS — M54.42 CHRONIC BILATERAL LOW BACK PAIN WITH BILATERAL SCIATICA: ICD-10-CM

## 2024-01-25 PROCEDURE — 3074F SYST BP LT 130 MM HG: CPT

## 2024-01-25 PROCEDURE — 99214 OFFICE O/P EST MOD 30 MIN: CPT | Mod: GC

## 2024-01-25 PROCEDURE — 3079F DIAST BP 80-89 MM HG: CPT

## 2024-01-25 RX ORDER — BUDESONIDE AND FORMOTEROL FUMARATE DIHYDRATE 80; 4.5 UG/1; UG/1
2 AEROSOL RESPIRATORY (INHALATION) 2 TIMES DAILY
Qty: 10.2 G | Refills: 11 | Status: SHIPPED | OUTPATIENT
Start: 2024-01-25

## 2024-01-25 RX ORDER — TRAZODONE HYDROCHLORIDE 300 MG/1
150 TABLET ORAL NIGHTLY
COMMUNITY

## 2024-01-25 RX ORDER — BUSPIRONE HYDROCHLORIDE 7.5 MG/1
7.5 TABLET ORAL
COMMUNITY

## 2024-01-25 RX ORDER — PROMETHAZINE HYDROCHLORIDE 25 MG/1
25 TABLET ORAL EVERY 6 HOURS PRN
COMMUNITY

## 2024-01-25 RX ORDER — VARENICLINE TARTRATE 0.5 (11)-1
KIT ORAL
Qty: 10 EACH | Refills: 0 | Status: SHIPPED | OUTPATIENT
Start: 2024-01-25 | End: 2024-03-08

## 2024-01-25 RX ORDER — VARENICLINE TARTRATE 0.5 (11)-1
KIT ORAL
Qty: 10 EACH | Refills: 0 | Status: SHIPPED | OUTPATIENT
Start: 2024-01-25 | End: 2024-01-25

## 2024-01-25 RX ORDER — CYCLOBENZAPRINE HCL 10 MG
10 TABLET ORAL
COMMUNITY

## 2024-01-25 ASSESSMENT — FIBROSIS 4 INDEX: FIB4 SCORE: 2.03

## 2024-01-26 ENCOUNTER — TELEPHONE (OUTPATIENT)
Dept: VASCULAR LAB | Facility: MEDICAL CENTER | Age: 64
End: 2024-01-26
Payer: MEDICAID

## 2024-01-26 NOTE — TELEPHONE ENCOUNTER
[facility]     Received smoking cessation referral from Dr. Bird on 1/5/24.     Called patient to schedule an appt to establish care - pt amenable to scheduling initial visit, but requests c/b at a later time to do so.     2nd call    Insurance: Medicaid  PCP: [facility]  Locations to be seen: [facility]     If no response by 2/5/24 (1 month from referral date) OR 2 no shows/cancellations, will remove from referral list and send FYI to referring provider     [facility] at [phone], fax [phone]     Norris Nieto, PharmD, BCACP
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
## 2024-01-27 NOTE — TELEPHONE ENCOUNTER
Caller: Danielle Mosher    Topic/issue: Returning call to schedule referral, please call     Callback Number: [phone]

## 2024-01-29 ENCOUNTER — DOCUMENTATION (OUTPATIENT)
Dept: VASCULAR LAB | Facility: MEDICAL CENTER | Age: 64
End: 2024-01-29
Payer: MEDICAID

## 2024-01-29 NOTE — PROGRESS NOTES
[facility]         Received smoking cessation referral from Dr. Bird on 1/5/24.     Called patient again today to schedule an appt to establish care for smoking cessation.    Patient reports that she is already working closely with PCP and started on Chantix so declined establishing care with our clinic. She will continue to follow up with PCP moving forward.      Insurance: Medicaid  PCP: [facility]  Locations to be seen: [facility]     If no response by 2/5/24 (1 month from referral date) OR 2 no shows/cancellations, will remove from referral list and send FYI to referring provider     [facility] at [phone], fax [phone]                Lashae JohnsonD

## 2024-01-31 ENCOUNTER — TELEPHONE (OUTPATIENT)
Dept: HEALTH INFORMATION MANAGEMENT | Facility: OTHER | Age: 64
End: 2024-01-31
Payer: MEDICAID

## 2024-02-07 ENCOUNTER — TELEPHONE (OUTPATIENT)
Dept: HEALTH INFORMATION MANAGEMENT | Facility: OTHER | Age: 64
End: 2024-02-07
Payer: MEDICAID

## 2024-02-16 ENCOUNTER — OFFICE VISIT (OUTPATIENT)
Dept: MEDICAL GROUP | Facility: CLINIC | Age: 64
End: 2024-02-16
Payer: MEDICAID

## 2024-02-16 VITALS
BODY MASS INDEX: 32.75 KG/M2 | OXYGEN SATURATION: 91 % | WEIGHT: 156 LBS | HEIGHT: 58 IN | SYSTOLIC BLOOD PRESSURE: 112 MMHG | HEART RATE: 78 BPM | DIASTOLIC BLOOD PRESSURE: 74 MMHG | TEMPERATURE: 97.8 F

## 2024-02-16 DIAGNOSIS — M54.41 CHRONIC BILATERAL LOW BACK PAIN WITH BILATERAL SCIATICA: ICD-10-CM

## 2024-02-16 DIAGNOSIS — G89.4 CHRONIC PAIN SYNDROME: ICD-10-CM

## 2024-02-16 DIAGNOSIS — K21.9 GASTROESOPHAGEAL REFLUX DISEASE, UNSPECIFIED WHETHER ESOPHAGITIS PRESENT: ICD-10-CM

## 2024-02-16 DIAGNOSIS — J44.9 CHRONIC OBSTRUCTIVE PULMONARY DISEASE, UNSPECIFIED COPD TYPE (HCC): ICD-10-CM

## 2024-02-16 DIAGNOSIS — M54.42 CHRONIC BILATERAL LOW BACK PAIN WITH BILATERAL SCIATICA: ICD-10-CM

## 2024-02-16 DIAGNOSIS — G89.29 CHRONIC BILATERAL LOW BACK PAIN WITH BILATERAL SCIATICA: ICD-10-CM

## 2024-02-16 DIAGNOSIS — Z72.0 TOBACCO USE: ICD-10-CM

## 2024-02-16 PROCEDURE — 3078F DIAST BP <80 MM HG: CPT | Mod: GC

## 2024-02-16 PROCEDURE — 3074F SYST BP LT 130 MM HG: CPT | Mod: GC

## 2024-02-16 PROCEDURE — 99214 OFFICE O/P EST MOD 30 MIN: CPT | Mod: GC

## 2024-02-16 RX ORDER — OMEPRAZOLE 20 MG/1
20 CAPSULE, DELAYED RELEASE ORAL
Qty: 90 CAPSULE | Refills: 2 | Status: SHIPPED | OUTPATIENT
Start: 2024-02-16

## 2024-02-16 RX ORDER — OMEPRAZOLE 20 MG/1
20 CAPSULE, DELAYED RELEASE ORAL
Qty: 30 CAPSULE | Refills: 2 | Status: SHIPPED | OUTPATIENT
Start: 2024-02-16 | End: 2024-02-16

## 2024-02-16 RX ORDER — OXYCODONE AND ACETAMINOPHEN 7.5; 325 MG/1; MG/1
1 TABLET ORAL EVERY 6 HOURS PRN
Qty: 12 TABLET | Refills: 0 | Status: CANCELLED | OUTPATIENT
Start: 2024-02-16 | End: 2024-02-19

## 2024-02-16 ASSESSMENT — FIBROSIS 4 INDEX: FIB4 SCORE: 2.03

## 2024-02-16 NOTE — TELEPHONE ENCOUNTER
Received request via: Pharmacy    Was the patient seen in the last year in this department? Yes    Does the patient have an active prescription (recently filled or refills available) for medication(s) requested? No    Pharmacy Name: [facility]    Does the patient have nursing home Plus and need 100 day supply (blood pressure, diabetes and cholesterol meds only)? Patient does not have SCP
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
## 2024-03-07 DIAGNOSIS — Z72.0 TOBACCO USE: ICD-10-CM

## 2024-03-07 NOTE — TELEPHONE ENCOUNTER
Received request via: Pharmacy    Was the patient seen in the last year in this department? Yes    Does the patient have an active prescription (recently filled or refills available) for medication(s) requested? No    [facility]: [facility]

## 2024-03-08 RX ORDER — VARENICLINE TARTRATE 0.5 (11)-1
KIT ORAL
Qty: 159 EACH | Refills: 6 | Status: SHIPPED | OUTPATIENT
Start: 2024-03-08

## 2024-04-04 ENCOUNTER — APPOINTMENT (OUTPATIENT)
Dept: PHYSICAL THERAPY | Facility: REHABILITATION | Age: 64
End: 2024-04-04
Payer: MEDICAID

## 2024-04-09 DIAGNOSIS — M54.42 CHRONIC BILATERAL LOW BACK PAIN WITH BILATERAL SCIATICA: ICD-10-CM

## 2024-04-09 DIAGNOSIS — M51.36 DDD (DEGENERATIVE DISC DISEASE), LUMBAR: ICD-10-CM

## 2024-04-09 DIAGNOSIS — M54.41 CHRONIC BILATERAL LOW BACK PAIN WITH BILATERAL SCIATICA: ICD-10-CM
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
## 2024-04-09 DIAGNOSIS — G89.4 CHRONIC PAIN SYNDROME: ICD-10-CM

## 2024-04-09 DIAGNOSIS — M54.16 LUMBAR RADICULOPATHY: ICD-10-CM

## 2024-04-09 DIAGNOSIS — M48.061 LUMBAR STENOSIS WITHOUT NEUROGENIC CLAUDICATION: ICD-10-CM

## 2024-04-09 DIAGNOSIS — G89.29: ICD-10-CM

## 2024-04-09 DIAGNOSIS — G89.29 CHRONIC BILATERAL LOW BACK PAIN WITH BILATERAL SCIATICA: ICD-10-CM

## 2024-04-24 NOTE — TELEPHONE ENCOUNTER
Received request via: Pharmacy    Was the patient seen in the last year in this department? Yes    Does the patient have an active prescription (recently filled or refills available) for medication(s) requested? No    [facility]: [facility]    Does the patient have custodial Plus and need 100 day supply (blood pressure, diabetes and cholesterol meds only)? Patient does not have SCP

## 2024-04-25 ENCOUNTER — APPOINTMENT (OUTPATIENT)
Dept: PHYSICAL THERAPY | Facility: REHABILITATION | Age: 64
End: 2024-04-25
Payer: MEDICAID

## 2024-04-25 RX ORDER — PROMETHAZINE HYDROCHLORIDE 25 MG/1
25 TABLET ORAL EVERY 6 HOURS PRN
Qty: 385 TABLET | Refills: 2 | Status: SHIPPED | OUTPATIENT
Start: 2024-04-25

## 2024-04-30 ENCOUNTER — APPOINTMENT (OUTPATIENT)
Dept: PHYSICAL THERAPY | Facility: REHABILITATION | Age: 64
End: 2024-04-30
Payer: MEDICAID

## 2024-05-02 ENCOUNTER — APPOINTMENT (OUTPATIENT)
Dept: PHYSICAL THERAPY | Facility: REHABILITATION | Age: 64
End: 2024-05-02
Payer: MEDICAID

## 2024-05-07 ENCOUNTER — APPOINTMENT (OUTPATIENT)
Dept: PHYSICAL THERAPY | Facility: REHABILITATION | Age: 64
End: 2024-05-07
Payer: MEDICAID

## 2024-05-07 DIAGNOSIS — M48.061 LUMBAR STENOSIS WITHOUT NEUROGENIC CLAUDICATION: ICD-10-CM

## 2024-05-07 DIAGNOSIS — M54.16 LUMBAR RADICULOPATHY: ICD-10-CM

## 2024-05-07 DIAGNOSIS — M51.36 DDD (DEGENERATIVE DISC DISEASE), LUMBAR: ICD-10-CM
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
## 2024-05-07 DIAGNOSIS — G89.29: ICD-10-CM

## 2024-05-09 ENCOUNTER — APPOINTMENT (OUTPATIENT)
Dept: PHYSICAL THERAPY | Facility: REHABILITATION | Age: 64
End: 2024-05-09
Payer: MEDICAID

## 2024-05-14 ENCOUNTER — APPOINTMENT (OUTPATIENT)
Dept: PHYSICAL THERAPY | Facility: REHABILITATION | Age: 64
End: 2024-05-14
Payer: MEDICAID

## 2024-05-16 ENCOUNTER — APPOINTMENT (OUTPATIENT)
Dept: PHYSICAL THERAPY | Facility: REHABILITATION | Age: 64
End: 2024-05-16
Payer: MEDICAID

## 2024-09-17 DIAGNOSIS — G43.119 MIGRAINE WITH AURA, INTRACTABLE, WITHOUT STATUS MIGRAINOSUS: ICD-10-CM

## 2024-09-18 NOTE — TELEPHONE ENCOUNTER
Received request via: Pharmacy    Was the patient seen in the last year in this department? Yes    Does the patient have an active prescription (recently filled or refills available) for medication(s) requested? No    [facility]    Does the patient have longterm Plus and need 100-day supply? (This applies to ALL medications) Patient does not have SCP

## 2024-09-20 ENCOUNTER — TELEPHONE (OUTPATIENT)
Dept: MEDICAL GROUP | Facility: CLINIC | Age: 64
End: 2024-09-20
Payer: MEDICAID

## 2024-09-24 RX ORDER — POLYETHYLENE GLYCOL 3350 17 G/17G
POWDER, FOR SOLUTION ORAL
Qty: 238 G | Refills: 2 | Status: SHIPPED | OUTPATIENT
Start: 2024-09-24

## 2024-09-24 RX ORDER — SUMATRIPTAN 50 MG/1
50 TABLET, FILM COATED ORAL
Qty: 10 TABLET | Refills: 3 | Status: SHIPPED | OUTPATIENT
Start: 2024-09-24
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
## 2024-11-13 DIAGNOSIS — K21.9 GASTROESOPHAGEAL REFLUX DISEASE, UNSPECIFIED WHETHER ESOPHAGITIS PRESENT: ICD-10-CM

## 2024-11-13 NOTE — TELEPHONE ENCOUNTER
Received request via: Pharmacy    Was the patient seen in the last year in this department? Yes    Does the patient have an active prescription (recently filled or refills available) for medication(s) requested? No    Pharmacy Name:   [id number] - [place], [place] - [address]       Does the patient have jail Plus and need 100-day supply? (This applies to ALL medications) Patient does not have SCP

## 2024-11-13 NOTE — DOCUMENTATION QUERY
DOCUMENTATION QUERY    PROVIDERS: Please select “Cosign w/ note”to reply to query.    Dr. Jain,    To better represent the severity of illness of your patient, please review the following information and exercise your independent professional judgment in responding to this query.     Phosphorus 1.4 on 5/13/18 is noted in the Lab Results . Based upon the clinical findings, risk factors, and treatment, can a diagnosis be provided to support this finding?     • Hypophosphatemia  • Findings of no clinical significance   • Other explanation of clinical findings  • Unable to determine (no explanation for clinical findings)    The medical record reflects the following:   Clinical Findings  Phosphorus 1.4 noted in lab results on 5/13/18   Treatment  Sodium Phosphate 30 mmol IV given once on 5/13/18   Risk Factors  Sepsis w/ Septic Shock, UTI, COPD exacerbation.    Location within medical record  Lab Results      Thank you,   Gaye Whitten RN  Clinical   [phone]       37.2

## 2025-01-09 DIAGNOSIS — J44.9 CHRONIC OBSTRUCTIVE PULMONARY DISEASE, UNSPECIFIED COPD TYPE (HCC): ICD-10-CM

## 2025-01-09 RX ORDER — DILTIAZEM HYDROCHLORIDE 60 MG/1
2 TABLET, FILM COATED ORAL 2 TIMES DAILY
Qty: 10.2 G | Refills: 11 | Status: SHIPPED | OUTPATIENT
Start: 2025-01-09

## 2025-01-09 NOTE — TELEPHONE ENCOUNTER
Received request via: Pharmacy    Was the patient seen in the last year in this department? Yes    Does the patient have an active prescription (recently filled or refills available) for medication(s) requested? No    Pharmacy Name: [facility]    Does the patient have USP Plus and need 100-day supply? (This applies to ALL medications) Patient does not have SCP

## 2025-01-16 DIAGNOSIS — Z72.0 TOBACCO USE: ICD-10-CM

## 2025-01-16 RX ORDER — VARENICLINE TARTRATE 0.5 (11)-1
KIT ORAL
Qty: 159 EACH | Refills: 6 | Status: SHIPPED | OUTPATIENT
Start: 2025-01-16

## 2025-01-16 NOTE — TELEPHONE ENCOUNTER
Received request via: Pharmacy    Was the patient seen in the last year in this department? Yes    Does the patient have an active prescription (recently filled or refills available) for medication(s) requested? No    [facility]    Does the patient have FCI Plus and need 100-day supply? (This applies to ALL medications) Patient does not have SCP

## 2025-04-03 ENCOUNTER — OFFICE VISIT (OUTPATIENT)
Dept: MEDICAL GROUP | Facility: CLINIC | Age: 65
End: 2025-04-03
Payer: MEDICAID

## 2025-04-03 ENCOUNTER — HOSPITAL ENCOUNTER (OUTPATIENT)
Facility: MEDICAL CENTER | Age: 65
End: 2025-04-03
Payer: MEDICAID

## 2025-04-03 VITALS
OXYGEN SATURATION: 94 % | WEIGHT: 157 LBS | TEMPERATURE: 97.3 F | SYSTOLIC BLOOD PRESSURE: 132 MMHG | BODY MASS INDEX: 32.95 KG/M2 | HEART RATE: 78 BPM | DIASTOLIC BLOOD PRESSURE: 70 MMHG | HEIGHT: 58 IN

## 2025-04-03 DIAGNOSIS — Z12.31 BREAST CANCER SCREENING BY MAMMOGRAM: ICD-10-CM

## 2025-04-03 DIAGNOSIS — T40.2X5A OPIOID-INDUCED CONSTIPATION: ICD-10-CM

## 2025-04-03 DIAGNOSIS — Z00.00 ANNUAL PHYSICAL EXAM: ICD-10-CM

## 2025-04-03 DIAGNOSIS — K59.03 OPIOID-INDUCED CONSTIPATION: ICD-10-CM

## 2025-04-03 DIAGNOSIS — Z72.0 TOBACCO USE: ICD-10-CM

## 2025-04-03 DIAGNOSIS — Z12.11 COLON CANCER SCREENING: ICD-10-CM

## 2025-04-03 PROBLEM — R10.9 ABDOMINAL DISCOMFORT: Chronic | Status: RESOLVED | Noted: 2017-12-26 | Resolved: 2024-01-05

## 2025-04-03 LAB
ALBUMIN SERPL BCP-MCNC: 4.3 G/DL (ref 3.2–4.9)
ALBUMIN/GLOB SERPL: 1.3 G/DL
ALP SERPL-CCNC: 79 U/L (ref 30–99)
ALT SERPL-CCNC: 8 U/L (ref 2–50)
ANION GAP SERPL CALC-SCNC: 9 MMOL/L (ref 7–16)
AST SERPL-CCNC: 40 U/L (ref 12–45)
BASOPHILS # BLD AUTO: 1.2 % (ref 0–1.8)
BASOPHILS # BLD: 0.06 K/UL (ref 0–0.12)
BILIRUB SERPL-MCNC: 0.4 MG/DL (ref 0.1–1.5)
BUN SERPL-MCNC: 16 MG/DL (ref 8–22)
CALCIUM ALBUM COR SERPL-MCNC: 9.5 MG/DL (ref 8.5–10.5)
CALCIUM SERPL-MCNC: 9.7 MG/DL (ref 8.5–10.5)
CHLORIDE SERPL-SCNC: 104 MMOL/L (ref 96–112)
CHOLEST SERPL-MCNC: 152 MG/DL (ref 100–199)
CO2 SERPL-SCNC: 23 MMOL/L (ref 20–33)
CREAT SERPL-MCNC: 0.83 MG/DL (ref 0.5–1.4)
EOSINOPHIL # BLD AUTO: 0.14 K/UL (ref 0–0.51)
EOSINOPHIL NFR BLD: 2.8 % (ref 0–6.9)
ERYTHROCYTE [DISTWIDTH] IN BLOOD BY AUTOMATED COUNT: 49.4 FL (ref 35.9–50)
EST. AVERAGE GLUCOSE BLD GHB EST-MCNC: 111 MG/DL
GFR SERPLBLD CREATININE-BSD FMLA CKD-EPI: 79 ML/MIN/1.73 M 2
GLOBULIN SER CALC-MCNC: 3.2 G/DL (ref 1.9–3.5)
GLUCOSE SERPL-MCNC: 90 MG/DL (ref 65–99)
HBA1C MFR BLD: 5.5 % (ref 4–5.6)
HCT VFR BLD AUTO: 50.1 % (ref 37–47)
HDLC SERPL-MCNC: 38 MG/DL
HGB BLD-MCNC: 16.4 G/DL (ref 12–16)
IMM GRANULOCYTES # BLD AUTO: 0.02 K/UL (ref 0–0.11)
IMM GRANULOCYTES NFR BLD AUTO: 0.4 % (ref 0–0.9)
LDLC SERPL CALC-MCNC: 89 MG/DL
LYMPHOCYTES # BLD AUTO: 1.57 K/UL (ref 1–4.8)
LYMPHOCYTES NFR BLD: 31.5 % (ref 22–41)
MCH RBC QN AUTO: 30.7 PG (ref 27–33)
MCHC RBC AUTO-ENTMCNC: 32.7 G/DL (ref 32.2–35.5)
MCV RBC AUTO: 93.6 FL (ref 81.4–97.8)
MONOCYTES # BLD AUTO: 0.39 K/UL (ref 0–0.85)
MONOCYTES NFR BLD AUTO: 7.8 % (ref 0–13.4)
NEUTROPHILS # BLD AUTO: 2.81 K/UL (ref 1.82–7.42)
NEUTROPHILS NFR BLD: 56.3 % (ref 44–72)
NRBC # BLD AUTO: 0 K/UL
NRBC BLD-RTO: 0 /100 WBC (ref 0–0.2)
PLATELET # BLD AUTO: 198 K/UL (ref 164–446)
PMV BLD AUTO: 10.6 FL (ref 9–12.9)
POTASSIUM SERPL-SCNC: 5.2 MMOL/L (ref 3.6–5.5)
PROT SERPL-MCNC: 7.5 G/DL (ref 6–8.2)
RBC # BLD AUTO: 5.35 M/UL (ref 4.2–5.4)
SODIUM SERPL-SCNC: 136 MMOL/L (ref 135–145)
TRIGL SERPL-MCNC: 123 MG/DL (ref 0–149)
WBC # BLD AUTO: 5 K/UL (ref 4.8–10.8)

## 2025-04-03 PROCEDURE — 80061 LIPID PANEL: CPT

## 2025-04-03 PROCEDURE — 3075F SYST BP GE 130 - 139MM HG: CPT | Mod: GE

## 2025-04-03 PROCEDURE — 83036 HEMOGLOBIN GLYCOSYLATED A1C: CPT

## 2025-04-03 PROCEDURE — 80053 COMPREHEN METABOLIC PANEL: CPT

## 2025-04-03 PROCEDURE — 99213 OFFICE O/P EST LOW 20 MIN: CPT | Mod: GE

## 2025-04-03 PROCEDURE — 36415 COLL VENOUS BLD VENIPUNCTURE: CPT

## 2025-04-03 PROCEDURE — 85025 COMPLETE CBC W/AUTO DIFF WBC: CPT

## 2025-04-03 PROCEDURE — 3078F DIAST BP <80 MM HG: CPT | Mod: GE

## 2025-04-03 RX ORDER — POLYETHYLENE GLYCOL 3350 17 G/17G
17 POWDER, FOR SOLUTION ORAL DAILY
Qty: 850 G | Refills: 2 | Status: SHIPPED | OUTPATIENT
Start: 2025-04-03

## 2025-04-03 RX ORDER — VARENICLINE TARTRATE 0.5 (11)-1
KIT ORAL
Qty: 159 EACH | Refills: 6 | Status: SHIPPED | OUTPATIENT
Start: 2025-04-03

## 2025-04-03 ASSESSMENT — FIBROSIS 4 INDEX: FIB4 SCORE: 2.09

## 2025-04-03 NOTE — ASSESSMENT & PLAN NOTE
65-year-old female  /70, BMI 32  PMH significant for chronic pain on opioids with constipation.  Taking MiraLAX for constipation  COPD well-controlled on current regiment  Followed by mental health    Plan  - Labs ordered: CBC, CMP, lipid panel, A1c  - Screening mammogram ordered  - Referral to GI for colonoscopy ordered  - Refilled MiraLAX for constipation  - Refill Chantix for smoking cessation  - Follow-up as needed

## 2025-04-03 NOTE — PROGRESS NOTES
[facility]    Subjective:     CC: Constipation and smoking cessation     HPI:   Martha is a 65 y.o. female with:    Problem   Breast Cancer Screening By Mammogram   Colon Cancer Screening   Annual Physical Exam    Patient presents to clinic for annual physical exam  Patient complaining of constipation secondary to chronic opioid use for chronic pain.  Patient had not had a bowel movement in 5 days but reports a small bowel movement this morning.  Hx of hysterectomy secondary to cervical cancer. Last mammogram reported 2 years ago.  Colonoscopy completed in 2009 with removal of a polyp.  Patient is interested in looking into weight loss programs within the community.  Patient has restarted smoking and would like a prescription of Chantix.     Opioid-Induced Constipation    Patient has history of constipation due to pain management due to back pain.  Currently taking MiraLAX daily seems to be helping although sometimes she will go a week without stooling.  Patient would like a refill of MiraLAX.     Abdominal Discomfort (Resolved)       Current Outpatient Medications Ordered in Epic   Medication Sig Dispense Refill    polyethylene glycol 3350 (MIRALAX) 17 GM/SCOOP Powder Take 17 g by mouth every day. 850 g 2    Varenicline Tartrate, Starter, 0.5 MG X 11 & 1 MG X 42 Tablet Therapy Pack FOLLOW PACKAGE DIRECTIONS 159 Each 6    SYMBICORT 80-4.5 MCG/ACT Aerosol INHALE 2 PUFFS BY MOUTH TWICE DAILY 10.2 g 11    omeprazole (PRILOSEC) 20 MG delayed-release capsule TAKE 1 CAPSULE BY MOUTH AT BEDTIME 90 Capsule 2    SUMAtriptan (IMITREX) 50 MG Tab TAKE 1 TABLET BY MOUTH 1 TIME AS NEEDED FOR MIGRAINE 10 Tablet 3    promethazine (PHENERGAN) 25 MG Tab TAKE 1 TABLET BY MOUTH EVERY 6 HOURS AS NEEDED FOR NAUSEA OR VOMITING 385 Tablet 2    cyclobenzaprine (FLEXERIL) 10 mg Tab Take 10 mg by mouth at bedtime.      busPIRone (BUSPAR) 7.5 MG tablet Take 7.5 mg by mouth at bedtime.      trazodone (DESYREL) 300 MG tablet Take 150  "mg by mouth every evening.      asa/apap/caffeine (EXCEDRIN) 250-250-65 MG Tab Take 1 Tablet by mouth every 8 hours as needed for Headache.      acetaminophen (TYLENOL) 500 MG Tab Take 2 Tablets by mouth every 8 hours as needed for Moderate Pain, Fever or Mild Pain.      gabapentin (NEURONTIN) 800 MG tablet Take 800 mg by mouth 3 times a day.      albuterol (PROVENTIL HFA) 108 (90 Base) MCG/ACT Aero Soln inhalation aerosol Inhale 2 Puffs every 6 hours as needed for Shortness of Breath. 6.7 g 5    DULoxetine (CYMBALTA) 60 MG Cap DR Particles delayed-release capsule Take 60 mg by mouth at bedtime.      QUEtiapine (SEROQUEL) 100 MG Tab Take 200 mg by mouth at bedtime. 1.5 tablets = 150 mg.      oxyCODONE-acetaminophen (PERCOCET) 7.5-325 MG per tablet Take 1 Tablet by mouth every 6 hours as needed for Severe Pain.      tiotropium (SPIRIVA HANDIHALER) 18 MCG Cap Place 1 Capsule into inhaler and inhale every day. (Patient not taking: Reported on 4/3/2025) 30 Capsule 3    Multiple Vitamins-Minerals (HAIR SKIN & NAILS PO) Take 1 Capsule by mouth every day. (Patient not taking: Reported on 4/3/2025)       No current [facility]-ordered facility-administered medications on file.     ROS:  Negative except as noted in HPI    Objective:     Exam:  /70   Pulse 78   Temp 36.3 °C (97.3 °F)   Ht 1.473 m (4' 10\")   Wt 71.2 kg (157 lb)   LMP  (LMP Unknown)   SpO2 94%   BMI 32.81 kg/m²  Body mass index is 32.81 kg/m².    Physical Exam  Constitutional:       General: She is not in acute distress.     Appearance: She is obese. She is not ill-appearing.   HENT:      Mouth/Throat:      Mouth: Mucous membranes are moist.   Eyes:      Extraocular Movements: Extraocular movements intact.   Cardiovascular:      Rate and Rhythm: Normal rate.   Pulmonary:      Effort: Pulmonary effort is normal. No respiratory distress.      Breath sounds: No wheezing.   Abdominal:      General: There is no distension.      Palpations: Abdomen is soft. "   Musculoskeletal:         General: Normal range of motion.      Cervical back: Normal range of motion.   Skin:     General: Skin is warm.   Neurological:      General: No focal deficit present.      Mental Status: She is alert and oriented to person, place, and time.   Psychiatric:         Mood and Affect: Mood normal.         Behavior: Behavior normal.       Labs:     Results for orders placed or performed during the hospital encounter of 24   EKG    Collection Time: 24 11:25 AM   Result Value Ref Range    Report       [facility] Emergency Dept.    Test Date:  2024  Pt Name:    EDENILSON FLETCHER             Department: ER  MRN:        [id number]                      Room:       [facility]  Gender:     Female                       Technician: [id number]  :        1960                   Requested By:ER TRIAGE PROTOCOL  Order #:    [id number]                    Reading MD:    Measurements  Intervals                                Axis  Rate:       108                          P:          77  MO:         127                          QRS:        98  QRSD:       142                          T:          34  QT:         359  QTc:        481    Interpretive Statements  Sinus tachycardia  RBBB and LPFB  Compared to ECG 2022 14:24:10  Left posterior fascicular block now present  Sinus rhythm no longer present     CBC WITH DIFFERENTIAL    Collection Time: 24 12:11 PM   Result Value Ref Range    WBC 5.0 4.8 - 10.8 K/uL    RBC 4.77 4.20 - 5.40 M/uL    Hemoglobin 14.1 12.0 - 16.0 g/dL    Hematocrit 43.8 37.0 - 47.0 %    MCV 91.8 81.4 - 97.8 fL    MCH 29.6 27.0 - 33.0 pg    MCHC 32.2 32.2 - 35.5 g/dL    RDW 48.6 35.9 - 50.0 fL    Platelet Count 107 (L) 164 - 446 K/uL    MPV 10.5 9.0 - 12.9 fL    Neutrophils-Polys 86.40 (H) 44.00 - 72.00 %    Lymphocytes 8.80 (L) 22.00 - 41.00 %    Monocytes 4.40 0.00 - 13.40 %    Eosinophils 0.00 0.00 - 6.90 %    Basophils 0.20 0.00 - 1.80 %    Immature  Granulocytes 0.20 0.00 - 0.90 %    Nucleated RBC 0.00 0.00 - 0.20 /100 WBC    Neutrophils (Absolute) 4.31 1.82 - 7.42 K/uL    Lymphs (Absolute) 0.44 (L) 1.00 - 4.80 K/uL    Monos (Absolute) 0.22 0.00 - 0.85 K/uL    Eos (Absolute) 0.00 0.00 - 0.51 K/uL    Baso (Absolute) 0.01 0.00 - 0.12 K/uL    Immature Granulocytes (abs) 0.01 0.00 - 0.11 K/uL    NRBC (Absolute) 0.00 K/uL   Comp Metabolic Panel    Collection Time: 01/05/24 12:11 PM   Result Value Ref Range    Sodium 136 135 - 145 mmol/L    Potassium 4.1 3.6 - 5.5 mmol/L    Chloride 101 96 - 112 mmol/L    Co2 21 20 - 33 mmol/L    Anion Gap 14.0 7.0 - 16.0    Glucose 164 (H) 65 - 99 mg/dL    Bun 16 8 - 22 mg/dL    Creatinine 0.69 0.50 - 1.40 mg/dL    Calcium 8.8 8.5 - 10.5 mg/dL    Correct Calcium 9.0 8.5 - 10.5 mg/dL    AST(SGOT) 52 (H) 12 - 45 U/L    ALT(SGPT) 22 2 - 50 U/L    Alkaline Phosphatase 63 30 - 99 U/L    Total Bilirubin 0.4 0.1 - 1.5 mg/dL    Albumin 3.8 3.2 - 4.9 g/dL    Total Protein 7.4 6.0 - 8.2 g/dL    Globulin 3.6 (H) 1.9 - 3.5 g/dL    A-G Ratio 1.1 g/dL   PROCALCITONIN    Collection Time: 01/05/24 12:11 PM   Result Value Ref Range    Procalcitonin 0.09 <0.25 ng/mL   ESTIMATED GFR    Collection Time: 01/05/24 12:11 PM   Result Value Ref Range    GFR (CKD-EPI) 98 >60 mL/min/1.73 m 2   CoV-2, Flu A/B, And RSV by PCR ([employer])    Collection Time: 01/05/24 12:39 PM    Specimen: Nasal; Respirate   Result Value Ref Range    Influenza virus A RNA POSITIVE (A) Negative    Influenza virus B, PCR Negative Negative    RSV, PCR Negative Negative    SARS-CoV-2 by PCR DETECTED (AA)     SARS-CoV-2 Source NP Swab    CBC without Differential    Collection Time: 01/06/24  2:15 AM   Result Value Ref Range    WBC 5.5 4.8 - 10.8 K/uL    RBC 4.04 (L) 4.20 - 5.40 M/uL    Hemoglobin 12.3 12.0 - 16.0 g/dL    Hematocrit 37.6 37.0 - 47.0 %    MCV 93.1 81.4 - 97.8 fL    MCH 30.4 27.0 - 33.0 pg    MCHC 32.7 32.2 - 35.5 g/dL    RDW 50.7 (H) 35.9 - 50.0 fL    Platelet Count 112  (L) 164 - 446 K/uL    MPV 10.4 9.0 - 12.9 fL   Comp Metabolic Panel (CMP)    Collection Time: 01/06/24  2:15 AM   Result Value Ref Range    Sodium 139 135 - 145 mmol/L    Potassium 4.0 3.6 - 5.5 mmol/L    Chloride 104 96 - 112 mmol/L    Co2 24 20 - 33 mmol/L    Anion Gap 11.0 7.0 - 16.0    Glucose 165 (H) 65 - 99 mg/dL    Bun 15 8 - 22 mg/dL    Creatinine 0.58 0.50 - 1.40 mg/dL    Calcium 8.7 8.5 - 10.5 mg/dL    Correct Calcium 9.3 8.5 - 10.5 mg/dL    AST(SGOT) 30 12 - 45 U/L    ALT(SGPT) 15 2 - 50 U/L    Alkaline Phosphatase 52 30 - 99 U/L    Total Bilirubin 0.3 0.1 - 1.5 mg/dL    Albumin 3.3 3.2 - 4.9 g/dL    Total Protein 6.3 6.0 - 8.2 g/dL    Globulin 3.0 1.9 - 3.5 g/dL    A-G Ratio 1.1 g/dL   HEMOGLOBIN A1C    Collection Time: 01/06/24  2:15 AM   Result Value Ref Range    Glycohemoglobin 5.4 4.0 - 5.6 %    Est Avg Glucose 108 mg/dL   ESTIMATED GFR    Collection Time: 01/06/24  2:15 AM   Result Value Ref Range    GFR (CKD-EPI) 102 >60 mL/min/1.73 m 2   CBC WITH DIFFERENTIAL    Collection Time: 01/08/24  6:22 AM   Result Value Ref Range    WBC 10.1 4.8 - 10.8 K/uL    RBC 4.50 4.20 - 5.40 M/uL    Hemoglobin 13.5 12.0 - 16.0 g/dL    Hematocrit 42.5 37.0 - 47.0 %    MCV 94.4 81.4 - 97.8 fL    MCH 30.0 27.0 - 33.0 pg    MCHC 31.8 (L) 32.2 - 35.5 g/dL    RDW 50.8 (H) 35.9 - 50.0 fL    Platelet Count 159 (L) 164 - 446 K/uL    MPV 10.3 9.0 - 12.9 fL    Neutrophils-Polys 76.60 (H) 44.00 - 72.00 %    Lymphocytes 14.50 (L) 22.00 - 41.00 %    Monocytes 8.10 0.00 - 13.40 %    Eosinophils 0.10 0.00 - 6.90 %    Basophils 0.20 0.00 - 1.80 %    Immature Granulocytes 0.50 0.00 - 0.90 %    Nucleated RBC 0.00 0.00 - 0.20 /100 WBC    Neutrophils (Absolute) 7.77 (H) 1.82 - 7.42 K/uL    Lymphs (Absolute) 1.47 1.00 - 4.80 K/uL    Monos (Absolute) 0.82 0.00 - 0.85 K/uL    Eos (Absolute) 0.01 0.00 - 0.51 K/uL    Baso (Absolute) 0.02 0.00 - 0.12 K/uL    Immature Granulocytes (abs) 0.05 0.00 - 0.11 K/uL    NRBC (Absolute) 0.00 K/uL   Comp  Metabolic Panel    Collection Time: 01/08/24  6:22 AM   Result Value Ref Range    Sodium 136 135 - 145 mmol/L    Potassium 4.8 3.6 - 5.5 mmol/L    Chloride 97 96 - 112 mmol/L    Co2 29 20 - 33 mmol/L    Anion Gap 10.0 7.0 - 16.0    Glucose 114 (H) 65 - 99 mg/dL    Bun 14 8 - 22 mg/dL    Creatinine 0.46 (L) 0.50 - 1.40 mg/dL    Calcium 9.5 8.5 - 10.5 mg/dL    Correct Calcium 9.7 8.5 - 10.5 mg/dL    AST(SGOT) 39 12 - 45 U/L    ALT(SGPT) 26 2 - 50 U/L    Alkaline Phosphatase 61 30 - 99 U/L    Total Bilirubin 0.6 0.1 - 1.5 mg/dL    Albumin 3.7 3.2 - 4.9 g/dL    Total Protein 7.5 6.0 - 8.2 g/dL    Globulin 3.8 (H) 1.9 - 3.5 g/dL    A-G Ratio 1.0 g/dL   PROCALCITONIN    Collection Time: 01/08/24  6:22 AM   Result Value Ref Range    Procalcitonin 0.07 <0.25 ng/mL   ESTIMATED GFR    Collection Time: 01/08/24  6:22 AM   Result Value Ref Range    GFR (CKD-EPI) 108 >60 mL/min/1.73 m 2   proBrain Natriuretic Peptide, NT    Collection Time: 01/08/24  6:22 AM   Result Value Ref Range    NT-proBNP 2418 (H) 0 - 125 pg/mL   Comp Metabolic Panel    Collection Time: 01/08/24 10:14 PM   Result Value Ref Range    Sodium 139 135 - 145 mmol/L    Potassium 3.9 3.6 - 5.5 mmol/L    Chloride 96 96 - 112 mmol/L    Co2 36 (H) 20 - 33 mmol/L    Anion Gap 7.0 7.0 - 16.0    Glucose 121 (H) 65 - 99 mg/dL    Bun 16 8 - 22 mg/dL    Creatinine 0.81 0.50 - 1.40 mg/dL    Calcium 9.7 8.5 - 10.5 mg/dL    Correct Calcium 10.1 8.5 - 10.5 mg/dL    AST(SGOT) 38 12 - 45 U/L    ALT(SGPT) 31 2 - 50 U/L    Alkaline Phosphatase 60 30 - 99 U/L    Total Bilirubin 0.7 0.1 - 1.5 mg/dL    Albumin 3.5 3.2 - 4.9 g/dL    Total Protein 6.8 6.0 - 8.2 g/dL    Globulin 3.3 1.9 - 3.5 g/dL    A-G Ratio 1.1 g/dL   PHOSPHORUS    Collection Time: 01/08/24 10:14 PM   Result Value Ref Range    Phosphorus 2.1 (L) 2.5 - 4.5 mg/dL   MAGNESIUM    Collection Time: 01/08/24 10:14 PM   Result Value Ref Range    Magnesium 2.1 1.5 - 2.5 mg/dL   ESTIMATED GFR    Collection Time: 01/08/24  10:14 PM   Result Value Ref Range    GFR (CKD-EPI) 82 >60 mL/min/1.73 m 2   EKG    Collection Time: 24 10:15 PM   Result Value Ref Range    Report       [facility]    Test Date:  2024  Pt Name:    EDENILSON FLETCHER             Department: 131  MRN:        [id number]                      Room:       [facility]  Gender:     Female                       Technician: [id number]  :        1960                   Requested By:DEVAN ANDREW  Order #:    [id number]                    Reading MD: Gallo Nickerson MD    Measurements  Intervals                                Axis  Rate:       69                           P:          78  SD:         127                          QRS:        69  QRSD:       139                          T:          57  QT:         431  QTc:        462    Interpretive Statements  Sinus rhythm  Right bundle branch block  Compared to ECG 2024 11:25:28  Sinus tachycardia no longer present  Left posterior fascicular block no longer present  Electronically Signed On 2024 23:13:35 PST by Gallo Nickerson MD     EC-ECHOCARDIOGRAM COMPLETE W/O CONT    Collection Time: 24  1:54 PM   Result Value Ref Range    Eject.Frac. MOD BP 55.58     Eject.Frac. MOD 4C 55.98     Eject.Frac. MOD 2C 51.94     Left Ventrical Ejection Fraction 60    PHOSPHORUS    Collection Time: 01/10/24  3:08 AM   Result Value Ref Range    Phosphorus 4.2 2.5 - 4.5 mg/dL   MAGNESIUM    Collection Time: 01/10/24  3:08 AM   Result Value Ref Range    Magnesium 2.2 1.5 - 2.5 mg/dL   CBC WITHOUT DIFFERENTIAL    Collection Time: 01/10/24  3:08 AM   Result Value Ref Range    WBC 6.6 4.8 - 10.8 K/uL    RBC 4.48 4.20 - 5.40 M/uL    Hemoglobin 13.6 12.0 - 16.0 g/dL    Hematocrit 40.5 37.0 - 47.0 %    MCV 90.4 81.4 - 97.8 fL    MCH 30.4 27.0 - 33.0 pg    MCHC 33.6 32.2 - 35.5 g/dL    RDW 46.0 35.9 - 50.0 fL    Platelet Count 186 164 - 446 K/uL    MPV 10.9 9.0 - 12.9 fL   Comp Metabolic Panel    Collection  Time: 01/10/24  6:29 AM   Result Value Ref Range    Sodium 139 135 - 145 mmol/L    Potassium 3.2 (L) 3.6 - 5.5 mmol/L    Chloride 96 96 - 112 mmol/L    Co2 31 20 - 33 mmol/L    Anion Gap 12.0 7.0 - 16.0    Glucose 119 (H) 65 - 99 mg/dL    Bun 16 8 - 22 mg/dL    Creatinine 0.51 0.50 - 1.40 mg/dL    Calcium 9.4 8.5 - 10.5 mg/dL    Correct Calcium 9.8 8.5 - 10.5 mg/dL    AST(SGOT) 37 12 - 45 U/L    ALT(SGPT) 37 2 - 50 U/L    Alkaline Phosphatase 62 30 - 99 U/L    Total Bilirubin 0.7 0.1 - 1.5 mg/dL    Albumin 3.5 3.2 - 4.9 g/dL    Total Protein 7.0 6.0 - 8.2 g/dL    Globulin 3.5 1.9 - 3.5 g/dL    A-G Ratio 1.0 g/dL   ESTIMATED GFR    Collection Time: 01/10/24  6:29 AM   Result Value Ref Range    GFR (CKD-EPI) 105 >60 mL/min/1.73 m 2     Assessment & Plan:     65 y.o. female with the following -     Problem List Items Addressed This Visit       Opioid-induced constipation    Relevant Medications    polyethylene glycol 3350 (MIRALAX) 17 GM/SCOOP Powder    Annual physical exam    65-year-old female  /70, BMI 32  PMH significant for chronic pain on opioids with constipation.  Taking MiraLAX for constipation  COPD well-controlled on current regiment  Followed by mental health    Plan  - Labs ordered: CBC, CMP, lipid panel, A1c  - Screening mammogram ordered  - Referral to GI for colonoscopy ordered  - Refilled MiraLAX for constipation  - Refill Chantix for smoking cessation  - Follow-up as needed           Relevant Orders    CBC WITH DIFFERENTIAL    Comp Metabolic Panel    HEMOGLOBIN A1C    Lipid Profile    Tobacco use    Relevant Medications    Varenicline Tartrate, Starter, 0.5 MG X 11 & 1 MG X 42 Tablet Therapy Pack    Breast cancer screening by mammogram    Relevant Orders    MA-SCREENING MAMMO BILAT W/TOMOSYNTHESIS W/CAD    Colon cancer screening    Relevant Orders    Referral to GI for Colonoscopy         Return if symptoms worsen or fail to improve.      I advised the patient that I will contact them with all  lab, imaging, or procedure results within a week of having the test performed. If they have not received a message/call from our clinic during that expected time period, they are advised to contact our clinic to ensure that the labs/imaging studies were received by this office.     Katharina Lara MD  [facility]  PGY-3

## 2025-04-04 ENCOUNTER — RESULTS FOLLOW-UP (OUTPATIENT)
Dept: MEDICAL GROUP | Facility: CLINIC | Age: 65
End: 2025-04-04

## 2025-04-09 NOTE — Clinical Note
REFERRAL APPROVAL NOTICE         Sent on April 9, 2025                   Danielle Andiejesus Mosher  [address]  [facility] [address]                   Dear Ms. Hernandezxin,    After a careful review of the medical information and benefit coverage, [facility] has processed your referral. See below for additional details.    If applicable, you must be actively enrolled with your insurance for coverage of the authorized service. If you have any questions regarding your coverage, please contact your insurance directly.    REFERRAL INFORMATION   Referral #:  [id number]  Referred-To Provider    Referred-By Provider:  Gastroenterology    Katharina Murdock M.D.   [facility]      [address]  [place] [address] [phone] [address] [facility] [place] [address]  [phone]    Referral Start Date:  04/03/2025  Referral End Date:   04/03/2026             SCHEDULING  If you do not already have an appointment, please call [phone] to make an appointment.     MORE INFORMATION  If you do not already have a [employer] account, sign up at: [email or web address]  You can access your medical information, make appointments, see lab results, billing information, and more.  If you have questions regarding this referral, please contact  the [facility] Referrals department at:             [phone]. Monday - Friday 8:00AM - 5:00PM.     Sincerely,    [facility]

## 2025-05-20 ENCOUNTER — OFFICE VISIT (OUTPATIENT)
Dept: URGENT CARE | Facility: CLINIC | Age: 65
End: 2025-05-20
Payer: MEDICAID

## 2025-05-20 ENCOUNTER — HOSPITAL ENCOUNTER (EMERGENCY)
Facility: MEDICAL CENTER | Age: 65
End: 2025-05-20
Payer: MEDICAID

## 2025-05-20 VITALS
WEIGHT: 158 LBS | HEART RATE: 70 BPM | TEMPERATURE: 97 F | RESPIRATION RATE: 20 BRPM | BODY MASS INDEX: 33.17 KG/M2 | OXYGEN SATURATION: 91 % | HEIGHT: 58 IN | DIASTOLIC BLOOD PRESSURE: 60 MMHG | SYSTOLIC BLOOD PRESSURE: 100 MMHG

## 2025-05-20 DIAGNOSIS — M54.42 CHRONIC BILATERAL LOW BACK PAIN WITH SCIATICA, SCIATICA LATERALITY UNSPECIFIED: ICD-10-CM

## 2025-05-20 DIAGNOSIS — G89.29 CHRONIC BILATERAL LOW BACK PAIN WITH SCIATICA, SCIATICA LATERALITY UNSPECIFIED: ICD-10-CM

## 2025-05-20 DIAGNOSIS — M54.41 CHRONIC BILATERAL LOW BACK PAIN WITH SCIATICA, SCIATICA LATERALITY UNSPECIFIED: ICD-10-CM

## 2025-05-20 DIAGNOSIS — R10.31 RLQ ABDOMINAL PAIN: ICD-10-CM

## 2025-05-20 DIAGNOSIS — R15.9 INCONTINENCE OF FECES, UNSPECIFIED FECAL INCONTINENCE TYPE: Primary | ICD-10-CM

## 2025-05-20 PROCEDURE — 3074F SYST BP LT 130 MM HG: CPT | Performed by: PHYSICIAN ASSISTANT

## 2025-05-20 PROCEDURE — 99214 OFFICE O/P EST MOD 30 MIN: CPT | Performed by: PHYSICIAN ASSISTANT

## 2025-05-20 PROCEDURE — 3078F DIAST BP <80 MM HG: CPT | Performed by: PHYSICIAN ASSISTANT

## 2025-05-20 ASSESSMENT — FIBROSIS 4 INDEX: FIB4 SCORE: 4.57

## 2025-05-21 ENCOUNTER — HOSPITAL ENCOUNTER (OUTPATIENT)
Facility: MEDICAL CENTER | Age: 65
End: 2025-05-22
Attending: STUDENT IN AN ORGANIZED HEALTH CARE EDUCATION/TRAINING PROGRAM | Admitting: FAMILY MEDICINE
Payer: MEDICAID

## 2025-05-21 ENCOUNTER — TELEPHONE (OUTPATIENT)
Dept: HEALTH INFORMATION MANAGEMENT | Facility: OTHER | Age: 65
End: 2025-05-21
Payer: MEDICAID

## 2025-05-21 ENCOUNTER — APPOINTMENT (OUTPATIENT)
Dept: RADIOLOGY | Facility: MEDICAL CENTER | Age: 65
End: 2025-05-21
Attending: STUDENT IN AN ORGANIZED HEALTH CARE EDUCATION/TRAINING PROGRAM
Payer: MEDICAID

## 2025-05-21 DIAGNOSIS — M54.50 MIDLINE LOW BACK PAIN WITHOUT SCIATICA, UNSPECIFIED CHRONICITY: Primary | ICD-10-CM

## 2025-05-21 DIAGNOSIS — J96.91 RESPIRATORY FAILURE WITH HYPOXIA, UNSPECIFIED CHRONICITY (HCC): ICD-10-CM

## 2025-05-21 DIAGNOSIS — R15.9 INCONTINENCE OF FECES, UNSPECIFIED FECAL INCONTINENCE TYPE: ICD-10-CM

## 2025-05-21 LAB
ALBUMIN SERPL BCP-MCNC: 4.3 G/DL (ref 3.2–4.9)
ALBUMIN/GLOB SERPL: 1.3 G/DL
ALP SERPL-CCNC: 89 U/L (ref 30–99)
ALT SERPL-CCNC: 12 U/L (ref 2–50)
ANION GAP SERPL CALC-SCNC: 11 MMOL/L (ref 7–16)
AST SERPL-CCNC: 44 U/L (ref 12–45)
BASOPHILS # BLD AUTO: 1.8 % (ref 0–1.8)
BASOPHILS # BLD: 0.09 K/UL (ref 0–0.12)
BILIRUB SERPL-MCNC: 0.3 MG/DL (ref 0.1–1.5)
BUN SERPL-MCNC: 14 MG/DL (ref 8–22)
CALCIUM ALBUM COR SERPL-MCNC: 8.5 MG/DL (ref 8.5–10.5)
CALCIUM SERPL-MCNC: 8.7 MG/DL (ref 8.5–10.5)
CHLORIDE SERPL-SCNC: 103 MMOL/L (ref 96–112)
CO2 SERPL-SCNC: 25 MMOL/L (ref 20–33)
CREAT SERPL-MCNC: 0.94 MG/DL (ref 0.5–1.4)
EOSINOPHIL # BLD AUTO: 0.27 K/UL (ref 0–0.51)
EOSINOPHIL NFR BLD: 5.4 % (ref 0–6.9)
ERYTHROCYTE [DISTWIDTH] IN BLOOD BY AUTOMATED COUNT: 46.8 FL (ref 35.9–50)
GFR SERPLBLD CREATININE-BSD FMLA CKD-EPI: 68 ML/MIN/1.73 M 2
GLOBULIN SER CALC-MCNC: 3.4 G/DL (ref 1.9–3.5)
GLUCOSE SERPL-MCNC: 101 MG/DL (ref 65–99)
HCT VFR BLD AUTO: 50.1 % (ref 37–47)
HGB BLD-MCNC: 16.1 G/DL (ref 12–16)
LIPASE SERPL-CCNC: 29 U/L (ref 11–82)
LYMPHOCYTES # BLD AUTO: 1.62 K/UL (ref 1–4.8)
LYMPHOCYTES NFR BLD: 32.4 % (ref 22–41)
MANUAL DIFF BLD: NORMAL
MCH RBC QN AUTO: 30.1 PG (ref 27–33)
MCHC RBC AUTO-ENTMCNC: 32.1 G/DL (ref 32.2–35.5)
MCV RBC AUTO: 93.8 FL (ref 81.4–97.8)
METAMYELOCYTES NFR BLD MANUAL: 0.9 %
MONOCYTES # BLD AUTO: 0.4 K/UL (ref 0–0.85)
MONOCYTES NFR BLD AUTO: 8.1 % (ref 0–13.4)
MORPHOLOGY BLD-IMP: NORMAL
NEUTROPHILS # BLD AUTO: 2.57 K/UL (ref 1.82–7.42)
NEUTROPHILS NFR BLD: 51.4 % (ref 44–72)
NRBC # BLD AUTO: 0 K/UL
NRBC BLD-RTO: 0 /100 WBC (ref 0–0.2)
PLATELET # BLD AUTO: 187 K/UL (ref 164–446)
PLATELET BLD QL SMEAR: NORMAL
PMV BLD AUTO: 9.8 FL (ref 9–12.9)
POTASSIUM SERPL-SCNC: 4.4 MMOL/L (ref 3.6–5.5)
PROT SERPL-MCNC: 7.7 G/DL (ref 6–8.2)
RBC # BLD AUTO: 5.34 M/UL (ref 4.2–5.4)
RBC BLD AUTO: NORMAL
SODIUM SERPL-SCNC: 139 MMOL/L (ref 135–145)
WBC # BLD AUTO: 5 K/UL (ref 4.8–10.8)

## 2025-05-21 PROCEDURE — 85027 COMPLETE CBC AUTOMATED: CPT

## 2025-05-21 PROCEDURE — 700111 HCHG RX REV CODE 636 W/ 250 OVERRIDE (IP): Mod: JZ,UD | Performed by: STUDENT IN AN ORGANIZED HEALTH CARE EDUCATION/TRAINING PROGRAM

## 2025-05-21 PROCEDURE — 36415 COLL VENOUS BLD VENIPUNCTURE: CPT

## 2025-05-21 PROCEDURE — 96374 THER/PROPH/DIAG INJ IV PUSH: CPT

## 2025-05-21 PROCEDURE — 80053 COMPREHEN METABOLIC PANEL: CPT

## 2025-05-21 PROCEDURE — 84484 ASSAY OF TROPONIN QUANT: CPT

## 2025-05-21 PROCEDURE — 85007 BL SMEAR W/DIFF WBC COUNT: CPT

## 2025-05-21 PROCEDURE — 83880 ASSAY OF NATRIURETIC PEPTIDE: CPT

## 2025-05-21 PROCEDURE — 83690 ASSAY OF LIPASE: CPT

## 2025-05-21 PROCEDURE — 99285 EMERGENCY DEPT VISIT HI MDM: CPT

## 2025-05-21 RX ORDER — MIDAZOLAM HYDROCHLORIDE 1 MG/ML
2 INJECTION INTRAMUSCULAR; INTRAVENOUS ONCE
Status: COMPLETED | OUTPATIENT
Start: 2025-05-21 | End: 2025-05-21

## 2025-05-21 RX ADMIN — MIDAZOLAM HYDROCHLORIDE 2 MG: 1 INJECTION, SOLUTION INTRAMUSCULAR; INTRAVENOUS at 23:34

## 2025-05-21 ASSESSMENT — FIBROSIS 4 INDEX: FIB4 SCORE: 4.57

## 2025-05-21 NOTE — PROGRESS NOTES
"Subjective:   Martha Mosher is a 65 y.o. female who presents for Bowel Problem (Patient is here today for bowel problems that is going on for 3-4 months. Patient states she has bowel movements without even feeling it. )      HPI  The patient presents to the Urgent Care with complaints of bowel incontinence for about 3 to 4 months.  She does have a history of opioid-induced constipation, however she states she has not been constipated recently and will have bowel incontinence at night and during the day. She has not been taking laxatives recently. States he will come out when she has no idea.  Denies any urinary incontinence.  Sometimes abdominal cramping.  Currently has right lower quadrant abdominal pain.  Sometimes pain with urination and sometimes urgency and frequency.  She has a history of chronic low back pain with radiation down to her legs.  She reports of some leg weakness.  Denies any saddle anesthesia.  Denies any fever, chills, or vomiting. Tolerating fluids well.         Past Medical History[1]  Allergies[2]     Objective:     /60   Pulse 70   Temp 36.1 °C (97 °F) (Temporal)   Resp 20   Ht 1.473 m (4' 10\")   Wt 71.7 kg (158 lb)   LMP  (LMP Unknown)   SpO2 91%   BMI 33.02 kg/m²     Physical Exam  Vitals reviewed.   Constitutional:       General: She is not in acute distress.     Appearance: Normal appearance. She is not ill-appearing or toxic-appearing.   Eyes:      Conjunctiva/sclera: Conjunctivae normal.   Cardiovascular:      Rate and Rhythm: Normal rate.   Pulmonary:      Effort: Pulmonary effort is normal.   Abdominal:      General: Abdomen is flat. Bowel sounds are normal. There is no distension.      Palpations: Abdomen is soft.      Tenderness: There is abdominal tenderness in the right lower quadrant. There is no guarding or rebound. Negative signs include Abreu's sign.   Musculoskeletal:      Cervical back: Neck supple.   Skin:     General: Skin is warm and dry. "   Neurological:      General: No focal deficit present.      Mental Status: She is alert and oriented to person, place, and time.   Psychiatric:         Mood and Affect: Mood normal.         Behavior: Behavior normal.         Diagnosis and associated orders:     1. Incontinence of feces, unspecified fecal incontinence type    2. Chronic bilateral low back pain with sciatica, sciatica laterality unspecified    3. RLQ abdominal pain       Comments/MDM:     This is a 65-year-old female with a history of chronic low back pain who presents to the urgent care with complaints of worsening bowel incontinence, low back pain, and right lower quadrant abdominal pain. She has not been taking laxatives.  She has had bowel incontinence for about 3 to 4 months.  Her primary care was concerned and so she was told to come to the urgent care.  See full history above.  The patient overall is very well-appearing in no acute distress.  Vital signs stable.  Does have mild abdominal tenderness to right lower quadrant.  Discussed with patient due to history of chronic low back pain and worsening symptoms of feces incontinence this is a red flag.  I do feel patient requires further evaluation.  Therefore it was highly recommended she present immediately to the ER for higher level evaluation and care.  The patient verbalized understanding and agreed to plan.  She is going to present via private vehicle across [place] to [facility] ER.  I called transfer center report given awaiting patient arrival.           Please note that this dictation was created using voice recognition software. I have made a reasonable attempt to correct obvious errors, but I expect that there are errors of grammar and possibly content that I did not discover before finalizing the note.    This note was electronically signed by Niko Lopez PA-C         [1]   Past Medical History:  Diagnosis Date    Anxiety     Arthritis     Bronchitis 05/20/2022    Cancer (HCC)      cervical ca - hysterectomy    Chronic airway obstruction, not elsewhere classified     Constipation 05/15/2018    Patient has history of constipation due to pain management due to back pain.  Currently taking MiraLAX daily seems to be helping although sometimes she will go a week without stooling.  Patient would like a refill of MiraLAX.    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  Currently prescribed sumatriptan 50 mg as needed.  Patient reports having 5 migraines per month.  Patient would like a refill of sumatriptan.    Osteoporosis, unspecified     Other emphysema ([facility])     Psychiatric disorder 01/29/2018    Pyelonephritis 05/15/2018    Renal disorder     kidney stone    Severe sepsis ([facility]) 05/13/2018    Swollen lymph nodes 05/31/2018    Type II or unspecified type diabetes mellitus without mention of complication, not stated as uncontrolled     Ulcer     Unspecified asthma(493.90)     Unspecified cataract     Urolithiasis     has lithotripsy   [2]   Allergies  Allergen Reactions    Sulfa Drugs Hives, Unspecified and Shortness of Breath     Pt states that she hallucinates on this as well as getting hives    Lyrica Unspecified     Hallucinations

## 2025-05-22 ENCOUNTER — APPOINTMENT (OUTPATIENT)
Dept: RADIOLOGY | Facility: MEDICAL CENTER | Age: 65
End: 2025-05-22
Attending: STUDENT IN AN ORGANIZED HEALTH CARE EDUCATION/TRAINING PROGRAM
Payer: MEDICAID

## 2025-05-22 ENCOUNTER — APPOINTMENT (OUTPATIENT)
Dept: RADIOLOGY | Facility: MEDICAL CENTER | Age: 65
End: 2025-05-22
Payer: MEDICAID

## 2025-05-22 VITALS
TEMPERATURE: 97 F | WEIGHT: 160.72 LBS | HEIGHT: 58 IN | RESPIRATION RATE: 16 BRPM | DIASTOLIC BLOOD PRESSURE: 81 MMHG | SYSTOLIC BLOOD PRESSURE: 126 MMHG | HEART RATE: 71 BPM | OXYGEN SATURATION: 91 % | BODY MASS INDEX: 33.74 KG/M2

## 2025-05-22 PROBLEM — R09.02 HYPOXIA: Status: ACTIVE | Noted: 2025-05-22

## 2025-05-22 PROBLEM — R15.9 FULL INCONTINENCE OF FECES: Status: ACTIVE | Noted: 2025-05-22

## 2025-05-22 LAB
APPEARANCE UR: CLEAR
BACTERIA #/AREA URNS HPF: NORMAL /HPF
BILIRUB UR QL STRIP.AUTO: NEGATIVE
CASTS URNS QL MICRO: NORMAL /LPF (ref 0–2)
COLOR UR: YELLOW
EPITHELIAL CELLS 1715: NORMAL /HPF (ref 0–5)
FLUAV RNA SPEC QL NAA+PROBE: NEGATIVE
FLUBV RNA SPEC QL NAA+PROBE: NEGATIVE
GLUCOSE UR STRIP.AUTO-MCNC: NEGATIVE MG/DL
KETONES UR STRIP.AUTO-MCNC: NEGATIVE MG/DL
LEUKOCYTE ESTERASE UR QL STRIP.AUTO: ABNORMAL
MICRO URNS: ABNORMAL
NITRITE UR QL STRIP.AUTO: NEGATIVE
NT-PROBNP SERPL IA-MCNC: 506 PG/ML (ref 0–125)
PH UR STRIP.AUTO: 5.5 [PH] (ref 5–8)
PROT UR QL STRIP: NEGATIVE MG/DL
RBC # URNS HPF: NORMAL /HPF (ref 0–2)
RBC UR QL AUTO: NEGATIVE
RSV RNA SPEC QL NAA+PROBE: NEGATIVE
SARS-COV-2 RNA RESP QL NAA+PROBE: NOTDETECTED
SP GR UR STRIP.AUTO: 1.01
SPECIMEN SOURCE: NORMAL
TROPONIN T SERPL-MCNC: 14 NG/L (ref 6–19)
UROBILINOGEN UR STRIP.AUTO-MCNC: 0.2 EU/DL
WBC #/AREA URNS HPF: NORMAL /HPF

## 2025-05-22 PROCEDURE — A9270 NON-COVERED ITEM OR SERVICE: HCPCS | Mod: UD | Performed by: FAMILY MEDICINE

## 2025-05-22 PROCEDURE — 92610 EVALUATE SWALLOWING FUNCTION: CPT

## 2025-05-22 PROCEDURE — 700102 HCHG RX REV CODE 250 W/ 637 OVERRIDE(OP): Mod: UD

## 2025-05-22 PROCEDURE — 700111 HCHG RX REV CODE 636 W/ 250 OVERRIDE (IP): Mod: JZ,UD

## 2025-05-22 PROCEDURE — 700102 HCHG RX REV CODE 250 W/ 637 OVERRIDE(OP): Mod: UD | Performed by: FAMILY MEDICINE

## 2025-05-22 PROCEDURE — 81001 URINALYSIS AUTO W/SCOPE: CPT

## 2025-05-22 PROCEDURE — 99235 HOSP IP/OBS SAME DATE MOD 70: CPT | Performed by: FAMILY MEDICINE

## 2025-05-22 PROCEDURE — 96372 THER/PROPH/DIAG INJ SC/IM: CPT

## 2025-05-22 PROCEDURE — 0241U HCHG SARS-COV-2 COVID-19 NFCT DS RESP RNA 4 TRGT MIC: CPT

## 2025-05-22 PROCEDURE — G0378 HOSPITAL OBSERVATION PER HR: HCPCS

## 2025-05-22 PROCEDURE — 74018 RADEX ABDOMEN 1 VIEW: CPT

## 2025-05-22 PROCEDURE — 72148 MRI LUMBAR SPINE W/O DYE: CPT

## 2025-05-22 PROCEDURE — A9270 NON-COVERED ITEM OR SERVICE: HCPCS | Mod: UD

## 2025-05-22 PROCEDURE — 87086 URINE CULTURE/COLONY COUNT: CPT

## 2025-05-22 PROCEDURE — 71045 X-RAY EXAM CHEST 1 VIEW: CPT

## 2025-05-22 RX ORDER — SUMATRIPTAN 50 MG/1
50 TABLET, FILM COATED ORAL
Status: DISCONTINUED | OUTPATIENT
Start: 2025-05-22 | End: 2025-05-22 | Stop reason: HOSPADM

## 2025-05-22 RX ORDER — POLYETHYLENE GLYCOL 3350 17 G/17G
1 POWDER, FOR SOLUTION ORAL
Status: DISCONTINUED | OUTPATIENT
Start: 2025-05-22 | End: 2025-05-22 | Stop reason: HOSPADM

## 2025-05-22 RX ORDER — AMOXICILLIN 250 MG
2 CAPSULE ORAL EVERY EVENING
Status: DISCONTINUED | OUTPATIENT
Start: 2025-05-22 | End: 2025-05-22 | Stop reason: HOSPADM

## 2025-05-22 RX ORDER — GABAPENTIN 400 MG/1
800 CAPSULE ORAL 3 TIMES DAILY
Status: DISCONTINUED | OUTPATIENT
Start: 2025-05-22 | End: 2025-05-22 | Stop reason: HOSPADM

## 2025-05-22 RX ORDER — BUDESONIDE AND FORMOTEROL FUMARATE DIHYDRATE 80; 4.5 UG/1; UG/1
2 AEROSOL RESPIRATORY (INHALATION) 2 TIMES DAILY
Status: DISCONTINUED | OUTPATIENT
Start: 2025-05-22 | End: 2025-05-22

## 2025-05-22 RX ORDER — OMEPRAZOLE 20 MG/1
20 CAPSULE, DELAYED RELEASE ORAL
Status: DISCONTINUED | OUTPATIENT
Start: 2025-05-22 | End: 2025-05-22 | Stop reason: HOSPADM

## 2025-05-22 RX ORDER — BUSPIRONE HYDROCHLORIDE 5 MG/1
7.5 TABLET ORAL
Status: DISCONTINUED | OUTPATIENT
Start: 2025-05-22 | End: 2025-05-22 | Stop reason: HOSPADM

## 2025-05-22 RX ORDER — ENOXAPARIN SODIUM 100 MG/ML
40 INJECTION SUBCUTANEOUS DAILY
Status: DISCONTINUED | OUTPATIENT
Start: 2025-05-22 | End: 2025-05-22 | Stop reason: HOSPADM

## 2025-05-22 RX ORDER — DULOXETIN HYDROCHLORIDE 60 MG/1
60 CAPSULE, DELAYED RELEASE ORAL
Status: DISCONTINUED | OUTPATIENT
Start: 2025-05-22 | End: 2025-05-22 | Stop reason: HOSPADM

## 2025-05-22 RX ORDER — OXYCODONE AND ACETAMINOPHEN 5; 325 MG/1; MG/1
1 TABLET ORAL EVERY 6 HOURS PRN
Refills: 0 | Status: DISCONTINUED | OUTPATIENT
Start: 2025-05-22 | End: 2025-05-22 | Stop reason: HOSPADM

## 2025-05-22 RX ORDER — ALBUTEROL SULFATE 90 UG/1
2 INHALANT RESPIRATORY (INHALATION)
Status: DISCONTINUED | OUTPATIENT
Start: 2025-05-22 | End: 2025-05-22 | Stop reason: HOSPADM

## 2025-05-22 RX ADMIN — GABAPENTIN 800 MG: 400 CAPSULE ORAL at 17:14

## 2025-05-22 RX ADMIN — DOCUSATE SODIUM 50 MG AND SENNOSIDES 8.6 MG 2 TABLET: 8.6; 5 TABLET, FILM COATED ORAL at 17:14

## 2025-05-22 RX ADMIN — MOMETASONE FUROATE AND FORMOTEROL FUMARATE DIHYDRATE 2 PUFF: 200; 5 AEROSOL RESPIRATORY (INHALATION) at 17:14

## 2025-05-22 RX ADMIN — OXYCODONE AND ACETAMINOPHEN 1 TABLET: 5; 325 TABLET ORAL at 15:42

## 2025-05-22 RX ADMIN — GABAPENTIN 800 MG: 400 CAPSULE ORAL at 12:12

## 2025-05-22 RX ADMIN — SUMATRIPTAN SUCCINATE 50 MG: 50 TABLET ORAL at 12:17

## 2025-05-22 RX ADMIN — NICOTINE 7 MG: 7 PATCH TRANSDERMAL at 09:32

## 2025-05-22 RX ADMIN — OXYCODONE AND ACETAMINOPHEN 1 TABLET: 5; 325 TABLET ORAL at 09:33

## 2025-05-22 RX ADMIN — ENOXAPARIN SODIUM 40 MG: 100 INJECTION SUBCUTANEOUS at 03:41

## 2025-05-22 SDOH — ECONOMIC STABILITY: TRANSPORTATION INSECURITY
IN THE PAST 12 MONTHS, HAS THE LACK OF TRANSPORTATION KEPT YOU FROM MEDICAL APPOINTMENTS OR FROM GETTING MEDICATIONS?: NO

## 2025-05-22 SDOH — ECONOMIC STABILITY: TRANSPORTATION INSECURITY
IN THE PAST 12 MONTHS, HAS LACK OF RELIABLE TRANSPORTATION KEPT YOU FROM MEDICAL APPOINTMENTS, MEETINGS, WORK OR FROM GETTING THINGS NEEDED FOR DAILY LIVING?: NO

## 2025-05-22 ASSESSMENT — LIFESTYLE VARIABLES
HOW MANY TIMES IN THE PAST YEAR HAVE YOU HAD 5 OR MORE DRINKS IN A DAY: 0
EVER FELT BAD OR GUILTY ABOUT YOUR DRINKING: NO
CONSUMPTION TOTAL: NEGATIVE
ALCOHOL_USE: NO
EVER HAD A DRINK FIRST THING IN THE MORNING TO STEADY YOUR NERVES TO GET RID OF A HANGOVER: NO
DOES PATIENT WANT TO STOP DRINKING: NO
TOTAL SCORE: 0
HAVE PEOPLE ANNOYED YOU BY CRITICIZING YOUR DRINKING: NO
HAVE YOU EVER FELT YOU SHOULD CUT DOWN ON YOUR DRINKING: NO
TOTAL SCORE: 0
TOTAL SCORE: 0
ON A TYPICAL DAY WHEN YOU DRINK ALCOHOL HOW MANY DRINKS DO YOU HAVE: 0
AVERAGE NUMBER OF DAYS PER WEEK YOU HAVE A DRINK CONTAINING ALCOHOL: 0

## 2025-05-22 ASSESSMENT — SOCIAL DETERMINANTS OF HEALTH (SDOH)
IN THE PAST 12 MONTHS, HAS THE ELECTRIC, GAS, OIL, OR WATER COMPANY THREATENED TO SHUT OFF SERVICE IN YOUR HOME?: NO
WITHIN THE LAST YEAR, HAVE YOU BEEN HUMILIATED OR EMOTIONALLY ABUSED IN OTHER WAYS BY YOUR PARTNER OR EX-PARTNER?: NO
WITHIN THE LAST YEAR, HAVE YOU BEEN KICKED, HIT, SLAPPED, OR OTHERWISE PHYSICALLY HURT BY YOUR PARTNER OR EX-PARTNER?: NO
WITHIN THE PAST 12 MONTHS, THE FOOD YOU BOUGHT JUST DIDN'T LAST AND YOU DIDN'T HAVE MONEY TO GET MORE: NEVER TRUE
WITHIN THE LAST YEAR, HAVE TO BEEN RAPED OR FORCED TO HAVE ANY KIND OF SEXUAL ACTIVITY BY YOUR PARTNER OR EX-PARTNER?: NO
WITHIN THE LAST YEAR, HAVE YOU BEEN AFRAID OF YOUR PARTNER OR EX-PARTNER?: NO
IN THE PAST 12 MONTHS, HAS THE ELECTRIC, GAS, OIL, OR WATER COMPANY THREATENED TO SHUT OFF SERVICE IN YOUR HOME?: YES
WITHIN THE PAST 12 MONTHS, YOU WORRIED THAT YOUR FOOD WOULD RUN OUT BEFORE YOU GOT THE MONEY TO BUY MORE: NEVER TRUE

## 2025-05-22 ASSESSMENT — COGNITIVE AND FUNCTIONAL STATUS - GENERAL
MOVING FROM LYING ON BACK TO SITTING ON SIDE OF FLAT BED: A LITTLE
SUGGESTED CMS G CODE MODIFIER DAILY ACTIVITY: CH
DAILY ACTIVITIY SCORE: 24
MOVING TO AND FROM BED TO CHAIR: A LITTLE
SUGGESTED CMS G CODE MODIFIER MOBILITY: CK
MOBILITY SCORE: 18
WALKING IN HOSPITAL ROOM: A LITTLE
TURNING FROM BACK TO SIDE WHILE IN FLAT BAD: A LITTLE
STANDING UP FROM CHAIR USING ARMS: A LITTLE
CLIMB 3 TO 5 STEPS WITH RAILING: A LITTLE

## 2025-05-22 ASSESSMENT — PATIENT HEALTH QUESTIONNAIRE - PHQ9
2. FEELING DOWN, DEPRESSED, IRRITABLE, OR HOPELESS: NOT AT ALL
1. LITTLE INTEREST OR PLEASURE IN DOING THINGS: NOT AT ALL
SUM OF ALL RESPONSES TO PHQ9 QUESTIONS 1 AND 2: 0

## 2025-05-22 ASSESSMENT — PAIN DESCRIPTION - PAIN TYPE
TYPE: CHRONIC PAIN;ACUTE PAIN
TYPE: CHRONIC PAIN;ACUTE PAIN
TYPE: CHRONIC PAIN
TYPE: ACUTE PAIN
TYPE: CHRONIC PAIN
TYPE: ACUTE PAIN

## 2025-05-22 ASSESSMENT — FIBROSIS 4 INDEX: FIB4 SCORE: 4.35

## 2025-05-22 NOTE — H&P
"      [facility] H&P        PATIENT ID:  NAME:  Martha Mosher  MRN:               [id number]  YOB: 1960    Date of Admission: 5/21/2025     Attending: Jena Casanova M.d.    Resident: Rand Dash M.D.    Primary Care Physician:  Katharina Murdock M.D.    CC:  Incontinence    HPI: Martha Mosher is a 65 y.o. female who presented with incontinence ad found to be hypoxic. Patient reports that for a few months now, she has been intermittently incontinent of her bowels. She has several episodes of bowel movements a day which she describes as a soft serve consistency. The patient denies any blood in her stools. She has not had any recent travel outside of the  but is not sure about any antibiotic use prior to these symptoms starting. Patient does have a prior history of constipation but has not been taking any laxatives. She does have a history of chronic back pain with sciatica. She reports both of her legs \"giving out\" intermittently when she uses her walker. The patient has numbness in her right leg. She has no urinary incontinence but reports an increase in the frequency of her urination as well as pain with urination.     The patient reports that she has been told she needs oxygen in the past. She has a 40 pack year smoking history and is interested in quitting. She denies a chronic or current cough. Patient denies any chest pain but does report some shortness of breath. She denies any recent illnesses.       ERCourse:  Patient's vital signs were notable for hypoxia and requiring 2L nasal canula. She did receive Versed while in the ED for MRI. MRI results did not demonstrate cauda equina or spinal cord compression.    REVIEW OF SYSTEMS:   Ten systems reviewed and were negative except as noted in the HPI.                PAST MEDICAL HISTORY:  Past Medical History[1]    PAST SURGICAL HISTORY:  Past Surgical History[2]    FAMILY HISTORY:  Family History   Problem Relation Age of " Onset    Lung Disease Mother     Diabetes Mother     Stroke Mother     Arthritis Father     Cancer Father     Diabetes Father     Hypertension Father     Stroke Father     Alcohol/Drug Father     Hypertension Brother     Stroke Brother     Alcohol/Drug Brother     Arthritis Maternal Aunt     Arthritis Maternal Uncle     Cancer Paternal Grandmother     Cancer Paternal Grandfather        SOCIAL HISTORY:   Pt lives in an apartment by herself.  Smoking 40 pack year smoking history, current.  Etoh use denies.  Drug use denies.    DIET:   Orders Placed This Encounter   Procedures    Diet Order Diet: Regular     Standing Status:   Standing     Number of Occurrences:   1     Diet::   Regular [1]       ALLERGIES:  Allergies[3]    OUTPATIENT MEDICATIONS:  Medications - Current, Listed Continuously[4]    PHYSICAL EXAM:  Vitals:    25 0121 25 0150 25 0202 25 0302   BP: 121/80  121/71 114/70   Pulse: 67 67 66 64   Resp:    15   Temp:       TempSrc:       SpO2: 96% (!) 86% 95% 92%   Weight:       Height:       , Temp (24hrs), Av.4 °C (97.5 °F), Min:36.4 °C (97.5 °F), Max:36.4 °C (97.5 °F)  , Pulse Oximetry: 92 %, O2 (LPM): 2, O2 Delivery Device: Nasal Cannula    General: Pt resting in NAD, cooperative   Skin:  Pink, warm and dry.  No rashes  HEENT: NC/AT. PERRL. EOMI. MMM.   Neck:  Supple without lymphadenopathy or rigidity. No JVD   Lungs:  Decreased breath sounds in bilateral lung bases. Symmetrical.  CTAB with no W/R/R.    Cardiovascular:  S1/S2 RRR without M/R/G.  Abdomen:  Abdomen is soft NT/ND. +BS. No masses noted.  Extremities:  5/5 strength in bilateral upper and lower extremities.Full range of motion. No gross deformities noted. 2+ pulses in all extremities. No C/C/E   Spine:  Straight without vertebral anomalies.  CNS:  Muscle tone is normal. Cranial nerves II-XII grossly intact. 2+ DTRs.         LAB TESTS:   Recent Labs     25  2157   WBC 5.0   RBC 5.34   HEMOGLOBIN 16.1*   HEMATOCRIT  50.1*   MCV 93.8   MCH 30.1   RDW 46.8   PLATELETCT 187   MPV 9.8   NEUTSPOLYS 51.40   LYMPHOCYTES 32.40   MONOCYTES 8.10   EOSINOPHILS 5.40   BASOPHILS 1.80   RBCMORPHOLO Normal         Recent Labs     05/21/25  2157   SODIUM 139   POTASSIUM 4.4   CHLORIDE 103   CO2 25   BUN 14   CREATININE 0.94   CALCIUM 8.7   ALBUMIN 4.3       CULTURES:   Results       Procedure Component Value Units Date/Time    URINE CULTURE-EXISTING-LESS THAN 48 HOURS [[id number]]     Order Status: Sent Specimen: Urine, Clean Catch     CULTURE STOOL [[id number]]     Order Status: Sent Specimen: Stool     Complete O&P [[id number]]     Order Status: Sent Specimen: Stool     URINALYSIS (UA) [[id number]]  (Abnormal) Collected: 05/22/25 0006    Order Status: Completed Specimen: Urine Updated: 05/22/25 0027     Color Yellow     Character Clear     Specific Gravity 1.008     Ph 5.5     Glucose Negative mg/dL      Ketones Negative mg/dL      Protein Negative mg/dL      Bilirubin Negative     Urobilinogen, Urine 0.2 EU/dL      Nitrite Negative     Leukocyte Esterase Trace     Occult Blood Negative     Micro Urine Req Microscopic            IMAGES:  DX-CHEST-PORTABLE (1 VIEW)   Final Result         1.  Bibasilar atelectasis and/or infiltrates      MR-LUMBAR SPINE-W/O    (Results Pending)   PK-TOZAJSR-3 VIEW    (Results Pending)       CONSULTS:   None    ASSESSMENT/PLAN: 65 y.o. female admitted for hypoxia.      Hypoxia  Patient has a known history of COPD and is on Symbicort daily with Albuterol as needed. She was previously on home supplemental oxygen (3-4L) per chart review and this does not appear to be acute. Patient does not have oxygen at home. She has no acute cough, fevers, or chills. Troponin and BNP are not acutely elevated. Chest XR shows bibasilar atelectasis.    Plan:  -O2 walk test  -Home O2  -Counseled patient on smoking cessation and she is interested in quitting Has Chantix at home  -Recommend outpatient follow-up for PFT's    COPD (chronic  obstructive pulmonary disease) (HCC)  Chronic and stable. No evidence of acute exacerbation at this time. Has had difficulty with inhaler compliance in the past.    Plan:  -Continue Symbicort inpatient  -See Hypoxia for further plan    Full incontinence of feces  Patient reports a several month history of fecal incontinence without urinary incontinence. Differentials include infectious diarrhea vs fecal impaction vs neuropathy.    Plan:  -Stool culture and O&P ordered  -Abdominal XR to rule out impaction  -Outpatient f/u with possible GI referral    Tobacco use  40 pack year smoking history. Counseled patient on tobacco cessation which she is interested in.  -CM to provide additional resources to patient    Psychiatric disorder  -Continue Buspar and Duloxetine    Dysuria  Patient reporting several months of frequent urination with pain. She denies any blood. Urinalysis positive for leukocyte esterase. Microscopy without bacteria.    Plan  -Urine culture to evaluate for UTI        Rand Dash M.D.  [facility]         [1]   Past Medical History:  Diagnosis Date    Anxiety     Arthritis     Bronchitis 05/20/2022    Cancer ([facility])     cervical ca - hysterectomy    Chronic airway obstruction, not elsewhere classified     Constipation 05/15/2018    Patient has history of constipation due to pain management due to back pain.  Currently taking MiraLAX daily seems to be helping although sometimes she will go a week without stooling.  Patient would like a refill of MiraLAX.    COPD exacerbation (HCC) 01/05/2024    Cystitis 08/01/2018    Depression     Fibromyalgia     Fx wrist, left, closed, initial encounter 08/01/2018    GERD (gastroesophageal reflux disease)     H/O total hysterectomy     High-risk sexual behavior 11/08/2018    Hypertension     takes lisinopril    Indigestion     hx colitis    Low back pain associated with a spinal disorder other than radiculopathy or spinal stenosis 05/08/2014    Mass of  parotid gland 04/20/2021    Migraine with aura, intractable, without status migrainosus 11/08/2018    Patient with history of migraines with aura.  Currently prescribed sumatriptan 50 mg as needed.  Patient reports having 5 migraines per month.  Patient would like a refill of sumatriptan.    Osteoporosis, unspecified     Other emphysema (HCC)     Psychiatric disorder 01/29/2018    Pyelonephritis 05/15/2018    Renal disorder     kidney stone    Severe sepsis (HCC) 05/13/2018    Swollen lymph nodes 05/31/2018    Type II or unspecified type diabetes mellitus without mention of complication, not stated as uncontrolled     Ulcer     Unspecified asthma(493.90)     Unspecified cataract     Urolithiasis     has lithotripsy   [2]   Past Surgical History:  Procedure Laterality Date    LUMBAR LAMINECTOMY DISKECTOMY  01/03/2023    Procedure: POSTERIOR L4-5 LAMINECTOMY;  Surgeon: Royce Crowder M.D.;  Location: SURGERY [facility];  Service: Neurosurgery    COLONOSCOPY WITH BIOPSY  08/03/2009    Performed by GRAEME LOCK JR at ENDOSCOPY [facility] ORS    GASTROSCOPY WITH BIOPSY  03/01/2009    Performed by LUIS ARMANDO SIERRA at ENDOSCOPY [facility] ORS    ABDOMINAL HYSTERECTOMY TOTAL      APPENDECTOMY      HERNIA REPAIR      LITHOTRIPSY      NECK EXPLORATION      PRIMARY C SECTION     [3]   Allergies  Allergen Reactions    Sulfa Drugs Hives, Unspecified and Shortness of Breath     Pt states that she hallucinates on this as well as getting hives    Lyrica Unspecified     Hallucinations   [4]   Current Facility-Administered Medications:     Respiratory Therapy Consult, , Nebulization, Continuous RT, Rand Dash M.D.    enoxaparin (Lovenox) inj 40 mg, 40 mg, Subcutaneous, DAILY AT 1800, Rand Dash M.D.    gabapentin (Neurontin) tablet 800 mg, 800 mg, Oral, TID, Rand Dash M.D.    omeprazole (PriLOSEC) capsule 20 mg, 20 mg, Oral, QHS, Rand Dash M.D.    budesonide-formoterol (Symbicort) 80-4.5  MCG/ACT inhaler 2 Puff, 2 Puff, Inhalation, BID, Rand Dash M.D.    DULoxetine (Cymbalta) capsule 60 mg, 60 mg, Oral, QHS, Rand Dash M.D.    busPIRone (Buspar) TABS 7.5 mg, 7.5 mg, Oral, QHS, Rand Dash M.D.    Current Outpatient Medications:     polyethylene glycol 3350 (MIRALAX) 17 GM/SCOOP Powder, Take 17 g by mouth every day., Disp: 850 g, Rfl: 2    Varenicline Tartrate, Starter, 0.5 MG X 11 & 1 MG X 42 Tablet Therapy Pack, FOLLOW PACKAGE DIRECTIONS, Disp: 159 Each, Rfl: 6    SYMBICORT 80-4.5 MCG/ACT Aerosol, INHALE 2 PUFFS BY MOUTH TWICE DAILY, Disp: 10.2 g, Rfl: 11    omeprazole (PRILOSEC) 20 MG delayed-release capsule, TAKE 1 CAPSULE BY MOUTH AT BEDTIME, Disp: 90 Capsule, Rfl: 2    SUMAtriptan (IMITREX) 50 MG Tab, TAKE 1 TABLET BY MOUTH 1 TIME AS NEEDED FOR MIGRAINE, Disp: 10 Tablet, Rfl: 3    promethazine (PHENERGAN) 25 MG Tab, TAKE 1 TABLET BY MOUTH EVERY 6 HOURS AS NEEDED FOR NAUSEA OR VOMITING, Disp: 385 Tablet, Rfl: 2    cyclobenzaprine (FLEXERIL) 10 mg Tab, Take 10 mg by mouth at bedtime., Disp: , Rfl:     busPIRone (BUSPAR) 7.5 MG tablet, Take 7.5 mg by mouth at bedtime., Disp: , Rfl:     trazodone (DESYREL) 300 MG tablet, Take 150 mg by mouth every evening., Disp: , Rfl:     asa/apap/caffeine (EXCEDRIN) 250-250-65 MG Tab, Take 1 Tablet by mouth every 8 hours as needed for Headache., Disp: , Rfl:     acetaminophen (TYLENOL) 500 MG Tab, Take 2 Tablets by mouth every 8 hours as needed for Moderate Pain, Fever or Mild Pain., Disp: , Rfl:     tiotropium (SPIRIVA HANDIHALER) 18 MCG Cap, Place 1 Capsule into inhaler and inhale every day. (Patient not taking: Reported on 5/20/2025), Disp: 30 Capsule, Rfl: 3    gabapentin (NEURONTIN) 800 MG tablet, Take 800 mg by mouth 3 times a day., Disp: , Rfl:     Multiple Vitamins-Minerals (HAIR SKIN & NAILS PO), Take 1 Capsule by mouth every day. (Patient not taking: Reported on 4/3/2025), Disp: , Rfl:     albuterol (PROVENTIL HFA) 108 (90 Base)  MCG/ACT Aero Soln inhalation aerosol, Inhale 2 Puffs every 6 hours as needed for Shortness of Breath., Disp: 6.7 g, Rfl: 5    DULoxetine (CYMBALTA) 60 MG Cap DR Particles delayed-release capsule, Take 60 mg by mouth at bedtime., Disp: , Rfl:     QUEtiapine (SEROQUEL) 100 MG Tab, Take 200 mg by mouth at bedtime. 1.5 tablets = 150 mg., Disp: , Rfl:     oxyCODONE-acetaminophen (PERCOCET) 7.5-325 MG per tablet, Take 1 Tablet by mouth every 6 hours as needed for Severe Pain., Disp: , Rfl:

## 2025-05-22 NOTE — ED NOTES
Med Rec completed per patient      Allergies reviewed: yes    Oral antibiotics in the past 30 days: no    Anticoagulant in past 14 days: no    Dispense history available in [facility]: no    Pharmacy patient utilizes: [facility] [place]  [phone]

## 2025-05-22 NOTE — PROGRESS NOTES
4 Eyes Skin Assessment Completed by TEJAS Brizuela and TEJAS Vega.    Head WDL  Ears WDL  Nose WDL  Mouth WDL  Neck WDL  Breast/Chest Redness and Blanching  Shoulder Blades WDL  Spine Redness and Blanching  (R) Arm/Elbow/Hand WDL  (L) Arm/Elbow/Hand Redness and Blanching  Abdomen Scab  Groin WDL  Scrotum/Coccyx/Buttocks Redness and Blanching  (R) Leg WDL  (L) Leg WDL  (R) Heel/Foot/Toe WDL  (L) Heel/Foot/Toe WDL          Devices In Places Blood Pressure Cuff, Pulse Ox, and SCD's      Interventions In Place NC W/Ear Foams, Heel Mepilex, Sacral Mepilex, and Pillows    Possible Skin Injury No    Pictures Uploaded Into Epic Yes  Wound Consult Placed N/A  RN Wound Prevention Protocol Ordered No

## 2025-05-22 NOTE — ASSESSMENT & PLAN NOTE
Patient reports a several month history of fecal incontinence without urinary incontinence. Differentials include infectious diarrhea vs fecal impaction vs neuropathy.    Plan:  -Stool culture and O&P ordered  -Abdominal XR to rule out impaction  -Outpatient f/u with possible GI referral

## 2025-05-22 NOTE — ASSESSMENT & PLAN NOTE
Chronic and stable. No evidence of acute exacerbation at this time. Has had difficulty with inhaler compliance in the past.    Plan:  -Continue Symbicort inpatient  -See Hypoxia for further plan

## 2025-05-22 NOTE — ED TRIAGE NOTES
"Chief Complaint   Patient presents with    Incontinence     Pt states \"I don't know I am going poop, it just comes out\". Pt states this has been going on 4-5 months.     Painful Urination     X4-5 months     Pt ambulatory to triage for above complaint. Pt states she was sent by PCP for CT/MRI for concerns of spinal compression.     A+Ox4, GCS 15    Pt placed in lobby. Educated on triage process. Encouraged to alert staff to any changes.      BP (!) 147/99   Pulse 90   Temp 36.4 °C (97.5 °F) (Temporal)   Resp 16   Ht 1.473 m (4' 10\")   Wt 71.7 kg (158 lb)   LMP  (LMP Unknown)   SpO2 88% Comment: 88 room air / 94 on 2 L in lobby  BMI 33.02 kg/m²     "

## 2025-05-22 NOTE — PROGRESS NOTES
[facility] FAMILY MEDICINE PROGRESS NOTE     Attending: Jena Casanova M.d.    PATIENT: Martha Mosher; [id number]; 1960    ID: 65 y.o. female known COPD on 3-4L in the past but no longer has oxygen and admitted on 5/21/2025 for hypoxia. Also with fecal incontinence.    SUBJECTIVE: No acute events overnight. Patient reports of fecal incontinence, headaches as she does history of migraine headaches. She also requesting her pain medications. Patient states she has chronic pain, takes percocet prescribed by her pain specialist. Patient states that she had previously quit smoking but restarted abbout 2 months ago. Patient expresses willingness to restart her chantix.     OBJECTIVE:   Vitals:    05/22/25 0302 05/22/25 0402 05/22/25 0536 05/22/25 0537   BP: 114/70 114/70 115/75    Pulse: 64 65 60    Resp: 15 18 18    Temp:   35.9 °C (96.6 °F)    TempSrc:   Temporal    SpO2: 92% 93% 95%    Weight:    72.9 kg (160 lb 11.5 oz)   Height:         No intake or output data in the 24 hours ending 05/22/25 0727    PE:   General: No acute distress, resting comfortably in bed.  HEENT: NC/AT. PERRLA. EOMI. MMM  Cardiovascular: Normal S1/S2, RRR, no m/r/g  Respiratory: Symmetric inspiratory effort. Wheezing the upper right lung  Abdomen: BS+, soft, NT/ND   EXT:  [place], 2+ pulses, no rashes, bruising, or bleeding.  Neuro: Non focal with no numbness, tingling or changes in sensation      LABS x 2 DAYS:  Recent Labs     05/21/25 2157   WBC 5.0   RBC 5.34   HEMOGLOBIN 16.1*   HEMATOCRIT 50.1*   MCV 93.8   MCH 30.1   RDW 46.8   PLATELETCT 187   MPV 9.8   NEUTSPOLYS 51.40   LYMPHOCYTES 32.40   MONOCYTES 8.10   EOSINOPHILS 5.40   BASOPHILS 1.80   RBCMORPHOLO Normal     Recent Labs     05/21/25 2157   SODIUM 139   POTASSIUM 4.4   CHLORIDE 103   CO2 25   GLUCOSE 101*   BUN 14     Recent Labs     05/21/25 2157   ALBUMIN 4.3   TBILIRUBIN 0.3   ALKPHOSPHAT 89   TOTPROTEIN 7.7   ALTSGPT 12   ASTSGOT 44   CREATININE 0.94        MICROBIOLOGY:  Results       Procedure Component Value Units Date/Time    URINE CULTURE-EXISTING-LESS THAN 48 HOURS [[id number]] Collected: 05/22/25 0006    Order Status: Sent Specimen: Urine, Clean Catch Updated: 05/22/25 0559    CoV-2, Flu A/B, And RSV by PCR ([facility]) [[id number]] Collected: 05/22/25 0345    Order Status: Completed Specimen: Respirate from Nasal Updated: 05/22/25 0446     Influenza virus A RNA Negative     Influenza virus B, PCR Negative     RSV, PCR Negative     SARS-CoV-2 by PCR NotDetected     Comment: [facility] providers: PLEASE REFER TO DE-ESCALATION AND RETESTING PROTOCOL  on [email or web address]    **The [facility] GeneXpert Xpress SARS-CoV-2 RT-PCR Test has been made  available for use under the Emergency Use Authorization (EUA) only.          SARS-CoV-2 Source NP Swab    CULTURE STOOL [[id number]]     Order Status: Sent Specimen: Stool     Complete O&P [[id number]]     Order Status: Sent Specimen: Stool     URINALYSIS (UA) [[id number]]  (Abnormal) Collected: 05/22/25 0006    Order Status: Completed Specimen: Urine Updated: 05/22/25 0027     Color Yellow     Character Clear     Specific Gravity 1.008     Ph 5.5     Glucose Negative mg/dL      Ketones Negative mg/dL      Protein Negative mg/dL      Bilirubin Negative     Urobilinogen, Urine 0.2 EU/dL      Nitrite Negative     Leukocyte Esterase Trace     Occult Blood Negative     Micro Urine Req Microscopic          IMAGING:   [id number] VIEW   Final Result         1.  Moderate stool in the colon suggests changes of constipation, otherwise nonspecific bowel gas pattern in the upper abdomen      DX-CHEST-PORTABLE (1 VIEW)   Final Result         1.  Bibasilar atelectasis and/or infiltrates      MR-LUMBAR SPINE-W/O   Final Result      1.  L4-L5 laminectomies with improved spinal stenosis from prior MRI.   2.  L3-L4 mild to moderate spinal stenosis is similar to prior.   3.  L5-S1 severe right facet degeneration with marrow and pericapsular edema  suggesting active facet arthritis.   4.  Mild degenerative changes elsewhere as detailed.   5.  Cholelithiasis.                MEDS:  Current Facility-Administered Medications   Medication Last Admin    Respiratory Therapy Consult      enoxaparin (Lovenox) inj 40 mg 40 mg at 05/22/25 0341    gabapentin (Neurontin) capsule 800 mg      omeprazole (PriLOSEC) capsule 20 mg      DULoxetine (Cymbalta) capsule 60 mg      busPIRone (Buspar) tablet 7.5 mg      albuterol inhaler 2 Puff      mometasone-formoterol (Dulera) 200-5 MCG/ACT inhaler 2 Puff      oxyCODONE-acetaminophen (Percocet) 7.5-325 MG per tablet 1 Tablet         PROBLEM LIST:  No problems updated.    ASSESSMENT/PLAN: Martha Mosher is a 65 y.o. female here with:    * Hypoxia- (present on admission)  Assessment & Plan  Patient has a known history of COPD and is on Symbicort daily with Albuterol as needed. She was previously on home supplemental oxygen (3-4L) per chart review and this does not appear to be acute. Patient does not have oxygen at home. She has no acute cough, fevers, or chills, increased sputum, dyspnea, likely COPD exacerbation. Troponin and BNP are not acutely elevated. CXR shows bibasilar atelectasis. Patient currently on 2L satting at 94%.    Plan:  - O2 walk test for home O2. Will order DME once completed  - Counseled patient on smoking cessation and she is interested in quitting. Has Chantix at home. Will order nicotine patches  - Recommend outpatient follow-up for PFT's  - Continue to monitor  - RT consult placed    Full incontinence of feces  Constipation  Assessment & Plan  Patient reports a several month history of fecal incontinence without urinary incontinence. Differentials include infectious diarrhea vs fecal impaction vs neuropathy. Patient on opioid and reports using Miralax, but stopped a few months ago. Abdominal xray showed moderate stool in the colon suggests changes of constipation, otherwise nonspecific bowel gas pattern in  the upper abdomen    Plan:  - Stool culture and O&P results pending  - Will start senna-docusate, PEG  - Encourage out of bed ambulation, use of incentive spirometer  - Outpatient f/u with possible GI referral    Tobacco use- (present on admission)  Assessment & Plan  40 pack year smoking history. Counseled patient on tobacco cessation which she is interested in.  - Will order nicotine patches while inpatient  -  to provide additional resources to patient    Dysuria- (present on admission)  Assessment & Plan  Patient reporting several months of frequent urination with pain. She denies any blood. Urinalysis positive for leukocyte esterase. Microscopy without bacteria.    Plan  - Urine culture pending    COPD (chronic obstructive pulmonary disease) (HCC)- (present on admission)  Assessment & Plan  Chronic and stable on Symbicort. No evidence of acute exacerbation at this time. Has had difficulty with inhaler compliance in the past.    Plan:  - Continue Dulera inpatient  - See Hypoxia for further plan    Psychiatric disorder- (present on admission)  Assessment & Plan  - Continue Buspar and Duloxetine    Migraine headache  - Sumatriptan home as needed      Core Measures:  Fluids: PO  Lines: PIV  Abx: None  Diet: regular  PPX: Lovenox  DISPO: inpatient for home O2 evaluation      CODE STATUS: Full    Marcus Walton, PGY3  [facility]

## 2025-05-22 NOTE — ASSESSMENT & PLAN NOTE
Patient reporting several months of frequent urination with pain. She denies any blood. Urinalysis positive for leukocyte esterase. Microscopy without bacteria.    Plan  -Urine culture to evaluate for UTI

## 2025-05-22 NOTE — ED NOTES
Pt medicated per MAR for MRI, MRI tech at bedside to take pt, after meds pt became very tired, unable to stand up and get into wheelchair. Went awad to get gurney to take pt to mri.

## 2025-05-22 NOTE — THERAPY
"Speech Language Pathology   Clinical Swallow Evaluation     Patient Name: Martha Mosher  AGE:  65 y.o., SEX:  female  Medical Record #: [id number]  Date of Service: 5/22/2025      History of Present Illness  Patient is a 65 y.o. female who presented on 5/21 with incontinence and found to be hypoxic.    PMHx: anxiety, arthritis, cancer, chronic airway obstruction, COPD, GERD    CXR:  1.  Bibasilar atelectasis and/or infiltrates      General Information:  Vitals  O2 (LPM): 2  O2 Delivery Device: Silicone Nasal Cannula  Level of Consciousness: Alert, Awake  Orientation: Oriented x 4  Follows Directives: Yes      Prior Living Situation & Level of Function:  Housing / Facility: 1 Story Apartment / Condo  Lives with - Patient's Self Care Capacity: Alone and Able to Care For Self   Swallowing: Pt reports hx of globus sensation       Oral Mechanism Evaluation:  Dentition: Natural dentition   Facial Symmetry: Equal      Labial Observations: WFL   Lingual Observations: Midline      Laryngeal Function:  Secretion Management: Adequate  Voice Quality: Hoarse  Cough: Perceptually WNL       Subjective  Patient received awake, sitting upright in bed eating breakfast. Reported occasional globus sensation with liquids and solids. Pt endorsed sometimes she is able to get it clear with liquid but sometimes has to cough it back up. Pt reported previously having \"a hole\" in her esophagus that was repaired.       Assessment  Current Method of Nutrition: Oral diet (Regular solids/thin liquids)  Positioning: Larkin's (60-90 degrees)  Bolus Administration: Patient  O2 (LPM): 2 O2 Delivery Device: Silicone Nasal Cannula        Swallowing Trials:  Swallowing Trials  Thin Liquid (TN0): WFL  Easy to Chew (EC7): WFL  Regular (RG7): WFL      Comments: Patient self-feeding without difficulty. Pt consumed water via straw, Japanese toast, and demarco. Functional mastication of solids. Complete oral bolus clearance. No cough/throat clear, vocal " wetness, or increased WOB appreciated throughout oral intake. Pt endorsed brief globus sensation with thin liquids and regular solids that cleared. Education provided regarding aspiration and reflux precautions. Discussed recommendation for GI consult for further work up; pt endorsed understanding.       Clinical Impressions  Patient presents without clinical indicators concerning for oropharyngeal dysphagia. Concern for possible esophageal dysphagia given pt reports of globus sensation with food; pt would benefit from an outpatient GI consult for further work up. Recommend continuation of diet with adherence to reflux precautions. SLP to follow.       Recommendations  Diet Consistency: Regular solids/thin liquids  Instrumentation: None indicated at this time  Medication: As tolerated  Supervision: Distant supervision - check on patient 2-3 times per meal  Positioning: Fully upright and midline during oral intake, Remain upright for 30-45 minutes after oral intake, Meals sitting upright in a chair, as tolerated  Risk Management : Small bites/sips, Slow rate of intake, Alternate bites and sips  Oral Care: BID  Consult Referral(s): Gastroenterologist (outpatient okay)      SLP Treatment Plan  Treatment Plan: Dysphagia Treatment  SLP Frequency: 2x Per Week  Estimated Duration: Until Therapy Goals Met      Anticipated Discharge Needs  Discharge Recommendations: Anticipate that the patient will have no further speech therapy needs after discharge from the hospital            Patient / Family Goals  Patient / Family Goal #1: To eat breakfast  Short Term Goals  Short Term Goal # 1: Patient will consume an oral diet of regular solids/thin liquids without overt s/sx of aspiration or decline in respiratory status      Janette Dow, SLP

## 2025-05-22 NOTE — ED PROVIDER NOTES
"ED Provider Note    CHIEF COMPLAINT  Chief Complaint   Patient presents with    Incontinence     Pt states \"I don't know I am going poop, it just comes out\". Pt states this has been going on 4-5 months.     Painful Urination     X4-5 months       EXTERNAL RECORDS REVIEWED  Patient was seen at the urgent care yesterday for bowel incontinence.  She has a history of chronic low back pain with radiation down her legs.  She was denying any saddle anesthesia.  She has a history of opioid-induced constipation however has not been constipated and is having bowel incontinence instead.  No urinary incontinence.  She was referred to the ER to obtain MRI.    HPI/ROS  LIMITATION TO HISTORY   none  OUTSIDE HISTORIAN(S):  none    Martha Mosher is a 65 y.o. female who presents with bowel incontinence.  She says it has been going on for several months now.  She said that she just does not really have sensation and cannot tell when she needs to have a bowel movements.  She says that she has loose stool associated with it.  She is now endorsing having to push to urinate but has no urinary incontinence.  No saddle anesthesia.  She has no weakness and is able to ambulate with her walker.  No fevers.  She is not diabetic and is not immunocompromise.    He does report a prior history of a lumbar fusion.    PAST MEDICAL HISTORY   has a past medical history of Anxiety, Arthritis, Bronchitis (05/20/2022), Cancer ([facility]), Chronic airway obstruction, not elsewhere classified, Constipation (05/15/2018), COPD exacerbation ([facility]) (01/05/2024), Cystitis (08/01/2018), Depression, Fibromyalgia, Fx wrist, left, closed, initial encounter (08/01/2018), GERD (gastroesophageal reflux disease), H/O total hysterectomy, High-risk sexual behavior (11/08/2018), Hypertension, Indigestion, Low back pain associated with a spinal disorder other than radiculopathy or spinal stenosis (05/08/2014), Mass of parotid gland (04/20/2021), Migraine with aura, " intractable, without status migrainosus (11/08/2018), Osteoporosis, unspecified, Other emphysema (HCC), Psychiatric disorder (01/29/2018), Pyelonephritis (05/15/2018), Renal disorder, Severe sepsis (HCC) (05/13/2018), Swollen lymph nodes (05/31/2018), Type II or unspecified type diabetes mellitus without mention of complication, not stated as uncontrolled, Ulcer, Unspecified asthma(493.90), Unspecified cataract, and Urolithiasis.    SURGICAL HISTORY   has a past surgical history that includes gastroscopy with biopsy (03/01/2009); colonoscopy with biopsy (08/03/2009); lithotripsy; appendectomy; primary c section; hernia repair; abdominal hysterectomy total; neck exploration; and lumbar laminectomy diskectomy (01/03/2023).    FAMILY HISTORY  Family History   Problem Relation Age of Onset    Lung Disease Mother     Diabetes Mother     Stroke Mother     Arthritis Father     Cancer Father     Diabetes Father     Hypertension Father     Stroke Father     Alcohol/Drug Father     Hypertension Brother     Stroke Brother     Alcohol/Drug Brother     Arthritis Maternal Aunt     Arthritis Maternal Uncle     Cancer Paternal Grandmother     Cancer Paternal Grandfather        SOCIAL HISTORY  Social History     Tobacco Use    Smoking status: Every Day     Current packs/day: 0.25     Average packs/day: 0.3 packs/day for 30.0 years (7.5 ttl pk-yrs)     Types: Cigarettes    Smokeless tobacco: Never    Tobacco comments:     1ppd - quit 7-8 months ago   Vaping Use    Vaping status: Former    Quit date: 11/1/2023    Substances: Nicotine   Substance and Sexual Activity    Alcohol use: Not Currently     Comment: occ    Drug use: Not Currently     Types: Inhaled     Comment: Meth 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    Sexual activity: Not Currently     Partners: Male       CURRENT MEDICATIONS  Home Medications       Reviewed by Klaus Reddy (Pharmacy Tech) on 05/22/25 at 0418  Med List Status: Complete     Medication Last Dose Status   acetaminophen  "(TYLENOL) 500 MG Tab 5/8/2025 Active   albuterol (PROVENTIL HFA) 108 (90 Base) MCG/ACT Aero Soln inhalation aerosol 5/21/2025 Active   asa/apap/caffeine (EXCEDRIN) 250-250-65 MG Tab 5/21/2025 Active   busPIRone (BUSPAR) 7.5 MG tablet 5/21/2025 Active   cyclobenzaprine (FLEXERIL) 10 mg Tab 5/21/2025 Active   DULoxetine (CYMBALTA) 60 MG Cap DR Particles delayed-release capsule 5/21/2025 Active   gabapentin (NEURONTIN) 800 MG tablet 5/21/2025 Active   omeprazole (PRILOSEC) 20 MG delayed-release capsule 5/21/2025 Active   oxyCODONE-acetaminophen (PERCOCET) 7.5-325 MG per tablet 5/21/2025 Active   polyethylene glycol 3350 (MIRALAX) 17 GM/SCOOP Powder 5/14/2025 Active   promethazine (PHENERGAN) 25 MG Tab 5/21/2025 Active   QUEtiapine (SEROQUEL) 100 MG Tab 5/21/2025 Active   SUMAtriptan (IMITREX) 50 MG Tab 5/21/2025 Active   SYMBICORT 80-4.5 MCG/ACT Aerosol 5/17/2025 Active   trazodone (DESYREL) 300 MG tablet 5/21/2025 Active   Varenicline Tartrate, Starter, 0.5 MG X 11 & 1 MG X 42 Tablet Therapy Pack Not Taking Active                  Audit from Redirected Encounters    **Home medications have not yet been reviewed for this encounter**         ALLERGIES  Allergies[1]    PHYSICAL EXAM  VITAL SIGNS: /70   Pulse 64   Temp 36.4 °C (97.5 °F) (Temporal)   Resp 15   Ht 1.473 m (4' 10\")   Wt 71.7 kg (158 lb)   LMP  (LMP Unknown)   SpO2 92%   BMI 33.02 kg/m²    Constitutional: Awake and alert. Nontoxic  HENT:  Grossly normal  Eyes: Grossly normal  Neck: Normal range of motion  Cardiovascular: Normal heart rate   Thorax & Lungs: No respiratory distress  Abdomen: Nontender  Skin:  No pathologic rash.   No midline tenderness. No focal tenderness old appearing surgical scar to lumbar spine. Full ROM.  Neuro: Sensation to gross touch intact including in saddle region. Strength 5/5 in all extremities.   Extremities: Well perfused  Psychiatric: Affect normal        EKG/LABS  Results for orders placed or performed during the " hospital encounter of 05/21/25   CBC WITH DIFFERENTIAL    Collection Time: 05/21/25  9:57 PM   Result Value Ref Range    WBC 5.0 4.8 - 10.8 K/uL    RBC 5.34 4.20 - 5.40 M/uL    Hemoglobin 16.1 (H) 12.0 - 16.0 g/dL    Hematocrit 50.1 (H) 37.0 - 47.0 %    MCV 93.8 81.4 - 97.8 fL    MCH 30.1 27.0 - 33.0 pg    MCHC 32.1 (L) 32.2 - 35.5 g/dL    RDW 46.8 35.9 - 50.0 fL    Platelet Count 187 164 - 446 K/uL    MPV 9.8 9.0 - 12.9 fL    Neutrophils-Polys 51.40 44.00 - 72.00 %    Lymphocytes 32.40 22.00 - 41.00 %    Monocytes 8.10 0.00 - 13.40 %    Eosinophils 5.40 0.00 - 6.90 %    Basophils 1.80 0.00 - 1.80 %    Nucleated RBC 0.00 0.00 - 0.20 /100 WBC    Neutrophils (Absolute) 2.57 1.82 - 7.42 K/uL    Lymphs (Absolute) 1.62 1.00 - 4.80 K/uL    Monos (Absolute) 0.40 0.00 - 0.85 K/uL    Eos (Absolute) 0.27 0.00 - 0.51 K/uL    Baso (Absolute) 0.09 0.00 - 0.12 K/uL    NRBC (Absolute) 0.00 K/uL   COMP METABOLIC PANEL    Collection Time: 05/21/25  9:57 PM   Result Value Ref Range    Sodium 139 135 - 145 mmol/L    Potassium 4.4 3.6 - 5.5 mmol/L    Chloride 103 96 - 112 mmol/L    Co2 25 20 - 33 mmol/L    Anion Gap 11.0 7.0 - 16.0    Glucose 101 (H) 65 - 99 mg/dL    Bun 14 8 - 22 mg/dL    Creatinine 0.94 0.50 - 1.40 mg/dL    Calcium 8.7 8.5 - 10.5 mg/dL    Correct Calcium 8.5 8.5 - 10.5 mg/dL    AST(SGOT) 44 12 - 45 U/L    ALT(SGPT) 12 2 - 50 U/L    Alkaline Phosphatase 89 30 - 99 U/L    Total Bilirubin 0.3 0.1 - 1.5 mg/dL    Albumin 4.3 3.2 - 4.9 g/dL    Total Protein 7.7 6.0 - 8.2 g/dL    Globulin 3.4 1.9 - 3.5 g/dL    A-G Ratio 1.3 g/dL   LIPASE    Collection Time: 05/21/25  9:57 PM   Result Value Ref Range    Lipase 29 11 - 82 U/L   ESTIMATED GFR    Collection Time: 05/21/25  9:57 PM   Result Value Ref Range    GFR (CKD-EPI) 68 >60 mL/min/1.73 m 2   DIFFERENTIAL MANUAL    Collection Time: 05/21/25  9:57 PM   Result Value Ref Range    Metamyelocytes 0.90 %    Manual Diff Status PERFORMED    PERIPHERAL SMEAR REVIEW    Collection  Time: 05/21/25  9:57 PM   Result Value Ref Range    Peripheral Smear Review see below    PLATELET ESTIMATE    Collection Time: 05/21/25  9:57 PM   Result Value Ref Range    Plt Estimation Normal    MORPHOLOGY    Collection Time: 05/21/25  9:57 PM   Result Value Ref Range    RBC Morphology Normal    proBrain Natriuretic Peptide, NT    Collection Time: 05/21/25  9:57 PM   Result Value Ref Range    NT-proBNP 506 (H) 0 - 125 pg/mL   TROPONIN    Collection Time: 05/21/25  9:57 PM   Result Value Ref Range    Troponin T 14 6 - 19 ng/L   URINALYSIS (UA)    Collection Time: 05/22/25 12:06 AM    Specimen: Urine   Result Value Ref Range    Color Yellow     Character Clear     Specific Gravity 1.008 <1.035    Ph 5.5 5.0 - 8.0    Glucose Negative Negative mg/dL    Ketones Negative Negative mg/dL    Protein Negative Negative mg/dL    Bilirubin Negative Negative    Urobilinogen, Urine 0.2 <=1.0 EU/dL    Nitrite Negative Negative    Leukocyte Esterase Trace (A) Negative    Occult Blood Negative Negative    Micro Urine Req Microscopic    URINE MICROSCOPIC (W/UA)    Collection Time: 05/22/25 12:06 AM   Result Value Ref Range    WBC 0-2 /hpf    RBC 0-2 0 - 2 /hpf    Bacteria None Seen None /hpf    Epithelial Cells 0-2 0 - 5 /hpf    Urine Casts 0-2 0 - 2 /lpf   CoV-2, Flu A/B, And RSV by PCR ([employer])    Collection Time: 05/22/25  3:45 AM    Specimen: Nasal; Respirate   Result Value Ref Range    Influenza virus A RNA Negative Negative    Influenza virus B, PCR Negative Negative    RSV, PCR Negative Negative    SARS-CoV-2 by PCR NotDetected     SARS-CoV-2 Source NP Swab        I have independently interpreted this EKG    RADIOLOGY/PROCEDURES   I have independently interpreted the diagnostic imaging associated with this visit and am waiting the final reading from the radiologist.   My preliminary interpretation is as follows: No obvious signs of critical cord compression    Radiologist interpretation:  DX-CHEST-PORTABLE (1 VIEW)   Final  Result         1.  Bibasilar atelectasis and/or infiltrates      MR-LUMBAR SPINE-W/O    (Results Pending)   [id number] VIEW    (Results Pending)       COURSE & MEDICAL DECISION MAKING    ASSESSMENT, COURSE AND PLAN  Care Narrative: This is a 65-year-old who presents with 3 months of bowel incontinence and loose stools.  She does also have acute on chronic low back pain.  She was seen at the urgent care yesterday and was referred to the ER for to obtain an MRI.  She was unable to get it here until this evening.  She has no lower extremity weakness, saddle anesthesia or numbness on exam.  She does report bowel incontinence but no urinary incontinence, although PVR slightly elevated (150cc).  She has no fevers or risk factors for epidural abscess and I doubt an infectious etiology such as abscess or discitis.  I did obtain MRI of the lumbar spine to evaluate for critical cord compression or cauda equina syndrome.  I discussed with the radiologist and this is fortunately unremarkable regarding any emergent findings.  There is no indication for emergent neurosurgical intervention.  She is able to ambulate without difficulty.  I do think her incontinence could be further worked up as an outpatient.  However the patient has remained persistently hypoxic here in the ER.  Her room air sats are as low as 86%.  She is not having any shortness of breath.  She is not having any associated chest pain to suggest PE, ACS .  She does have an infiltrate on x-ray but she is not having any cough or fevers clinically suggest this.  No indication for antibiotics.  She has no wheezing or increased work of breathing to suggest a COPD exacerbation.  She does report smoking tobacco and I suspect she likely needs to be on chronic oxygen.  She will require admission to the hospital in order to facilitate arranging outpatient oxygen.  She will follow-up regarding the incontinence with her primary doctor.       DISPOSITION AND DISCUSSIONS  I  have discussed management of the patient with the following physicians and GAURAV's: R Family medicine    Discussion of management with other [facility] [place] or appropriate source(s): Radiologist Dr. Velazquez regarding imaging findings       FINAL DIAGNOSIS  1. Midline low back pain without sciatica, unspecified chronicity Acute   2. Incontinence of feces, unspecified fecal incontinence type    3. Respiratory failure with hypoxia, unspecified chronicity (HCC) Acute        Electronically signed by: Kateryna Wheeler M.D., 5/21/2025 9:54 PM           [1]   Allergies  Allergen Reactions    Sulfa Drugs Hives, Unspecified and Shortness of Breath     Pt states that she hallucinates on this as well as getting hives    Lyrica Unspecified     Hallucinations

## 2025-05-22 NOTE — ASSESSMENT & PLAN NOTE
Patient has a known history of COPD and is on Symbicort daily with Albuterol as needed. She was previously on home supplemental oxygen (3-4L) per chart review and this does not appear to be acute. Patient does not have oxygen at home. She has no acute cough, fevers, or chills. Troponin and BNP are not acutely elevated. Chest XR shows bibasilar atelectasis.    Plan:  -O2 walk test  -Home O2  -Counseled patient on smoking cessation and she is interested in quitting Has Chantix at home  -Recommend outpatient follow-up for PFT's

## 2025-05-22 NOTE — ASSESSMENT & PLAN NOTE
40 pack year smoking history. Counseled patient on tobacco cessation which she is interested in.  -CM to provide additional resources to patient

## 2025-05-22 NOTE — CARE PLAN
The patient is Stable - Low risk of patient condition declining or worsening    Shift Goals  Clinical Goals: pain management, monitor respiratory status, mobility  Patient Goals: pain management  Family Goals: aleyda    Progress made toward(s) clinical / shift goals:  Patient educated on plan of care. Patient medicated per MAR for pain. Patient ambulating with walker and is steady on feet. Skin integrity maintained. Patient did not have any episodes of incontinence throughout shift. Patient being weaned down on oxygen.     Problem: Knowledge Deficit - Standard  Goal: Patient and family/care givers will demonstrate understanding of plan of care, disease process/condition, diagnostic tests and medications  Outcome: Progressing     Problem: Pain - Standard  Goal: Alleviation of pain or a reduction in pain to the patient’s comfort goal  Outcome: Progressing     Problem: Skin Integrity  Goal: Skin integrity is maintained or improved  Outcome: Progressing     Problem: Fall Risk  Goal: Patient will remain free from falls  Outcome: Progressing       Patient is not progressing towards the following goals:

## 2025-05-22 NOTE — DISCHARGE PLANNING
Case Management Discharge Planning    Admission Date: 5/21/2025  GMLOS:    ALOS: 0    6-Clicks ADL Score: 24  6-Clicks Mobility Score: 18      Anticipated Discharge Dispo: Discharge Disposition: Discharged to home/self care (01)    DME Needed: No    Action(s) Taken: DC Assessment Complete (See below)    Escalations Completed: Provider    Medically Clear: No    Next Steps: f/u with pt and medical team to discuss dc needs and barriers.    Barriers to Discharge: Medical clearance    Is the patient up for discharge tomorrow: Possible      Care Transition Team Assessment    RN LAWRENCE met with pt at bedside to complete assessment. Pt A&Ox4 and able to verify the information on the face sheet. Pt lives alone in a ground floor apartment in [facility]. At baseline, Pt is independent with all her ADL's and IADL's. Pt uses FWW for ambulation. Pt does not use O2 at baseline. Pt denies any history of ETOH/drug abuse. PCP is Dr Katharina Murdock. Pt's sonAbi Hair is listed as emergency contact and can provide transport home.  Payor is Medicaid FFS.     Information Source  Orientation Level: Oriented X4  Information Given By: Patient  Who is responsible for making decisions for patient? : Patient    Readmission Evaluation  Is this a readmission?: No    Elopement Risk  Legal Hold: No  Ambulatory or Self Mobile in Wheelchair: Yes  Disoriented: No  Psychiatric Symptoms: None  History of Wandering: No  Elopement this Admit: No  Vocalizing Wanting to Leave: No  Displays Behaviors, Body Language Wanting to Leave: No-Not at Risk for Elopement  Elopement Risk: Not at Risk for Elopement    Interdisciplinary Discharge Planning  Lives with - Patient's Self Care Capacity: Alone and Able to Care For Self  Patient or legal guardian wants to designate a caregiver: Yes  Caregiver name: sivan stern  Support Systems: Children, Family Member(s)  Housing / Facility: 1 Story Apartment / Condo    Discharge Preparedness  What is your plan after discharge?: Home  with help  What are your discharge supports?: Child  Prior Functional Level: Ambulatory, Independent with Activities of Daily Living, Uses Walker  Difficulity with ADLs: None  Difficulity with IADLs: None    Functional Assesment  Prior Functional Level: Ambulatory, Independent with Activities of Daily Living, Uses Walker    Finances  Financial Barriers to Discharge: No  Prescription Coverage: Yes    Vision / Hearing Impairment  Vision Impairment : No  Right Eye Vision: Impaired, Wears Glasses  Left Eye Vision: Impaired, Wears Glasses  Hearing Impairment : No         Advance Directive  Advance Directive?: None    Domestic Abuse  Have you ever been the victim of abuse or violence?: No  Possible Abuse/Neglect Reported to:: Not Applicable    Psychological Assessment  History of Substance Abuse: None    Discharge Risks or Barriers  Discharge risks or barriers?: Complex medical needs  Patient risk factors: Complex medical needs    Anticipated Discharge Information  Discharge Disposition: Discharged to home/self care (01)

## 2025-05-22 NOTE — DISCHARGE INSTRUCTIONS
Chronic Obstructive Pulmonary Disease (COPD) is a long-term, progressive lung disease that makes it harder to breathe. It includes chronic bronchitis, emphysema, and refractory (non-reversible) asthma. With COPD, the airways in your lungs become inflamed and thicken, and the tissue where oxygen is exchanged is destroyed. The flow of air in and out of your lungs decreases. When that happens, less oxygen gets into your body tissues, and it becomes harder to get rid of the waste gas carbon dioxide. As the disease gets worse, shortness of breath makes it harder to remain active. There is no cure for COPD, but it is preventable and treatable.    COPD Patient Discharge Instructions    Diet  Follow a low fat, low cholesterol, low salt diet unless instructed otherwise by your Doctor. Read the labels on the back of food products and track your intake of fat, cholesterol and salt.  No smoking  Discontinuing smoking will have the biggest impact on preventing progression of disease.  To participate in [employer]’s Quit Tobacco Program, call [phone] or visit [email or web address]  Oxygen  If your doctor has order that you wear oxygen at home, it is important to wear it as ordered.  Do not smoke, vape, or use e-cigarettes with oxygen on.  Store in an appropriate location: upright in its bell or laid flat down, away from open flames and stoves.   Do not use oil-based creams and moisturizers (ie: petroleum products, oil-based lip moisturizers) or aerosol sprays (ie: hair sprays or deodorants) when using your oxygen equipment.  Be careful with tubing placement to prevent yourself and others from tripping.  Medications  Refer to your personalized Action Plan to manage your symptoms.  Warning signs of an exacerbation  Breathing fast and shallow, worsening shortness of breath (like you just finished exercising)  Chest tightness  Increases in sputum production  Changes in sputum color  Lower oxygen levels than baseline  When  to call your doctor  If the warning signs of an exacerbation do not improve  Refer to your personalized Action Plan   Pulmonary Rehab  Your doctor has ordered you a referral to Pulmonary Rehab. Call [phone] to schedule an appointment  Attend your follow-up appointment with your PCP and/or Pulmonologist  Remote Monitoring: At the direction of the remote monitoring on-call provider, you may increase your oxygen by 2 liters above your baseline.     See the educational handout provided by your COPD Navigator for more information. This also explains more about COPD, symptoms of an exacerbation, and some of the tests that you have undergone.

## 2025-05-24 ENCOUNTER — RESULTS FOLLOW-UP (OUTPATIENT)
Dept: MEDICAL GROUP | Facility: CLINIC | Age: 65
End: 2025-05-24

## 2025-05-24 LAB
BACTERIA UR CULT: NORMAL
SIGNIFICANT IND 70042: NORMAL
SITE SITE: NORMAL
SOURCE SOURCE: NORMAL

## 2025-06-02 NOTE — DISCHARGE SUMMARY
"[facility] MEDICINE DISCHARGE SUMMARY     PATIENT ID:  Name:             Martha Mosher   YOB: 1960  Age:                 65 y.o.  female   MRN:               [id number]  Address:         [address] [place] [address] [place] [address]  Phone:            [phone] (home)    ADMISSION DATE:   5/21/2025    DISCHARGE DATE:   5/22/2025    DISCHARGE DIAGNOSES:   1. Midline low back pain without sciatica, unspecified chronicity Acute       2. Incontinence of feces, unspecified fecal incontinence type        3. Respiratory failure with hypoxia, unspecified chronicity (HCC) Acute         No problems updated.    ATTENDING PHYSICIAN:   Jena De La Fuente MD    RESIDENT:   Marcus Walton MD    CONSULTANTS:    None    PROCEDURES:    None    IMAGING:   LY-GIJYYRC-0 VIEW   Final Result         1.  Moderate stool in the colon suggests changes of constipation, otherwise nonspecific bowel gas pattern in the upper abdomen      DX-CHEST-PORTABLE (1 VIEW)   Final Result         1.  Bibasilar atelectasis and/or infiltrates      MR-LUMBAR SPINE-W/O   Final Result      1.  L4-L5 laminectomies with improved spinal stenosis from prior MRI.   2.  L3-L4 mild to moderate spinal stenosis is similar to prior.   3.  L5-S1 severe right facet degeneration with marrow and pericapsular edema suggesting active facet arthritis.   4.  Mild degenerative changes elsewhere as detailed.   5.  Cholelithiasis.                LABS:  No results for input(s): \"WBC\", \"RBC\", \"HEMOGLOBIN\", \"HEMATOCRIT\", \"MCV\", \"MCH\", \"RDW\", \"PLATELETCT\", \"MPV\", \"NEUTSPOLYS\", \"LYMPHOCYTES\", \"MONOCYTES\", \"EOSINOPHILS\", \"BASOPHILS\", \"RBCMORPHOLO\" in the last 72 hours.  No results for input(s): \"SODIUM\", \"POTASSIUM\", \"CHLORIDE\", \"CO2\", \"GLUCOSE\", \"BUN\", \"CPKTOTAL\" in the last 72 hours.  Lab Results   Component Value Date/Time    CHOLSTRLTOT 152 04/03/2025 02:23 PM    LDL 89 04/03/2025 02:23 PM    HDL 38 (A) 04/03/2025 02:23 PM    TRIGLYCERIDE 123 04/03/2025 " "02:23 PM       Lab Results   Component Value Date/Time    TROPONINI 0.01 05/13/2018 01:08 PM    CKMB 1.6 02/27/2009 07:40 PM       Lab Results   Component Value Date/Time    TROPONINI 0.01 05/13/2018 01:08 PM    CKMB 1.6 02/27/2009 07:40 PM            HOSPITAL COURSE:   Per Admission HPI:  \" Martha Mosher is a 65 y.o. female who presented with incontinence ad found to be hypoxic. Patient reports that for a few months now, she has been intermittently incontinent of her bowels. She has several episodes of bowel movements a day which she describes as a soft serve consistency. The patient denies any blood in her stools. She has not had any recent travel outside of the [place] but is not sure about any antibiotic use prior to these symptoms starting. Patient does have a prior history of constipation but has not been taking any laxatives. She does have a history of chronic back pain with sciatica. She reports both of her legs \"giving out\" intermittently when she uses her walker. The patient has numbness in her right leg. She has no urinary incontinence but reports an increase in the frequency of her urination as well as pain with urination.      The patient reports that she has been told she needs oxygen in the past. She has a 40 pack year smoking history and is interested in quitting. She denies a chronic or current cough. Patient denies any chest pain but does report some shortness of breath. She denies any recent illnesses.      ERCourse:  Patient's vital signs were notable for hypoxia and requiring 2L nasal canula. She did receive Versed while in the ED for MRI. MRI results did not demonstrate cauda equina or spinal cord compression.    During hospitalization, the patient required supplemental oxygen to maintain saturations above 90%. An oxygen titration trial was recommended in order to place a durable medical equipment (DME) order for home oxygen. However, the patient declined the oxygen trial and requested discharge, " stating she has not experienced further episodes of fecal incontinence since admission. Patient is discharged home in stable condition and with Varenicline for smoking cessation.     DISCHARGE CONDITION:    Stable    DISPOSITION:   Home     DISCHARGE MEDICATIONS:      Medication List        START taking these medications        Instructions   Varenicline Tartrate (Starter) 0.5 MG X 11 & 1 MG X 42 Tbpk   Doctor's comments: **Patient requests 90 days supply**  FOLLOW PACKAGE DIRECTIONS            CONTINUE taking these medications        Instructions   acetaminophen 500 MG Tabs  Commonly known as: Tylenol   Take 2 Tablets by mouth every 8 hours as needed for Moderate Pain, Fever or Mild Pain.  Dose: 1,000 mg     albuterol 108 (90 Base) MCG/ACT Aers inhalation aerosol  Commonly known as: Proventil HFA   Inhale 2 Puffs every 6 hours as needed for Shortness of Breath.  Dose: 2 Puff     asa/apap/caffeine 250-250-65 MG Tabs  Commonly known as: Excedrin   Take 1 Tablet by mouth every 8 hours as needed for Headache.  Dose: 1 Tablet     busPIRone 7.5 MG tablet  Commonly known as: Buspar   Take 7.5 mg by mouth at bedtime.  Dose: 7.5 mg     cyclobenzaprine 10 MG Tabs  Commonly known as: Flexeril   Take 10 mg by mouth at bedtime.  Dose: 10 mg     DULoxetine 60 MG Cpep delayed-release capsule  Commonly known as: Cymbalta   Take 60 mg by mouth at bedtime.  Dose: 60 mg     gabapentin 800 MG tablet  Commonly known as: Neurontin   Take 800 mg by mouth 3 times a day.  Dose: 800 mg     omeprazole 20 MG delayed-release capsule  Commonly known as: PriLOSEC   TAKE 1 CAPSULE BY MOUTH AT BEDTIME  Dose: 20 mg     oxyCODONE-acetaminophen 7.5-325 MG per tablet  Commonly known as: Percocet   Take 1 Tablet by mouth every 6 hours as needed for Severe Pain.  Dose: 1 Tablet     polyethylene glycol 3350 17 GM/SCOOP Powd  Commonly known as: Miralax   Take 17 g by mouth every day.  Dose: 17 g     promethazine 25 MG Tabs  Commonly known as: Phenergan    Doctor's comments: **Patient requests 90 days supply**  TAKE 1 TABLET BY MOUTH EVERY 6 HOURS AS NEEDED FOR NAUSEA OR VOMITING  Dose: 25 mg     QUEtiapine 100 MG Tabs  Commonly known as: SEROquel   Take 200 mg by mouth at bedtime. 200 mg = 2 tablets  Dose: 200 mg     SUMAtriptan 50 MG Tabs  Commonly known as: Imitrex   TAKE 1 TABLET BY MOUTH 1 TIME AS NEEDED FOR MIGRAINE  Dose: 50 mg     Symbicort 80-4.5 MCG/ACT Aero  Generic drug: budesonide-formoterol   INHALE 2 PUFFS BY MOUTH TWICE DAILY  Dose: 2 Puff     trazodone 300 MG tablet  Commonly known as: Desyrel   Take 150 mg by mouth every evening. 150 mg = 0.5 tablet  Dose: 150 mg            ACTIVITY:   Normal Activity as Tolerated.    DIET:   Healthy    DISCHARGE INSTRUCTIONS AND FOLLOW UP:  The patient was instructed to return to the ER in the event of worsening symptoms or any other major concerns. Patient understands that failure to do so may indicate worsening of her medical condition(s) and result in adverse clinical outcomes including fatality. Patient was counseled on the importance of compliance and the patient has agreed to proceed with all medical recommendations and follow up plan indicated above. The patient understands that the failure to do so may result in result in adverse clinical outcomes including fatality.     Follow Up:   No future appointments.     CC:   Katharina Murdock M.D.

## 2025-07-18 ENCOUNTER — OFFICE VISIT (OUTPATIENT)
Dept: URGENT CARE | Facility: CLINIC | Age: 65
End: 2025-07-18
Payer: MEDICAID

## 2025-07-18 ENCOUNTER — APPOINTMENT (OUTPATIENT)
Dept: RADIOLOGY | Facility: IMAGING CENTER | Age: 65
End: 2025-07-18
Attending: STUDENT IN AN ORGANIZED HEALTH CARE EDUCATION/TRAINING PROGRAM
Payer: MEDICAID

## 2025-07-18 VITALS
SYSTOLIC BLOOD PRESSURE: 110 MMHG | OXYGEN SATURATION: 91 % | HEIGHT: 58 IN | WEIGHT: 156 LBS | HEART RATE: 69 BPM | BODY MASS INDEX: 32.75 KG/M2 | TEMPERATURE: 96.7 F | RESPIRATION RATE: 18 BRPM | DIASTOLIC BLOOD PRESSURE: 80 MMHG

## 2025-07-18 DIAGNOSIS — J18.9 PNEUMONIA OF BOTH LOWER LOBES DUE TO INFECTIOUS ORGANISM: ICD-10-CM

## 2025-07-18 DIAGNOSIS — R05.9 COUGH, UNSPECIFIED TYPE: ICD-10-CM

## 2025-07-18 DIAGNOSIS — J44.1 COPD WITH ACUTE EXACERBATION (HCC): ICD-10-CM

## 2025-07-18 PROCEDURE — 3079F DIAST BP 80-89 MM HG: CPT | Performed by: STUDENT IN AN ORGANIZED HEALTH CARE EDUCATION/TRAINING PROGRAM

## 2025-07-18 PROCEDURE — 3074F SYST BP LT 130 MM HG: CPT | Performed by: STUDENT IN AN ORGANIZED HEALTH CARE EDUCATION/TRAINING PROGRAM

## 2025-07-18 PROCEDURE — 71046 X-RAY EXAM CHEST 2 VIEWS: CPT | Mod: TC | Performed by: RADIOLOGY

## 2025-07-18 PROCEDURE — 99214 OFFICE O/P EST MOD 30 MIN: CPT | Performed by: STUDENT IN AN ORGANIZED HEALTH CARE EDUCATION/TRAINING PROGRAM

## 2025-07-18 RX ORDER — DOXYCYCLINE HYCLATE 100 MG
100 TABLET ORAL 2 TIMES DAILY
Qty: 14 TABLET | Refills: 0 | Status: SHIPPED | OUTPATIENT
Start: 2025-07-18 | End: 2025-07-18 | Stop reason: CLARIF

## 2025-07-18 RX ORDER — METHYLPREDNISOLONE 4 MG/1
TABLET ORAL
Qty: 21 TABLET | Refills: 0 | Status: SHIPPED | OUTPATIENT
Start: 2025-07-18

## 2025-07-18 RX ORDER — BENZONATATE 100 MG/1
100 CAPSULE ORAL 3 TIMES DAILY PRN
Qty: 60 CAPSULE | Refills: 0 | Status: SHIPPED | OUTPATIENT
Start: 2025-07-18

## 2025-07-18 RX ORDER — AZITHROMYCIN 250 MG/1
TABLET, FILM COATED ORAL
Qty: 6 TABLET | Refills: 0 | Status: SHIPPED | OUTPATIENT
Start: 2025-07-18

## 2025-07-18 RX ORDER — ALBUTEROL SULFATE 90 UG/1
2 INHALANT RESPIRATORY (INHALATION) EVERY 6 HOURS PRN
Qty: 8.5 G | Refills: 0 | Status: SHIPPED | OUTPATIENT
Start: 2025-07-18

## 2025-07-18 ASSESSMENT — ENCOUNTER SYMPTOMS
HEMOPTYSIS: 0
SORE THROAT: 0
SPUTUM PRODUCTION: 1
COUGH: 1
WHEEZING: 1
FEVER: 0
CHILLS: 0
PALPITATIONS: 0
DIZZINESS: 0
HEADACHES: 0
SHORTNESS OF BREATH: 1

## 2025-07-18 ASSESSMENT — FIBROSIS 4 INDEX: FIB4 SCORE: 4.35

## 2025-07-18 NOTE — PROGRESS NOTES
"Subjective     Danielle Andie Mosher is a 65 y.o. female who presents with Cough (Congested X 30 days)            Danielle is a 65 y.o. female who presents to urgent care with a productive cough for 1 month.  Patient states she has been feeling sick for last month.  Patient states she did not come in sooner because she was \"contagious\" then.  She had doctors on wheels come to her house twice.  Patient mainly presents today as she is wanting a chest x-ray to make sure she does not have pneumonia.  Cough is worse at night and when lying flat.        Review of Systems   Constitutional:  Negative for chills and fever.   HENT:  Positive for congestion. Negative for ear pain and sore throat.    Respiratory:  Positive for cough, sputum production, shortness of breath and wheezing. Negative for hemoptysis.    Cardiovascular:  Negative for chest pain and palpitations.   Neurological:  Negative for dizziness and headaches.   All other systems reviewed and are negative.             Objective     /80   Pulse 69   Temp 35.9 °C (96.7 °F) (Temporal)   Resp 18   Ht 1.473 m (4' 10\")   Wt 70.8 kg (156 lb)   LMP  (LMP Unknown)   SpO2 91%   BMI 32.60 kg/m²      Physical Exam  Vitals reviewed.   Constitutional:       General: She is not in acute distress.     Appearance: Normal appearance. She is not toxic-appearing.   HENT:      Head: Normocephalic and atraumatic.      Nose: Nose normal.   Eyes:      Extraocular Movements: Extraocular movements intact.      Conjunctiva/sclera: Conjunctivae normal.      Pupils: Pupils are equal, round, and reactive to light.   Cardiovascular:      Rate and Rhythm: Normal rate.   Pulmonary:      Effort: Pulmonary effort is normal. No respiratory distress.      Breath sounds: Normal breath sounds. No stridor. No wheezing, rhonchi or rales.      Comments: + productive cough  Skin:     General: Skin is warm and dry.   Neurological:      General: No focal deficit present.      Mental Status: She is " alert and oriented to person, place, and time.                                  Assessment & Plan  Cough, unspecified type    Orders:    DX-CHEST-2 VIEWS    benzonatate (TESSALON) 100 MG Cap; Take 1 Capsule by mouth 3 times a day as needed for Cough.    COPD with acute exacerbation (HCC)    Orders:    methylPREDNISolone (MEDROL DOSEPAK) 4 MG Tablet Therapy Pack; Follow schedule on package instructions.    albuterol 108 (90 Base) MCG/ACT Aero Soln inhalation aerosol; Inhale 2 Puffs every 6 hours as needed for Shortness of Breath.           RADIOLOGY RESULTS   DX-CHEST-2 VIEWS  Result Date: 7/18/2025 7/18/2025 4:05 PM HISTORY/REASON FOR EXAM: Cough. TECHNIQUE/EXAM DESCRIPTION AND NUMBER OF VIEWS: Two views of the chest. COMPARISON:  5/22/2025 FINDINGS: The cardiomediastinal silhouette is stable. There are some increased markings at the lung bases which may be due to atelectasis or infiltrate. There is no definite pleural effusion or pneumothorax.    Mild bibasilar atelectasis versus infiltrate.           Follow up in 1 week to check for resolution whether in urgent care or with PCP.    Differential diagnoses, supportive care measures and indications for immediate follow-up discussed with patient. Pathogenesis of diagnosis discussed including typical length and natural progression.  Follow up with PCP. ER precautions discussed.    Instructed to return to urgent care or nearest emergency department if symptoms fail to improve, for any change in condition, further concerns, or new concerning symptoms.    Patient states understanding and agrees with the plan of care and discharge instructions.

## 2025-07-23 RX ORDER — PROMETHAZINE HYDROCHLORIDE 25 MG/1
25 TABLET ORAL EVERY 6 HOURS PRN
Qty: 385 TABLET | Refills: 2 | Status: SHIPPED | OUTPATIENT
Start: 2025-07-23

## 2025-07-23 NOTE — TELEPHONE ENCOUNTER
Received request via: Pharmacy    Was the patient seen in the last year in this department? Yes    Does the patient have an active prescription (recently filled or refills available) for medication(s) requested? No    Pharmacy Name: [id number] - [place], [place] - [address]      Does the patient have shelter Plus and need 100-day supply? (This applies to ALL medications) Patient does not have SCP

## 2025-08-08 ENCOUNTER — HOSPITAL ENCOUNTER (OUTPATIENT)
Facility: MEDICAL CENTER | Age: 65
End: 2025-08-08
Payer: MEDICAID

## 2025-08-08 ENCOUNTER — OFFICE VISIT (OUTPATIENT)
Dept: MEDICAL GROUP | Facility: CLINIC | Age: 65
End: 2025-08-08
Payer: MEDICAID

## 2025-08-08 VITALS
TEMPERATURE: 95.2 F | BODY MASS INDEX: 31.4 KG/M2 | WEIGHT: 149.6 LBS | DIASTOLIC BLOOD PRESSURE: 78 MMHG | HEIGHT: 58 IN | SYSTOLIC BLOOD PRESSURE: 126 MMHG | HEART RATE: 82 BPM | OXYGEN SATURATION: 90 %

## 2025-08-08 DIAGNOSIS — J44.9 CHRONIC OBSTRUCTIVE PULMONARY DISEASE, UNSPECIFIED COPD TYPE (HCC): ICD-10-CM

## 2025-08-08 DIAGNOSIS — N89.8 VAGINAL ITCHING: ICD-10-CM

## 2025-08-08 DIAGNOSIS — R30.0 DYSURIA: ICD-10-CM

## 2025-08-08 DIAGNOSIS — Z12.4 CERVICAL CANCER SCREENING: ICD-10-CM

## 2025-08-08 DIAGNOSIS — K21.9 GASTROESOPHAGEAL REFLUX DISEASE, UNSPECIFIED WHETHER ESOPHAGITIS PRESENT: ICD-10-CM

## 2025-08-08 DIAGNOSIS — Z23 NEED FOR VACCINATION: Primary | ICD-10-CM

## 2025-08-08 DIAGNOSIS — Z12.31 ENCOUNTER FOR SCREENING MAMMOGRAM FOR MALIGNANT NEOPLASM OF BREAST: ICD-10-CM

## 2025-08-08 DIAGNOSIS — Z12.11 SCREEN FOR COLON CANCER: ICD-10-CM

## 2025-08-08 LAB
APPEARANCE UR: CLEAR
BILIRUB UR STRIP-MCNC: NORMAL MG/DL
COLOR UR AUTO: NORMAL
GLUCOSE UR STRIP.AUTO-MCNC: NEGATIVE MG/DL
KETONES UR STRIP.AUTO-MCNC: NEGATIVE MG/DL
LEUKOCYTE ESTERASE UR QL STRIP.AUTO: NEGATIVE
NITRITE UR QL STRIP.AUTO: NEGATIVE
PH UR STRIP.AUTO: 5.5 [PH] (ref 5–8)
PROT UR QL STRIP: NORMAL MG/DL
RBC UR QL AUTO: NEGATIVE
SP GR UR STRIP.AUTO: 1.03
UROBILINOGEN UR STRIP-MCNC: 0.2 MG/DL

## 2025-08-08 PROCEDURE — 88175 CYTOPATH C/V AUTO FLUID REDO: CPT

## 2025-08-08 PROCEDURE — 87480 CANDIDA DNA DIR PROBE: CPT

## 2025-08-08 PROCEDURE — 90715 TDAP VACCINE 7 YRS/> IM: CPT | Mod: GC

## 2025-08-08 PROCEDURE — 87624 HPV HI-RISK TYP POOLED RSLT: CPT

## 2025-08-08 PROCEDURE — 3074F SYST BP LT 130 MM HG: CPT | Mod: GC

## 2025-08-08 PROCEDURE — 87660 TRICHOMONAS VAGIN DIR PROBE: CPT

## 2025-08-08 PROCEDURE — 3078F DIAST BP <80 MM HG: CPT | Mod: GC

## 2025-08-08 PROCEDURE — 90471 IMMUNIZATION ADMIN: CPT | Mod: GC

## 2025-08-08 PROCEDURE — 87591 N.GONORRHOEAE DNA AMP PROB: CPT

## 2025-08-08 PROCEDURE — 81002 URINALYSIS NONAUTO W/O SCOPE: CPT | Mod: GC

## 2025-08-08 PROCEDURE — 99213 OFFICE O/P EST LOW 20 MIN: CPT | Mod: 25,GE

## 2025-08-08 PROCEDURE — 87510 GARDNER VAG DNA DIR PROBE: CPT

## 2025-08-08 PROCEDURE — 87491 CHLMYD TRACH DNA AMP PROBE: CPT

## 2025-08-08 PROCEDURE — 87625 HPV TYPES 16 & 18 ONLY: CPT | Mod: 59

## 2025-08-08 RX ORDER — OMEPRAZOLE 20 MG/1
20 CAPSULE, DELAYED RELEASE ORAL
Qty: 90 CAPSULE | Refills: 3 | Status: SHIPPED | OUTPATIENT
Start: 2025-08-08

## 2025-08-08 ASSESSMENT — FIBROSIS 4 INDEX: FIB4 SCORE: 4.35

## 2025-08-09 LAB
C TRACH DNA GENITAL QL NAA+PROBE: NEGATIVE
C TRACH DNA GENITAL QL NAA+PROBE: NEGATIVE
CANDIDA DNA VAG QL PROBE+SIG AMP: NEGATIVE
G VAGINALIS DNA VAG QL PROBE+SIG AMP: NEGATIVE
N GONORRHOEA DNA GENITAL QL NAA+PROBE: NEGATIVE
N GONORRHOEA DNA GENITAL QL NAA+PROBE: NEGATIVE
SPECIMEN SOURCE: NORMAL
T VAGINALIS DNA VAG QL PROBE+SIG AMP: NEGATIVE

## 2025-08-12 LAB
HPV I/H RISK 1 DNA SPEC QL NAA+PROBE: DETECTED
HPV16 DNA CVX QL PROBE+SIG AMP: NOT DETECTED
HPV18 DNA CVX QL PROBE+SIG AMP: NOT DETECTED
SPECIMEN SOURCE: ABNORMAL
SPECIMEN SOURCE: NORMAL
THINPREP PAP, CYTOLOGY NL11781: NORMAL

## (undated) DEVICE — SUTURE 2-0 VICRYL PLUS CT-1 - 8 X 18 INCH(12/BX)

## (undated) DEVICE — COVER MAYO STAND X-LG - (22EA/CA)

## (undated) DEVICE — SODIUM CHL IRRIGATION 0.9% 1000ML (12EA/CA)

## (undated) DEVICE — SUTURE GENERAL

## (undated) DEVICE — CLEANER ELECTRO-SURGICAL TIP - (25/BX 4BX/CA)

## (undated) DEVICE — TOWEL STOP TIMEOUT SAFETY FLAG (40EA/CA)

## (undated) DEVICE — DRAPE IOBAN II INCISE 23X17 - (10EA/BX 4BX/CA)

## (undated) DEVICE — SYRINGE NON SAFETY 10 CC 20 GA X 1-1/2 IN (100/BX 4BX/CA)

## (undated) DEVICE — GLOVE BIOGEL PI ORTHO SZ 7.5 PF LF (40PR/BX)

## (undated) DEVICE — SET EXTENSION WITH 2 PORTS (48EA/CA) ***PART #2C8610 IS A SUBSTITUTE*****

## (undated) DEVICE — HEADREST PRONEVIEW LARGE - (10/CA)

## (undated) DEVICE — TUBING C&T SET FLYING LEADS DRAIN TUBING (10EA/BX)

## (undated) DEVICE — SLEEVE, VASO, THIGH, MED

## (undated) DEVICE — SUCTION INSTRUMENT YANKAUER BULBOUS TIP W/O VENT (50EA/CA)

## (undated) DEVICE — MEDICINE CUP STERILE 2 OZ - (100/CA)

## (undated) DEVICE — CATHETER IV 14 GA X 2 ---SURG.& SDS ONLY---(200EA/CA)

## (undated) DEVICE — CANISTER SUCTION 3000ML MECHANICAL FILTER AUTO SHUTOFF MEDI-VAC NONSTERILE LF DISP  (40EA/CA)

## (undated) DEVICE — BOVIE BLADE SHORT - (150EA/CT)

## (undated) DEVICE — DRAPE MICROSCOPE ARMATEC 120IN X 46IN (10EA/CA)

## (undated) DEVICE — CATHETER FOLEY ROBINSON 16FR 16IN STRL (12EA/CA)

## (undated) DEVICE — SENSOR OXIMETER ADULT SPO2 RD SET (20EA/BX)

## (undated) DEVICE — SUTURE 3-0 VICRYL PLUS RB-1 - 8 X 18 INCH (12/BX)

## (undated) DEVICE — GLOVE BIOGEL ECLIPSE  PF LATEX SIZE 6.5 (50PR/BX)

## (undated) DEVICE — DRAPESURG STERI-DRAPE LONG - (10/BX 4BX/CA)

## (undated) DEVICE — SET LEADWIRE 5 LEAD BEDSIDE DISPOSABLE ECG (1SET OF 5/EA)

## (undated) DEVICE — GLOVE BIOGEL INDICATOR SZ 7SURGICAL PF LTX - (50/BX 4BX/CA)

## (undated) DEVICE — PACK CRANI - (1EA/CA)

## (undated) DEVICE — DRAPE LAPAROTOMY T SHEET - (12EA/CA)

## (undated) DEVICE — MIDAS LUBRICATOR DIFFUSER PACK (4EA/CA)

## (undated) DEVICE — DRAPE STRLE REG TOWEL 18X24 - (10/BX 4BX/CA)"

## (undated) DEVICE — DRAPE LARGE 3 QUARTER - (20/CA)

## (undated) DEVICE — SPONGE XRAY 8X4 STERL. 12PL - (10EA/TY 80TY/CA)

## (undated) DEVICE — ARMREST CRADLE FOAM - (2PR/PK 12PR/CA)

## (undated) DEVICE — CONTAINER SPECIMEN BAG OR - STERILE 4 OZ W/LID (100EA/CA)

## (undated) DEVICE — GOWN WARMING STANDARD FLEX - (30/CA)

## (undated) DEVICE — ELECTRODE DUAL RETURN W/ CORD - (50/PK)

## (undated) DEVICE — SUTURE 0 VICRYL PLUS CT-1 - 8 X 18 INCH (12/BX)

## (undated) DEVICE — COVER LIGHT HANDLE ALC PLUS DISP (18EA/BX)

## (undated) DEVICE — PATTIES SURG NEURO X-RAY 1/2X1/2 (10EA/PK 20PK/CS)

## (undated) DEVICE — LACTATED RINGERS INJ 1000 ML - (14EA/CA 60CA/PF)

## (undated) DEVICE — KIT SURGIFLO W/OUT THROMBIN - (6EA/CA)

## (undated) DEVICE — GLOVE BIOGEL PI ORTHO SZ 7 PF LF (40PR/BX)

## (undated) DEVICE — KIT EVACUATER 3 SPRING PVC LF 1/8 DRAIN SIZE (10EA/CA)"

## (undated) DEVICE — LACTATED RINGERS INJ. 500 ML - (24EA/CA)

## (undated) DEVICE — TUBING CLEARLINK DUO-VENT - C-FLO (48EA/CA)

## (undated) DEVICE — PACK NEURO - (2EA/CA)

## (undated) DEVICE — Device

## (undated) DEVICE — CLOSURE WOUND 1/4 X 4 (STERI - STRIP) (50/BX 4BX/CA)

## (undated) DEVICE — SPONGE GAUZESTER 4 X 4 4PLY - (128PK/CA)

## (undated) DEVICE — PATTIES SURG NEURO X-RAY 1X1 (10EA/PK 20PK/CA)